# Patient Record
Sex: FEMALE | Race: WHITE | Employment: OTHER | URBAN - METROPOLITAN AREA
[De-identification: names, ages, dates, MRNs, and addresses within clinical notes are randomized per-mention and may not be internally consistent; named-entity substitution may affect disease eponyms.]

---

## 2017-02-23 RX ORDER — CHOLECALCIFEROL (VITAMIN D3) 125 MCG
5000 CAPSULE ORAL DAILY
Qty: 60 CAP | Refills: 1 | Status: SHIPPED | OUTPATIENT
Start: 2017-02-23 | End: 2017-04-13 | Stop reason: ALTCHOICE

## 2017-02-28 ENCOUNTER — OFFICE VISIT (OUTPATIENT)
Dept: INTERNAL MEDICINE CLINIC | Age: 62
End: 2017-02-28

## 2017-02-28 VITALS
OXYGEN SATURATION: 99 % | HEART RATE: 65 BPM | TEMPERATURE: 98 F | DIASTOLIC BLOOD PRESSURE: 69 MMHG | WEIGHT: 160.2 LBS | RESPIRATION RATE: 12 BRPM | BODY MASS INDEX: 25.75 KG/M2 | HEIGHT: 66 IN | SYSTOLIC BLOOD PRESSURE: 147 MMHG

## 2017-02-28 DIAGNOSIS — L30.9 DERMATITIS: Primary | ICD-10-CM

## 2017-02-28 NOTE — MR AVS SNAPSHOT
Visit Information Date & Time Provider Department Dept. Phone Encounter #  
 2/28/2017  8:15 AM Nela Allan NP Internist of [facility] [id number] Your Appointments 3/23/2017  8:05 AM  
LAB with [facility] NURSE VISIT Internist of [facility] ([facility]) Appt Note: labs 6mos. sarris; labs 6mos. sarris [address], [facility] [id number] [place] [address] [address]  
  
   
 [address], [address]  
  
    
 3/30/2017  1:00 PM  
Office Visit with Vince Goldberg, MD  
Internist of [facility] Appt Note: ov 6mos. sarris; 6 mos per bs  
 [address], [address]  
  
   
 [address], [address] Upcoming Health Maintenance Date Due Pneumococcal 19-64 Highest Risk (1 of 3 - PCV13) 4[birth year]8/1974 COLONOSCOPY 12/29/2014 FOOT EXAM Q1 12/15/2016 LIPID PANEL Q1 5/24/2017 PAP AKA CERVICAL CYTOLOGY 6/30/2017 HEMOGLOBIN A1C Q6M 7/18/2017 MICROALBUMIN Q1 9/7/2017 EYE EXAM RETINAL OR DILATED Q1 11/1/2017 BREAST CANCER SCRN MAMMOGRAM 1/16/2018 DTaP/Tdap/Td series (2 - Td) 6/25/2023 Allergies as of 2/28/2017  Review Complete On: 2/28/2017 By: Nela Allan NP Severity Noted Reaction Type Reactions Pcn [Penicillins]    Hives Sulfa (Sulfonamide Antibiotics)  04/13/2015    Nausea Only, Vertigo Current Immunizations  Reviewed on 9/14/2016 Name Date Influenza Vaccine (Quad) PF 9/14/2016 12:16 PM, 12/15/2015  9:44 AM  
 Influenza Vaccine PF 12/22/2014  2:01 PM  
 Influenza Vaccine Split 10/17/2012, 11/4/2011 Influenza Vaccine Whole 11/9/2010 Tdap 6/25/2013 Zoster Vaccine, Live 5/31/2016  2:05 PM  
  
 Not reviewed this visit You Were Diagnosed With   
  
 Codes Comments Dermatitis    -  Primary ICD-10-CM: L30.9 ICD-9-CM: 692.9 Vitals  BP  
  
  
  
  
  
 147/69 (BP 1 Location: Left arm, BP Patient Position: Sitting) BMI and BSA Data Body Mass Index Body Surface Area  
 26.25 kg/m 2 1.83 m 2 Preferred Pharmacy Pharmacy Name Phone [facility] [address] [place] [address] [place] [phone] Your Updated Medication List  
  
   
This list is accurate as of: 2/28/17  8:38 AM.  Always use your most recent med list.  
  
  
  
  
 ALPHAGAN P 0.1 % ophthalmic solution Generic drug:  brimonidine  
  
 aspirin delayed-release 81 mg tablet Take  by mouth daily. cholecalciferol 5,000 unit capsule Commonly known as:  VITAMIN D3 Take 1 Cap by mouth daily. Indications: VITAMIN D DEFICIENCY  
  
 ezetimibe 10 mg tablet Commonly known as:  Kamilla Thurstonda Take 1 Tab by mouth daily. glucose blood VI test strips strip Commonly known as:  ONETOUCH ULTRA TEST  
USE TO TEST BLOOD SUGAR FOUR TIMES A DAY HumaLOG Pen 100 unit/mL kwikpen Generic drug:  insulin lispro  
by SubCUTAneous route. Sliding scale as needed LANTUS SOLOSTAR 100 unit/mL (3 mL) pen Generic drug:  insulin glargine 23 Units by SubCUTAneous route daily. levothyroxine 112 mcg tablet Commonly known as:  SYNTHROID Take 1 Tab by mouth Daily (before breakfast). * quinapril 5 mg tablet Commonly known as:  ACCUPRIL Take 1 Tab by mouth nightly. Takes 5mg with 10mg every evening. * quinapril 10 mg tablet Commonly known as:  ACCUPRIL  
1 tab every evening along with a 5 mg tab VITAMIN B-12 500 mcg tablet Generic drug:  cyanocobalamin Take 500 mcg by mouth daily. * Notice: This list has 2 medication(s) that are the same as other medications prescribed for you. Read the directions carefully, and ask your doctor or other care provider to review them with you. Patient Instructions Health Maintenance Due Topic Date Due  
  Pneumococcal 19-64 Highest Risk (1 of 3 - PCV13) [birth year]08[birth year]1974  COLONOSCOPY  12/29/2014  
 FOOT EXAM Q1  12/15/2016 Dermatitis: Care Instructions Your Care Instructions Dermatitis is the general name used for any rash or inflammation of the skin. Different kinds of dermatitis cause different kinds of rashes. Common causes of a rash include new medicines, plants (such as poison oak or poison ivy), heat, and stress. Certain illnesses can also cause a rash. An allergic reaction to something that touches your skin, such as latex, nickel, or poison ivy, is called contact dermatitis. Contact dermatitis may also be caused by something that irritates the skin, such as bleach, a chemical, or soap. These types of rashes cannot be spread from person to person. How long your rash will last depends on what caused it. Rashes may last a few days or months. Follow-up care is a key part of your treatment and safety. Be sure to make and go to all appointments, and call your doctor if you are having problems. It's also a good idea to know your test results and keep a list of the medicines you take. How can you care for yourself at home? · Do not scratch the rash. Cut your nails short, and file them smooth. Or wear gloves if this helps keep you from scratching. · Wash the area with water only. Pat dry. · Put cold, wet cloths on the rash to reduce itching. · Keep cool, and stay out of the sun. · Leave the rash open to the air as much as possible. · If the rash itches, use hydrocortisone cream. Follow the directions on the label. Calamine lotion may help for plant rashes. · Take an over-the-counter antihistamine, such as diphenhydramine (Benadryl) or loratadine (Claritin), to help calm the itching. Read and follow all instructions on the label. · If your doctor prescribed a cream, use it as directed. If your doctor prescribed medicine, take it exactly as directed. When should you call for help? Call your doctor now or seek immediate medical care if: 
· You have symptoms of infection, such as: 
¨ Increased pain, swelling, warmth, or redness. ¨ Red streaks leading from the area. ¨ Pus draining from the area. ¨ A fever. · You have joint pain along with the rash. Watch closely for changes in your health, and be sure to contact your doctor if: 
· Your rash is changing or getting worse. · You are not getting better as expected. Where can you learn more? Go to http://laura-elsa.info/. Enter ([phone] in the search box to learn more about \"Dermatitis: Care Instructions. \" Current as of: May 10, 2016 Content Version: 11.1 © 5925-8308 [employer]. Care instructions adapted under license by "[employer] (which disclaims liability or warranty for this information). If you have questions about a medical condition or this instruction, always ask your healthcare professional. [place] [address] any warranty or liability for your use of this information. Introducing [facility]! Joselito Collins introduces 5skills patient portal. Now you can access parts of your medical record, email your doctor's office, and request medication refills online. 1. In your internet browser, go to [email or web address] 2. Click on the First Time User? Click Here link in the Sign In box. You will see the New Member Sign Up page. 3. Enter your 5skills Access Code exactly as it appears below. You will not need to use this code after youve completed the sign-up process. If you do not sign up before the expiration date, you must request a new code. · 5skills Access Code: [email or web address] Expires: 5/29/2017  8:38 AM 
 
4. Enter the last four digits of your Social Security Number (xxxx) and Date of Birth (mm/dd/yyyy) as indicated and click Submit. You will be taken to the next sign-up page. 5. Create a Ajungo ID. This will be your Ajungo login ID and cannot be changed, so think of one that is secure and easy to remember. 6. Create a Ajungo password. You can change your password at any time. 7. Enter your Password Reset Question and Answer. This can be used at a later time if you forget your password. 8. Enter your e-mail address. You will receive e-mail notification when new information is available in [address] 9. Click Sign Up. You can now view and download portions of your medical record. 10. Click the Download Summary menu link to download a portable copy of your medical information. If you have questions, please visit the Frequently Asked Questions section of the Ajungo website. Remember, Ajungo is NOT to be used for urgent needs. For medical emergencies, dial 911. Now available from your iPhone and Android! Please provide this summary of care documentation to your next provider. Your primary care clinician is listed as Dandre Pierce. If you have any questions after today's visit, please call [phone].

## 2017-02-28 NOTE — PROGRESS NOTES
HISTORY OF PRESENT ILLNESS  Jens Melara is a 64 y.o. female. Here today for acute visit. She reports about 1 week for congestion, sore throat, ear ache. This has since improved, she took nyquil, she states she is just about 100% recovered but she continues to have swelling under her right eye. No known injury to this area. No vision changes or drainage from eye. No eye redness. She does have chronic retinal problems which she follows with her ophthalmologist closely, but no acute changes related to this skin problem in the last 1 week. She states she has been using much make up on it, trying to cover it up. Has not been using any medications. No fevers, eating and drinking with no problems. No sick contacts. Skin Problem   The history is provided by the patient. This is a new problem. The current episode started more than 2 days ago. The problem occurs constantly. The problem has not changed since onset. Pertinent negatives include no chest pain, no abdominal pain, no headaches and no shortness of breath. She has tried nothing for the symptoms. Past Medical History:   Diagnosis Date    Diabetes mellitus type 1 ([address]     Diabetic peripheral neuropathy ([address]     Diabetic retinopathy ([address]     Essential hypertension with goal blood pressure less than 140/90     Hearing decreased     Hyperlipidemia     Hypothyroidism      Past Surgical History:   Procedure Laterality Date    HX GYN      partial hysterectomy    HX GYN      3 D and C's    HX HEENT      tonsillectomy    HX HEENT      cataract removal bilateral    HX HEENT      multiple surgeries for retinopathy    HX ORTHOPAEDIC      carpral tunnel sugery bilateral     Current Outpatient Prescriptions   Medication Sig    cholecalciferol (VITAMIN D3) 5,000 unit capsule Take 1 Cap by mouth daily. Indications: VITAMIN D DEFICIENCY    ezetimibe (ZETIA) 10 mg tablet Take 1 Tab by mouth daily.     quinapril (ACCUPRIL) 10 mg tablet 1 tab every evening along with a 5 mg tab    levothyroxine (SYNTHROID) 112 mcg tablet Take 1 Tab by mouth Daily (before breakfast).  quinapril (ACCUPRIL) 5 mg tablet Take 1 Tab by mouth nightly. Takes 5mg with 10mg every evening.  ALPHAGAN P 0.1 % ophthalmic solution     glucose blood VI test strips (ONE TOUCH ULTRA TEST) strip USE TO TEST BLOOD SUGAR FOUR TIMES A DAY    cyanocobalamin (VITAMIN B-12) 500 mcg tablet Take 500 mcg by mouth daily.  aspirin delayed-release 81 mg tablet Take  by mouth daily.  insulin glargine (LANTUS SOLOSTAR) 100 unit/mL (3 mL) pen 23 Units by SubCUTAneous route daily.  insulin lispro (HUMALOG PEN) 100 unit/mL kwikpen by SubCUTAneous route. Sliding scale as needed       No current facility-administered medications for this visit. Allergies and Intolerances: Allergies   Allergen Reactions    Pcn [Penicillins] Hives    Sulfa (Sulfonamide Antibiotics) Nausea Only and Vertigo     Family History:   Family History   Problem Relation Age of Onset    Diabetes Brother     Diabetes Mother     Heart Disease Mother         Social History:   She  reports that she quit smoking about 2 years ago. She smoked 1.00 pack per day. She has never used smokeless tobacco.  She  reports that she drinks alcohol. Vitals:   Visit Vitals    /69 (BP 1 Location: Left arm, BP Patient Position: Sitting)    Pulse 65    Temp 98 °F (36.7 °C) (Oral)    Resp 12    Ht 5' 5.5\" (1.664 m)    Wt 160 lb 3.2 oz (72.7 kg)    SpO2 99%    BMI 26.25 kg/m2        Body surface area is 1.83 meters squared. BP Readings from Last 3 Encounters:   02/28/17 147/69   09/14/16 132/60   05/31/16 124/62        Wt Readings from Last 3 Encounters:   02/28/17 160 lb 3.2 oz (72.7 kg)   09/14/16 172 lb (78 kg)   05/31/16 168 lb (76.2 kg)        Case and notes discussed with MA and reviewed with patient for accuracy.      I have personally reviewed and subsequently discussed with the MA any pertinent, preliminary and incomplete elements of the history related to the chief complaint that were recorded by the MA in the patients chart during the initial rooming of the patient. As I have explored the necessary and required elements in detail with the patient during my personal interview with them, I have personally verified, clarified, amended, added to and/or revised said elements based on information acquired during during the interview. Review of Systems   Constitutional: Negative for chills and fever. HENT: Positive for congestion. Negative for ear pain and sore throat. Congestion resolving. Eyes: Positive for blurred vision. Negative for pain, discharge and redness. Chronic vision changes, blurred vision, no change in the last 1 week. Respiratory: Negative for cough, shortness of breath and wheezing. Cardiovascular: Negative for chest pain and palpitations. Gastrointestinal: Negative for abdominal pain, constipation, diarrhea, nausea and vomiting. Genitourinary: Negative for dysuria. Skin: Negative for itching and rash. Neurological: Negative for seizures, loss of consciousness and headaches. Physical Exam   Constitutional: She appears well-developed and well-nourished. No distress. HENT:   Head: Normocephalic and atraumatic. Nose: Nose normal. No mucosal edema or rhinorrhea. Mouth/Throat: Uvula is midline, oropharynx is clear and moist and mucous membranes are normal. No posterior oropharyngeal edema or posterior oropharyngeal erythema. Eyes: Conjunctivae are normal. Pupils are equal, round, and reactive to light. Right eye exhibits no discharge. Left eye exhibits no discharge. Right conjunctiva is not injected. Right conjunctiva has no hemorrhage. Left conjunctiva is not injected. Left conjunctiva has no hemorrhage. No scleral icterus. Right eye exhibits normal extraocular motion. Left eye exhibits normal extraocular motion.        Patient with mascara and eye liner and cover up on today. No eye drainage, redness, crusting. Cardiovascular: Normal rate, regular rhythm and normal heart sounds. Pulmonary/Chest: Effort normal and breath sounds normal. No respiratory distress. She has no wheezes. Lymphadenopathy:     She has no cervical adenopathy. Neurological: She is alert. Skin: Skin is warm and dry. ASSESSMENT and PLAN    ICD-10-CM ICD-9-CM    1. Dermatitis: No sign of infection today. Does appear to be irritated, small healing skin tear or abrasion. Today discuss wound care to this sensitive area. Wash with mild soap, pat dry. White fragrance free lotion. Refrain from make up now. She did take a photo of this on her phone today for comparison. Discussed follow up in 1 week to reassess, she states she will call if needed. Discussed signs symptoms of infection, need for follow up, she agrees to plan. L30.9 692.9      Chiara Brunson was seen today for cold symptoms and eye swelling. Diagnoses and all orders for this visit:    Dermatitis      Follow-up Disposition:  Return in about 1 week (around 3/7/2017), or if symptoms worsen or fail to improve. The plan of care was discussed with the patient, who verbalizes understanding. Discussed all medications, side effects with patient. The patient is to follow up as scheduled and will report to the ED or the office if symptoms change or increase. The patient has voiced understanding and will comply to plan of care.

## 2017-02-28 NOTE — PROGRESS NOTES
Chief Complaint   Patient presents with    Cold Symptoms     x 1 week    Eye Swelling     x 1 week     1. Have you been to the ER, urgent care clinic since your last visit? Hospitalized since your last visit? No    2. Have you seen or consulted any other health care providers outside of the [address] [place] [address] since your last visit? Include any pap smears or colon screening.  No

## 2017-02-28 NOTE — PATIENT INSTRUCTIONS
Health Maintenance Due   Topic Date Due    Pneumococcal 19-64 Highest Risk (1 of 3 - PCV13) 04/08/1974    COLONOSCOPY  12/29/2014    FOOT EXAM Q1  12/15/2016          Dermatitis: Care Instructions  Your Care Instructions  Dermatitis is the general name used for any rash or inflammation of the skin. Different kinds of dermatitis cause different kinds of rashes. Common causes of a rash include new medicines, plants (such as poison oak or poison ivy), heat, and stress. Certain illnesses can also cause a rash. An allergic reaction to something that touches your skin, such as latex, nickel, or poison ivy, is called contact dermatitis. Contact dermatitis may also be caused by something that irritates the skin, such as bleach, a chemical, or soap. These types of rashes cannot be spread from person to person. How long your rash will last depends on what caused it. Rashes may last a few days or months. Follow-up care is a key part of your treatment and safety. Be sure to make and go to all appointments, and call your doctor if you are having problems. It's also a good idea to know your test results and keep a list of the medicines you take. How can you care for yourself at home? · Do not scratch the rash. Cut your nails short, and file them smooth. Or wear gloves if this helps keep you from scratching. · Wash the area with water only. Pat dry. · Put cold, wet cloths on the rash to reduce itching. · Keep cool, and stay out of the sun. · Leave the rash open to the air as much as possible. · If the rash itches, use hydrocortisone cream. Follow the directions on the label. Calamine lotion may help for plant rashes. · Take an over-the-counter antihistamine, such as diphenhydramine (Benadryl) or loratadine (Claritin), to help calm the itching. Read and follow all instructions on the label. · If your doctor prescribed a cream, use it as directed. If your doctor prescribed medicine, take it exactly as directed.   When should you call for help? Call your doctor now or seek immediate medical care if:  · You have symptoms of infection, such as:  ¨ Increased pain, swelling, warmth, or redness. ¨ Red streaks leading from the area. ¨ Pus draining from the area. ¨ A fever. · You have joint pain along with the rash. Watch closely for changes in your health, and be sure to contact your doctor if:  · Your rash is changing or getting worse. · You are not getting better as expected. Where can you learn more? Go to http://laura-elsa.info/. Enter (98) 8811 4307 in the search box to learn more about \"Dermatitis: Care Instructions. \"  Current as of: May 10, 2016  Content Version: 11.1  © 6019-4876 [employer]. Care instructions adapted under license by [employer] (which disclaims liability or warranty for this information). If you have questions about a medical condition or this instruction, always ask your healthcare professional. [place] [address] any warranty or liability for your use of this information.

## 2017-03-23 ENCOUNTER — HOSPITAL ENCOUNTER (OUTPATIENT)
Dept: LAB | Age: 62
Discharge: HOME | End: 2017-03-23
Payer: OTHER GOVERNMENT

## 2017-03-23 DIAGNOSIS — E78.5 HYPERLIPIDEMIA, UNSPECIFIED HYPERLIPIDEMIA TYPE: ICD-10-CM

## 2017-03-23 DIAGNOSIS — E03.4 HYPOTHYROIDISM DUE TO ACQUIRED ATROPHY OF THYROID: ICD-10-CM

## 2017-03-23 DIAGNOSIS — I10 ESSENTIAL HYPERTENSION WITH GOAL BLOOD PRESSURE LESS THAN 140/90: ICD-10-CM

## 2017-03-23 DIAGNOSIS — N18.30 TYPE 2 DIABETES MELLITUS WITH STAGE 3 CHRONIC KIDNEY DISEASE (HCC): ICD-10-CM

## 2017-03-23 DIAGNOSIS — E11.22 TYPE 2 DIABETES MELLITUS WITH STAGE 3 CHRONIC KIDNEY DISEASE (HCC): ICD-10-CM

## 2017-03-23 LAB
ALBUMIN SERPL BCP-MCNC: 3.3 G/DL (ref 3.4–5)
ALBUMIN/GLOB SERPL: 1.2 {RATIO} (ref 0.8–1.7)
ALP SERPL-CCNC: 62 U/L (ref 45–117)
ALT SERPL-CCNC: 16 U/L (ref 13–56)
ANION GAP BLD CALC-SCNC: 8 MMOL/L (ref 3–18)
APPEARANCE UR: CLEAR
AST SERPL W P-5'-P-CCNC: 13 U/L (ref 15–37)
BACTERIA URNS QL MICRO: NEGATIVE /HPF
BASOPHILS # BLD AUTO: 0 K/UL (ref 0–0.06)
BASOPHILS # BLD: 0 % (ref 0–2)
BILIRUB SERPL-MCNC: 0.3 MG/DL (ref 0.2–1)
BILIRUB UR QL: NEGATIVE
BUN SERPL-MCNC: 29 MG/DL (ref 7–18)
BUN/CREAT SERPL: 31 (ref 12–20)
CALCIUM SERPL-MCNC: 8.5 MG/DL (ref 8.5–10.1)
CHLORIDE SERPL-SCNC: 104 MMOL/L (ref 100–108)
CHOLEST SERPL-MCNC: 160 MG/DL
CO2 SERPL-SCNC: 26 MMOL/L (ref 21–32)
COLOR UR: YELLOW
CREAT SERPL-MCNC: 0.95 MG/DL (ref 0.6–1.3)
DIFFERENTIAL METHOD BLD: ABNORMAL
EOSINOPHIL # BLD: 0.1 K/UL (ref 0–0.4)
EOSINOPHIL NFR BLD: 1 % (ref 0–5)
EPITH CASTS URNS QL MICRO: ABNORMAL /LPF (ref 0–5)
ERYTHROCYTE [DISTWIDTH] IN BLOOD BY AUTOMATED COUNT: 12.1 % (ref 11.6–14.5)
GLOBULIN SER CALC-MCNC: 2.7 G/DL (ref 2–4)
GLUCOSE SERPL-MCNC: 138 MG/DL (ref 74–99)
GLUCOSE UR STRIP.AUTO-MCNC: NEGATIVE MG/DL
HCT VFR BLD AUTO: 32.1 % (ref 35–45)
HDLC SERPL-MCNC: 61 MG/DL (ref 40–60)
HDLC SERPL: 2.6 {RATIO} (ref 0–5)
HGB BLD-MCNC: 10.4 G/DL (ref 12–16)
HGB UR QL STRIP: NEGATIVE
KETONES UR QL STRIP.AUTO: NEGATIVE MG/DL
LDLC SERPL CALC-MCNC: 82.2 MG/DL (ref 0–100)
LEUKOCYTE ESTERASE UR QL STRIP.AUTO: ABNORMAL
LIPID PROFILE,FLP: ABNORMAL
LYMPHOCYTES # BLD AUTO: 10 % (ref 21–52)
LYMPHOCYTES # BLD: 0.7 K/UL (ref 0.9–3.6)
MCH RBC QN AUTO: 32.4 PG (ref 24–34)
MCHC RBC AUTO-ENTMCNC: 32.4 G/DL (ref 31–37)
MCV RBC AUTO: 100 FL (ref 74–97)
MONOCYTES # BLD: 0.5 K/UL (ref 0.05–1.2)
MONOCYTES NFR BLD AUTO: 6 % (ref 3–10)
NEUTS SEG # BLD: 6.1 K/UL (ref 1.8–8)
NEUTS SEG NFR BLD AUTO: 83 % (ref 40–73)
NITRITE UR QL STRIP.AUTO: NEGATIVE
PH UR STRIP: 5 [PH] (ref 5–8)
PLATELET # BLD AUTO: 220 K/UL (ref 135–420)
PMV BLD AUTO: 11.8 FL (ref 9.2–11.8)
POTASSIUM SERPL-SCNC: 4.4 MMOL/L (ref 3.5–5.5)
PROT SERPL-MCNC: 6 G/DL (ref 6.4–8.2)
PROT UR STRIP-MCNC: NEGATIVE MG/DL
RBC # BLD AUTO: 3.21 M/UL (ref 4.2–5.3)
RBC #/AREA URNS HPF: 0 /HPF (ref 0–5)
SODIUM SERPL-SCNC: 138 MMOL/L (ref 136–145)
SP GR UR REFRACTOMETRY: 1.02 (ref 1–1.03)
TRIGL SERPL-MCNC: 84 MG/DL (ref ?–150)
TSH SERPL DL<=0.05 MIU/L-ACNC: 0.19 UIU/ML (ref 0.36–3.74)
UROBILINOGEN UR QL STRIP.AUTO: 0.2 EU/DL (ref 0.2–1)
VLDLC SERPL CALC-MCNC: 16.8 MG/DL
WBC # BLD AUTO: 7.4 K/UL (ref 4.6–13.2)
WBC URNS QL MICRO: ABNORMAL /HPF (ref 0–4)

## 2017-03-23 PROCEDURE — 81001 URINALYSIS AUTO W/SCOPE: CPT | Performed by: INTERNAL MEDICINE

## 2017-03-23 PROCEDURE — 82306 VITAMIN D 25 HYDROXY: CPT | Performed by: INTERNAL MEDICINE

## 2017-03-23 PROCEDURE — 84443 ASSAY THYROID STIM HORMONE: CPT | Performed by: INTERNAL MEDICINE

## 2017-03-23 PROCEDURE — 82043 UR ALBUMIN QUANTITATIVE: CPT | Performed by: INTERNAL MEDICINE

## 2017-03-23 PROCEDURE — 36415 COLL VENOUS BLD VENIPUNCTURE: CPT | Performed by: INTERNAL MEDICINE

## 2017-03-23 PROCEDURE — 80061 LIPID PANEL: CPT | Performed by: INTERNAL MEDICINE

## 2017-03-23 PROCEDURE — 85025 COMPLETE CBC W/AUTO DIFF WBC: CPT | Performed by: INTERNAL MEDICINE

## 2017-03-23 PROCEDURE — 80053 COMPREHEN METABOLIC PANEL: CPT | Performed by: INTERNAL MEDICINE

## 2017-03-24 LAB
25(OH)D3 SERPL-MCNC: 53.2 NG/ML (ref 30–100)
CREAT UR-MCNC: 142.36 MG/DL (ref 30–125)
MICROALBUMIN UR-MCNC: 3.2 MG/DL (ref 0–3)
MICROALBUMIN/CREAT UR-RTO: 22 MG/G (ref 0–30)

## 2017-03-26 RX ORDER — QUINAPRIL 10 MG/1
TABLET ORAL
Qty: 90 TAB | Refills: 0 | Status: SHIPPED | OUTPATIENT
Start: 2017-03-26 | End: 2017-06-24 | Stop reason: SDUPTHER

## 2017-04-13 ENCOUNTER — HOSPITAL ENCOUNTER (OUTPATIENT)
Dept: LAB | Age: 62
Discharge: HOME | End: 2017-04-13
Payer: OTHER GOVERNMENT

## 2017-04-13 ENCOUNTER — OFFICE VISIT (OUTPATIENT)
Dept: INTERNAL MEDICINE CLINIC | Age: 62
End: 2017-04-13

## 2017-04-13 VITALS
OXYGEN SATURATION: 100 % | BODY MASS INDEX: 26.82 KG/M2 | TEMPERATURE: 98.4 F | WEIGHT: 161 LBS | HEIGHT: 65 IN | DIASTOLIC BLOOD PRESSURE: 62 MMHG | SYSTOLIC BLOOD PRESSURE: 136 MMHG | HEART RATE: 68 BPM

## 2017-04-13 DIAGNOSIS — E03.9 ACQUIRED HYPOTHYROIDISM: ICD-10-CM

## 2017-04-13 DIAGNOSIS — D64.9 ANEMIA, UNSPECIFIED TYPE: ICD-10-CM

## 2017-04-13 DIAGNOSIS — E78.5 HYPERLIPIDEMIA, UNSPECIFIED HYPERLIPIDEMIA TYPE: ICD-10-CM

## 2017-04-13 DIAGNOSIS — Z23 ENCOUNTER FOR IMMUNIZATION: ICD-10-CM

## 2017-04-13 DIAGNOSIS — N18.30 TYPE 1 DIABETES MELLITUS WITH STAGE 3 CHRONIC KIDNEY DISEASE (HCC): Primary | ICD-10-CM

## 2017-04-13 DIAGNOSIS — Z12.2 ENCOUNTER FOR SCREENING FOR LUNG CANCER: ICD-10-CM

## 2017-04-13 DIAGNOSIS — I10 ESSENTIAL HYPERTENSION: ICD-10-CM

## 2017-04-13 DIAGNOSIS — Z12.31 SCREENING MAMMOGRAM, ENCOUNTER FOR: ICD-10-CM

## 2017-04-13 DIAGNOSIS — N18.30 CHRONIC KIDNEY DISEASE, STAGE III (MODERATE) (HCC): ICD-10-CM

## 2017-04-13 DIAGNOSIS — Z87.891 FORMER SMOKER: ICD-10-CM

## 2017-04-13 DIAGNOSIS — E10.42 TYPE 1 DIABETES MELLITUS WITH DIABETIC POLYNEUROPATHY (HCC): ICD-10-CM

## 2017-04-13 DIAGNOSIS — E10.3553 STABLE PROLIFERATIVE DIABETIC RETINOPATHY OF BOTH EYES ASSOCIATED WITH TYPE 1 DIABETES MELLITUS (HCC): ICD-10-CM

## 2017-04-13 DIAGNOSIS — E10.22 TYPE 1 DIABETES MELLITUS WITH STAGE 3 CHRONIC KIDNEY DISEASE (HCC): Primary | ICD-10-CM

## 2017-04-13 LAB
BASOPHILS # BLD AUTO: 0 K/UL (ref 0–0.06)
BASOPHILS # BLD: 0 % (ref 0–2)
DIFFERENTIAL METHOD BLD: ABNORMAL
EOSINOPHIL # BLD: 0.1 K/UL (ref 0–0.4)
EOSINOPHIL NFR BLD: 0 % (ref 0–5)
ERYTHROCYTE [DISTWIDTH] IN BLOOD BY AUTOMATED COUNT: 12.2 % (ref 11.6–14.5)
FERRITIN SERPL-MCNC: 136 NG/ML (ref 8–388)
FOLATE SERPL-MCNC: 2.9 NG/ML (ref 3.1–17.5)
HCT VFR BLD AUTO: 33.7 % (ref 35–45)
HGB BLD-MCNC: 10.9 G/DL (ref 12–16)
IRON SATN MFR SERPL: 8 %
IRON SERPL-MCNC: 17 UG/DL (ref 50–175)
LYMPHOCYTES # BLD AUTO: 17 % (ref 21–52)
LYMPHOCYTES # BLD: 1.9 K/UL (ref 0.9–3.6)
MCH RBC QN AUTO: 32 PG (ref 24–34)
MCHC RBC AUTO-ENTMCNC: 32.3 G/DL (ref 31–37)
MCV RBC AUTO: 98.8 FL (ref 74–97)
MONOCYTES # BLD: 1.3 K/UL (ref 0.05–1.2)
MONOCYTES NFR BLD AUTO: 11 % (ref 3–10)
NEUTS SEG # BLD: 7.9 K/UL (ref 1.8–8)
NEUTS SEG NFR BLD AUTO: 72 % (ref 40–73)
PLATELET # BLD AUTO: 236 K/UL (ref 135–420)
PMV BLD AUTO: 12.2 FL (ref 9.2–11.8)
RBC # BLD AUTO: 3.41 M/UL (ref 4.2–5.3)
T4 FREE SERPL-MCNC: 1.5 NG/DL (ref 0.7–1.5)
TIBC SERPL-MCNC: 220 UG/DL (ref 250–450)
TSH SERPL DL<=0.05 MIU/L-ACNC: 0.17 UIU/ML (ref 0.36–3.74)
VIT B12 SERPL-MCNC: >2000 PG/ML (ref 211–911)
WBC # BLD AUTO: 11.1 K/UL (ref 4.6–13.2)

## 2017-04-13 PROCEDURE — 85025 COMPLETE CBC W/AUTO DIFF WBC: CPT | Performed by: INTERNAL MEDICINE

## 2017-04-13 PROCEDURE — 84439 ASSAY OF FREE THYROXINE: CPT | Performed by: INTERNAL MEDICINE

## 2017-04-13 PROCEDURE — 83540 ASSAY OF IRON: CPT | Performed by: INTERNAL MEDICINE

## 2017-04-13 PROCEDURE — 82728 ASSAY OF FERRITIN: CPT | Performed by: INTERNAL MEDICINE

## 2017-04-13 PROCEDURE — 84443 ASSAY THYROID STIM HORMONE: CPT | Performed by: INTERNAL MEDICINE

## 2017-04-13 PROCEDURE — 82607 VITAMIN B-12: CPT | Performed by: INTERNAL MEDICINE

## 2017-04-13 RX ORDER — DIPHENHYDRAMINE HCL 25 MG
25 TABLET ORAL
COMMUNITY
End: 2017-10-19 | Stop reason: ALTCHOICE

## 2017-04-13 RX ORDER — DOCUSATE SODIUM 100 MG/1
100 CAPSULE, LIQUID FILLED ORAL 2 TIMES DAILY
COMMUNITY
End: 2017-10-19 | Stop reason: ALTCHOICE

## 2017-04-13 RX ORDER — ACETAMINOPHEN 500 MG
2000 TABLET ORAL DAILY
Qty: 30 CAP | Refills: 5 | Status: SHIPPED | OUTPATIENT
Start: 2017-04-13 | End: 2017-09-26 | Stop reason: SDUPTHER

## 2017-04-13 RX ORDER — ONDANSETRON 4 MG/1
4 TABLET, ORALLY DISINTEGRATING ORAL
COMMUNITY
End: 2020-08-07

## 2017-04-13 RX ORDER — LORATADINE 10 MG
10 TABLET,DISINTEGRATING ORAL DAILY
Qty: 30 TAB | Refills: 5 | Status: SHIPPED | OUTPATIENT
Start: 2017-04-13 | End: 2020-08-07

## 2017-04-13 RX ORDER — MECLIZINE HYDROCHLORIDE 25 MG/1
TABLET ORAL
COMMUNITY
End: 2020-08-07

## 2017-04-13 NOTE — MR AVS SNAPSHOT
Visit Information Date & Time Provider Department Dept. Phone Encounter #  
 4/13/2017 10:00 AM Telma Luke MD Internist of [facility]  Follow-up Instructions Return in about 6 months (around 10/13/2017), or if symptoms worsen or fail to improve. Your Appointments 10/12/2017  8:00 AM  
LAB with C NURSE VISIT Internist of [facility] ([address]) Appt Note: lab  
 [address], [address] An[address] [place]  
  
   
 [address], [address]  
  
    
 10/19/2017  9:00 AM  
Office Visit with Telma Luke MD  
Internist of [address]) Appt Note: 6 months [address], [address] [place] [address] [place]  
  
   
 [address], [address] Upcoming Health Maintenance Date Due Pneumococcal 19-64 Medium Risk (1 of 1 - PPSV23) 4/8/1974 COLONOSCOPY 12/29/2014 FOOT EXAM Q1 12/15/2016 PAP AKA CERVICAL CYTOLOGY 6/30/2017 HEMOGLOBIN A1C Q6M 7/18/2017 EYE EXAM RETINAL OR DILATED Q1 11/1/2017 BREAST CANCER SCRN MAMMOGRAM 1/16/2018 MICROALBUMIN Q1 3/23/2018 LIPID PANEL Q1 3/23/2018 DTaP/Tdap/Td series (2 - Td) 6/25/2023 Allergies as of 4/13/2017  Review Complete On: 4/13/2017 By: Staci Austin Severity Noted Reaction Type Reactions Pcn [Penicillins]    Hives Sulfa (Sulfonamide Antibiotics)  04/13/2015    Nausea Only, Vertigo Current Immunizations  Reviewed on 9/14/2016 Name Date Influenza Vaccine (Quad) PF 9/14/2016 12:16 PM, 12/15/2015  9:44 AM  
 Influenza Vaccine PF 12/22/2014  2:01 PM  
 Influenza Vaccine Split 10/17/2012, 11/4/2011 Influenza Vaccine Whole 11/9/2010 Pneumococcal Polysaccharide (PPSV-23)  Incomplete Tdap 6/25/2013 Zoster Vaccine, Live 5/31/2016  2:05 PM  
  
 Not reviewed this visit You Were Diagnosed With   
  
 Codes Comments Anemia, unspecified type    -  Primary ICD-10-CM: D64.9 ICD-9-CM: 285.9 Acquired hypothyroidism     ICD-10-CM: E03.9 ICD-9-CM: 244.9 Encounter for immunization     ICD-10-CM: A22 ICD-9-CM: V03.89 Vitals BP Pulse Temp Height(growth percentile) Weight(growth percentile) SpO2  
 136/62 68 98.4 °F (36.9 °C) (Oral) 5' 5\" (1.651 m) 161 lb (73 kg) 100% BMI OB Status Smoking Status 26.79 kg/m2 Hysterectomy Former Smoker Vitals History BMI and BSA Data Body Mass Index Body Surface Area  
 26.79 kg/m 2 1.83 m 2 Preferred Pharmacy Pharmacy Name Phone [address] [facility] [phone] Your Updated Medication List  
  
   
This list is accurate as of: 4/13/17 11:40 AM.  Always use your most recent med list.  
  
  
  
  
 ALPHAGAN P 0.1 % ophthalmic solution Generic drug:  brimonidine  
  
 aspirin delayed-release 81 mg tablet Take  by mouth daily. BENADRYL ALLERGY 25 mg tablet Generic drug:  diphenhydrAMINE Take 25 mg by mouth every six (6) hours as needed for Sleep.  
  
 bisacodyl 5 mg Tab Take  by mouth. cholecalciferol 5,000 unit capsule Commonly known as:  VITAMIN D3 Take 1 Cap by mouth daily. Indications: VITAMIN D DEFICIENCY  
  
 docusate sodium 100 mg capsule Commonly known as:  López Heads Take 100 mg by mouth two (2) times a day.  
  
 ezetimibe 10 mg tablet Commonly known as:  Marijean Socks Take 1 Tab by mouth daily. glucose blood VI test strips strip Commonly known as:  ONETOUCH ULTRA TEST  
USE TO TEST BLOOD SUGAR FOUR TIMES A DAY HumaLOG Pen 100 unit/mL kwikpen Generic drug:  insulin lispro  
by SubCUTAneous route. Sliding scale as needed LANTUS SOLOSTAR 100 unit/mL (3 mL) pen Generic drug:  insulin glargine 23 Units by SubCUTAneous route daily. levothyroxine 112 mcg tablet Commonly known as:  SYNTHROID  
 Take 1 Tab by mouth Daily (before breakfast). meclizine 25 mg tablet Commonly known as:  ANTIVERT Take  by mouth three (3) times daily as needed. ondansetron 4 mg disintegrating tablet Commonly known as:  ZOFRAN ODT Take 4 mg by mouth every eight (8) hours as needed for Nausea. * quinapril 5 mg tablet Commonly known as:  ACCUPRIL Take 1 Tab by mouth nightly. Takes 5mg with 10mg every evening. * quinapril 10 mg tablet Commonly known as:  ACCUPRIL  
TAKE 1 TABLET EVERY EVENING ALONG WITH A 5 MG TABLET  
  
 VITAMIN B-12 500 mcg tablet Generic drug:  cyanocobalamin Take 500 mcg by mouth daily. * Notice: This list has 2 medication(s) that are the same as other medications prescribed for you. Read the directions carefully, and ask your doctor or other care provider to review them with you. We Performed the Following PNEUMOCOCCAL POLYSACCHARIDE VACCINE, 23-VALENT, ADULT OR IMMUNOSUPPRESSED PT DOSE, [85001 CPT(R)] Follow-up Instructions Return in about 6 months (around 10/13/2017), or if symptoms worsen or fail to improve. Patient Instructions Advance Directives: Care Instructions Your Care Instructions An advance directive is a legal way to state your wishes at the end of your life. It tells your family and your doctor what to do if you can no longer say what you want. There are two main types of advance directives. You can change them any time that your wishes change. · A living will tells your family and your doctor your wishes about life support and other treatment. · A durable power of  for health care lets you name a person to make treatment decisions for you when you can't speak for yourself. This person is called a health care agent. If you do not have an advance directive, decisions about your medical care may be made by a doctor or a  who doesn't know you. It may help to think of an advance directive as a gift to the people who care for you. If you have one, they won't have to make tough decisions by themselves. Follow-up care is a key part of your treatment and safety. Be sure to make and go to all appointments, and call your doctor if you are having problems. It's also a good idea to know your test results and keep a list of the medicines you take. How can you care for yourself at home? · Discuss your wishes with your loved ones and your doctor. This way, there are no surprises. · Many states have a unique form. Or you might use a universal form that has been approved by many states. This kind of form can sometimes be completed and stored online. Your electronic copy will then be available wherever you have a connection to the Internet. In most cases, doctors will respect your wishes even if you have a form from a different state. · You don't need a  to do an advance directive. But you may want to get legal advice. · Think about these questions when you prepare an advance directive: ¨ Who do you want to make decisions about your medical care if you are not able to? Many people choose a family member or close friend. ¨ Do you know enough about life support methods that might be used? If not, talk to your doctor so you understand. ¨ What are you most afraid of that might happen? You might be afraid of having pain, losing your independence, or being kept alive by machines. ¨ Where would you prefer to die? Choices include your home, a hospital, or a nursing home. ¨ Would you like to have information about hospice care to support you and your family? ¨ Do you want to donate organs when you die? ¨ Do you want certain Jainism practices performed before you die? If so, put your wishes in the advance directive. · Read your advance directive every year, and make changes as needed. When should you call for help? Be sure to contact your doctor if you have any questions. Where can you learn more? Go to http://laura-elsa.info/. Enter R264 in the search box to learn more about \"Advance Directives: Care Instructions. \" Current as of: November 17, 2016 Content Version: 11.2 © 4522-2121 ShareSDK. Care instructions adapted under license by [employer] (which disclaims liability or warranty for this information). If you have questions about a medical condition or this instruction, always ask your healthcare professional. [address] any warranty or liability for your use of this information. Managing Your Allergies: Care Instructions Your Care Instructions Managing your allergies is an important part of staying healthy. Your doctor will help you find out what may be causing the allergies. Common causes of allergy symptoms are house dust and dust mites, animal dander, mold, and pollen. As soon as you know what triggers your symptoms, try to reduce your exposure to your triggers. This can help prevent allergy symptoms, asthma, and other health problems. Ask your doctor about allergy medicine or immunotherapy. These treatments may help reduce or prevent allergy symptoms. Follow-up care is a key part of your treatment and safety. Be sure to make and go to all appointments, and call your doctor if you are having problems. It's also a good idea to know your test results and keep a list of the medicines you take. How can you care for yourself at home? · If you think that dust or dust mites are causing your allergies: 
¨ Wash sheets, pillowcases, and other bedding every week in hot water. ¨ Use airtight, dust-proof covers for pillows, duvets, and mattresses. Avoid plastic covers, because they tend to tear quickly and do not \"breathe. \" Wash according to the instructions. ¨ Remove extra blankets and pillows that you don't need. ¨ Use blankets that are machine-washable. ¨ Don't use home humidifiers. They can help mites live longer. · Use air-conditioning. Change or clean all filters every month. Keep windows closed. Use high-efficiency air filters. Don't use window or attic fans, which draw dust into the air. · If you're allergic to pet dander, keep pets outside or, at the very least, out of your bedroom. Old carpet and cloth-covered furniture can hold a lot of animal dander. You may need to replace them. · Look for signs of cockroaches. Use cockroach baits to get rid of them. Then clean your home well. · If you're allergic to mold, don't keep indoor plants, because molds can grow in soil. Get rid of furniture, rugs, and drapes that smell musty. Check for mold in the bathroom. · If you're allergic to pollen, stay inside when pollen counts are high. · Don't smoke or let anyone else smoke in your house. Don't use fireplaces or wood-burning stoves. Avoid paint fumes, perfumes, and other strong odors. When should you call for help? Give an epinephrine shot if: 
· You think you are having a severe allergic reaction. · You have symptoms in more than one body area, such as mild nausea and an itchy mouth. After giving an epinephrine shot call 911, even if you feel better. Call 911 if: 
· You have symptoms of a severe allergic reaction. These may include: 
¨ Sudden raised, red areas (hives) all over your body. ¨ Swelling of the throat, mouth, lips, or tongue. ¨ Trouble breathing. ¨ Passing out (losing consciousness). Or you may feel very lightheaded or suddenly feel weak, confused, or restless. · You have been given an epinephrine shot, even if you feel better. Call your doctor now or seek immediate medical care if: 
· You have symptoms of an allergic reaction, such as: ¨ A rash or hives (raised, red areas on the skin). ¨ Itching. ¨ Swelling. ¨ Belly pain, nausea, or vomiting. Watch closely for changes in your health, and be sure to contact your doctor if: 
· Your allergies get worse. · You need help controlling your allergies. · You have questions about allergy testing. · You do not get better as expected. Where can you learn more? Go to http://laura-elsa.info/. Enter L249 in the search box to learn more about \"Managing Your Allergies: Care Instructions. \" Current as of: February 12, 2016 Content Version: 11.2 © 9634-8217 iHealth. Care instructions adapted under license by M[employer] Pixel (which disclaims liability or warranty for this information). If you have questions about a medical condition or this instruction, always ask your healthcare professional. [address] any warranty or liability for your use of this information. Introducing [facility]! Pita Castro introduces Crysalin patient portal. Now you can access parts of your medical record, email your doctor's office, and request medication refills online. 1. In your internet browser, go to [email or web address] 2. Click on the First Time User? Click Here link in the Sign In box. You will see the New Member Sign Up page. 3. Enter your Crysalin Access Code exactly as it appears below. You will not need to use this code after youve completed the sign-up process. If you do not sign up before the expiration date, you must request a new code. · Crysalin Access Code: [email or web address] Expires: 5/29/2017  9:38 AM 
 
4. Enter the last four digits of your Social Security Number (xxxx) and Date of Birth (mm/dd/yyyy) as indicated and click Submit. You will be taken to the next sign-up page. 5. Create a Crysalin ID. This will be your Crysalin login ID and cannot be changed, so think of one that is secure and easy to remember. 6. Create a Crysalin password. You can change your password at any time. 7. Enter your Password Reset Question and Answer. This can be used at a later time if you forget your password. 8. Enter your e-mail address. You will receive e-mail notification when new information is available in [address] 9. Click Sign Up. You can now view and download portions of your medical record. 10. Click the Download Summary menu link to download a portable copy of your medical information. If you have questions, please visit the Frequently Asked Questions section of the Common Curriculum website. Remember, Common Curriculum is NOT to be used for urgent needs. For medical emergencies, dial 911. Now available from your iPhone and Android! Please provide this summary of care documentation to your next provider. Your primary care clinician is listed as Mai Joy. If you have any questions after today's visit, please call [phone].

## 2017-04-13 NOTE — PROGRESS NOTES
1. Have you been to the ER, urgent care clinic or hospitalized since your last visit? YES.     2. Have you seen or consulted any other health care providers outside of the [address] [place] [address] since your last visit (Include any pap smears or colon screening)? YES      Do you have an Advanced Directive? NO    Would you like information on Advanced Directives? YES  Pt following up from [facility] in March 2017 for dizziness and vomiting.

## 2017-04-13 NOTE — PATIENT INSTRUCTIONS
Advance Directives: Care Instructions  Your Care Instructions  An advance directive is a legal way to state your wishes at the end of your life. It tells your family and your doctor what to do if you can no longer say what you want. There are two main types of advance directives. You can change them any time that your wishes change. · A living will tells your family and your doctor your wishes about life support and other treatment. · A durable power of  for health care lets you name a person to make treatment decisions for you when you can't speak for yourself. This person is called a health care agent. If you do not have an advance directive, decisions about your medical care may be made by a doctor or a  who doesn't know you. It may help to think of an advance directive as a gift to the people who care for you. If you have one, they won't have to make tough decisions by themselves. Follow-up care is a key part of your treatment and safety. Be sure to make and go to all appointments, and call your doctor if you are having problems. It's also a good idea to know your test results and keep a list of the medicines you take. How can you care for yourself at home? · Discuss your wishes with your loved ones and your doctor. This way, there are no surprises. · Many states have a unique form. Or you might use a universal form that has been approved by many states. This kind of form can sometimes be completed and stored online. Your electronic copy will then be available wherever you have a connection to the Internet. In most cases, doctors will respect your wishes even if you have a form from a different state. · You don't need a  to do an advance directive. But you may want to get legal advice. · Think about these questions when you prepare an advance directive:  ¨ Who do you want to make decisions about your medical care if you are not able to?  Many people choose a family member or close friend. ¨ Do you know enough about life support methods that might be used? If not, talk to your doctor so you understand. ¨ What are you most afraid of that might happen? You might be afraid of having pain, losing your independence, or being kept alive by machines. ¨ Where would you prefer to die? Choices include your home, a hospital, or a nursing home. ¨ Would you like to have information about hospice care to support you and your family? ¨ Do you want to donate organs when you die? ¨ Do you want certain Nondenominational practices performed before you die? If so, put your wishes in the advance directive. · Read your advance directive every year, and make changes as needed. When should you call for help? Be sure to contact your doctor if you have any questions. Where can you learn more? Go to http://lauraMobiveryelsa.info/. Enter R264 in the search box to learn more about \"Advance Directives: Care Instructions. \"  Current as of: November 17, 2016  Content Version: 11.2  © 1856-6978 Anna Lozabai. Care instructions adapted under license by "[employer]Steel[employer]" (which disclaims liability or warranty for this information). If you have questions about a medical condition or this instruction, always ask your healthcare professional. [address] any warranty or liability for your use of this information. Managing Your Allergies: Care Instructions  Your Care Instructions  Managing your allergies is an important part of staying healthy. Your doctor will help you find out what may be causing the allergies. Common causes of allergy symptoms are house dust and dust mites, animal dander, mold, and pollen. As soon as you know what triggers your symptoms, try to reduce your exposure to your triggers. This can help prevent allergy symptoms, asthma, and other health problems. Ask your doctor about allergy medicine or immunotherapy.  These treatments may help reduce or prevent allergy symptoms. Follow-up care is a key part of your treatment and safety. Be sure to make and go to all appointments, and call your doctor if you are having problems. It's also a good idea to know your test results and keep a list of the medicines you take. How can you care for yourself at home? · If you think that dust or dust mites are causing your allergies:  ¨ Wash sheets, pillowcases, and other bedding every week in hot water. ¨ Use airtight, dust-proof covers for pillows, duvets, and mattresses. Avoid plastic covers, because they tend to tear quickly and do not \"breathe. \" Wash according to the instructions. ¨ Remove extra blankets and pillows that you don't need. ¨ Use blankets that are machine-washable. ¨ Don't use home humidifiers. They can help mites live longer. · Use air-conditioning. Change or clean all filters every month. Keep windows closed. Use high-efficiency air filters. Don't use window or attic fans, which draw dust into the air. · If you're allergic to pet dander, keep pets outside or, at the very least, out of your bedroom. Old carpet and cloth-covered furniture can hold a lot of animal dander. You may need to replace them. · Look for signs of cockroaches. Use cockroach baits to get rid of them. Then clean your home well. · If you're allergic to mold, don't keep indoor plants, because molds can grow in soil. Get rid of furniture, rugs, and drapes that smell musty. Check for mold in the bathroom. · If you're allergic to pollen, stay inside when pollen counts are high. · Don't smoke or let anyone else smoke in your house. Don't use fireplaces or wood-burning stoves. Avoid paint fumes, perfumes, and other strong odors. When should you call for help? Give an epinephrine shot if:  · You think you are having a severe allergic reaction. · You have symptoms in more than one body area, such as mild nausea and an itchy mouth.   After giving an epinephrine shot call 911, even if you feel better. Call 911 if:  · You have symptoms of a severe allergic reaction. These may include:  ¨ Sudden raised, red areas (hives) all over your body. ¨ Swelling of the throat, mouth, lips, or tongue. ¨ Trouble breathing. ¨ Passing out (losing consciousness). Or you may feel very lightheaded or suddenly feel weak, confused, or restless. · You have been given an epinephrine shot, even if you feel better. Call your doctor now or seek immediate medical care if:  · You have symptoms of an allergic reaction, such as:  ¨ A rash or hives (raised, red areas on the skin). ¨ Itching. ¨ Swelling. ¨ Belly pain, nausea, or vomiting. Watch closely for changes in your health, and be sure to contact your doctor if:  · Your allergies get worse. · You need help controlling your allergies. · You have questions about allergy testing. · You do not get better as expected. Where can you learn more? Go to http://laura-elsa.info/. Enter L249 in the search box to learn more about \"Managing Your Allergies: Care Instructions. \"  Current as of: February 12, 2016  Content Version: 11.2  © 7118-0859 [employer]. Care instructions adapted under license by [employer] (which disclaims liability or warranty for this information). If you have questions about a medical condition or this instruction, always ask your healthcare professional. [place] [address] any warranty or liability for your use of this information.

## 2017-04-14 ENCOUNTER — TELEPHONE (OUTPATIENT)
Dept: INTERNAL MEDICINE CLINIC | Age: 62
End: 2017-04-14

## 2017-04-14 DIAGNOSIS — E03.9 ACQUIRED HYPOTHYROIDISM: Primary | ICD-10-CM

## 2017-04-14 RX ORDER — LEVOTHYROXINE SODIUM 100 UG/1
100 TABLET ORAL
Qty: 30 TAB | Refills: 2 | Status: SHIPPED | OUTPATIENT
Start: 2017-04-14 | End: 2017-08-04 | Stop reason: SDUPTHER

## 2017-04-14 NOTE — LETTER
4/27/2017 1:45 PM 
 
Ms. Sourav Terry [address] [address] Ms. Cooley, Our office has attempted to contact you by phone and we haven't received a call back. Please contact us to schedule a lab appt in 8 weeks to recheck your thyroid. Also, note that your last labs regarding your thyroid indicate that we need you to decrease your Levothyroxine to 100mcg daily. This was already sent to your pharmacy. Additional labs also indicate that you are anemic, although your blood count is stable so you are not iron deficient. Please continue taking a multivitamin. Sincerely, Mary Kay Aviles MD

## 2017-04-14 NOTE — TELEPHONE ENCOUNTER
Please let the patient know that the lab work drawn at her visit showed that she is getting too high a dose of Synthroid. Please let her know that she should decrease her dose to 100 mcg. New script sent to [facility]. She will need to have her TSH checked again in 8 weeks. Please schedule lab appointment. Lab order placed. Other labs showed that she is anemic, although her blood count is stable and she is not iron deficient. Please make sure she is taking a multivitamin. She was slightly low on folate.

## 2017-04-16 PROBLEM — D64.9 ANEMIA: Status: ACTIVE | Noted: 2017-04-16

## 2017-04-16 NOTE — PROGRESS NOTES
HPI:   Gisell Morales is a 58y.o. year old female who presents today for evaluation of hypertension, hyperlipidemia, diabetes mellitus type 1, chronic renal disease stage 3, and hypothyroidism. She reports that she has had difficulty over the last few weeks with upper respiratory tract infection, prompting a visit to [facility] on 3/24/2017, and she was prescribed doxycycline and prednisone. She subsequently developed severe dizziness and vomiting, prompting a visit to the [facility] [place] [facility] [place]. She was diagnosed with benign positional vertigo and treated with meclizine and Zofran with improvement. She reports today that she continues to have mild postnasal drainage and a non-productive cough. She reports mild facial pain, but denies any fevers, chills, myalgias, ear pain, or sore throat. She has been taking Benadryl with some relief. She is otherwise without complaints. She successfully stopped smoking in 1/2015, and reports that she continues to abstain. She has a history of diabetes mellitus type 1, with onset at age 6. She is currently under the care of Dr. Tayler Hoffman, and is being treated with insulin glargine and lispro. She has not been well-controlled recently, with HbA1c ranging from 7.5-8.0. She does have proliferative diabetic retinopathy and is s/p pan-retinal photocoagulation therapy in both eyes. She is followed closely by Dr. Cain Nolen, and recent exam showed regression bilaterally. She also has peripheral neuropathy, with burning and tingling in her feet at night. Renal function had been normal until 5/2016, with evidence of chronic renal disease, stage 3, since that time with baseline creatinine1.0-1.04/ eGFR 54-56. She also has a history of hypothyroidism, and she is currently on Synthroid. She denies cold intolerance, hair or skin changes. She has a history of hypertension, treated with quinapril.  She had a pharmacologic nuclear stress test in 11/2012, which showed no evidence for ischemia, and normal LV wall motion (EF> 70%). She also had a carotid duplex scan in 7/2013 which showed mild (< 50%) bilateral internal carotid artery stenoses. She denies any chest pain, shortness of breath at rest or with exertion, palpitations, lightheadedness, or edema. She has a history of hyperlipidemia, and was on atorvastatin for many years until 10/2015 when she discontinued it because of myalgias. She was recently tried on rosuvastatin without success due to recurrent myalgias. She was started on ezetimibe in 9/2016 and has been tolerating it without difficulty. She had a screening colonoscopy in 9/2014 by Dr. Reyes Carreno, which found a 6 mm sessile polyp in the descending colon and a 4-5 mm sessile polyp in the rectum/sigmoid (pathology: tubular adenomas). Recommendation was for follow-up colonoscopy in 5 years. She denies any abdominal pain, nausea, vomiting, melena, hematochezia, or change in bowel movements. Past Medical History:   Diagnosis Date    Chronic renal disease, stage III     Diabetes mellitus type 1 ([address]     Diabetic peripheral neuropathy (HCC)     Diabetic retinopathy ([address]     Essential hypertension with goal blood pressure less than 140/90     Hearing decreased     Hyperlipidemia     Hypothyroidism      Past Surgical History:   Procedure Laterality Date    HX GYN      partial hysterectomy    HX GYN      3 D and C's    HX HEENT      tonsillectomy    HX HEENT      cataract removal bilateral    HX HEENT      multiple surgeries for retinopathy    HX ORTHOPAEDIC      carpral tunnel sugery bilateral     Current Outpatient Prescriptions   Medication Sig    bisacodyl 5 mg tab Take  by mouth.  docusate sodium (COLACE) 100 mg capsule Take 100 mg by mouth two (2) times a day.  meclizine (ANTIVERT) 25 mg tablet Take  by mouth three (3) times daily as needed.     ondansetron (ZOFRAN ODT) 4 mg disintegrating tablet Take 4 mg by mouth every eight (8) hours as needed for Nausea.  diphenhydrAMINE (BENADRYL ALLERGY) 25 mg tablet Take 25 mg by mouth every six (6) hours as needed for Sleep.  Cholecalciferol, Vitamin D3, (VITAMIN D3) 2,000 unit cap capsule Take 2,000 Units by mouth daily.  loratadine (CLARITIN REDITABS) 10 mg dissolvable tablet Take 1 Tab by mouth daily.  quinapril (ACCUPRIL) 10 mg tablet TAKE 1 TABLET EVERY EVENING ALONG WITH A 5 MG TABLET    ezetimibe (ZETIA) 10 mg tablet Take 1 Tab by mouth daily.  quinapril (ACCUPRIL) 5 mg tablet Take 1 Tab by mouth nightly. Takes 5mg with 10mg every evening.  ALPHAGAN P 0.1 % ophthalmic solution     glucose blood VI test strips (ONE TOUCH ULTRA TEST) strip USE TO TEST BLOOD SUGAR FOUR TIMES A DAY    cyanocobalamin (VITAMIN B-12) 500 mcg tablet Take 500 mcg by mouth daily.  aspirin delayed-release 81 mg tablet Take  by mouth daily.  insulin glargine (LANTUS SOLOSTAR) 100 unit/mL (3 mL) pen 23 Units by SubCUTAneous route daily.  insulin lispro (HUMALOG PEN) 100 unit/mL kwikpen by SubCUTAneous route. Sliding scale as needed      levothyroxine (SYNTHROID) 100 mcg tablet Take 1 Tab by mouth Daily (before breakfast). No current facility-administered medications for this visit. Allergies and Intolerances: Allergies   Allergen Reactions    Pcn [Penicillins] Hives    Sulfa (Sulfonamide Antibiotics) Nausea Only and Vertigo     Family History: Grandmother  from colon cancer. Mother had CAD and DM. Family History   Problem Relation Age of Onset    Diabetes Brother     Diabetes Mother     Heart Disease Mother      Social History:   She  reports that she quit smoking about 2 years ago. She smoked 1.00 pack per day. She has never used smokeless tobacco. She is  and lives with her . 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   History   Alcohol Use    Yes     Comment: infrequently     Immunization History:  Immunization History Administered Date(s) Administered    Influenza Vaccine (Quad) PF 12/15/2015, 09/14/2016    Influenza Vaccine PF 12/22/2014    Influenza Vaccine Split 11/04/2011, 10/17/2012    Influenza Vaccine Whole 11/09/2010    Pneumococcal Polysaccharide (PPSV-23) 04/13/2017    Tdap 06/25/2013    Zoster Vaccine, Live 05/31/2016       Review of Systems:   As above included in HPI. Otherwise 11 point review of systems negative including constitutional, skin, HENT, eyes, respiratory, cardiovascular, gastrointestinal, genitourinary, musculoskeletal, endo/heme/aller, neurological.    Physical:   Vitals:   BP: 136/62  HR: 68  WT: 161 lb (73 kg)  BMI:  26.79 kg/m2    Exam:   Patient appears in no apparent distress. Affect is appropriate. HEENT --Anicteric sclerae, tympanic membranes normal,  ear canals normal.  PERRL, EOMI, conjunctiva and lids normal.   Sinuses were nontender, turbinates normal, hearing normal.  Oropharynx without  erythema, normal tongue, oral mucosa and tonsils. No cervical lymphadenopathy. No thyromegaly, JVD, or bruits. Carotid pulses 2+ with normal upstroke. Lungs --Clear to auscultation. No wheezing or rales. Heart --Regular rate and rhythm, no murmurs, rubs, gallops, or clicks. Breasts -- no masses, skin dimpling, asymmetry, nipple discharge, nodules, axillary lymphadenopathy. Chest wall --Nontender to palpation. PMI normal.  Abdomen -- Soft and nontender, no hepatosplenomegaly or masses. Extremities -- Without cyanosis, clubbing, edema. 2+ pulses equally and bilaterally.   Normal looking digits, ROM intact  Neuro -- CN 2-12 intact, strength 5/5 with intact soft touch in all extremities, cerebellar  exam finger to nose test normal  Derm  no obvious abnormalities noted, no rash    Review of Data:  Labs:   Hospital Outpatient Visit on 03/23/2017   Component Date Value Ref Range Status    LIPID PROFILE 03/23/2017        Final    Cholesterol, total 03/23/2017 160  <200 MG/DL Final    Triglyceride 03/23/2017 84  <150 MG/DL Final    HDL Cholesterol 03/23/2017 61* 40 - 60 MG/DL Final    LDL, calculated 03/23/2017 82.2  0 - 100 MG/DL Final    VLDL, calculated 03/23/2017 16.8  MG/DL Final    CHOL/HDL Ratio 03/23/2017 2.6  0 - 5.0   Final    TSH 03/23/2017 0.19* 0.36 - 3.74 uIU/mL Final    WBC 03/23/2017 7.4  4.6 - 13.2 K/uL Final    RBC 03/23/2017 3.21* 4.20 - 5.30 M/uL Final    HGB 03/23/2017 10.4* 12.0 - 16.0 g/dL Final    HCT 03/23/2017 32.1* 35.0 - 45.0 % Final    MCV 03/23/2017 100.0* 74.0 - 97.0 FL Final    MCH 03/23/2017 32.4  24.0 - 34.0 PG Final    MCHC 03/23/2017 32.4  31.0 - 37.0 g/dL Final    RDW 03/23/2017 12.1  11.6 - 14.5 % Final    PLATELET 08/44/3077 719  135 - 420 K/uL Final    MPV 03/23/2017 11.8  9.2 - 11.8 FL Final    NEUTROPHILS 03/23/2017 83* 40 - 73 % Final    LYMPHOCYTES 03/23/2017 10* 21 - 52 % Final    MONOCYTES 03/23/2017 6  3 - 10 % Final    EOSINOPHILS 03/23/2017 1  0 - 5 % Final    BASOPHILS 03/23/2017 0  0 - 2 % Final    ABS. NEUTROPHILS 03/23/2017 6.1  1.8 - 8.0 K/UL Final    ABS. LYMPHOCYTES 03/23/2017 0.7* 0.9 - 3.6 K/UL Final    ABS. MONOCYTES 03/23/2017 0.5  0.05 - 1.2 K/UL Final    ABS. EOSINOPHILS 03/23/2017 0.1  0.0 - 0.4 K/UL Final    ABS.  BASOPHILS 03/23/2017 0.0  0.0 - 0.06 K/UL Final    DF 03/23/2017 AUTOMATED    Final    Sodium 03/23/2017 138  136 - 145 mmol/L Final    Potassium 03/23/2017 4.4  3.5 - 5.5 mmol/L Final    Chloride 03/23/2017 104  100 - 108 mmol/L Final    CO2 03/23/2017 26  21 - 32 mmol/L Final    Anion gap 03/23/2017 8  3.0 - 18 mmol/L Final    Glucose 03/23/2017 138* 74 - 99 mg/dL Final    BUN 03/23/2017 29* 7.0 - 18 MG/DL Final    Creatinine 03/23/2017 0.95  0.6 - 1.3 MG/DL Final    BUN/Creatinine ratio 03/23/2017 31* 12 - 20   Final    GFR est AA 03/23/2017 >60  >60 ml/min/1.73m2 Final    GFR est non-AA 03/23/2017 60* >60 ml/min/1.73m2 Final    Calcium 03/23/2017 8.5  8.5 - 10.1 MG/DL Final    Bilirubin, total 03/23/2017 0.3  0.2 - 1.0 MG/DL Final    ALT (SGPT) 03/23/2017 16  13 - 56 U/L Final    AST (SGOT) 03/23/2017 13* 15 - 37 U/L Final    Alk. phosphatase 03/23/2017 62  45 - 117 U/L Final    Protein, total 03/23/2017 6.0* 6.4 - 8.2 g/dL Final    Albumin 03/23/2017 3.3* 3.4 - 5.0 g/dL Final    Globulin 03/23/2017 2.7  2.0 - 4.0 g/dL Final    A-G Ratio 03/23/2017 1.2  0.8 - 1.7   Final    Microalbumin,urine random 03/23/2017 3.20* 0 - 3.0 MG/DL Final    Creatinine, urine 03/23/2017 142.36* 30 - 125 mg/dL Final    Microalbumin/Creat ratio (mg/g cre* 03/23/2017 22  0 - 30 mg/g Final    Color 03/23/2017 YELLOW    Final    Appearance 03/23/2017 CLEAR    Final    Specific gravity 03/23/2017 1.019  1.005 - 1.030   Final    pH (UA) 03/23/2017 5.0  5.0 - 8.0   Final    Protein 03/23/2017 NEGATIVE   NEG mg/dL Final    Glucose 03/23/2017 NEGATIVE   NEG mg/dL Final    Ketone 03/23/2017 NEGATIVE   NEG mg/dL Final    Bilirubin 03/23/2017 NEGATIVE   NEG   Final    Blood 03/23/2017 NEGATIVE   NEG   Final    Urobilinogen 03/23/2017 0.2  0.2 - 1.0 EU/dL Final    Nitrites 03/23/2017 NEGATIVE   NEG   Final    Leukocyte Esterase 03/23/2017 MODERATE* NEG   Final    WBC 03/23/2017 4 to 8  0 - 4 /hpf Final    RBC 03/23/2017 0  0 - 5 /hpf Final    Epithelial cells 03/23/2017 FEW  0 - 5 /lpf Final    Bacteria 03/23/2017 NEGATIVE   NEG /hpf Final    Vitamin D 25-Hydroxy 03/23/2017 53.2  30 - 100 ng/mL Final     Health Maintenance:  Screening:    Mammogram: negative (1/2016)   PAP smear: negative (6/30/2014). S/p YUNG, no further screening needed. Colorectal: colonsocopy (9/2014) tubular adenomas. Dr. Nadine Jefferson. Due 2019. Depression: none   DM (HbA1c/FPG): HbA1c 7.7 (1/2017)   Hepatitis C: negative (12/2015)   Falls: none    DEXA: N/A    Glaucoma: mild primary open angle glaucoma and proliferative diabetic retinopathy. Followed by Dr. Nadine Jefferson (last 11/2016).    Smoking: stopped 1/2015; 30 pack year.   Vitamin D: 53.2 (3/2017)   Medicare Wellness: N/A    Impression:  Patient Active Problem List   Diagnosis Code    Stable proliferative diabetic retinopathy of both eyes associated with type 1 diabetes mellitus ([facility] O63.4297    Hyperlipidemia E78.5    Hearing decreased H91.90    Diabetes mellitus with diabetic polyneuropathy ([facility] E11.42    Vitamin D deficiency E55.9    Ex-cigarette smoker (30 pack year), stopped 1/2015 Z87.891    Colon polyp, colonoscopy due 2019 K63.5    Essential hypertension I10    Hypothyroidism due to acquired atrophy of thyroid E03.4    Chronic kidney disease, stage III (moderate) N18.3    Type 1 diabetes mellitus with stage 3 chronic kidney disease (HCC) E10.22, N18.3    Anemia D64.9       Plan:  1. Hypertension. Well controlled on quinapril. Renal function stable with creatinine 0.95/ eGFR 60. Continue to follow. 2. Type 1 diabetes mellitus. HbA1c with some improvement to 7.7. May be attributable to significant weight loss of 11 pounds since 9/2016. On Lantus and Humalog. Being managed by endocrinology. Proliferative diabetic retinopathy in regression, followed by Dr. Renaldo Morgan. Neuropathy symptoms in feet are mild and have not required treatment with medication. Foot exam performed by endocrine. Renal function with evidence of chronic renal disease, stage 3, but no evidence of microalbuminuria. On Ace-I, but not able to tolerate statin. Follow closely. 3. Hyperlipidemia. On ezetimibe 10 mg qday with significant improvement in LDL to 82. Not able to tolerate statin (atorvastatin/ rosuvastatin) due to myalgias. Stressed importance of lifestyle modifications, especially diet, exercise and weight loss. 4. Chronic renal disease, stage 3. Evidence of mild disease with creatinine 1.00-1.04/ eGFR 54-56 since 5/2016. Most likely due to long standing diabetes mellitus and hypertension. Remaining stable. On Ace-I, but not on statin as discussed above.  Counseled on avoiding NSAIDS and prerenal status. 5. Hypothyroidism. TSH with evidence of over replacement on recent labs on current Synthroid dose. Labs performed during acute illness. Will repeat to confirm and adjust dosage if needed. Continue to follow. 6. H/O cigarette smoking. 30 pack year history and patient stopped in 1/2015. Meets criteria for lung cancer screening with low dose CT scan. Patient agreeable. Will order. 7. Anemia. Noted on recent lab work, but during acute illness. Will repeat, and check iron studies, B12 and folate levels. Colonoscopy 9/2014. S/p YUNG. Further evaluation pending lab results. 8. Allergy symptoms. Patient feels URI symptoms resolved and lingering symptoms due to allergies. Will prescribe trial of Claritin. 9. Health maintenance. Encouraged to continue with abstaining from smoking. Already received influenza vaccine. Will give pneumovax as patient high risk. Other immunizations up to date. Mammogram due and order placed. Colonoscopy due 2018. Vitamin D level now normal. Instructed to decrease to maintenance dose. Patient understands recommendations and agrees with plan. Follow-up appointment in 6 months.

## 2017-04-17 NOTE — TELEPHONE ENCOUNTER
[address] to notify that she will need to decrease Synthroid to 100mcg daily and have thyroid labs rechecked in 8 weeks. Needs a lab appt scheduled. Also verify that she is taking her multivitamin as she is anemic, but not iron deficient.

## 2017-05-06 ENCOUNTER — HOSPITAL ENCOUNTER (OUTPATIENT)
Dept: MAMMOGRAPHY | Age: 62
Discharge: HOME | End: 2017-05-06
Attending: INTERNAL MEDICINE
Payer: OTHER GOVERNMENT

## 2017-05-06 DIAGNOSIS — Z12.31 SCREENING MAMMOGRAM, ENCOUNTER FOR: ICD-10-CM

## 2017-05-06 PROCEDURE — 77067 SCR MAMMO BI INCL CAD: CPT

## 2017-06-24 RX ORDER — QUINAPRIL 10 MG/1
TABLET ORAL
Qty: 90 TAB | Refills: 2 | Status: SHIPPED | OUTPATIENT
Start: 2017-06-24 | End: 2017-08-04 | Stop reason: SDUPTHER

## 2017-07-05 ENCOUNTER — HOSPITAL ENCOUNTER (OUTPATIENT)
Dept: LAB | Age: 62
Discharge: HOME | End: 2017-07-05
Payer: OTHER GOVERNMENT

## 2017-07-05 LAB
ALBUMIN SERPL BCP-MCNC: 3.7 G/DL (ref 3.4–5)
ALBUMIN/GLOB SERPL: 1.4 {RATIO} (ref 0.8–1.7)
ALP SERPL-CCNC: 62 U/L (ref 45–117)
ALT SERPL-CCNC: 18 U/L (ref 13–56)
ANION GAP BLD CALC-SCNC: 8 MMOL/L (ref 3–18)
APPEARANCE UR: CLEAR
AST SERPL W P-5'-P-CCNC: 13 U/L (ref 15–37)
BACTERIA URNS QL MICRO: ABNORMAL /HPF
BASOPHILS # BLD AUTO: 0 K/UL (ref 0–0.06)
BASOPHILS # BLD: 0 % (ref 0–2)
BILIRUB SERPL-MCNC: 0.3 MG/DL (ref 0.2–1)
BILIRUB UR QL: NEGATIVE
BUN SERPL-MCNC: 24 MG/DL (ref 7–18)
BUN/CREAT SERPL: 24 (ref 12–20)
CALCIUM SERPL-MCNC: 8.9 MG/DL (ref 8.5–10.1)
CHLORIDE SERPL-SCNC: 97 MMOL/L (ref 100–108)
CHOLEST SERPL-MCNC: 187 MG/DL
CO2 SERPL-SCNC: 28 MMOL/L (ref 21–32)
COLOR UR: YELLOW
CREAT SERPL-MCNC: 0.99 MG/DL (ref 0.6–1.3)
DIFFERENTIAL METHOD BLD: ABNORMAL
EOSINOPHIL # BLD: 0 K/UL (ref 0–0.4)
EOSINOPHIL NFR BLD: 0 % (ref 0–5)
EPITH CASTS URNS QL MICRO: ABNORMAL /LPF (ref 0–5)
ERYTHROCYTE [DISTWIDTH] IN BLOOD BY AUTOMATED COUNT: 12.6 % (ref 11.6–14.5)
GLOBULIN SER CALC-MCNC: 2.7 G/DL (ref 2–4)
GLUCOSE SERPL-MCNC: 159 MG/DL (ref 74–99)
GLUCOSE UR STRIP.AUTO-MCNC: NEGATIVE MG/DL
HCT VFR BLD AUTO: 34.4 % (ref 35–45)
HDLC SERPL-MCNC: 79 MG/DL (ref 40–60)
HDLC SERPL: 2.4 {RATIO} (ref 0–5)
HGB BLD-MCNC: 11.5 G/DL (ref 12–16)
HGB UR QL STRIP: NEGATIVE
KETONES UR QL STRIP.AUTO: NEGATIVE MG/DL
LDLC SERPL CALC-MCNC: 90.6 MG/DL (ref 0–100)
LEUKOCYTE ESTERASE UR QL STRIP.AUTO: ABNORMAL
LIPID PROFILE,FLP: ABNORMAL
LYMPHOCYTES # BLD AUTO: 23 % (ref 21–52)
LYMPHOCYTES # BLD: 1.5 K/UL (ref 0.9–3.6)
MCH RBC QN AUTO: 32.5 PG (ref 24–34)
MCHC RBC AUTO-ENTMCNC: 33.4 G/DL (ref 31–37)
MCV RBC AUTO: 97.2 FL (ref 74–97)
MONOCYTES # BLD: 0.5 K/UL (ref 0.05–1.2)
MONOCYTES NFR BLD AUTO: 7 % (ref 3–10)
NEUTS SEG # BLD: 4.8 K/UL (ref 1.8–8)
NEUTS SEG NFR BLD AUTO: 70 % (ref 40–73)
NITRITE UR QL STRIP.AUTO: NEGATIVE
PH UR STRIP: 7.5 [PH] (ref 5–8)
PLATELET # BLD AUTO: 259 K/UL (ref 135–420)
PMV BLD AUTO: 11.8 FL (ref 9.2–11.8)
POTASSIUM SERPL-SCNC: 5.5 MMOL/L (ref 3.5–5.5)
PROT SERPL-MCNC: 6.4 G/DL (ref 6.4–8.2)
PROT UR STRIP-MCNC: NEGATIVE MG/DL
RBC # BLD AUTO: 3.54 M/UL (ref 4.2–5.3)
RBC #/AREA URNS HPF: 0 /HPF (ref 0–5)
SODIUM SERPL-SCNC: 133 MMOL/L (ref 136–145)
SP GR UR REFRACTOMETRY: 1.01 (ref 1–1.03)
TRIGL SERPL-MCNC: 87 MG/DL (ref ?–150)
TSH SERPL DL<=0.05 MIU/L-ACNC: 0.58 UIU/ML (ref 0.36–3.74)
UROBILINOGEN UR QL STRIP.AUTO: 0.2 EU/DL (ref 0.2–1)
VLDLC SERPL CALC-MCNC: 17.4 MG/DL
WBC # BLD AUTO: 6.8 K/UL (ref 4.6–13.2)
WBC URNS QL MICRO: ABNORMAL /HPF (ref 0–4)

## 2017-07-05 PROCEDURE — 81001 URINALYSIS AUTO W/SCOPE: CPT | Performed by: INTERNAL MEDICINE

## 2017-07-05 PROCEDURE — 36415 COLL VENOUS BLD VENIPUNCTURE: CPT | Performed by: INTERNAL MEDICINE

## 2017-07-05 PROCEDURE — 84443 ASSAY THYROID STIM HORMONE: CPT | Performed by: INTERNAL MEDICINE

## 2017-07-05 PROCEDURE — 80061 LIPID PANEL: CPT | Performed by: INTERNAL MEDICINE

## 2017-07-05 PROCEDURE — 80053 COMPREHEN METABOLIC PANEL: CPT | Performed by: INTERNAL MEDICINE

## 2017-07-05 PROCEDURE — 85025 COMPLETE CBC W/AUTO DIFF WBC: CPT | Performed by: INTERNAL MEDICINE

## 2017-07-05 PROCEDURE — 82306 VITAMIN D 25 HYDROXY: CPT | Performed by: INTERNAL MEDICINE

## 2017-07-06 LAB — 25(OH)D3 SERPL-MCNC: 54.9 NG/ML (ref 30–100)

## 2017-07-07 ENCOUNTER — TELEPHONE (OUTPATIENT)
Dept: INTERNAL MEDICINE CLINIC | Age: 62
End: 2017-07-07

## 2017-07-07 DIAGNOSIS — E03.4 HYPOTHYROIDISM DUE TO ACQUIRED ATROPHY OF THYROID: ICD-10-CM

## 2017-07-07 DIAGNOSIS — D64.9 ANEMIA, UNSPECIFIED TYPE: ICD-10-CM

## 2017-07-07 DIAGNOSIS — N18.30 TYPE 1 DIABETES MELLITUS WITH STAGE 3 CHRONIC KIDNEY DISEASE (HCC): ICD-10-CM

## 2017-07-07 DIAGNOSIS — E10.22 TYPE 1 DIABETES MELLITUS WITH STAGE 3 CHRONIC KIDNEY DISEASE (HCC): ICD-10-CM

## 2017-07-07 DIAGNOSIS — I10 ESSENTIAL HYPERTENSION: Primary | ICD-10-CM

## 2017-07-07 NOTE — TELEPHONE ENCOUNTER
Please let the patient know that her TSH is improved on her current dose of Synthroid. She should continue on current dose and we will recheck prior to her next visit in October. Other labs drawn showed stable blood count, cholesterol, and liver function. Vitamin D level is normal. Urinalysis showed evidence of possible infection with large leukocyte esterase, 20-30 WBC, and 1+ bacteria. Please inquire if she is having any symptoms, and if so, request that she give sample for urine culture. Thank you.

## 2017-07-11 ENCOUNTER — TELEPHONE (OUTPATIENT)
Dept: INTERNAL MEDICINE CLINIC | Age: 62
End: 2017-07-11

## 2017-07-11 NOTE — TELEPHONE ENCOUNTER
Pt returned call from 7/7/17. Gave her complete message and she was pleased with her results. She stated she has no urinary complaints at this time, no symptoms. I told her if she does start experiencing any symptoms to certainly call us back. She voiced understanding.

## 2017-07-11 NOTE — TELEPHONE ENCOUNTER
Juvencio Rodriguez 20 minutes ago (12:53 PM)                 Pt returned call from 7/7/17. Gave her complete message and she was pleased with her results. She stated she has no urinary complaints at this time, no symptoms. I told her if she does start experiencing any symptoms to certainly call us back. She voiced understanding.

## 2017-07-27 RX ORDER — CHOLECALCIFEROL (VITAMIN D3) 125 MCG
CAPSULE ORAL
Qty: 60 CAP | Refills: 0 | Status: SHIPPED | OUTPATIENT
Start: 2017-07-27 | End: 2017-09-26 | Stop reason: ALTCHOICE

## 2017-08-04 RX ORDER — LEVOTHYROXINE SODIUM 100 UG/1
TABLET ORAL
Qty: 30 TAB | Refills: 5 | Status: SHIPPED | OUTPATIENT
Start: 2017-08-04 | End: 2017-10-19 | Stop reason: SDUPTHER

## 2017-08-04 RX ORDER — QUINAPRIL 5 1/1
TABLET ORAL
Qty: 90 TAB | Refills: 3 | Status: SHIPPED | OUTPATIENT
Start: 2017-08-04 | End: 2018-05-09 | Stop reason: SDUPTHER

## 2017-09-09 RX ORDER — EZETIMIBE 10 MG
TABLET ORAL
Qty: 90 TAB | Refills: 3 | Status: SHIPPED | OUTPATIENT
Start: 2017-09-09 | End: 2018-08-17 | Stop reason: SDUPTHER

## 2017-09-18 RX ORDER — CHOLECALCIFEROL (VITAMIN D3) 125 MCG
CAPSULE ORAL
Qty: 60 CAP | Refills: 0 | OUTPATIENT
Start: 2017-09-18

## 2017-09-25 RX ORDER — CHOLECALCIFEROL (VITAMIN D3) 125 MCG
CAPSULE ORAL
Qty: 60 CAP | Refills: 0 | OUTPATIENT
Start: 2017-09-25

## 2017-09-26 ENCOUNTER — TELEPHONE (OUTPATIENT)
Dept: INTERNAL MEDICINE CLINIC | Age: 62
End: 2017-09-26

## 2017-09-26 RX ORDER — ACETAMINOPHEN 500 MG
2000 TABLET ORAL DAILY
Qty: 30 CAP | Refills: 5 | Status: SHIPPED | OUTPATIENT
Start: 2017-09-26

## 2017-09-26 NOTE — TELEPHONE ENCOUNTER
Patient returning call said Karyle Simone could send in the Vitamin D to [facility] in [address] Phone #[phone]

## 2017-09-26 NOTE — TELEPHONE ENCOUNTER
Please let patient know that she should be taking 2000 U of Vitamin D daily given normal level at last visit. Does she need refill of this and if so, which pharmacy should it be sent? Last time it was sent to [facility]. Thanks.

## 2017-10-12 ENCOUNTER — HOSPITAL ENCOUNTER (OUTPATIENT)
Dept: LAB | Age: 62
Discharge: HOME | End: 2017-10-12
Payer: OTHER GOVERNMENT

## 2017-10-12 DIAGNOSIS — E03.4 HYPOTHYROIDISM DUE TO ACQUIRED ATROPHY OF THYROID: ICD-10-CM

## 2017-10-12 DIAGNOSIS — E10.22 TYPE 1 DIABETES MELLITUS WITH STAGE 3 CHRONIC KIDNEY DISEASE (HCC): ICD-10-CM

## 2017-10-12 DIAGNOSIS — I10 ESSENTIAL HYPERTENSION: ICD-10-CM

## 2017-10-12 DIAGNOSIS — D64.9 ANEMIA, UNSPECIFIED TYPE: ICD-10-CM

## 2017-10-12 DIAGNOSIS — N18.30 TYPE 1 DIABETES MELLITUS WITH STAGE 3 CHRONIC KIDNEY DISEASE (HCC): ICD-10-CM

## 2017-10-12 LAB
ANION GAP SERPL CALC-SCNC: 9 MMOL/L (ref 3–18)
BASOPHILS # BLD: 0 K/UL (ref 0–0.06)
BASOPHILS NFR BLD: 0 % (ref 0–2)
BUN SERPL-MCNC: 16 MG/DL (ref 7–18)
BUN/CREAT SERPL: 19 (ref 12–20)
CALCIUM SERPL-MCNC: 9 MG/DL (ref 8.5–10.1)
CHLORIDE SERPL-SCNC: 103 MMOL/L (ref 100–108)
CO2 SERPL-SCNC: 26 MMOL/L (ref 21–32)
CREAT SERPL-MCNC: 0.84 MG/DL (ref 0.6–1.3)
DIFFERENTIAL METHOD BLD: ABNORMAL
EOSINOPHIL # BLD: 0 K/UL (ref 0–0.4)
EOSINOPHIL NFR BLD: 0 % (ref 0–5)
ERYTHROCYTE [DISTWIDTH] IN BLOOD BY AUTOMATED COUNT: 12.3 % (ref 11.6–14.5)
GLUCOSE SERPL-MCNC: 122 MG/DL (ref 74–99)
HCT VFR BLD AUTO: 34.9 % (ref 35–45)
HGB BLD-MCNC: 11.3 G/DL (ref 12–16)
LYMPHOCYTES # BLD: 1.2 K/UL (ref 0.9–3.6)
LYMPHOCYTES NFR BLD: 26 % (ref 21–52)
MCH RBC QN AUTO: 32.1 PG (ref 24–34)
MCHC RBC AUTO-ENTMCNC: 32.4 G/DL (ref 31–37)
MCV RBC AUTO: 99.1 FL (ref 74–97)
MONOCYTES # BLD: 0.4 K/UL (ref 0.05–1.2)
MONOCYTES NFR BLD: 8 % (ref 3–10)
NEUTS SEG # BLD: 3.2 K/UL (ref 1.8–8)
NEUTS SEG NFR BLD: 66 % (ref 40–73)
PLATELET # BLD AUTO: 243 K/UL (ref 135–420)
PMV BLD AUTO: 12.4 FL (ref 9.2–11.8)
POTASSIUM SERPL-SCNC: 4.1 MMOL/L (ref 3.5–5.5)
RBC # BLD AUTO: 3.52 M/UL (ref 4.2–5.3)
SODIUM SERPL-SCNC: 138 MMOL/L (ref 136–145)
T4 FREE SERPL-MCNC: 1.5 NG/DL (ref 0.7–1.5)
TSH SERPL DL<=0.05 MIU/L-ACNC: 0.25 UIU/ML (ref 0.36–3.74)
WBC # BLD AUTO: 4.8 K/UL (ref 4.6–13.2)

## 2017-10-12 PROCEDURE — 36415 COLL VENOUS BLD VENIPUNCTURE: CPT | Performed by: INTERNAL MEDICINE

## 2017-10-12 PROCEDURE — 85025 COMPLETE CBC W/AUTO DIFF WBC: CPT | Performed by: INTERNAL MEDICINE

## 2017-10-12 PROCEDURE — 84443 ASSAY THYROID STIM HORMONE: CPT | Performed by: INTERNAL MEDICINE

## 2017-10-12 PROCEDURE — 80048 BASIC METABOLIC PNL TOTAL CA: CPT | Performed by: INTERNAL MEDICINE

## 2017-10-12 PROCEDURE — 84439 ASSAY OF FREE THYROXINE: CPT | Performed by: INTERNAL MEDICINE

## 2017-10-19 ENCOUNTER — OFFICE VISIT (OUTPATIENT)
Dept: INTERNAL MEDICINE CLINIC | Age: 62
End: 2017-10-19

## 2017-10-19 VITALS
DIASTOLIC BLOOD PRESSURE: 68 MMHG | HEART RATE: 67 BPM | SYSTOLIC BLOOD PRESSURE: 118 MMHG | BODY MASS INDEX: 26.99 KG/M2 | RESPIRATION RATE: 14 BRPM | TEMPERATURE: 98.5 F | WEIGHT: 162 LBS | HEIGHT: 65 IN | OXYGEN SATURATION: 98 %

## 2017-10-19 DIAGNOSIS — I10 ESSENTIAL HYPERTENSION: Primary | ICD-10-CM

## 2017-10-19 DIAGNOSIS — E03.4 HYPOTHYROIDISM DUE TO ACQUIRED ATROPHY OF THYROID: ICD-10-CM

## 2017-10-19 DIAGNOSIS — E10.22 TYPE 1 DIABETES MELLITUS WITH STAGE 3 CHRONIC KIDNEY DISEASE (HCC): ICD-10-CM

## 2017-10-19 DIAGNOSIS — Z12.2 ENCOUNTER FOR SCREENING FOR LUNG CANCER: ICD-10-CM

## 2017-10-19 DIAGNOSIS — N18.30 CHRONIC KIDNEY DISEASE, STAGE III (MODERATE) (HCC): ICD-10-CM

## 2017-10-19 DIAGNOSIS — E10.3553 STABLE PROLIFERATIVE DIABETIC RETINOPATHY OF BOTH EYES ASSOCIATED WITH TYPE 1 DIABETES MELLITUS (HCC): ICD-10-CM

## 2017-10-19 DIAGNOSIS — E10.42 TYPE 1 DIABETES MELLITUS WITH DIABETIC POLYNEUROPATHY (HCC): ICD-10-CM

## 2017-10-19 DIAGNOSIS — N18.30 TYPE 1 DIABETES MELLITUS WITH STAGE 3 CHRONIC KIDNEY DISEASE (HCC): ICD-10-CM

## 2017-10-19 DIAGNOSIS — Z23 ENCOUNTER FOR IMMUNIZATION: ICD-10-CM

## 2017-10-19 DIAGNOSIS — E78.5 HYPERLIPIDEMIA, UNSPECIFIED HYPERLIPIDEMIA TYPE: ICD-10-CM

## 2017-10-19 DIAGNOSIS — D63.8 ANEMIA IN OTHER CHRONIC DISEASES CLASSIFIED ELSEWHERE: ICD-10-CM

## 2017-10-19 DIAGNOSIS — I45.9 SKIPPED BEATS: ICD-10-CM

## 2017-10-19 RX ORDER — LEVOTHYROXINE SODIUM 100 UG/1
100 TABLET ORAL
Qty: 90 TAB | Refills: 2 | Status: SHIPPED | OUTPATIENT
Start: 2017-10-19 | End: 2018-04-26 | Stop reason: SDUPTHER

## 2017-10-19 NOTE — MR AVS SNAPSHOT
Visit Information Date & Time Provider Department Dept. Phone Encounter #  
 10/19/2017  9:00 AM Reese Tharsher MD [facility] [phone] [id number] Follow-up Instructions Return in about 6 months (around 4/19/2018), or if symptoms worsen or fail to improve. Your Appointments 1/23/2018  8:00 AM  
LAB with [facility] NURSE VISIT [facility] ([facility]) Appt Note: lab only per BS  
 [address] [facility]  
[address] [facility], [address]  
  
    
 4/20/2018  8:00 AM  
LAB with [facility] NURSE VISIT [facility] ([facility]) Appt Note: lab  
 [address], [address] [address]  
[phone]  
  
    
 4/26/2018  8:00 AM  
Office Visit with Reese Thrasher MD  
[facility]) Appt Note: 6 months bs  
 [address], [address] [facility] [address] [address] [place]  
  
   
 [address], [address] Upcoming Health Maintenance Date Due HEMOGLOBIN A1C Q6M 3/6/2018 MICROALBUMIN Q1 3/23/2018 LIPID PANEL Q1 7/5/2018 FOOT EXAM Q1 9/6/2018 EYE EXAM RETINAL OR DILATED Q1 9/19/2018 BREAST CANCER SCRN MAMMOGRAM 5/6/2019 COLONOSCOPY 9/29/2019 PAP AKA CERVICAL CYTOLOGY 4/13/2020 DTaP/Tdap/Td series (2 - Td) 6/25/2023 Allergies as of 10/19/2017  Review Complete On: 10/19/2017 By: Radha Morel Severity Noted Reaction Type Reactions Pcn [Penicillins]    Hives Sulfa (Sulfonamide Antibiotics)  04/13/2015    Nausea Only, Vertigo Current Immunizations  Reviewed on 10/19/2017 Name Date Influenza Vaccine (Quad) PF 10/19/2017  9:27 AM, 9/14/2016 12:16 PM, 12/15/2015  9:44 AM  
 Influenza Vaccine PF 12/22/2014  2:01 PM  
 Influenza Vaccine Split 10/17/2012, 11/4/2011 Influenza Vaccine Whole 11/9/2010 Pneumococcal Polysaccharide (PPSV-23) 4/13/2017 11:34 AM  
 Tdap 6/25/2013 Zoster Vaccine, Live 5/31/2016  2:05 PM  
  
 Reviewed by Radha Morel on 10/19/2017 at  9:30 AM  
 Reviewed by Radha Morel on 10/19/2017 at  9:30 AM  
You Were Diagnosed With   
  
 Codes Comments Encounter for immunization    -  Primary ICD-10-CM: R07 ICD-9-CM: V03.89 Skipped beats     ICD-10-CM: I45.9 ICD-9-CM: 427.9 Encounter for screening for lung cancer     ICD-10-CM: Z12.2 ICD-9-CM: V76.0 Vitals BP Pulse Temp Resp Height(growth percentile) Weight(growth percentile)  
 118/68 (BP 1 Location: Left arm, BP Patient Position: Sitting) 67 98.5 °F (36.9 °C) (Oral) 14 5' 5\" (1.651 m) 162 lb (73.5 kg) SpO2 BMI OB Status Smoking Status 98% 26.96 kg/m2 Hysterectomy Former Smoker Vitals History BMI and BSA Data Body Mass Index Body Surface Area  
 26.96 kg/m 2 1.84 m 2 Preferred Pharmacy Pharmacy Name Phone [address], [facility] [phone] Your Updated Medication List  
  
   
This list is accurate as of: 10/19/17 10:37 AM.  Always use your most recent med list.  
  
  
  
  
 ALPHAGAN P 0.1 % ophthalmic solution Generic drug:  brimonidine  
  
 aspirin delayed-release 81 mg tablet Take  by mouth daily. bisacodyl 5 mg Tab Take  by mouth. Cholecalciferol (Vitamin D3) 2,000 unit Cap capsule Commonly known as:  VITAMIN D3 Take 2,000 Units by mouth daily. glucose blood VI test strips strip Commonly known as:  ONETOUCH ULTRA TEST  
USE TO TEST BLOOD SUGAR FOUR TIMES A DAY HumaLOG Pen 100 unit/mL kwikpen Generic drug:  insulin lispro  
by SubCUTAneous route. Sliding scale as needed LANTUS SOLOSTAR 100 unit/mL (3 mL) Inpn Generic drug:  insulin glargine 23 Units by SubCUTAneous route daily. levothyroxine 100 mcg tablet Commonly known as:  SYNTHROID  
 Take 1 Tab by mouth Daily (before breakfast). loratadine 10 mg dissolvable tablet Commonly known as:  Aj Ora Take 1 Tab by mouth daily. meclizine 25 mg tablet Commonly known as:  ANTIVERT Take  by mouth three (3) times daily as needed. ondansetron 4 mg disintegrating tablet Commonly known as:  ZOFRAN ODT Take 4 mg by mouth every eight (8) hours as needed for Nausea. quinapril 5 mg tablet Commonly known as:  ACCUPRIL  
TAKE 1 TABLET NIGHTLY, WITH 10 MG TABLET EVERY EVENING  
  
 VITAMIN B-12 500 mcg tablet Generic drug:  cyanocobalamin Take 500 mcg by mouth daily. ZETIA 10 mg tablet Generic drug:  ezetimibe TAKE 1 TABLET DAILY Prescriptions Sent to Pharmacy Refills  
 levothyroxine (SYNTHROID) 100 mcg tablet 2 Sig: Take 1 Tab by mouth Daily (before breakfast). Class: Normal  
 Pharmacy: [address], [address] Ph #: [phone] Route: Oral  
  
We Performed the Following AMB POC EKG ROUTINE W/ 12 LEADS, INTER & REP [51799 CPT(R)] INFLUENZA VIRUS VAC QUAD,SPLIT,PRESV FREE SYRINGE IM [id number] CPT(R)] Follow-up Instructions Return in about 6 months (around 4/19/2018), or if symptoms worsen or fail to improve. To-Do List   
 10/19/2017 Imaging:  CT LOW DOSE LUNG CANCER SCREENING Referral Information Referral ID Referred By Referred To  
  
 [id number] Corazon Martin Not Available Visits Status Start Date End Date 1 New Request 10/19/17 10/19/18 If your referral has a status of pending review or denied, additional information will be sent to support the outcome of this decision. Patient Instructions Learning About Diabetes Food Guidelines Your Care Instructions Meal planning is important to manage diabetes. It helps keep your blood sugar at a target level (which you set with your doctor).  You don't have to eat special foods. You can eat what your family eats, including sweets once in a while. But you do have to pay attention to how often you eat and how much you eat of certain foods. You may want to work with a dietitian or a certified diabetes educator (CDE) to help you plan meals and snacks. A dietitian or CDE can also help you lose weight if that is one of your goals. What should you know about eating carbs? Managing the amount of carbohydrate (carbs) you eat is an important part of healthy meals when you have diabetes. Carbohydrate is found in many foods. · Learn which foods have carbs. And learn the amounts of carbs in different foods. ¨ Bread, cereal, pasta, and rice have about 15 grams of carbs in a serving. A serving is 1 slice of bread (1 ounce), ½ cup of cooked cereal, or 1/3 cup of cooked pasta or rice. ¨ Fruits have 15 grams of carbs in a serving. A serving is 1 small fresh fruit, such as an apple or orange; ½ of a banana; ½ cup of cooked or canned fruit; ½ cup of fruit juice; 1 cup of melon or raspberries; or 2 tablespoons of dried fruit. ¨ Milk and no-sugar-added yogurt have 15 grams of carbs in a serving. A serving is 1 cup of milk or 2/3 cup of no-sugar-added yogurt. ¨ Starchy vegetables have 15 grams of carbs in a serving. A serving is ½ cup of mashed potatoes or sweet potato; 1 cup winter squash; ½ of a small baked potato; ½ cup of cooked beans; or ½ cup cooked corn or green peas. · Learn how much carbs to eat each day and at each meal. A dietitian or CDE can teach you how to keep track of the amount of carbs you eat. This is called carbohydrate counting. · If you are not sure how to count carbohydrate grams, use the Plate Method to plan meals. It is a good, quick way to make sure that you have a balanced meal. It also helps you spread carbs throughout the day. ¨ Divide your plate by types of foods.  Put non-starchy vegetables on half the plate, meat or other protein food on one-quarter of the plate, and a grain or starchy vegetable in the final quarter of the plate. To this you can add a small piece of fruit and 1 cup of milk or yogurt, depending on how many carbs you are supposed to eat at a meal. 
· Try to eat about the same amount of carbs at each meal. Do not \"save up\" your daily allowance of carbs to eat at one meal. 
· Proteins have very little or no carbs per serving. Examples of proteins are beef, chicken, turkey, fish, eggs, tofu, cheese, cottage cheese, and peanut butter. A serving size of meat is 3 ounces, which is about the size of a deck of cards. Examples of meat substitute serving sizes (equal to 1 ounce of meat) are 1/4 cup of cottage cheese, 1 egg, 1 tablespoon of peanut butter, and ½ cup of tofu. How can you eat out and still eat healthy? · Learn to estimate the serving sizes of foods that have carbohydrate. If you measure food at home, it will be easier to estimate the amount in a serving of restaurant food. · If the meal you order has too much carbohydrate (such as potatoes, corn, or baked beans), ask to have a low-carbohydrate food instead. Ask for a salad or green vegetables. · If you use insulin, check your blood sugar before and after eating out to help you plan how much to eat in the future. · If you eat more carbohydrate at a meal than you had planned, take a walk or do other exercise. This will help lower your blood sugar. What else should you know? · Limit saturated fat, such as the fat from meat and dairy products. This is a healthy choice because people who have diabetes are at higher risk of heart disease. So choose lean cuts of meat and nonfat or low-fat dairy products. Use olive or canola oil instead of butter or shortening when cooking. · Don't skip meals. Your blood sugar may drop too low if you skip meals and take insulin or certain medicines for diabetes. · Check with your doctor before you drink alcohol. Alcohol can cause your blood sugar to drop too low. Alcohol can also cause a bad reaction if you take certain diabetes medicines. Follow-up care is a key part of your treatment and safety. Be sure to make and go to all appointments, and call your doctor if you are having problems. It's also a good idea to know your test results and keep a list of the medicines you take. Where can you learn more? Go to http://laura-elsa.info/. Enter Q061 in the search box to learn more about \"Learning About Diabetes Food Guidelines. \" Current as of: March 13, 2017 Content Version: 11.3 © 0051-5547 [employer]. Care instructions adapted under license by [employer] (which disclaims liability or warranty for this information). If you have questions about a medical condition or this instruction, always ask your healthcare professional. [place] [address] any warranty or liability for your use of this information. Learning About Meal Planning for Diabetes Why plan your meals? Meal planning can be a key part of managing diabetes. Planning meals and snacks with the right balance of carbohydrate, protein, and fat can help you keep your blood sugar at the target level you set with your doctor. You don't have to eat special foods. You can eat what your family eats, including sweets once in a while. But you do have to pay attention to how often you eat and how much you eat of certain foods. You may want to work with a dietitian or a certified diabetes educator. He or she can give you tips and meal ideas and can answer your questions about meal planning. This health professional can also help you reach a healthy weight if that is one of your goals. What plan is right for you? Your dietitian or diabetes educator may suggest that you start with the plate format or carbohydrate counting. The plate format The plate format is a simple way to help you manage how you eat. You plan meals by learning how much space each food should take on a plate. Using the plate format helps you spread carbohydrate throughout the day. It can make it easier to keep your blood sugar level within your target range. It also helps you see if you're eating healthy portion sizes. To use the plate format, you put non-starchy vegetables on half your plate. Add meat or meat substitutes on one-quarter of the plate. Put a grain or starchy vegetable (such as brown rice or a potato) on the final quarter of the plate. You can add a small piece of fruit and some low-fat or fat-free milk or yogurt, depending on your carbohydrate goal for each meal. 
Here are some tips for using the plate format: · Make sure that you are not using an oversized plate. A 9-inch plate is best. Many restaurants use larger plates. · Get used to using the plate format at home. Then you can use it when you eat out. · Write down your questions about using the plate format. Talk to your doctor, a dietitian, or a diabetes educator about your concerns. Carbohydrate counting With carbohydrate counting, you plan meals based on the amount of carbohydrate in each food. Carbohydrate raises blood sugar higher and more quickly than any other nutrient. It is found in desserts, breads and cereals, and fruit. It's also found in starchy vegetables such as potatoes and corn, grains such as rice and pasta, and milk and yogurt. Spreading carbohydrate throughout the day helps keep your blood sugar levels within your target range. Your daily amount depends on several things, including your weight, how active you are, which diabetes medicines you take, and what your goals are for your blood sugar levels. A registered dietitian or diabetes educator can help you plan how much carbohydrate to include in each meal and snack. A guideline for your daily amount of carbohydrate is: · 45 to 60 grams at each meal. That's about the same as 3 to 4 carbohydrate servings. · 15 to 20 grams at each snack. That's about the same as 1 carbohydrate serving. The Nutrition Facts label on packaged foods tells you how much carbohydrate is in a serving of the food. First, look at the serving size on the food label. Is that the amount you eat in a serving? All of the nutrition information on a food label is based on that serving size. So if you eat more or less than that, you'll need to adjust the other numbers. Total carbohydrate is the next thing you need to look for on the label. If you count carbohydrate servings, one serving of carbohydrate is 15 grams. For foods that don't come with labels, such as fresh fruits and vegetables, you'll need a guide that lists carbohydrate in these foods. Ask your doctor, dietitian, or diabetes educator about books or other nutrition guides you can use. If you take insulin, you need to know how many grams of carbohydrate are in a meal. This lets you know how much rapid-acting insulin to take before you eat. If you use an insulin pump, you get a constant rate of insulin during the day. So the pump must be programmed at meals to give you extra insulin to cover the rise in blood sugar after meals. When you know how much carbohydrate you will eat, you can take the right amount of insulin. Or, if you always use the same amount of insulin, you need to make sure that you eat the same amount of carbohydrate at meals. If you need more help to understand carbohydrate counting and food labels, ask your doctor, dietitian, or diabetes educator. How do you get started with meal planning? Here are some tips to get started: 
· Plan your meals a week at a time. Don't forget to include snacks too. · Use cookbooks or online recipes to plan several main meals. Plan some quick meals for busy nights.  You also can double some recipes that freeze well. Then you can save half for other busy nights when you don't have time to cook. · Make sure you have the ingredients you need for your recipes. If you're running low on basic items, put these items on your shopping list too. · List foods that you use to make breakfasts, lunches, and snacks. List plenty of fruits and vegetables. · Post this list on the refrigerator. Add to it as you think of more things you need. · Take the list to the store to do your weekly shopping. Follow-up care is a key part of your treatment and safety. Be sure to make and go to all appointments, and call your doctor if you are having problems. It's also a good idea to know your test results and keep a list of the medicines you take. Where can you learn more? Go to http://laura-elsa.info/. Kathleen Bowen in the search box to learn more about \"Learning About Meal Planning for Diabetes. \" Current as of: March 13, 2017 Content Version: 11.3 © 2424-9487 [employer]. Care instructions adapted under license by [employer] (which disclaims liability or warranty for this information). If you have questions about a medical condition or this instruction, always ask your healthcare professional. [address] any warranty or liability for your use of this information. Introducing [facility]! Ella Isaac introduces [employer] patient portal. Now you can access parts of your medical record, email your doctor's office, and request medication refills online. 1. In your internet browser, go to [email or web address] 2. Click on the First Time User? Click Here link in the Sign In box. You will see the New Member Sign Up page. 3. Enter your Capt[employer] Access Code exactly as it appears below. You will not need to use this code after youve completed the sign-up process. If you do not sign up before the expiration date, you must request a new code. · [employer] Access Code: [email or web address] Expires: 11/11/2017  3:08 PM 
 
4. Enter the last four digits of your Social Security Number (xxxx) and Date of Birth (mm/dd/yyyy) as indicated and click Submit. You will be taken to the next sign-up page. 5. Create a [employer] ID. This will be your [employer] login ID and cannot be changed, so think of one that is secure and easy to remember. 6. Create a [employer] password. You can change your password at any time. 7. Enter your Password Reset Question and Answer. This can be used at a later time if you forget your password. 8. Enter your e-mail address. You will receive e-mail notification when new information is available in [address] 9. Click Sign Up. You can now view and download portions of your medical record. 10. Click the Download Summary menu link to download a portable copy of your medical information. If you have questions, please visit the Frequently Asked Questions section of the [employer] website. Remember, [employer] is NOT to be used for urgent needs. For medical emergencies, dial 911. Now available from your iPhone and Android! Please provide this summary of care documentation to your next provider. Your primary care clinician is listed as Sangeeta Francis. If you have any questions after today's visit, please call [phone].

## 2017-10-19 NOTE — PROGRESS NOTES
Chief Complaint   Patient presents with    Diabetes     6 month follow up with lab results. Health Maintenance Due   Topic Date Due    INFLUENZA AGE 9 TO ADULT  08/01/2017     Patient given influenza vaccine, Fluarix Quadrivalent, in right deltoid. Instructed patient to sit and wait 10-20 minutes before leaving the premises so that we can watch for any complications or adverse reactions. Patient given vaccine information statement handout before vaccine was given. Patient tolerated well without adverse reactions or complications. 1. Have you been to the ER, urgent care clinic or hospitalized since your last visit? NO.     2. Have you seen or consulted any other health care providers outside of the [facility] since your last visit (Include any pap smears or colon screening)? YES, Patient states she went to see her endocrinologist last month, in [place] with Dr. Rob Tan. Patient states she went to Dr. Esteban Warner last month. Do you have an Advanced Directive? NO     Would you like information on Advanced Directives? NO, patient states she has information on it.

## 2017-10-19 NOTE — PATIENT INSTRUCTIONS
Learning About Diabetes Food Guidelines  Your Care Instructions  Meal planning is important to manage diabetes. It helps keep your blood sugar at a target level (which you set with your doctor). You don't have to eat special foods. You can eat what your family eats, including sweets once in a while. But you do have to pay attention to how often you eat and how much you eat of certain foods. You may want to work with a dietitian or a certified diabetes educator (CDE) to help you plan meals and snacks. A dietitian or CDE can also help you lose weight if that is one of your goals. What should you know about eating carbs? Managing the amount of carbohydrate (carbs) you eat is an important part of healthy meals when you have diabetes. Carbohydrate is found in many foods. · Learn which foods have carbs. And learn the amounts of carbs in different foods. ¨ Bread, cereal, pasta, and rice have about 15 grams of carbs in a serving. A serving is 1 slice of bread (1 ounce), ½ cup of cooked cereal, or 1/3 cup of cooked pasta or rice. ¨ Fruits have 15 grams of carbs in a serving. A serving is 1 small fresh fruit, such as an apple or orange; ½ of a banana; ½ cup of cooked or canned fruit; ½ cup of fruit juice; 1 cup of melon or raspberries; or 2 tablespoons of dried fruit. ¨ Milk and no-sugar-added yogurt have 15 grams of carbs in a serving. A serving is 1 cup of milk or 2/3 cup of no-sugar-added yogurt. ¨ Starchy vegetables have 15 grams of carbs in a serving. A serving is ½ cup of mashed potatoes or sweet potato; 1 cup winter squash; ½ of a small baked potato; ½ cup of cooked beans; or ½ cup cooked corn or green peas. · Learn how much carbs to eat each day and at each meal. A dietitian or CDE can teach you how to keep track of the amount of carbs you eat. This is called carbohydrate counting. · If you are not sure how to count carbohydrate grams, use the Plate Method to plan meals.  It is a good, quick way to make sure that you have a balanced meal. It also helps you spread carbs throughout the day. ¨ Divide your plate by types of foods. Put non-starchy vegetables on half the plate, meat or other protein food on one-quarter of the plate, and a grain or starchy vegetable in the final quarter of the plate. To this you can add a small piece of fruit and 1 cup of milk or yogurt, depending on how many carbs you are supposed to eat at a meal.  · Try to eat about the same amount of carbs at each meal. Do not \"save up\" your daily allowance of carbs to eat at one meal.  · Proteins have very little or no carbs per serving. Examples of proteins are beef, chicken, turkey, fish, eggs, tofu, cheese, cottage cheese, and peanut butter. A serving size of meat is 3 ounces, which is about the size of a deck of cards. Examples of meat substitute serving sizes (equal to 1 ounce of meat) are 1/4 cup of cottage cheese, 1 egg, 1 tablespoon of peanut butter, and ½ cup of tofu. How can you eat out and still eat healthy? · Learn to estimate the serving sizes of foods that have carbohydrate. If you measure food at home, it will be easier to estimate the amount in a serving of restaurant food. · If the meal you order has too much carbohydrate (such as potatoes, corn, or baked beans), ask to have a low-carbohydrate food instead. Ask for a salad or green vegetables. · If you use insulin, check your blood sugar before and after eating out to help you plan how much to eat in the future. · If you eat more carbohydrate at a meal than you had planned, take a walk or do other exercise. This will help lower your blood sugar. What else should you know? · Limit saturated fat, such as the fat from meat and dairy products. This is a healthy choice because people who have diabetes are at higher risk of heart disease. So choose lean cuts of meat and nonfat or low-fat dairy products. Use olive or canola oil instead of butter or shortening when cooking.   · Don't skip meals. Your blood sugar may drop too low if you skip meals and take insulin or certain medicines for diabetes. · Check with your doctor before you drink alcohol. Alcohol can cause your blood sugar to drop too low. Alcohol can also cause a bad reaction if you take certain diabetes medicines. Follow-up care is a key part of your treatment and safety. Be sure to make and go to all appointments, and call your doctor if you are having problems. It's also a good idea to know your test results and keep a list of the medicines you take. Where can you learn more? Go to http://laura-elsa.info/. Enter M813 in the search box to learn more about \"Learning About Diabetes Food Guidelines. \"  Current as of: March 13, 2017  Content Version: 11.3  © 5562-5889 [employer]. Care instructions adapted under license by [employer] (which disclaims liability or warranty for this information). If you have questions about a medical condition or this instruction, always ask your healthcare professional. [address] any warranty or liability for your use of this information. Learning About Meal Planning for Diabetes  Why plan your meals? Meal planning can be a key part of managing diabetes. Planning meals and snacks with the right balance of carbohydrate, protein, and fat can help you keep your blood sugar at the target level you set with your doctor. You don't have to eat special foods. You can eat what your family eats, including sweets once in a while. But you do have to pay attention to how often you eat and how much you eat of certain foods. You may want to work with a dietitian or a certified diabetes educator. He or she can give you tips and meal ideas and can answer your questions about meal planning. This health professional can also help you reach a healthy weight if that is one of your goals. What plan is right for you?   Your dietitian or diabetes educator may suggest that you start with the plate format or carbohydrate counting. The plate format  The plate format is a simple way to help you manage how you eat. You plan meals by learning how much space each food should take on a plate. Using the plate format helps you spread carbohydrate throughout the day. It can make it easier to keep your blood sugar level within your target range. It also helps you see if you're eating healthy portion sizes. To use the plate format, you put non-starchy vegetables on half your plate. Add meat or meat substitutes on one-quarter of the plate. Put a grain or starchy vegetable (such as brown rice or a potato) on the final quarter of the plate. You can add a small piece of fruit and some low-fat or fat-free milk or yogurt, depending on your carbohydrate goal for each meal.  Here are some tips for using the plate format:  · Make sure that you are not using an oversized plate. A 9-inch plate is best. Many restaurants use larger plates. · Get used to using the plate format at home. Then you can use it when you eat out. · Write down your questions about using the plate format. Talk to your doctor, a dietitian, or a diabetes educator about your concerns. Carbohydrate counting  With carbohydrate counting, you plan meals based on the amount of carbohydrate in each food. Carbohydrate raises blood sugar higher and more quickly than any other nutrient. It is found in desserts, breads and cereals, and fruit. It's also found in starchy vegetables such as potatoes and corn, grains such as rice and pasta, and milk and yogurt. Spreading carbohydrate throughout the day helps keep your blood sugar levels within your target range. Your daily amount depends on several things, including your weight, how active you are, which diabetes medicines you take, and what your goals are for your blood sugar levels.  A registered dietitian or diabetes educator can help you plan how much carbohydrate to include in each meal and snack. A guideline for your daily amount of carbohydrate is:  · 45 to 60 grams at each meal. That's about the same as 3 to 4 carbohydrate servings. · 15 to 20 grams at each snack. That's about the same as 1 carbohydrate serving. The Nutrition Facts label on packaged foods tells you how much carbohydrate is in a serving of the food. First, look at the serving size on the food label. Is that the amount you eat in a serving? All of the nutrition information on a food label is based on that serving size. So if you eat more or less than that, you'll need to adjust the other numbers. Total carbohydrate is the next thing you need to look for on the label. If you count carbohydrate servings, one serving of carbohydrate is 15 grams. For foods that don't come with labels, such as fresh fruits and vegetables, you'll need a guide that lists carbohydrate in these foods. Ask your doctor, dietitian, or diabetes educator about books or other nutrition guides you can use. If you take insulin, you need to know how many grams of carbohydrate are in a meal. This lets you know how much rapid-acting insulin to take before you eat. If you use an insulin pump, you get a constant rate of insulin during the day. So the pump must be programmed at meals to give you extra insulin to cover the rise in blood sugar after meals. When you know how much carbohydrate you will eat, you can take the right amount of insulin. Or, if you always use the same amount of insulin, you need to make sure that you eat the same amount of carbohydrate at meals. If you need more help to understand carbohydrate counting and food labels, ask your doctor, dietitian, or diabetes educator. How do you get started with meal planning? Here are some tips to get started:  · Plan your meals a week at a time. Don't forget to include snacks too. · Use cookbooks or online recipes to plan several main meals. Plan some quick meals for busy nights. You also can double some recipes that freeze well. Then you can save half for other busy nights when you don't have time to cook. · Make sure you have the ingredients you need for your recipes. If you're running low on basic items, put these items on your shopping list too. · List foods that you use to make breakfasts, lunches, and snacks. List plenty of fruits and vegetables. · Post this list on the refrigerator. Add to it as you think of more things you need. · Take the list to the store to do your weekly shopping. Follow-up care is a key part of your treatment and safety. Be sure to make and go to all appointments, and call your doctor if you are having problems. It's also a good idea to know your test results and keep a list of the medicines you take. Where can you learn more? Go to http://laura-elsa.info/. Janee Kothari in the search box to learn more about \"Learning About Meal Planning for Diabetes. \"  Current as of: March 13, 2017  Content Version: 11.3  © 1718-4003 [employer]. Care instructions adapted under license by [employer] (which disclaims liability or warranty for this information). If you have questions about a medical condition or this instruction, always ask your healthcare professional. [address] any warranty or liability for your use of this information.

## 2017-10-22 NOTE — PROGRESS NOTES
HPI:   Ivette Strauss is a 58y.o. year old female who presents today for evaluation of hypertension, hyperlipidemia, diabetes mellitus type 1, chronic renal disease stage 3, and hypothyroidism. She reports that she is doing relatively well. She states that she noticed that she was having difficulty with her peripheral vision and was evaluated by Dr. Stacy Irvin. She has regressed proliferative diabetic retinopathy and she reports that she was told that the laser therapy she received many years ago to treat this resulted in the current limitations in her peripheral vision. She does also have mild glaucoma for which she is receiving treatment. She states that her driving has been restricted to only during the day, and she is aware that she must turn her head completely to each side when navigating behind the wheel. She also reports that she occasionally notices some \"skipped beats\" and is wondering if she should be concerned. She denies any prolonged palpitations, chest pain, shortness of breath, lightheadedness, or edema. She is otherwise without complaints and feeling well. She has a history of diabetes mellitus type 1, with onset at age 6. She is currently under the care of Dr. Fany Dodd, and is being treated with insulin glargine and lispro. She has not been well-controlled recently, with HbA1c ranging from 7.5-8.0. She does have proliferative diabetic retinopathy and is s/p pan-retinal photocoagulation therapy in both eyes. She is followed closely by Dr. Stacy Irvin, and recent exam showed regression bilaterally. She also has peripheral neuropathy, with burning and tingling in her feet at night. Renal function had been normal until 5/2016, with evidence of chronic renal disease, stage 3, since that time with baseline creatinine1.0-1.04/ eGFR 54-56. She also has a history of hypothyroidism, and she is currently on Synthroid. She denies cold intolerance, hair or skin changes.       She has a history of hypertension, treated with quinapril. She had a pharmacologic nuclear stress test in 11/2012, which showed no evidence for ischemia, and normal LV wall motion (EF> 70%). She also had a carotid duplex scan in 7/2013 which showed mild (< 50%) bilateral internal carotid artery stenoses. She denies any chest pain, shortness of breath at rest or with exertion, palpitations, lightheadedness, or edema. She has a history of hyperlipidemia, and was on atorvastatin for many years until 10/2015 when she discontinued it because of myalgias. She was recently tried on rosuvastatin without success due to recurrent myalgias. She was started on ezetimibe in 9/2016 and has been tolerating it without difficulty. She successfully stopped smoking in 1/2015, and reports that she continues to abstain. She had a screening colonoscopy in 9/2014 by Dr. Evelyn Avina, which found a 6 mm sessile polyp in the descending colon and a 4-5 mm sessile polyp in the rectum/sigmoid (pathology: tubular adenomas). Recommendation was for follow-up colonoscopy in 5 years. She denies any abdominal pain, nausea, vomiting, melena, hematochezia, or change in bowel movements. In 3/2017, she was diagnosed with an upper respiratory tract infection and was prescribed doxycycline and prednisone by [facility]. She subsequently developed severe dizziness and vomiting, prompting a visit to the [facility] ED where she was diagnosed with benign positional vertigo and treated with meclizine and Zofran with improvement.     Past Medical History:   Diagnosis Date    Chronic renal disease, stage III     Diabetes mellitus type 1 ([address]     Diabetic peripheral neuropathy (HCC)     [facility] retin[facility]     Essential hypertension with goal blood pressure less than 140/90     Hearing decreased     Hyperlipidemia     Hypothyroidism      Past Surgical History:   Procedure Laterality Date    HX GYN      partial hysterectomy    HX GYN      3 D and C's    HX HEENT tonsillectomy    HX HEENT      cataract removal bilateral    HX HEENT      multiple surgeries for retinopathy    HX ORTHOPAEDIC      carpral tunnel sugery bilateral     Current Outpatient Prescriptions   Medication Sig    levothyroxine (SYNTHROID) 100 mcg tablet Take 1 Tab by mouth Daily (before breakfast).  Cholecalciferol, Vitamin D3, (VITAMIN D3) 2,000 unit cap capsule Take 2,000 Units by mouth daily. (Patient taking differently: Take 5,000 Units by mouth daily.)    ZETIA 10 mg tablet TAKE 1 TABLET DAILY    quinapril (ACCUPRIL) 5 mg tablet TAKE 1 TABLET NIGHTLY, WITH 10 MG TABLET EVERY EVENING    ALPHAGAN P 0.1 % ophthalmic solution     glucose blood VI test strips (ONE TOUCH ULTRA TEST) strip USE TO TEST BLOOD SUGAR FOUR TIMES A DAY    cyanocobalamin (VITAMIN B-12) 500 mcg tablet Take 500 mcg by mouth daily.  aspirin delayed-release 81 mg tablet Take  by mouth daily.  insulin glargine (LANTUS SOLOSTAR) 100 unit/mL (3 mL) pen 23 Units by SubCUTAneous route daily.  insulin lispro (HUMALOG PEN) 100 unit/mL kwikpen by SubCUTAneous route. Sliding scale as needed      bisacodyl 5 mg tab Take  by mouth.  meclizine (ANTIVERT) 25 mg tablet Take  by mouth three (3) times daily as needed.  ondansetron (ZOFRAN ODT) 4 mg disintegrating tablet Take 4 mg by mouth every eight (8) hours as needed for Nausea.  loratadine (CLARITIN REDITABS) 10 mg dissolvable tablet Take 1 Tab by mouth daily. No current facility-administered medications for this visit. Allergies and Intolerances: Allergies   Allergen Reactions    Pcn [Penicillins] Hives    Sulfa (Sulfonamide Antibiotics) Nausea Only and Vertigo     Family History: Grandmother  from colon cancer. Mother had CAD and DM. Family History   Problem Relation Age of Onset    Diabetes Brother     Diabetes Mother     Heart Disease Mother      Social History:   She  reports that she quit smoking about 2 years ago.  She smoked 1.00 pack per day. She has never used smokeless tobacco. She is  and lives with her . 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 History   Alcohol Use    Yes     Comment: infrequently     Immunization History:  Immunization History   Administered Date(s) Administered    Influenza Vaccine (Quad) PF 12/15/2015, 09/14/2016, 10/19/2017    Influenza Vaccine PF 12/22/2014    Influenza Vaccine Split 11/04/2011, 10/17/2012    Influenza Vaccine Whole 11/09/2010    Pneumococcal Polysaccharide (PPSV-23) 04/13/2017    Tdap 06/25/2013    Zoster Vaccine, Live 05/31/2016       Review of Systems:   As above included in HPI. Otherwise 11 point review of systems negative including constitutional, skin, HENT, eyes, respiratory, cardiovascular, gastrointestinal, genitourinary, musculoskeletal, endo/heme/aller, neurological.    Physical:   Vitals:   BP: 118/68  HR: 67  WT: 162 lb (73.5 kg)  BMI:  26.96 kg/m2    Exam:   Pt appears well; alert and oriented x 3; appropriate affect. HEENT: PERRLA, anicteric, oropharynx clear, no JVD, adenopathy or thyromegaly. No carotid bruits or radiated murmur. Lungs: clear to auscultation, no wheezes, rhonchi, or rales. Heart: regular rate and rhythm. No murmur, rubs, gallops  Abdomen: soft, nontender, nondistended, normal bowel sounds, no hepatosplenomegaly or masses. Extremities: without edema. Pulses 1-2+ bilaterally.     Review of Data:  Labs:   Hospital Outpatient Visit on 10/12/2017   Component Date Value Ref Range Status    WBC 10/12/2017 4.8  4.6 - 13.2 K/uL Final    RBC 10/12/2017 3.52* 4.20 - 5.30 M/uL Final    HGB 10/12/2017 11.3* 12.0 - 16.0 g/dL Final    HCT 10/12/2017 34.9* 35.0 - 45.0 % Final    MCV 10/12/2017 99.1* 74.0 - 97.0 FL Final    MCH 10/12/2017 32.1  24.0 - 34.0 PG Final    MCHC 10/12/2017 32.4  31.0 - 37.0 g/dL Final    RDW 10/12/2017 12.3  11.6 - 14.5 % Final    PLATELET 53/81/1698 475  135 - 420 K/uL Final    MPV 10/12/2017 12.4* 9.2 - 11.8 FL Final    NEUTROPHILS 10/12/2017 66  40 - 73 % Final    LYMPHOCYTES 10/12/2017 26  21 - 52 % Final    MONOCYTES 10/12/2017 8  3 - 10 % Final    EOSINOPHILS 10/12/2017 0  0 - 5 % Final    BASOPHILS 10/12/2017 0  0 - 2 % Final    ABS. NEUTROPHILS 10/12/2017 3.2  1.8 - 8.0 K/UL Final    ABS. LYMPHOCYTES 10/12/2017 1.2  0.9 - 3.6 K/UL Final    ABS. MONOCYTES 10/12/2017 0.4  0.05 - 1.2 K/UL Final    ABS. EOSINOPHILS 10/12/2017 0.0  0.0 - 0.4 K/UL Final    ABS. BASOPHILS 10/12/2017 0.0  0.0 - 0.06 K/UL Final    DF 10/12/2017 AUTOMATED    Final    Sodium 10/12/2017 138  136 - 145 mmol/L Final    Potassium 10/12/2017 4.1  3.5 - 5.5 mmol/L Final    Chloride 10/12/2017 103  100 - 108 mmol/L Final    CO2 10/12/2017 26  21 - 32 mmol/L Final    Anion gap 10/12/2017 9  3.0 - 18 mmol/L Final    Glucose 10/12/2017 122* 74 - 99 mg/dL Final    BUN 10/12/2017 16  7.0 - 18 MG/DL Final    Creatinine 10/12/2017 0.84  0.6 - 1.3 MG/DL Final    BUN/Creatinine ratio 10/12/2017 19  12 - 20   Final    GFR est AA 10/12/2017 >60  >60 ml/min/1.73m2 Final    GFR est non-AA 10/12/2017 >60  >60 ml/min/1.73m2 Final    Calcium 10/12/2017 9.0  8.5 - 10.1 MG/DL Final    TSH 10/12/2017 0.25* 0.36 - 3.74 uIU/mL Final    T4, Free 10/12/2017 1.5  0.7 - 1.5 NG/DL Final   Abstract on 10/05/2017   Component Date Value Ref Range Status    LDL-C, External 03/23/2017 82   Final    Creatinine, External 03/23/2017 0.95   Final    Hemoglobin A1c, External 09/06/2017 8.2   Final     EKG (10/19/2017) sinus rhythm at 65 bpm, short AL interval (11 msec), but normal QRS and QTc intervals. No ischemic changes. (without change from 12/2015). Health Maintenance:  Screening:    Mammogram: negative (5/2017)   PAP smear: negative (6/30/2014). S/p YUNG, no further screening needed. Colorectal: colonsocopy (9/2014) tubular adenomas. Dr. Sharon Caputo. Due 2019.    Depression: none   DM (HbA1c/FPG): HbA1c 8.2 (9/2017)   Hepatitis C: negative (12/2015)   Falls: none    DEXA: N/A    Glaucoma: mild primary open angle glaucoma and regressed proliferative diabetic retinopathy. Followed by Dr. Mani Doshi (last 9/2017). Smoking: stopped 1/2015; 30 pack year. Vitamin D: 53.2 (3/2017)   Medicare Wellness: N/A    Impression:  Patient Active Problem List   Diagnosis Code    Stable proliferative diabetic retinopathy of both eyes associated with type 1 diabetes mellitus ([facility] H80.0566    Hyperlipidemia E78.5    Hearing decreased H91.90    Diabetes mellitus with diabetic polyneuropathy [facility] E11.42    Vitamin D deficiency E55.9    Ex-cigarette smoker (30 pack year), stopped 1/2015 Z87.891    Colon polyp, colonoscopy due 2019 K63.5    Essential hypertension I10    Hypothyroidism due to acquired atrophy of thyroid E03.4    Chronic kidney disease, stage III (moderate) N18.3    Type 1 diabetes mellitus with stage 3 chronic kidney disease (HCC) E10.22, N18.3    Anemia D64.9       Plan:  1. Palpitations. Description most consistent with ectopic beats, and otherwise asymptomatic. EKG with shortened NJ interval but QRS duration is normal. Will consider further evaluation, including Holter monitor and echocardiogram if worsens. Patient not wishing further evaluation as does not feel they are significant enough currently. 2. Hypertension. Well controlled on quinapril. Renal function stable with creatinine 0.84/ eGFR >60. Continue to follow. 3. Type 1 diabetes mellitus. HbA1c with some worsening to 8.2. On Lantus and Humalog. Being managed by endocrinology. Weight stable, but most likely due to dietary indiscretion. Proliferative diabetic retinopathy in regression, followed by Dr. Mani Doshi. Neuropathy symptoms in feet are mild and have not required treatment with medication. Foot exam performed by endocrine. Renal function with evidence of chronic renal disease, stage 3, but no evidence of microalbuminuria. On Ace-I.  ACE Inhibitor or ARB therapy not prescibed for patient reasons as not able to tolerate due to myalgias. Follow closely. 4. Hyperlipidemia. On ezetimibe with significant improvement in LDL to 82. Not able to tolerate statin (atorvastatin/ rosuvastatin) due to myalgias. Stressed importance of lifestyle modifications, especially diet, exercise and weight loss. 5. Chronic renal disease, stage 3. Evidence of mild disease with creatinine 1.00-1.04/ eGFR 54-56 since 5/2016. Most likely due to long standing diabetes mellitus and hypertension. Remaining stable. On Ace-I, but not on statin as discussed above. Counseled on avoiding NSAIDS and prerenal status. 6. Hypothyroidism. Synthroid dose decreased from 112 to 100 mcg in 4/2017 due to over replacement. TSH was therapeutic in 7/2017, but with evidence of over replacement on most recent labs on current Synthroid dose. Patient not wishing to decrease dose currently as concerned regarding weight gain. Will recheck in 3 months and adjust dose then if needed. Continue to follow. 6. H/O cigarette smoking. 30 pack year history and patient stopped in 1/2015. Meets criteria for lung cancer screening with low dose CT scan. Patient agreeable. Will order. Encouraged to continue with abstaining from smoking. 7. Anemia. Iron studies consistent with anemia of chronic inflammation. B12 normal, and folate level slightly low. Encouraged to take multivitamin with folate. 8. Health maintenance. Will give influenza vaccine today. Other immunizations up to date. Mammogram up to date. Colonoscopy due 2018. Vitamin D level now normal. Continue maintenance dose supplement. Patient understands recommendations and agrees with plan. Follow-up TSH in 3 months. Follow-up appointment in 6 months.

## 2017-10-26 ENCOUNTER — NURSE NAVIGATOR (OUTPATIENT)
Dept: OTHER | Age: 62
End: 2017-10-26

## 2017-10-26 NOTE — NURSE NAVIGATOR
Referring MD: Nicci Lee      Lung Cancer Risk Profile:   Age: 58  Gender: Female  Height: 66\"  Weight: 162#    Smoking History:  Smoking Status: past use  # years smokin  # years quit: 3  Packs/day: 1.5  Pack years: 39    Patient discussed smoking cessation with PCP: Yes    Patient patient participated in shared decision making process with PCP:Yes    Patient is currently experiencing symptoms: No    If yes what symptoms:     Co-Morbidities:     Cancer History: Grandfather with lung cancer      Patient's smoking history discussed via phone. Patient meets LDCT lung cancer screening criteria.  Call transferred to central scheduling to schedule exam.      MARTY McclendonN, RN, [address] [address] [facility] Nurse Navigator

## 2017-11-10 ENCOUNTER — HOSPITAL ENCOUNTER (OUTPATIENT)
Dept: CT IMAGING | Age: 62
Discharge: HOME | End: 2017-11-10
Attending: INTERNAL MEDICINE
Payer: OTHER GOVERNMENT

## 2017-11-10 DIAGNOSIS — Z12.2 ENCOUNTER FOR SCREENING FOR LUNG CANCER: ICD-10-CM

## 2017-11-10 PROCEDURE — G0297 LDCT FOR LUNG CA SCREEN: HCPCS

## 2017-11-13 ENCOUNTER — TELEPHONE (OUTPATIENT)
Dept: INTERNAL MEDICINE CLINIC | Age: 62
End: 2017-11-13

## 2017-11-13 NOTE — TELEPHONE ENCOUNTER
Please let the patient know that her screening low dose chest CT scan showed only a few tiny nodules that were benign in appearance. It also showed evidence of mild emphysema and coronary atherosclerosis. Routine follow-up is recommended with repeat study in one year.

## 2018-01-23 ENCOUNTER — TELEPHONE (OUTPATIENT)
Dept: INTERNAL MEDICINE CLINIC | Age: 63
End: 2018-01-23

## 2018-01-23 ENCOUNTER — HOSPITAL ENCOUNTER (OUTPATIENT)
Dept: LAB | Age: 63
Discharge: HOME | End: 2018-01-23
Payer: OTHER GOVERNMENT

## 2018-01-23 DIAGNOSIS — E03.4 HYPOTHYROIDISM DUE TO ACQUIRED ATROPHY OF THYROID: ICD-10-CM

## 2018-01-23 LAB
T4 FREE SERPL-MCNC: 1.2 NG/DL (ref 0.7–1.5)
TSH SERPL DL<=0.05 MIU/L-ACNC: 3.09 UIU/ML (ref 0.36–3.74)

## 2018-01-23 PROCEDURE — 36415 COLL VENOUS BLD VENIPUNCTURE: CPT | Performed by: INTERNAL MEDICINE

## 2018-01-23 PROCEDURE — 84439 ASSAY OF FREE THYROXINE: CPT | Performed by: INTERNAL MEDICINE

## 2018-01-23 PROCEDURE — 84443 ASSAY THYROID STIM HORMONE: CPT | Performed by: INTERNAL MEDICINE

## 2018-01-24 NOTE — TELEPHONE ENCOUNTER
Please let the patient know that her repeat thyroid labs are within the therapeutic range. She should continue on her current dose of Synthroid. Thanks.

## 2018-01-25 RX ORDER — LEVOTHYROXINE SODIUM 100 UG/1
100 TABLET ORAL
Qty: 90 TAB | Refills: 2 | Status: CANCELLED | OUTPATIENT
Start: 2018-01-25

## 2018-01-25 NOTE — TELEPHONE ENCOUNTER
It appears the patient should have 2 refills remaining on this medication. Please have the patient contact [facility] for additional refills.

## 2018-02-05 ENCOUNTER — OFFICE VISIT (OUTPATIENT)
Dept: ORTHOPEDIC SURGERY | Facility: CLINIC | Age: 63
End: 2018-02-05

## 2018-02-05 VITALS
SYSTOLIC BLOOD PRESSURE: 154 MMHG | OXYGEN SATURATION: 100 % | RESPIRATION RATE: 14 BRPM | WEIGHT: 168 LBS | BODY MASS INDEX: 27.99 KG/M2 | HEART RATE: 70 BPM | DIASTOLIC BLOOD PRESSURE: 64 MMHG | HEIGHT: 65 IN

## 2018-02-05 DIAGNOSIS — M77.11 RIGHT LATERAL EPICONDYLITIS: Primary | ICD-10-CM

## 2018-02-05 RX ORDER — TRIAMCINOLONE ACETONIDE 40 MG/ML
40 INJECTION, SUSPENSION INTRA-ARTICULAR; INTRAMUSCULAR ONCE
Qty: 1 ML | Refills: 0
Start: 2018-02-05 | End: 2018-02-05

## 2018-02-05 NOTE — PROGRESS NOTES
Patient: Idris Maynard                MRN: [id number]       SSN: [id number]  YOB: 1955        AGE: 58 y.o. SEX: female  Body mass index is 27.96 kg/(m^2). PCP: Wally Paredes MD  02/05/18    Chief Complaint: Right elbow pain    HISTORY OF PRESENT ILLNESS: Aleyda Akins is a 68-year-old female who is right-handed who presents today to the office with a complaint of right elbow pain. She states the pain has been present for approximately the last week. Her pain is located on the lateral side of her elbow. She has pain with carrying things. She has pain with wrist extension. She has taken Ibuprofen for the pain, which has helped some. She has not had any physical therapy, stretching exercises or injections. She denies any numbness or tingling. She denies any neck pain. The pain does radiate from the lateral aspect of her elbow through the extensor compartment of her right forearm. Past Medical History:   Diagnosis Date    Chronic renal disease, stage III     Diabetes mellitus type 1 ([address]     Diabetic peripheral neuropathy (HCC)     Diabetic retinopathy ([address]     Essential hypertension with goal blood pressure less than 140/90     Hearing decreased     Hyperlipidemia     Hypothyroidism        Family History   Problem Relation Age of Onset    Diabetes Brother     Diabetes Mother     Heart Disease Mother        Current Outpatient Prescriptions   Medication Sig Dispense Refill    triamcinolone acetonide (KENALOG) 40 mg/mL injection 1 mL by IntraMUSCular route once for 1 dose. 1 mL 0    levothyroxine (SYNTHROID) 100 mcg tablet Take 1 Tab by mouth Daily (before breakfast). 90 Tab 2    Cholecalciferol, Vitamin D3, (VITAMIN D3) 2,000 unit cap capsule Take 2,000 Units by mouth daily.  (Patient taking differently: Take 5,000 Units by mouth daily.) 30 Cap 5    ZETIA 10 mg tablet TAKE 1 TABLET DAILY 90 Tab 3    quinapril (ACCUPRIL) 5 mg tablet TAKE 1 TABLET NIGHTLY, WITH 10 MG TABLET EVERY EVENING 90 Tab 3    meclizine (ANTIVERT) 25 mg tablet Take  by mouth three (3) times daily as needed.  cyanocobalamin (VITAMIN B-12) 500 mcg tablet Take 500 mcg by mouth daily.  aspirin delayed-release 81 mg tablet Take  by mouth daily.  insulin glargine (LANTUS SOLOSTAR) 100 unit/mL (3 mL) pen 23 Units by SubCUTAneous route daily.  insulin lispro (HUMALOG PEN) 100 unit/mL kwikpen by SubCUTAneous route. Sliding scale as needed        bisacodyl 5 mg tab Take  by mouth.  ondansetron (ZOFRAN ODT) 4 mg disintegrating tablet Take 4 mg by mouth every eight (8) hours as needed for Nausea.  loratadine (CLARITIN REDITABS) 10 mg dissolvable tablet Take 1 Tab by mouth daily. 30 Tab 5    ALPHAGAN P 0.1 % ophthalmic solution   3    glucose blood VI test strips (ONE TOUCH ULTRA TEST) strip USE TO TEST BLOOD SUGAR FOUR TIMES A  Strip PRN       Allergies   Allergen Reactions    Pcn [Penicillins] Hives    Sulfa (Sulfonamide Antibiotics) Nausea Only and Vertigo       Past Surgical History:   Procedure Laterality Date    HX GYN      partial hysterectomy    HX GYN      3 D and C's    HX HEENT      tonsillectomy    HX HEENT      cataract removal bilateral    HX HEENT      multiple surgeries for retinopathy    HX ORTHOPAEDIC      carpral tunnel sugery bilateral       Social History     Social History    Marital status:      Spouse name: N/A    Number of children: N/A    Years of education: N/A     Occupational History    Not on file.      Social History Main Topics    Smoking status: Former Smoker     Packs/day: 1.00     Quit date: 1/1/2015    Smokeless tobacco: Never Used    Alcohol use Yes      Comment: infrequently    Drug use: Not on file    Sexual activity: Not on file     Other Topics Concern    Not on file     Social History Narrative       REVIEW OF SYSTEMS:      CON: negative for recent weight loss/gain, fever, or chills  EYE: negative for double or blurry vision  ENT: negative for hoarseness  RS:   negative for cough, URI, SOB  CV:  negative for chest pain, palpitations  GI:    negative for blood in stool, nausea/vomiting  :  negative for blood in urine  MS: As per HPI  Other systems reviewed and noted below. PHYSICAL EXAMINATION:  Visit Vitals    /64 (BP 1 Location: Left arm, BP Patient Position: Sitting)    Pulse 70    Resp 14    Ht 5' 5\" (1.651 m)    Wt 168 lb (76.2 kg)    SpO2 100%    BMI 27.96 kg/m2     Body mass index is 27.96 kg/(m^2). GENERAL: Alert and oriented x3, in no acute distress, well-developed, well-nourished. HEENT: Normocephalic, atraumatic. RESP: Non labored breathing with equal chest rise on inspiration. CV: Well perfused extremities. No cyanosis or clubbing noted. ABDOMEN: Soft, non-tender, non-distended. PHYSICAL EXAMINATION: Musculoskeletal examination of the right elbow reveals no obvious deformity. There is no effusion. There is no warmth or erythema. She has full elbow range of motion with flexion, extension, and pronation and supination. She has 5/5 strength of the biceps and triceps. She has tenderness to palpation over the lateral epicondyle. She has mild pain with passive stretch of the extensors and supinators of the right elbow. No pain with passive stretch of the flexors or pronators. She has pain with resisted wrist extension with the elbow extended and flexed. ASSESSMENT/PLAN: Gael Robledo is a 29-year-old female with right lateral epicondylitis or tennis elbow. I discussed with her the treatment of this. I recommend continuing with anti-inflammatories by mouth over the counter. I also have printed her out and shown her some stretching exercises to do to work on her pain. We also discussed an injection today of corticosteroid into the area. She would like to proceed with this.  After discussing the risks and benefits and obtaining informed consent, the right elbow was sterilely injected with Lidocaine and steroid in the area of her lateral epicondylitis. She tolerated the procedure well. She had no post-procedure pain, numbness, or tingling. I would like to see her back as needed if her symptoms persist. All of her questions were answered.      [facility] - [facility]  OFFICE PROCEDURE PROGRESS NOTE        Chart reviewed for the following:   Cori Ortiz MD, have reviewed the History, Physical and updated the Allergic reactions for [place] performed immediately prior to start of procedure:   Cori Ortiz MD, have performed the following reviews on Octavio Villeda prior to the start of the procedure:            * Patient was identified by name and date of birth   * Agreement on procedure being performed was verified  * Risks and Benefits explained to the patient  * Procedure site verified and marked as necessary  * Patient was positioned for comfort  * Consent was signed and verified     Time: [phone]      Date of procedure: 2/5/2018    Procedure performed by:  Maria Alejandra Castellon MD    Provider assisted by: Kerri RIOS    Patient assisted by: self    How tolerated by patient: tolerated the procedure well with no complications    Post Procedural Pain Scale: 0 - No Hurt    Comments: none                Electronically signed by: Maria Alejandra Castellon MD

## 2018-03-13 LAB
CREATININE, EXTERNAL: 0.99
LDL-C, EXTERNAL: 108
MICROALBUMIN UR TEST STR-MCNC: 21.1 MG/DL

## 2018-04-19 ENCOUNTER — HOSPITAL ENCOUNTER (OUTPATIENT)
Dept: LAB | Age: 63
Discharge: HOME | End: 2018-04-19
Payer: OTHER GOVERNMENT

## 2018-04-19 DIAGNOSIS — E10.22 TYPE 1 DIABETES MELLITUS WITH STAGE 3 CHRONIC KIDNEY DISEASE (HCC): ICD-10-CM

## 2018-04-19 DIAGNOSIS — I10 ESSENTIAL HYPERTENSION: ICD-10-CM

## 2018-04-19 DIAGNOSIS — E03.4 HYPOTHYROIDISM DUE TO ACQUIRED ATROPHY OF THYROID: ICD-10-CM

## 2018-04-19 DIAGNOSIS — E78.5 HYPERLIPIDEMIA, UNSPECIFIED HYPERLIPIDEMIA TYPE: ICD-10-CM

## 2018-04-19 DIAGNOSIS — N18.30 TYPE 1 DIABETES MELLITUS WITH STAGE 3 CHRONIC KIDNEY DISEASE (HCC): ICD-10-CM

## 2018-04-19 DIAGNOSIS — N18.30 CHRONIC KIDNEY DISEASE, STAGE III (MODERATE) (HCC): ICD-10-CM

## 2018-04-19 DIAGNOSIS — E10.42 TYPE 1 DIABETES MELLITUS WITH DIABETIC POLYNEUROPATHY (HCC): ICD-10-CM

## 2018-04-19 LAB
25(OH)D3 SERPL-MCNC: 48.8 NG/ML (ref 30–100)
ALBUMIN SERPL-MCNC: 3.4 G/DL (ref 3.4–5)
ALBUMIN/GLOB SERPL: 1.3 {RATIO} (ref 0.8–1.7)
ALP SERPL-CCNC: 50 U/L (ref 45–117)
ALT SERPL-CCNC: 17 U/L (ref 13–56)
ANION GAP SERPL CALC-SCNC: 5 MMOL/L (ref 3–18)
APPEARANCE UR: CLEAR
AST SERPL-CCNC: 14 U/L (ref 15–37)
BACTERIA URNS QL MICRO: NEGATIVE /HPF
BASOPHILS # BLD: 0 K/UL (ref 0–0.06)
BASOPHILS NFR BLD: 0 % (ref 0–2)
BILIRUB SERPL-MCNC: 0.3 MG/DL (ref 0.2–1)
BILIRUB UR QL: NEGATIVE
BUN SERPL-MCNC: 24 MG/DL (ref 7–18)
BUN/CREAT SERPL: 27 (ref 12–20)
CALCIUM SERPL-MCNC: 8.6 MG/DL (ref 8.5–10.1)
CHLORIDE SERPL-SCNC: 104 MMOL/L (ref 100–108)
CHOLEST SERPL-MCNC: 174 MG/DL
CO2 SERPL-SCNC: 28 MMOL/L (ref 21–32)
COLOR UR: YELLOW
CREAT SERPL-MCNC: 0.9 MG/DL (ref 0.6–1.3)
CREAT UR-MCNC: 50.9 MG/DL (ref 30–125)
DIFFERENTIAL METHOD BLD: ABNORMAL
EOSINOPHIL # BLD: 0.2 K/UL (ref 0–0.4)
EOSINOPHIL NFR BLD: 3 % (ref 0–5)
EPITH CASTS URNS QL MICRO: NORMAL /LPF (ref 0–5)
ERYTHROCYTE [DISTWIDTH] IN BLOOD BY AUTOMATED COUNT: 12.5 % (ref 11.6–14.5)
GLOBULIN SER CALC-MCNC: 2.7 G/DL (ref 2–4)
GLUCOSE SERPL-MCNC: 219 MG/DL (ref 74–99)
GLUCOSE UR STRIP.AUTO-MCNC: 250 MG/DL
HCT VFR BLD AUTO: 30 % (ref 35–45)
HDLC SERPL-MCNC: 81 MG/DL (ref 40–60)
HDLC SERPL: 2.1 {RATIO} (ref 0–5)
HGB BLD-MCNC: 10.4 G/DL (ref 12–16)
HGB UR QL STRIP: NEGATIVE
KETONES UR QL STRIP.AUTO: NEGATIVE MG/DL
LDLC SERPL CALC-MCNC: 79.8 MG/DL (ref 0–100)
LEUKOCYTE ESTERASE UR QL STRIP.AUTO: ABNORMAL
LIPID PROFILE,FLP: ABNORMAL
LYMPHOCYTES # BLD: 1.2 K/UL (ref 0.9–3.6)
LYMPHOCYTES NFR BLD: 20 % (ref 21–52)
MCH RBC QN AUTO: 33.1 PG (ref 24–34)
MCHC RBC AUTO-ENTMCNC: 34.7 G/DL (ref 31–37)
MCV RBC AUTO: 95.5 FL (ref 74–97)
MICROALBUMIN UR-MCNC: 2.63 MG/DL (ref 0–3)
MICROALBUMIN/CREAT UR-RTO: 52 MG/G (ref 0–30)
MONOCYTES # BLD: 0.5 K/UL (ref 0.05–1.2)
MONOCYTES NFR BLD: 8 % (ref 3–10)
NEUTS SEG # BLD: 4.1 K/UL (ref 1.8–8)
NEUTS SEG NFR BLD: 69 % (ref 40–73)
NITRITE UR QL STRIP.AUTO: NEGATIVE
PH UR STRIP: 6.5 [PH] (ref 5–8)
PLATELET # BLD AUTO: 218 K/UL (ref 135–420)
PMV BLD AUTO: 11.5 FL (ref 9.2–11.8)
POTASSIUM SERPL-SCNC: 4.6 MMOL/L (ref 3.5–5.5)
PROT SERPL-MCNC: 6.1 G/DL (ref 6.4–8.2)
PROT UR STRIP-MCNC: NEGATIVE MG/DL
RBC # BLD AUTO: 3.14 M/UL (ref 4.2–5.3)
RBC #/AREA URNS HPF: NORMAL /HPF (ref 0–5)
SODIUM SERPL-SCNC: 137 MMOL/L (ref 136–145)
SP GR UR REFRACTOMETRY: 1.01 (ref 1–1.03)
TRIGL SERPL-MCNC: 66 MG/DL (ref ?–150)
TSH SERPL DL<=0.05 MIU/L-ACNC: 0.15 UIU/ML (ref 0.36–3.74)
UROBILINOGEN UR QL STRIP.AUTO: 1 EU/DL (ref 0.2–1)
VLDLC SERPL CALC-MCNC: 13.2 MG/DL
WBC # BLD AUTO: 5.9 K/UL (ref 4.6–13.2)
WBC URNS QL MICRO: NORMAL /HPF (ref 0–4)

## 2018-04-19 PROCEDURE — 84443 ASSAY THYROID STIM HORMONE: CPT | Performed by: INTERNAL MEDICINE

## 2018-04-19 PROCEDURE — 82043 UR ALBUMIN QUANTITATIVE: CPT | Performed by: INTERNAL MEDICINE

## 2018-04-19 PROCEDURE — 82306 VITAMIN D 25 HYDROXY: CPT | Performed by: INTERNAL MEDICINE

## 2018-04-19 PROCEDURE — 80053 COMPREHEN METABOLIC PANEL: CPT | Performed by: INTERNAL MEDICINE

## 2018-04-19 PROCEDURE — 85025 COMPLETE CBC W/AUTO DIFF WBC: CPT | Performed by: INTERNAL MEDICINE

## 2018-04-19 PROCEDURE — 80061 LIPID PANEL: CPT | Performed by: INTERNAL MEDICINE

## 2018-04-19 PROCEDURE — 81001 URINALYSIS AUTO W/SCOPE: CPT | Performed by: INTERNAL MEDICINE

## 2018-04-19 PROCEDURE — 36415 COLL VENOUS BLD VENIPUNCTURE: CPT | Performed by: INTERNAL MEDICINE

## 2018-04-23 RX ORDER — QUINAPRIL 10 MG/1
TABLET ORAL
Qty: 90 TAB | Refills: 2 | Status: SHIPPED | OUTPATIENT
Start: 2018-04-23 | End: 2019-01-24 | Stop reason: SDUPTHER

## 2018-04-26 ENCOUNTER — TELEPHONE (OUTPATIENT)
Dept: INTERNAL MEDICINE CLINIC | Age: 63
End: 2018-04-26

## 2018-04-26 ENCOUNTER — OFFICE VISIT (OUTPATIENT)
Dept: INTERNAL MEDICINE CLINIC | Age: 63
End: 2018-04-26

## 2018-04-26 VITALS
TEMPERATURE: 98.8 F | DIASTOLIC BLOOD PRESSURE: 62 MMHG | RESPIRATION RATE: 16 BRPM | BODY MASS INDEX: 28.16 KG/M2 | HEIGHT: 65 IN | OXYGEN SATURATION: 97 % | HEART RATE: 64 BPM | WEIGHT: 169 LBS | SYSTOLIC BLOOD PRESSURE: 132 MMHG

## 2018-04-26 DIAGNOSIS — N18.30 CHRONIC KIDNEY DISEASE, STAGE III (MODERATE) (HCC): ICD-10-CM

## 2018-04-26 DIAGNOSIS — Z00.01 ENCOUNTER FOR ROUTINE ADULT PHYSICAL EXAM WITH ABNORMAL FINDINGS: Primary | ICD-10-CM

## 2018-04-26 DIAGNOSIS — E10.3553 STABLE PROLIFERATIVE DIABETIC RETINOPATHY OF BOTH EYES ASSOCIATED WITH TYPE 1 DIABETES MELLITUS (HCC): ICD-10-CM

## 2018-04-26 DIAGNOSIS — N18.30 TYPE 1 DIABETES MELLITUS WITH STAGE 3 CHRONIC KIDNEY DISEASE (HCC): ICD-10-CM

## 2018-04-26 DIAGNOSIS — E78.5 HYPERLIPIDEMIA, UNSPECIFIED HYPERLIPIDEMIA TYPE: ICD-10-CM

## 2018-04-26 DIAGNOSIS — D63.8 ANEMIA IN OTHER CHRONIC DISEASES CLASSIFIED ELSEWHERE: ICD-10-CM

## 2018-04-26 DIAGNOSIS — E03.4 HYPOTHYROIDISM DUE TO ACQUIRED ATROPHY OF THYROID: ICD-10-CM

## 2018-04-26 DIAGNOSIS — E10.42 TYPE 1 DIABETES MELLITUS WITH DIABETIC POLYNEUROPATHY (HCC): ICD-10-CM

## 2018-04-26 DIAGNOSIS — R00.2 PALPITATIONS: ICD-10-CM

## 2018-04-26 DIAGNOSIS — R60.0 BILATERAL LOWER EXTREMITY EDEMA: ICD-10-CM

## 2018-04-26 DIAGNOSIS — R06.09 DYSPNEA ON EXERTION: ICD-10-CM

## 2018-04-26 DIAGNOSIS — I10 ESSENTIAL HYPERTENSION: ICD-10-CM

## 2018-04-26 DIAGNOSIS — R80.9 MICROALBUMINURIA DUE TO TYPE 1 DIABETES MELLITUS (HCC): ICD-10-CM

## 2018-04-26 DIAGNOSIS — E10.22 TYPE 1 DIABETES MELLITUS WITH STAGE 3 CHRONIC KIDNEY DISEASE (HCC): ICD-10-CM

## 2018-04-26 DIAGNOSIS — E10.29 MICROALBUMINURIA DUE TO TYPE 1 DIABETES MELLITUS (HCC): ICD-10-CM

## 2018-04-26 RX ORDER — FUROSEMIDE 20 MG/1
20 TABLET ORAL DAILY
Qty: 30 TAB | Refills: 3 | Status: SHIPPED | OUTPATIENT
Start: 2018-04-26 | End: 2020-08-07

## 2018-04-26 RX ORDER — LEVOTHYROXINE SODIUM 88 UG/1
88 TABLET ORAL
Qty: 90 TAB | Refills: 3 | Status: SHIPPED | OUTPATIENT
Start: 2018-04-26 | End: 2019-05-16

## 2018-04-26 NOTE — PROGRESS NOTES
Chief Complaint   Patient presents with    Hypertension     6 month follow up with labs. 1. Have you been to the ER, urgent care clinic or hospitalized since your last visit? NO.     2. Have you seen or consulted any other health care providers outside of the [address] [place] [address] since your last visit (Include any pap smears or colon screening)? YES, eye exam last month. Endocrinologist, last seen last month.

## 2018-04-26 NOTE — TELEPHONE ENCOUNTER
Patient is to come back to see you in 2 weeks but your schedule is full.  Where would you like to fit her in?

## 2018-04-26 NOTE — MR AVS SNAPSHOT
[address] 
[phone] Patient: Agusto Moreno MRN: [id number] Visit Information Date & Time Provider Department Dept. Phone Encounter #  
 4/26/2018  8:00 AM Jenny Flood MD [facility] [phone] Follow-up Instructions Return in about 2 weeks (around 5/10/2018), or if symptoms worsen or fail to improve. Upcoming Health Maintenance Date Due  
 FOOT EXAM Q1 9/6/2018 HEMOGLOBIN A1C Q6M 9/13/2018 EYE EXAM RETINAL OR DILATED Q1 9/19/2018 MICROALBUMIN Q1 4/19/2019 LIPID PANEL Q1 4/19/2019 BREAST CANCER SCRN MAMMOGRAM 5/6/2019 COLONOSCOPY 9/29/2019 PAP AKA CERVICAL CYTOLOGY 4/13/2020 DTaP/Tdap/Td series (2 - Td) 6/25/2023 Allergies as of 4/26/2018  Review Complete On: 4/26/2018 By: Jinny Marques Severity Noted Reaction Type Reactions Pcn [Penicillins]    Hives Sulfa (Sulfonamide Antibiotics)  04/13/2015    Nausea Only, Vertigo Current Immunizations  Reviewed on 10/19/2017 Name Date Influenza Vaccine (Quad) PF 10/19/2017  9:27 AM, 9/14/2016 12:16 PM, 12/15/2015  9:44 AM  
 Influenza Vaccine PF 12/22/2014  2:01 PM  
 Influenza Vaccine Split 10/17/2012, 11/4/2011 Influenza Vaccine Whole 11/9/2010 Pneumococcal Polysaccharide (PPSV-23) 4/13/2017 11:34 AM  
 Tdap 6/25/2013 Zoster Vaccine, Live 5/31/2016  2:05 PM  
  
 Not reviewed this visit You Were Diagnosed With   
  
 Codes Comments Type 1 diabetes mellitus with stage 3 chronic kidney disease (HCC)    -  Primary ICD-10-CM: E10.22, N18.3 ICD-9-CM: 250.41, 585.3 Dyspnea on exertion     ICD-10-CM: R06.09 
ICD-9-CM: 786.09 Palpitations     ICD-10-CM: R00.2 ICD-9-CM: 785.1 Vitals BP Pulse Temp Resp Height(growth percentile) Weight(growth percentile)  132/62 (BP 1 Location: Left arm, BP Patient Position: Sitting) 64 98.8 °F (37.1 °C) (Oral) 16 5' 5\" (1.651 m) 169 lb (76.7 kg) SpO2 BMI OB Status Smoking Status 97% 28.12 kg/m2 Hysterectomy Former Smoker Vitals History BMI and BSA Data Body Mass Index Body Surface Area  
 28.12 kg/m 2 1.88 m 2 Preferred Pharmacy [facility] Name Phone [facility] [place] [id number] [phone] Your Updated Medication List  
  
   
This list is accurate as of 4/26/18  9:07 AM.  Always use your most recent med list.  
  
  
  
  
 ALPHAGAN P 0.1 % ophthalmic solution Generic drug:  brimonidine  
  
 aspirin delayed-release 81 mg tablet Take  by mouth daily. bisacodyl 5 mg Tab Take  by mouth. Cholecalciferol (Vitamin D3) 2,000 unit Cap capsule Commonly known as:  VITAMIN D3 Take 2,000 Units by mouth daily. glucose blood VI test strips strip Commonly known as:  ONETOUCH ULTRA TEST  
USE TO TEST BLOOD SUGAR FOUR TIMES A DAY HumaLOG Pen 100 unit/mL kwikpen Generic drug:  insulin lispro  
by SubCUTAneous route. Sliding scale as needed LANTUS SOLOSTAR U-100 INSULIN 100 unit/mL (3 mL) Inpn Generic drug:  insulin glargine 23 Units by SubCUTAneous route daily. levothyroxine 88 mcg tablet Commonly known as:  SYNTHROID Take 1 Tab by mouth Daily (before breakfast). loratadine 10 mg dissolvable tablet Commonly known as:  Elyce Waldo Take 1 Tab by mouth daily. meclizine 25 mg tablet Commonly known as:  ANTIVERT Take  by mouth three (3) times daily as needed. ondansetron 4 mg disintegrating tablet Commonly known as:  ZOFRAN ODT Take 4 mg by mouth every eight (8) hours as needed for Nausea. * quinapril 5 mg tablet Commonly known as:  ACCUPRIL  
TAKE 1 TABLET NIGHTLY, WITH 10 MG TABLET EVERY EVENING  
  
 * quinapril 10 mg tablet Commonly known as:  ACCUPRIL  
TAKE 1 TABLET EVERY EVENING ALONG WITH A 5 MG TABLET VITAMIN B-12 500 mcg tablet Generic drug:  cyanocobalamin Take 500 mcg by mouth daily. ZETIA 10 mg tablet Generic drug:  ezetimibe TAKE 1 TABLET DAILY * Notice: This list has 2 medication(s) that are the same as other medications prescribed for you. Read the directions carefully, and ask your doctor or other care provider to review them with you. Prescriptions Sent to Pharmacy Refills  
 levothyroxine (SYNTHROID) 88 mcg tablet 3 Sig: Take 1 Tab by mouth Daily (before breakfast). Class: Normal  
 Pharmacy: [address], [address] Ph #: [phone] Route: Oral  
  
We Performed the Following AMB POC EKG ROUTINE W/ 12 LEADS, INTER & REP [56201 CPT(R)] Follow-up Instructions Return in about 2 weeks (around 5/10/2018), or if symptoms worsen or fail to improve. To-Do List   
 04/26/2018 ECHO:  2D ECHO COMPLETE ADULT (TTE) W OR WO CONTR   
  
 04/26/2018 ECG:  ECG HOLTER MONITOR, UP TO 48 HRS Patient Instructions DASH Diet: Care Instructions Your Care Instructions The DASH diet is an eating plan that can help lower your blood pressure. DASH stands for Dietary Approaches to Stop Hypertension. Hypertension is high blood pressure. The DASH diet focuses on eating foods that are high in calcium, potassium, and magnesium. These nutrients can lower blood pressure. The foods that are highest in these nutrients are fruits, vegetables, low-fat dairy products, nuts, seeds, and legumes. But taking calcium, potassium, and magnesium supplements instead of eating foods that are high in those nutrients does not have the same effect. The DASH diet also includes whole grains, fish, and poultry. The DASH diet is one of several lifestyle changes your doctor may recommend to lower your high blood pressure.  Your doctor may also want you to decrease the amount of sodium in your diet. Lowering sodium while following the DASH diet can lower blood pressure even further than just the DASH diet alone. Follow-up care is a key part of your treatment and safety. Be sure to make and go to all appointments, and call your doctor if you are having problems. It's also a good idea to know your test results and keep a list of the medicines you take. How can you care for yourself at home? Following the DASH diet · Eat 4 to 5 servings of fruit each day. A serving is 1 medium-sized piece of fruit, ½ cup chopped or canned fruit, 1/4 cup dried fruit, or 4 ounces (½ cup) of fruit juice. Choose fruit more often than fruit juice. · Eat 4 to 5 servings of vegetables each day. A serving is 1 cup of lettuce or raw leafy vegetables, ½ cup of chopped or cooked vegetables, or 4 ounces (½ cup) of vegetable juice. Choose vegetables more often than vegetable juice. · Get 2 to 3 servings of low-fat and fat-free dairy each day. A serving is 8 ounces of milk, 1 cup of yogurt, or 1 ½ ounces of cheese. · Eat 6 to 8 servings of grains each day. A serving is 1 slice of bread, 1 ounce of dry cereal, or ½ cup of cooked rice, pasta, or cooked cereal. Try to choose whole-grain products as much as possible. · Limit lean meat, poultry, and fish to 2 servings each day. A serving is 3 ounces, about the size of a deck of cards. · Eat 4 to 5 servings of nuts, seeds, and legumes (cooked dried beans, lentils, and split peas) each week. A serving is 1/3 cup of nuts, 2 tablespoons of seeds, or ½ cup of cooked beans or peas. · Limit fats and oils to 2 to 3 servings each day. A serving is 1 teaspoon of vegetable oil or 2 tablespoons of salad dressing. · Limit sweets and added sugars to 5 servings or less a week. A serving is 1 tablespoon jelly or jam, ½ cup sorbet, or 1 cup of lemonade. · Eat less than 2,300 milligrams (mg) of sodium a day.  If you limit your sodium to 1,500 mg a day, you can lower your blood pressure even more. Tips for success · Start small. Do not try to make dramatic changes to your diet all at once. You might feel that you are missing out on your favorite foods and then be more likely to not follow the plan. Make small changes, and stick with them. Once those changes become habit, add a few more changes. · Try some of the following: ¨ Make it a goal to eat a fruit or vegetable at every meal and at snacks. This will make it easy to get the recommended amount of fruits and vegetables each day. ¨ Try yogurt topped with fruit and nuts for a snack or healthy dessert. ¨ Add lettuce, tomato, cucumber, and onion to sandwiches. ¨ Combine a ready-made pizza crust with low-fat mozzarella cheese and lots of vegetable toppings. Try using tomatoes, squash, spinach, broccoli, carrots, cauliflower, and onions. ¨ Have a variety of cut-up vegetables with a low-fat dip as an appetizer instead of chips and dip. ¨ Sprinkle sunflower seeds or chopped almonds over salads. Or try adding chopped walnuts or almonds to cooked vegetables. ¨ Try some vegetarian meals using beans and peas. Add garbanzo or kidney beans to salads. Make burritos and tacos with mashed weiner beans or black beans. Where can you learn more? Go to http://laura-elsa.info/. Enter I611 in the search box to learn more about \"DASH Diet: Care Instructions. \" Current as of: September 21, 2016 Content Version: 11.4 © 4694-5359 [employer]. Care instructions adapted under license by [employer] (which disclaims liability or warranty for this information). If you have questions about a medical condition or this instruction, always ask your healthcare professional. [address]  any warranty or liability for your use of this information. Introducing [facility]!    
 Dear Katia Banda: 
 Thank you for requesting a [facility] account. Our records indicate that you already have an active [facility] account. You can access your account anytime at [email or web address] Did you know that you can access your hospital and ER discharge instructions at any time in [facility]? You can also review all of your test results from your hospital stay or ER visit. Additional Information If you have questions, please visit the Frequently Asked Questions section of the [facility] website at [email or web address] Remember, [facility] is NOT to be used for urgent needs. For medical emergencies, dial 911. Now available from your iPhone and Android! Please provide this summary of care documentation to your next provider. Your primary care clinician is listed as Torey Torre. If you have any questions after today's visit, please call [phone].

## 2018-04-26 NOTE — PATIENT INSTRUCTIONS

## 2018-04-29 PROBLEM — E10.29 MICROALBUMINURIA DUE TO TYPE 1 DIABETES MELLITUS (HCC): Status: ACTIVE | Noted: 2018-04-29

## 2018-04-29 PROBLEM — R60.0 BILATERAL LOWER EXTREMITY EDEMA: Status: ACTIVE | Noted: 2018-04-29

## 2018-04-29 PROBLEM — R80.9 MICROALBUMINURIA DUE TO TYPE 1 DIABETES MELLITUS (HCC): Status: ACTIVE | Noted: 2018-04-29

## 2018-04-29 PROBLEM — R00.2 PALPITATIONS: Status: ACTIVE | Noted: 2018-04-29

## 2018-04-29 PROBLEM — R06.09 DYSPNEA ON EXERTION: Status: ACTIVE | Noted: 2018-04-29

## 2018-04-29 NOTE — PROGRESS NOTES
HPI:   Frances Hebert is a 61y.o. year old female who presents today for evaluation of hypertension, hyperlipidemia, diabetes mellitus type 1, chronic renal disease stage 3, and hypothyroidism. She reports that she is doing relatively well. She reports that in 2/2018, she began having difficulty with right elbow pain and was evaluated by Dr. Xavier Alejandra who diagnosed her with right lateral epicondylitis. She received a cortisone injection with improvement. She also reports that over the last several months, she has noticed worsening lower extremity edema. She states that previously, her swelling would improve overnight, but she has been noticing recently that it does not resolve. She also reports some mild dyspnea with exertion, such as walking upstairs, which has been present chronically, although may be slightly worse. She does continue to report some \"skipped beats\" although not prolonged. She denies any chest pain, shortness of breath at rest or with normal activities, lightheadedness, PND, or orthopnea. She is otherwise without complaints and feeling well. She has a history of diabetes mellitus type 1, with onset at age 6. She is currently under the care of Dr. Erik Feldman, and is being treated with insulin glargine and lispro. She has not been well-controlled recently, with HbA1c ranging from 7.5-8.0. She does have proliferative diabetic retinopathy and is s/p pan-retinal photocoagulation therapy in both eyes. She is followed closely by Dr. Kelsi Quiñonez, and recent exam showed regression bilaterally. She also has peripheral neuropathy, with burning and tingling in her feet at night. Renal function had been normal until 5/2016, with evidence of chronic renal disease, stage 3, since that time with baseline creatinine1.0-1.04/ eGFR 54-56. She also has a history of hypothyroidism, and she is currently on Synthroid. She denies cold intolerance, hair or skin changes.       She has a history of hypertension, treated with quinapril. She had a pharmacologic nuclear stress test in 11/2012, which showed no evidence for ischemia, and normal LV wall motion (EF> 70%). She also had a carotid duplex scan in 7/2013 which showed mild (< 50%) bilateral internal carotid artery stenoses. She denies any chest pain, shortness of breath at rest or with exertion, palpitations, lightheadedness, or edema. She has a history of hyperlipidemia, and was on atorvastatin for many years until 10/2015 when she discontinued it because of myalgias. She was recently tried on rosuvastatin without success due to recurrent myalgias. She was started on ezetimibe in 9/2016 and has been tolerating it without difficulty. She successfully stopped smoking in 1/2015, and reports that she continues to abstain. She had a screening colonoscopy in 9/2014 by Dr. Sarah Nash, which found a 6 mm sessile polyp in the descending colon and a 4-5 mm sessile polyp in the rectum/sigmoid (pathology: tubular adenomas). Recommendation was for follow-up colonoscopy in 5 years. She denies any abdominal pain, nausea, vomiting, melena, hematochezia, or change in bowel movements. In 3/2017, she was diagnosed with an upper respiratory tract infection and was prescribed doxycycline and prednisone by [facility]. She subsequently developed severe dizziness and vomiting, prompting a visit to the [facility] where she was diagnosed with benign positional vertigo and treated with meclizine and Zofran with improvement. In 10/2017, she noticed difficulty with her peripheral vision and was evaluated by Dr. Kelsi Quiñonez. She has regressed proliferative diabetic retinopathy and she reports that the laser therapy she received many years ago to treat this resulted in the current limitations in her peripheral vision. She does also have mild glaucoma for which she is receiving treatment.  She states that her driving has been restricted to only during the day, and she is aware that she must turn her head completely to each side when navigating behind the wheel. Past Medical History:   Diagnosis Date    Chronic renal disease, stage III     Diabetes mellitus type 1 ([address]     Diabetic peripheral neuropathy (HCC)     Diabetic retinopathy ([address]     Essential hypertension with goal blood pressure less than 140/90     Hearing decreased     Hyperlipidemia     Hypothyroidism      Past Surgical History:   Procedure Laterality Date    HX GYN      partial hysterectomy    HX GYN      3 D and C's    HX HEENT      tonsillectomy    HX HEENT      cataract removal bilateral    HX HEENT      multiple surgeries for retinopathy    HX ORTHOPAEDIC      carpral tunnel sugery bilateral     Current Outpatient Prescriptions   Medication Sig    levothyroxine (SYNTHROID) 88 mcg tablet Take 1 Tab by mouth Daily (before breakfast).  furosemide (LASIX) 20 mg tablet Take 1 Tab by mouth daily.  quinapril (ACCUPRIL) 10 mg tablet TAKE 1 TABLET EVERY EVENING ALONG WITH A 5 MG TABLET    Cholecalciferol, Vitamin D3, (VITAMIN D3) 2,000 unit cap capsule Take 2,000 Units by mouth daily. (Patient taking differently: Take 5,000 Units by mouth daily.)    ZETIA 10 mg tablet TAKE 1 TABLET DAILY    quinapril (ACCUPRIL) 5 mg tablet TAKE 1 TABLET NIGHTLY, WITH 10 MG TABLET EVERY EVENING    ALPHAGAN P 0.1 % ophthalmic solution     glucose blood VI test strips (ONE TOUCH ULTRA TEST) strip USE TO TEST BLOOD SUGAR FOUR TIMES A DAY    cyanocobalamin (VITAMIN B-12) 500 mcg tablet Take 500 mcg by mouth daily.  aspirin delayed-release 81 mg tablet Take  by mouth daily.  insulin glargine (LANTUS SOLOSTAR) 100 unit/mL (3 mL) pen 23 Units by SubCUTAneous route daily.  insulin lispro (HUMALOG PEN) 100 unit/mL kwikpen by SubCUTAneous route. Sliding scale as needed      bisacodyl 5 mg tab Take  by mouth.  meclizine (ANTIVERT) 25 mg tablet Take  by mouth three (3) times daily as needed.     ondansetron (ZOFRAN ODT) 4 mg disintegrating tablet Take 4 mg by mouth every eight (8) hours as needed for Nausea.  loratadine (CLARITIN REDITABS) 10 mg dissolvable tablet Take 1 Tab by mouth daily. No current facility-administered medications for this visit. Allergies and Intolerances: Allergies   Allergen Reactions    Pcn [Penicillins] Hives    Sulfa (Sulfonamide Antibiotics) Nausea Only and Vertigo     Family History: Grandmother  from colon cancer. Mother had CAD and DM. Family History   Problem Relation Age of Onset    Diabetes Brother     Diabetes Mother     Heart Disease Mother      Social History:   She  reports that she quit smoking about 3 years ago. She smoked 1.00 pack per day. She has never used smokeless tobacco. She is  and lives with her . 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 History   Alcohol Use    Yes     Comment: infrequently     Immunization History:  Immunization History   Administered Date(s) Administered    Influenza Vaccine (Quad) PF 12/15/2015, 2016, 10/19/2017    Influenza Vaccine PF 2014    Influenza Vaccine Split 2011, 10/17/2012    Influenza Vaccine Whole 2010    Pneumococcal Polysaccharide (PPSV-23) 2017    Tdap 2013    Zoster Vaccine, Live 2016       Review of Systems:   As above included in HPI. Otherwise 11 point review of systems negative including constitutional, skin, HENT, eyes, respiratory, cardiovascular, gastrointestinal, genitourinary, musculoskeletal, endo/heme/aller, neurological.    Physical:   Vitals:   BP: 132/62  HR: 64  WT: 169 lb (76.7 kg)  BMI:  28.12 kg/m2    Exam:   Patient appears in no apparent distress. Affect is appropriate.   HEENT --Anicteric sclerae, tympanic membranes normal,  ear canals normal.  PERRL, EOMI, conjunctiva and lids normal.   Sinuses were nontender, turbinates normal, hearing normal.  Oropharynx without  erythema, normal tongue, oral mucosa and tonsils. No cervical lymphadenopathy. No thyromegaly, JVD, or bruits. Carotid pulses 2+ with normal upstroke. Lungs --Clear to auscultation. No wheezing or rales. Heart --Regular rate and rhythm, no murmurs, rubs, gallops, or clicks. Breasts -- no masses, skin dimpling, asymmetry, nipple discharge, nodules, axillary lymphadenopathy. Chest wall --Nontender to palpation. PMI normal.  Abdomen -- Soft and nontender, no hepatosplenomegaly or masses. Extremities: 1+ edema bilaterally (R>L). No calf tenderness or discoloration of leg. Negative Jordan's sign. Pulses 1-2+ bilaterally. Neuro -- CN 2-12 intact, strength 5/5 with intact soft touch in all extremities, cerebellar  exam finger to nose test normal, 2+DTRs  Derm  no obvious abnormalities noted, no rash    Review of Data:  Labs:   Abstract on 04/25/2018   Component Date Value Ref Range Status    Hemoglobin A1c, External 03/13/2018 8.7   Final   Hospital Outpatient Visit on 04/19/2018   Component Date Value Ref Range Status    WBC 04/19/2018 5.9  4.6 - 13.2 K/uL Final    RBC 04/19/2018 3.14* 4.20 - 5.30 M/uL Final    HGB 04/19/2018 10.4* 12.0 - 16.0 g/dL Final    HCT 04/19/2018 30.0* 35.0 - 45.0 % Final    MCV 04/19/2018 95.5  74.0 - 97.0 FL Final    MCH 04/19/2018 33.1  24.0 - 34.0 PG Final    MCHC 04/19/2018 34.7  31.0 - 37.0 g/dL Final    RDW 04/19/2018 12.5  11.6 - 14.5 % Final    PLATELET 64/86/1299 104  135 - 420 K/uL Final    MPV 04/19/2018 11.5  9.2 - 11.8 FL Final    NEUTROPHILS 04/19/2018 69  40 - 73 % Final    LYMPHOCYTES 04/19/2018 20* 21 - 52 % Final    MONOCYTES 04/19/2018 8  3 - 10 % Final    EOSINOPHILS 04/19/2018 3  0 - 5 % Final    BASOPHILS 04/19/2018 0  0 - 2 % Final    ABS. NEUTROPHILS 04/19/2018 4.1  1.8 - 8.0 K/UL Final    ABS. LYMPHOCYTES 04/19/2018 1.2  0.9 - 3.6 K/UL Final    ABS. MONOCYTES 04/19/2018 0.5  0.05 - 1.2 K/UL Final    ABS. EOSINOPHILS 04/19/2018 0.2  0.0 - 0.4 K/UL Final    ABS.  BASOPHILS 04/19/2018 0.0  0.0 - 0.06 K/UL Final    DF 04/19/2018 AUTOMATED    Final    LIPID PROFILE 04/19/2018        Final    Cholesterol, total 04/19/2018 174  <200 MG/DL Final    Triglyceride 04/19/2018 66  <150 MG/DL Final    HDL Cholesterol 04/19/2018 81* 40 - 60 MG/DL Final    LDL, calculated 04/19/2018 79.8  0 - 100 MG/DL Final    VLDL, calculated 04/19/2018 13.2  MG/DL Final    CHOL/HDL Ratio 04/19/2018 2.1  0 - 5.0   Final    Sodium 04/19/2018 137  136 - 145 mmol/L Final    Potassium 04/19/2018 4.6  3.5 - 5.5 mmol/L Final    Chloride 04/19/2018 104  100 - 108 mmol/L Final    CO2 04/19/2018 28  21 - 32 mmol/L Final    Anion gap 04/19/2018 5  3.0 - 18 mmol/L Final    Glucose 04/19/2018 219* 74 - 99 mg/dL Final    BUN 04/19/2018 24* 7.0 - 18 MG/DL Final    Creatinine 04/19/2018 0.90  0.6 - 1.3 MG/DL Final    BUN/Creatinine ratio 04/19/2018 27* 12 - 20   Final    GFR est AA 04/19/2018 >60  >60 ml/min/1.73m2 Final    GFR est non-AA 04/19/2018 >60  >60 ml/min/1.73m2 Final    Calcium 04/19/2018 8.6  8.5 - 10.1 MG/DL Final    Bilirubin, total 04/19/2018 0.3  0.2 - 1.0 MG/DL Final    ALT (SGPT) 04/19/2018 17  13 - 56 U/L Final    AST (SGOT) 04/19/2018 14* 15 - 37 U/L Final    Alk.  phosphatase 04/19/2018 50  45 - 117 U/L Final    Protein, total 04/19/2018 6.1* 6.4 - 8.2 g/dL Final    Albumin 04/19/2018 3.4  3.4 - 5.0 g/dL Final    Globulin 04/19/2018 2.7  2.0 - 4.0 g/dL Final    A-G Ratio 04/19/2018 1.3  0.8 - 1.7   Final    TSH 04/19/2018 0.15* 0.36 - 3.74 uIU/mL Final    Microalbumin,urine random 04/19/2018 2.63  0 - 3.0 MG/DL Final    Creatinine, urine 04/19/2018 50.90  30 - 125 mg/dL Final    Microalbumin/Creat ratio (mg/g cre* 04/19/2018 52* 0 - 30 mg/g Final    Vitamin D 25-Hydroxy 04/19/2018 48.8  30 - 100 ng/mL Final    Color 04/19/2018 YELLOW    Final    Appearance 04/19/2018 CLEAR    Final    Specific gravity 04/19/2018 1.014  1.005 - 1.030   Final    pH (UA) 04/19/2018 6.5  5.0 - 8.0   Final    Protein 2018 NEGATIVE   NEG mg/dL Final    Glucose 2018 250* NEG mg/dL Final    Ketone 2018 NEGATIVE   NEG mg/dL Final    Bilirubin 2018 NEGATIVE   NEG   Final    Blood 2018 NEGATIVE   NEG   Final    Urobilinogen 2018 1.0  0.2 - 1.0 EU/dL Final    Nitrites 2018 NEGATIVE   NEG   Final    Leukocyte Esterase 2018 TRACE* NEG   Final    WBC 2018 1 to 3  0 - 4 /hpf Final    RBC 2018 0 to 1  0 - 5 /hpf Final    Epithelial cells 2018 1+  0 - 5 /lpf Final    Bacteria 2018 NEGATIVE   NEG /hpf Final     EKG (2108) sinus rhythm at 62 bpm, short SD interval (11 msec), but normal QRS and QTc intervals. No ischemic changes. (without change from 10/2017). Health Maintenance:  Screening:    Mammogram: negative (2017)   PAP smear: negative (2014). S/p YUNG, no further screening needed. Colorectal: colonsocopy (2014) tubular adenomas. Dr. Sera Masters. Due . Depression: none   DM (HbA1c/FPG): HbA1c 8.7 (3/2018) Dr. Xiomara Velasco   Hepatitis C: negative (2015)   Falls: none    DEXA: N/A    Glaucoma: mild primary open angle glaucoma and regressed proliferative diabetic retinopathy. Followed by Dr. Sera Masters (last 2017).    Smokin pack year (stopped 2015)   Vitamin D: 48.8 (2018)   Medicare Wellness: N/A    Impression:  Patient Active Problem List   Diagnosis Code    Stable proliferative diabetic retinopathy of both eyes associated with type 1 diabetes mellitus ([facility] [address] P16.7831    Hyperlipidemia E78.5    Hearing decreased H91.90    Diabetes mellitus with diabetic polyneuropathy ([facility] [address] E11.42    Vitamin D deficiency E55.9    Ex-cigarette smoker (30 pack year), stopped 2015 Z87.891    Colon polyp, colonoscopy due  K63.5    Essential hypertension I10    Hypothyroidism due to acquired atrophy of thyroid E03.4    Chronic kidney disease, stage III (moderate) N18.3    Type 1 diabetes mellitus with stage 3 chronic kidney disease (HCC) E10.22, N18.3    Anemia D64.9    Dyspnea on exertion R06.09    Palpitations R00.2    Bilateral lower extremity edema R60.0    Microalbuminuria due to type 1 diabetes mellitus (HCC) E10.29, R80.9       Plan:  1. Lower extremity edema/dyspnea. Patient reports worsening lower extremity edema as no longer resolving overnight. Some questionable mild increase in exertional dyspnea. No other associated symptoms except palpitations in the form of \"skipped beats\". EKG without change from baseline. Will obtain lower extremity duplex, although suspicion is low for DVT, and echocardiogram to evaluate heart function and rule out structural abnormalities. Long time smoker so need to evaluate right sided pressures as well as LV function. Will also obtain a 48 hour Holter monitor to evaluate her complaint of palpitations. Consider nuclear stress testing after results of other testing known. Given long standing type I diabetic, there is a concern for possible silent ischemia. Will begin lasix 20 mg daily to help manage edema. Follow closely. 2. Palpitations. Description most consistent with ectopic beats, and otherwise asymptomatic. EKG with shortened NH interval but QRS duration is normal. Given development of lower extremity edema, will proceed with Holter monitor and echocardiogram. Follow. 3. Hypertension. Well controlled on quinapril. Renal function stable with creatinine 0.90/ eGFR >60. Continue to follow. 4. Type 1 diabetes mellitus. HbA1c with some worsening to 8.7. On Lantus and Humalog. Reports has new blood sugar sensor machine that does not require finger sticks for readings. Being managed by endocrinology. Weight increased seven pounds since last visit. Proliferative diabetic retinopathy in regression, followed by Dr. Mich Doherty. Neuropathy symptoms in feet are mild and have not required treatment with medication. Foot exam performed by endocrine.  Renal function with evidence of chronic renal disease, stage 3, and also with evidence of mild microalbuminuria on recent labs. On Ace-I. Lipid lowering therapy not prescibed for patient reasons as not able to tolerate due to myalgias. Follow closely. 5. Hyperlipidemia. On ezetimibe with significant improvement in LDL to 79. Not able to tolerate statin (atorvastatin/ rosuvastatin) due to myalgias. Stressed importance of lifestyle modifications, especially diet, exercise and weight loss. 6. Chronic renal disease, stage 3. Evidence of mild disease with creatinine 1.00-1.04/ eGFR 54-56 since 5/2016. Most likely due to long standing diabetes mellitus and hypertension. Remaining stable. On Ace-I, but not on statin as discussed above. Counseled on avoiding NSAIDS and prerenal status. 7. Hypothyroidism. Synthroid dose decreased from 112 to 100 mcg in 4/2017 due to over replacement. TSH was therapeutic in 7/2017, but with evidence of over replacement on labs in 10/2017 on current Synthroid dose. Patient did not wish to decrease dose last visit, but repeat today again low signifying overreplacement. Agreeable to decrease dose to 88 mcg daily. Will need to recheck TSH in 3 months. Continue to follow. 8. H/O cigarette smoking. 30 pack year history and patient stopped in 1/2015. Meets criteria for lung cancer screening with low dose CT scan. Performed in 11/2017 showing a few tiny solid and one groundglass nodule identified (lung RADS 2). Follow-up recommended for one year (11/2018). Did also note evidence of emphysema and prominence of the pulmonary vascular trunk which can be seen in pulmonary arterial hypertension. To be evaluated with upcoming echocardiogram.  Encouraged to continue with abstaining from smoking. 9. Anemia. Iron studies consistent with anemia of chronic inflammation. B12 normal, and folate level slightly low. Encouraged to take multivitamin with folate. 10. Overweight.  Emphasized importance of lifestyle modifications, including diet, exercise, and weight loss. Weight increased five pounds since last visit with worsening HbA1c. Will readdress next visit. 11. Health maintenance. Will give influenza vaccine today. Will discuss Shingrix at next visit. Other immunizations up to date. Mammogram up to date. Colonoscopy due 2019. Vitamin D level now normal. Continue maintenance dose supplement. Total time: 40 minutes spent with the patient in face-to-face consultation of which greater than 50% was spent on counseling, answering questions and/or coordination of care. Complex medical review and management performed. Patient understands recommendations and agrees with plan. .  Follow-up appointment in 2 weeks.

## 2018-05-01 ENCOUNTER — HOSPITAL ENCOUNTER (OUTPATIENT)
Dept: NON INVASIVE DIAGNOSTICS | Age: 63
Discharge: HOME | End: 2018-05-01
Attending: INTERNAL MEDICINE
Payer: OTHER GOVERNMENT

## 2018-05-01 DIAGNOSIS — E10.22 TYPE 1 DIABETES MELLITUS WITH STAGE 3 CHRONIC KIDNEY DISEASE (HCC): ICD-10-CM

## 2018-05-01 DIAGNOSIS — R06.09 DYSPNEA ON EXERTION: ICD-10-CM

## 2018-05-01 DIAGNOSIS — N18.30 TYPE 1 DIABETES MELLITUS WITH STAGE 3 CHRONIC KIDNEY DISEASE (HCC): ICD-10-CM

## 2018-05-01 DIAGNOSIS — R00.2 PALPITATIONS: ICD-10-CM

## 2018-05-01 PROCEDURE — 93225 XTRNL ECG REC<48 HRS REC: CPT

## 2018-05-02 ENCOUNTER — TELEPHONE (OUTPATIENT)
Dept: INTERNAL MEDICINE CLINIC | Age: 63
End: 2018-05-02

## 2018-05-02 NOTE — TELEPHONE ENCOUNTER
Patient stating she is now having foot pain and red bloody blotches going up her leg. She refused an appt stating she sees Dr. Julia Collins next week. Asking for a call back.

## 2018-05-02 NOTE — TELEPHONE ENCOUNTER
Called and spoke with patient. Woke up this morning with right foot pain, swelling, and red blotches on right ankle. Reviewed photos sent through My Chart. Difficult to interpret, but appears to have evidence of redness on medial foot below great toe, worrisome for infection. Has small ulcer on distal second toe. Discussed with patient need for evaluation in ED to rule out infection. Agreeable and will go to [facility] as closest to her home.

## 2018-05-03 ENCOUNTER — TELEPHONE (OUTPATIENT)
Dept: INTERNAL MEDICINE CLINIC | Age: 63
End: 2018-05-03

## 2018-05-03 DIAGNOSIS — R60.0 BILATERAL LOWER EXTREMITY EDEMA: Primary | ICD-10-CM

## 2018-05-03 NOTE — TELEPHONE ENCOUNTER
Benny stating Dr. Julia Collins was prescribing her compression hose.  She wants rx sent to [facility]    Fax: [phone]

## 2018-05-09 ENCOUNTER — TELEPHONE (OUTPATIENT)
Dept: INTERNAL MEDICINE CLINIC | Age: 63
End: 2018-05-09

## 2018-05-09 ENCOUNTER — HOSPITAL ENCOUNTER (OUTPATIENT)
Dept: LAB | Age: 63
Discharge: HOME | End: 2018-05-09

## 2018-05-09 ENCOUNTER — OFFICE VISIT (OUTPATIENT)
Dept: INTERNAL MEDICINE CLINIC | Age: 63
End: 2018-05-09

## 2018-05-09 VITALS
HEART RATE: 82 BPM | DIASTOLIC BLOOD PRESSURE: 62 MMHG | WEIGHT: 166 LBS | HEIGHT: 65 IN | TEMPERATURE: 99.1 F | SYSTOLIC BLOOD PRESSURE: 142 MMHG | OXYGEN SATURATION: 93 % | RESPIRATION RATE: 14 BRPM | BODY MASS INDEX: 27.66 KG/M2

## 2018-05-09 DIAGNOSIS — N18.30 CHRONIC KIDNEY DISEASE, STAGE III (MODERATE) (HCC): ICD-10-CM

## 2018-05-09 DIAGNOSIS — N18.30 TYPE 1 DIABETES MELLITUS WITH STAGE 3 CHRONIC KIDNEY DISEASE (HCC): ICD-10-CM

## 2018-05-09 DIAGNOSIS — R60.0 BILATERAL LOWER EXTREMITY EDEMA: ICD-10-CM

## 2018-05-09 DIAGNOSIS — E10.29 MICROALBUMINURIA DUE TO TYPE 1 DIABETES MELLITUS (HCC): ICD-10-CM

## 2018-05-09 DIAGNOSIS — E03.4 HYPOTHYROIDISM DUE TO ACQUIRED ATROPHY OF THYROID: ICD-10-CM

## 2018-05-09 DIAGNOSIS — Z87.891 EX-CIGARETTE SMOKER: ICD-10-CM

## 2018-05-09 DIAGNOSIS — I10 ESSENTIAL HYPERTENSION: Primary | ICD-10-CM

## 2018-05-09 DIAGNOSIS — E10.42 TYPE 1 DIABETES MELLITUS WITH DIABETIC POLYNEUROPATHY (HCC): ICD-10-CM

## 2018-05-09 DIAGNOSIS — R06.09 DYSPNEA ON EXERTION: ICD-10-CM

## 2018-05-09 DIAGNOSIS — E10.22 TYPE 1 DIABETES MELLITUS WITH STAGE 3 CHRONIC KIDNEY DISEASE (HCC): ICD-10-CM

## 2018-05-09 DIAGNOSIS — E78.5 HYPERLIPIDEMIA, UNSPECIFIED HYPERLIPIDEMIA TYPE: ICD-10-CM

## 2018-05-09 DIAGNOSIS — R00.2 PALPITATIONS: ICD-10-CM

## 2018-05-09 DIAGNOSIS — E10.3553 STABLE PROLIFERATIVE DIABETIC RETINOPATHY OF BOTH EYES ASSOCIATED WITH TYPE 1 DIABETES MELLITUS (HCC): ICD-10-CM

## 2018-05-09 DIAGNOSIS — R80.9 MICROALBUMINURIA DUE TO TYPE 1 DIABETES MELLITUS (HCC): ICD-10-CM

## 2018-05-09 PROCEDURE — 99001 SPECIMEN HANDLING PT-LAB: CPT | Performed by: INTERNAL MEDICINE

## 2018-05-09 NOTE — PROGRESS NOTES
Chief Complaint   Patient presents with    Swelling     2 week follow up     1. Have you been to the ER, urgent care clinic or hospitalized since your last visit? YES. Last week at [facility] for foot issues. 2. Have you seen or consulted any other health care providers outside of the [address] [place] [address] since your last visit (Include any pap smears or colon screening)? YES, Patient states she went to Dr. Christin Finley from [facility], last seen   Yesterday and was prescribed methylprednisolone titrating dose pack.

## 2018-05-09 NOTE — PATIENT INSTRUCTIONS
Discontinue Medrol dose pack. Begin ibuprofen 400 mg three times per day with food. DASH Diet: Care Instructions  Your Care Instructions    The DASH diet is an eating plan that can help lower your blood pressure. DASH stands for Dietary Approaches to Stop Hypertension. Hypertension is high blood pressure. The DASH diet focuses on eating foods that are high in calcium, potassium, and magnesium. These nutrients can lower blood pressure. The foods that are highest in these nutrients are fruits, vegetables, low-fat dairy products, nuts, seeds, and legumes. But taking calcium, potassium, and magnesium supplements instead of eating foods that are high in those nutrients does not have the same effect. The DASH diet also includes whole grains, fish, and poultry. The DASH diet is one of several lifestyle changes your doctor may recommend to lower your high blood pressure. Your doctor may also want you to decrease the amount of sodium in your diet. Lowering sodium while following the DASH diet can lower blood pressure even further than just the DASH diet alone. Follow-up care is a key part of your treatment and safety. Be sure to make and go to all appointments, and call your doctor if you are having problems. It's also a good idea to know your test results and keep a list of the medicines you take. How can you care for yourself at home? Following the DASH diet  · Eat 4 to 5 servings of fruit each day. A serving is 1 medium-sized piece of fruit, ½ cup chopped or canned fruit, 1/4 cup dried fruit, or 4 ounces (½ cup) of fruit juice. Choose fruit more often than fruit juice. · Eat 4 to 5 servings of vegetables each day. A serving is 1 cup of lettuce or raw leafy vegetables, ½ cup of chopped or cooked vegetables, or 4 ounces (½ cup) of vegetable juice. Choose vegetables more often than vegetable juice. · Get 2 to 3 servings of low-fat and fat-free dairy each day.  A serving is 8 ounces of milk, 1 cup of yogurt, or 1 ½ ounces of cheese. · Eat 6 to 8 servings of grains each day. A serving is 1 slice of bread, 1 ounce of dry cereal, or ½ cup of cooked rice, pasta, or cooked cereal. Try to choose whole-grain products as much as possible. · Limit lean meat, poultry, and fish to 2 servings each day. A serving is 3 ounces, about the size of a deck of cards. · Eat 4 to 5 servings of nuts, seeds, and legumes (cooked dried beans, lentils, and split peas) each week. A serving is 1/3 cup of nuts, 2 tablespoons of seeds, or ½ cup of cooked beans or peas. · Limit fats and oils to 2 to 3 servings each day. A serving is 1 teaspoon of vegetable oil or 2 tablespoons of salad dressing. · Limit sweets and added sugars to 5 servings or less a week. A serving is 1 tablespoon jelly or jam, ½ cup sorbet, or 1 cup of lemonade. · Eat less than 2,300 milligrams (mg) of sodium a day. If you limit your sodium to 1,500 mg a day, you can lower your blood pressure even more. Tips for success  · Start small. Do not try to make dramatic changes to your diet all at once. You might feel that you are missing out on your favorite foods and then be more likely to not follow the plan. Make small changes, and stick with them. Once those changes become habit, add a few more changes. · Try some of the following:  ¨ Make it a goal to eat a fruit or vegetable at every meal and at snacks. This will make it easy to get the recommended amount of fruits and vegetables each day. ¨ Try yogurt topped with fruit and nuts for a snack or healthy dessert. ¨ Add lettuce, tomato, cucumber, and onion to sandwiches. ¨ Combine a ready-made pizza crust with low-fat mozzarella cheese and lots of vegetable toppings. Try using tomatoes, squash, spinach, broccoli, carrots, cauliflower, and onions. ¨ Have a variety of cut-up vegetables with a low-fat dip as an appetizer instead of chips and dip. ¨ Sprinkle sunflower seeds or chopped almonds over salads.  Or try adding chopped walnuts or almonds to cooked vegetables. ¨ Try some vegetarian meals using beans and peas. Add garbanzo or kidney beans to salads. Make burritos and tacos with mashed weiner beans or black beans. Where can you learn more? Go to http://laura-elsa.info/. Enter Z673 in the search box to learn more about \"DASH Diet: Care Instructions. \"  Current as of: September 21, 2016  Content Version: 11.4  © 9120-3278 [employer]. Care instructions adapted under license by [employer] (which disclaims liability or warranty for this information). If you have questions about a medical condition or this instruction, always ask your healthcare professional. [address] any warranty or liability for your use of this information.

## 2018-05-09 NOTE — TELEPHONE ENCOUNTER
Called and requested patient's last progress note be sent to our office. Medical release form will be signed and faxed to [facility] and [address] [place] [address] to release patients medical information.

## 2018-05-09 NOTE — MR AVS SNAPSHOT
[facility] 
 
 
 [address], [address] [place] [address] [facility] 
[phone] Patient: Sergo Ayers MRN: [id number] ZYS:[birth year] Visit Information Date & Time Provider Department Dept. Phone Encounter #  
 5/9/2018  2:30 PM Rafael Tracey MD [facility] [phone] Follow-up Instructions Return in about 3 months (around 8/9/2018), or if symptoms worsen or fail to improve. Your Appointments 8/23/2018 11:30 AM  
Office Visit with Rafael Tracey MD  
[facility]) Appt Note: ov 3mos, sarris [address], [address] [place] [address] [address]  
  
   
 [address], [address] Upcoming Health Maintenance Date Due Influenza Age 5 to Adult 8/1/2018 FOOT EXAM Q1 9/6/2018 HEMOGLOBIN A1C Q6M 9/13/2018 EYE EXAM RETINAL OR DILATED Q1 9/19/2018 MICROALBUMIN Q1 4/19/2019 LIPID PANEL Q1 4/19/2019 BREAST CANCER SCRN MAMMOGRAM 5/6/2019 COLONOSCOPY 9/29/2019 PAP AKA CERVICAL CYTOLOGY 4/13/2020 DTaP/Tdap/Td series (2 - Td) 6/25/2023 Allergies as of 5/9/2018  Review Complete On: 5/9/2018 By: Travis Moore Severity Noted Reaction Type Reactions Pcn [Penicillins]    Hives Sulfa (Sulfonamide Antibiotics)  04/13/2015    Nausea Only, Vertigo Current Immunizations  Reviewed on 10/19/2017 Name Date Influenza Vaccine (Quad) PF 10/19/2017  9:27 AM, 9/14/2016 12:16 PM, 12/15/2015  9:44 AM  
 Influenza Vaccine PF 12/22/2014  2:01 PM  
 Influenza Vaccine Split 10/17/2012, 11/4/2011 Influenza Vaccine Whole 11/9/2010 Pneumococcal Polysaccharide (PPSV-23) 4/13/2017 11:34 AM  
 Tdap 6/25/2013 Zoster Vaccine, Live 5/31/2016  2:05 PM  
  
 Not reviewed this visit You Were Diagnosed With   
  
 Codes Comments Essential hypertension    -  Primary ICD-10-CM: I10 
ICD-9-CM: 401.9 Vitals BP Pulse Temp Resp Height(growth percentile) Weight(growth percentile) 142/62 (BP 1 Location: Right arm, BP Patient Position: Sitting) 82 99.1 °F (37.3 °C) (Oral) 14 5' 5\" (1.651 m) 166 lb (75.3 kg) SpO2 BMI OB Status Smoking Status 93% 27.62 kg/m2 Hysterectomy Former Smoker Vitals History BMI and BSA Data Body Mass Index Body Surface Area  
 27.62 kg/m 2 1.86 m 2 Preferred Pharmacy Pharmacy Name Phone [facility] [phone] Your Updated Medication List  
  
   
This list is accurate as of 5/9/18  3:38 PM.  Always use your most recent med list.  
  
  
  
  
 ALPHAGAN P 0.1 % ophthalmic solution Generic drug:  brimonidine  
  
 aspirin delayed-release 81 mg tablet Take  by mouth daily. bisacodyl 5 mg Tab Take  by mouth. Cholecalciferol (Vitamin D3) 2,000 unit Cap capsule Commonly known as:  VITAMIN D3 Take 2,000 Units by mouth daily. furosemide 20 mg tablet Commonly known as:  LASIX Take 1 Tab by mouth daily. glucose blood VI test strips strip Commonly known as:  ONETOUCH ULTRA TEST  
USE TO TEST BLOOD SUGAR FOUR TIMES A DAY HumaLOG Pen 100 unit/mL kwikpen Generic drug:  insulin lispro  
by SubCUTAneous route. Sliding scale as needed LANTUS SOLOSTAR U-100 INSULIN 100 unit/mL (3 mL) Inpn Generic drug:  insulin glargine 23 Units by SubCUTAneous route daily. levothyroxine 88 mcg tablet Commonly known as:  SYNTHROID Take 1 Tab by mouth Daily (before breakfast). loratadine 10 mg dissolvable tablet Commonly known as:  Carvel Hark Take 1 Tab by mouth daily. meclizine 25 mg tablet Commonly known as:  ANTIVERT Take  by mouth three (3) times daily as needed. ondansetron 4 mg disintegrating tablet Commonly known as:  ZOFRAN ODT Take 4 mg by mouth every eight (8) hours as needed for Nausea. quinapril 10 mg tablet Commonly known as:  ACCUPRIL  
TAKE 1 TABLET EVERY EVENING ALONG WITH A 5 MG TABLET  
  
 VITAMIN B-12 500 mcg tablet Generic drug:  cyanocobalamin Take 500 mcg by mouth daily. ZETIA 10 mg tablet Generic drug:  ezetimibe TAKE 1 TABLET DAILY Follow-up Instructions Return in about 3 months (around 8/9/2018), or if symptoms worsen or fail to improve. To-Do List   
 05/10/2018  9:00 AM  
  Appointment with [id number] MACHINE (WT ) at [facility] NON-INVASIVE CARD ([phone]) Age Limit for ALL Heart procedures @ all [facility] facilities: 18 yrs and older only. Under the age of 25, refer to [facility] ([phone]). Wt Limit: 350lbs. This study requires patient to bring a written physician's order or MD office may fax the order to Central Scheduling at [phone]. Patient needs to bring a current list of all medications. No preparation is required for this study. Patients should report 15 minutes prior to their appointment time to the [facility], [address] [facility] [address].     
  
 05/18/2018 9:00 AM  
  Appointment with [facility] VASCULAR LAB 1 at [facility] VASCULAR LAB ([phone]) Please have patient bring a copy of their order if same day add-on. It can also be faxed to Central Scheduling - [phone]. No preparation is required for this study. Patient can have their meals and take their medications. Please report to the main location @ [place] [address] [address] at least 15 minutes prior to your appointment time. The  is located on the RIGHT side of the street, immediately adjacent to the Emergency Room. Patient Instructions Discontinue Medrol dose pack. Begin ibuprofen 400 mg three times per day with food. DASH Diet: Care Instructions Your Care Instructions The DASH diet is an eating plan that can help lower your blood pressure. DASH stands for Dietary Approaches to Stop Hypertension. Hypertension is high blood pressure. The DASH diet focuses on eating foods that are high in calcium, potassium, and magnesium. These nutrients can lower blood pressure. The foods that are highest in these nutrients are fruits, vegetables, low-fat dairy products, nuts, seeds, and legumes. But taking calcium, potassium, and magnesium supplements instead of eating foods that are high in those nutrients does not have the same effect. The DASH diet also includes whole grains, fish, and poultry. The DASH diet is one of several lifestyle changes your doctor may recommend to lower your high blood pressure. Your doctor may also want you to decrease the amount of sodium in your diet. Lowering sodium while following the DASH diet can lower blood pressure even further than just the DASH diet alone. Follow-up care is a key part of your treatment and safety. Be sure to make and go to all appointments, and call your doctor if you are having problems. It's also a good idea to know your test results and keep a list of the medicines you take. How can you care for yourself at home? Following the DASH diet · Eat 4 to 5 servings of fruit each day. A serving is 1 medium-sized piece of fruit, ½ cup chopped or canned fruit, 1/4 cup dried fruit, or 4 ounces (½ cup) of fruit juice. Choose fruit more often than fruit juice. · Eat 4 to 5 servings of vegetables each day. A serving is 1 cup of lettuce or raw leafy vegetables, ½ cup of chopped or cooked vegetables, or 4 ounces (½ cup) of vegetable juice. Choose vegetables more often than vegetable juice. · Get 2 to 3 servings of low-fat and fat-free dairy each day. A serving is 8 ounces of milk, 1 cup of yogurt, or 1 ½ ounces of cheese. · Eat 6 to 8 servings of grains each day.  A serving is 1 slice of bread, 1 ounce of dry cereal, or ½ cup of cooked rice, pasta, or cooked cereal. Try to choose whole-grain products as much as possible. · Limit lean meat, poultry, and fish to 2 servings each day. A serving is 3 ounces, about the size of a deck of cards. · Eat 4 to 5 servings of nuts, seeds, and legumes (cooked dried beans, lentils, and split peas) each week. A serving is 1/3 cup of nuts, 2 tablespoons of seeds, or ½ cup of cooked beans or peas. · Limit fats and oils to 2 to 3 servings each day. A serving is 1 teaspoon of vegetable oil or 2 tablespoons of salad dressing. · Limit sweets and added sugars to 5 servings or less a week. A serving is 1 tablespoon jelly or jam, ½ cup sorbet, or 1 cup of lemonade. · Eat less than 2,300 milligrams (mg) of sodium a day. If you limit your sodium to 1,500 mg a day, you can lower your blood pressure even more. Tips for success · Start small. Do not try to make dramatic changes to your diet all at once. You might feel that you are missing out on your favorite foods and then be more likely to not follow the plan. Make small changes, and stick with them. Once those changes become habit, add a few more changes. · Try some of the following: ¨ Make it a goal to eat a fruit or vegetable at every meal and at snacks. This will make it easy to get the recommended amount of fruits and vegetables each day. ¨ Try yogurt topped with fruit and nuts for a snack or healthy dessert. ¨ Add lettuce, tomato, cucumber, and onion to sandwiches. ¨ Combine a ready-made pizza crust with low-fat mozzarella cheese and lots of vegetable toppings. Try using tomatoes, squash, spinach, broccoli, carrots, cauliflower, and onions. ¨ Have a variety of cut-up vegetables with a low-fat dip as an appetizer instead of chips and dip. ¨ Sprinkle sunflower seeds or chopped almonds over salads. Or try adding chopped walnuts or almonds to cooked vegetables. ¨ Try some vegetarian meals using beans and peas.  Add garbanzo or kidney beans to salads. Make burritos and tacos with mashed weiner beans or black beans. Where can you learn more? Go to http://laura-elsa.info/. Enter Y925 in the search box to learn more about \"DASH Diet: Care Instructions. \" Current as of: September 21, 2016 Content Version: 11.4 © 3269-6386 [employer]. Care instructions adapted under license by [employer] (which disclaims liability or warranty for this information). If you have questions about a medical condition or this instruction, always ask your healthcare professional. [address] any warranty or liability for your use of this information. Introducing [facility]! Dear Candace Doyle: 
Thank you for requesting a [employer] account. Our records indicate that you already have an active Bundlr account. You can access your account anytime at [email or web address] Did you know that you can access your hospital and ER discharge instructions at any time in Bundlr? You can also review all of your test results from your hospital stay or ER visit. Additional Information If you have questions, please visit the Frequently Asked Questions section of the Bundlr website at [email or web address] Remember, Bundlr is NOT to be used for urgent needs. For medical emergencies, dial 911. Now available from your iPhone and Android! Please provide this summary of care documentation to your next provider. Your primary care clinician is listed as Lashell Ding. If you have any questions after today's visit, please call [phone].

## 2018-05-10 ENCOUNTER — TELEPHONE (OUTPATIENT)
Dept: INTERNAL MEDICINE CLINIC | Age: 63
End: 2018-05-10

## 2018-05-10 ENCOUNTER — APPOINTMENT (OUTPATIENT)
Dept: VASCULAR SURGERY | Age: 63
End: 2018-05-10
Attending: INTERNAL MEDICINE
Payer: OTHER GOVERNMENT

## 2018-05-10 ENCOUNTER — HOSPITAL ENCOUNTER (OUTPATIENT)
Dept: NON INVASIVE DIAGNOSTICS | Age: 63
Discharge: HOME | End: 2018-05-10
Attending: INTERNAL MEDICINE
Payer: OTHER GOVERNMENT

## 2018-05-10 DIAGNOSIS — N18.30 TYPE 1 DIABETES MELLITUS WITH STAGE 3 CHRONIC KIDNEY DISEASE (HCC): ICD-10-CM

## 2018-05-10 DIAGNOSIS — R06.09 DYSPNEA ON EXERTION: ICD-10-CM

## 2018-05-10 DIAGNOSIS — R00.2 PALPITATIONS: ICD-10-CM

## 2018-05-10 DIAGNOSIS — E10.22 TYPE 1 DIABETES MELLITUS WITH STAGE 3 CHRONIC KIDNEY DISEASE (HCC): ICD-10-CM

## 2018-05-10 LAB
BUN SERPL-MCNC: 24 MG/DL (ref 8–27)
BUN/CREAT SERPL: 27 (ref 12–28)
CALCIUM SERPL-MCNC: 9.5 MG/DL (ref 8.7–10.3)
CHLORIDE SERPL-SCNC: 99 MMOL/L (ref 96–106)
CO2 SERPL-SCNC: 27 MMOL/L (ref 18–29)
CREAT SERPL-MCNC: 0.89 MG/DL (ref 0.57–1)
GFR SERPLBLD CREATININE-BSD FMLA CKD-EPI: 69 ML/MIN/1.73
GFR SERPLBLD CREATININE-BSD FMLA CKD-EPI: 80 ML/MIN/1.73
GLUCOSE SERPL-MCNC: 254 MG/DL (ref 65–99)
MAGNESIUM SERPL-MCNC: 1.9 MG/DL (ref 1.6–2.3)
POTASSIUM SERPL-SCNC: 4.5 MMOL/L (ref 3.5–5.2)
SODIUM SERPL-SCNC: 137 MMOL/L (ref 134–144)

## 2018-05-10 PROCEDURE — 93306 TTE W/DOPPLER COMPLETE: CPT

## 2018-05-10 NOTE — TELEPHONE ENCOUNTER
Spoke with patient and told her that we were able to schedule her Duplex Ultrasound with Yash Olivera at [facility] for Friday May 11th at 1pm with check in at 12:30pm. Patient verbalized understanding. Order  faxed directly to Imaging department at [facility] fax # [phone].

## 2018-05-10 NOTE — TELEPHONE ENCOUNTER
Spoke with patient and explained that [facility] did not have an available appointment until Friday May 18th and that Dr. Vimal Cheatham would like her to be seen sooner. Explained that if she is willing then we can try [address] [place] [address] and see if they will be able to see her sooner. Patient happily agreed and is aware to be expecting a phone call from Chuck Catalan.

## 2018-05-11 ENCOUNTER — TELEPHONE (OUTPATIENT)
Dept: INTERNAL MEDICINE CLINIC | Age: 63
End: 2018-05-11

## 2018-05-11 NOTE — TELEPHONE ENCOUNTER
Called patient and informed of echocardiogram and holter monitor results. Reports that sinus tachycardia episode on Holter occurred when she was undergoing stress test at [facility] ED. Discussed that nothing concerning that would require intervention. Informed of normal BMP and magnesium. Will continue lasix 20 mg daily. Reports that right ankle swelling and pain improved with ibuprofen. Blood sugars much better since stopping Medrol dose pack. Duplex exam scheduled for today.

## 2018-05-12 NOTE — TELEPHONE ENCOUNTER
Please let the patient know that her lower extremity duplex scan was negative for DVT and venous reflux. The swelling in her ankle is most likely secondary to an injury which she may have been unaware of due to her neuropathy.

## 2018-05-14 NOTE — TELEPHONE ENCOUNTER
Pt aware of message below and verbalized understanding. Patient wanted to let Dr. Flavia Jamil know that she is going to follow up with her Podiatrist. No further questions or concerns from pt at this time.

## 2018-05-14 NOTE — PROGRESS NOTES
HPI:   Yogesh Tim is a 61y.o. year old female who presents today for follow-up evaluation of lower extremity edema. She has a history of hypertension, hyperlipidemia, diabetes mellitus type 1, chronic renal disease stage 3, and hypothyroidism. She was last seen on 4/26/2018, and reported worsening lower extremity edema. She stated that previously, her swelling would improve overnight, but she had been noticing recently that it did not resolve. She also reported some mild dyspnea with exertion, such as walking upstairs, which had been present chronically, although may have been slightly worse. She also reported some \"skipped beats\" although not prolonged. She denied any chest pain, shortness of breath at rest or with normal activities, lightheadedness, PND, or orthopnea. She was started on low dose lasix, and referred for an echocardiogram, Holter monitor, and lower extremity duplex scan, but prior to having these performed, presented to [facility] ED for evaluation of pain and swelling of her right ankle. While in ED, CBC, CMP, and D-dimer were unremarkable. Cardiac enzymes x 2 were negative, and EKG was unchanged. She was observed overnight and underwent a pharmacologic nuclear stress test (5/3/2018) which was a normal low risk study, without evidence of ischemia or prior infarction, and EF 86%. She reports that yesterday, she was seen by a podiatrist, Dr. Dwight Ha, for her right foot pain and swelling and was started on a Medrol dose pack. She states that since taking the steroids, her blood sugars have increased to >400. She reports today that the swelling in her left foot completely resolved since starting lasix. She has her echocardiogram and duplex scan scheduled for tomorrow, and she completed her Holter monitor. She is otherwise without complaints. She has a history of diabetes mellitus type 1, with onset at age 6.  She is currently under the care of Dr. Courtney Shabazz, and is being treated with insulin glargine and lispro. She has not been well-controlled recently, with HbA1c ranging from 7.5-8.0. She does have proliferative diabetic retinopathy and is s/p pan-retinal photocoagulation therapy in both eyes. She is followed closely by Dr. Darin Sanchez, and recent exam showed regression bilaterally. She also has peripheral neuropathy, with burning and tingling in her feet at night. Renal function had been normal until 5/2016, with evidence of chronic renal disease, stage 3, since that time with baseline creatinine1.0-1.04/ eGFR 54-56. She also has a history of hypothyroidism, and she is currently on Synthroid. She denies cold intolerance, hair or skin changes. She has a history of hypertension, treated with quinapril. She had a pharmacologic nuclear stress test in 11/2012, which showed no evidence for ischemia, and normal LV wall motion (EF> 70%). She also had a carotid duplex scan in 7/2013 which showed mild (< 50%) bilateral internal carotid artery stenoses. She denies any chest pain, shortness of breath at rest or with exertion, palpitations, lightheadedness, or edema. She has a history of hyperlipidemia, and was on atorvastatin for many years until 10/2015 when she discontinued it because of myalgias. She was recently tried on rosuvastatin without success due to recurrent myalgias. She was started on ezetimibe in 9/2016 and has been tolerating it without difficulty. She successfully stopped smoking in 1/2015, and reports that she continues to abstain. She had a screening colonoscopy in 9/2014 by Dr. Gayathri Sosa, which found a 6 mm sessile polyp in the descending colon and a 4-5 mm sessile polyp in the rectum/sigmoid (pathology: tubular adenomas). Recommendation was for follow-up colonoscopy in 5 years. She denies any abdominal pain, nausea, vomiting, melena, hematochezia, or change in bowel movements.     In 3/2017, she was diagnosed with an upper respiratory tract infection and was prescribed doxycycline and prednisone by [facility]. She subsequently developed severe dizziness and vomiting, prompting a visit to the [facility] ED where she was diagnosed with benign positional vertigo and treated with meclizine and Zofran with improvement. In 10/2017, she noticed difficulty with her peripheral vision and was evaluated by Dr. Malissa Ureña. She has regressed proliferative diabetic retinopathy and she reports that the laser therapy she received many years ago to treat this resulted in the current limitations in her peripheral vision. She does also have mild glaucoma for which she is receiving treatment. She states that her driving has been restricted to only during the day, and she is aware that she must turn her head completely to each side when navigating behind the wheel. In 2/2018, she began having difficulty with right elbow pain and was evaluated by Dr. Micaela Paredes who diagnosed her with right lateral epicondylitis. She received a cortisone injection with improvement. Past Medical History:   Diagnosis Date    Chronic renal disease, stage III     Diabetes mellitus type 1 ([address]     Diabetic peripheral neuropathy (HCC)     Diabetic retinopathy ([address]     Essential hypertension with goal blood pressure less than 140/90     Hearing decreased     Hyperlipidemia     Hypothyroidism      Past Surgical History:   Procedure Laterality Date    HX GYN      partial hysterectomy    HX GYN      3 D and C's    HX HEENT      tonsillectomy    HX HEENT      cataract removal bilateral    HX HEENT      multiple surgeries for retinopathy    HX ORTHOPAEDIC      carpral tunnel sugery bilateral     Current Outpatient Prescriptions   Medication Sig    levothyroxine (SYNTHROID) 88 mcg tablet Take 1 Tab by mouth Daily (before breakfast).  furosemide (LASIX) 20 mg tablet Take 1 Tab by mouth daily.     quinapril (ACCUPRIL) 10 mg tablet TAKE 1 TABLET EVERY EVENING ALONG WITH A 5 MG TABLET  Cholecalciferol, Vitamin D3, (VITAMIN D3) 2,000 unit cap capsule Take 2,000 Units by mouth daily.  ZETIA 10 mg tablet TAKE 1 TABLET DAILY    ALPHAGAN P 0.1 % ophthalmic solution     glucose blood VI test strips (ONE TOUCH ULTRA TEST) strip USE TO TEST BLOOD SUGAR FOUR TIMES A DAY    cyanocobalamin (VITAMIN B-12) 500 mcg tablet Take 500 mcg by mouth daily.  aspirin delayed-release 81 mg tablet Take  by mouth daily.  insulin glargine (LANTUS SOLOSTAR) 100 unit/mL (3 mL) pen 23 Units by SubCUTAneous route daily.  insulin lispro (HUMALOG PEN) 100 unit/mL kwikpen by SubCUTAneous route. Sliding scale as needed      bisacodyl 5 mg tab Take  by mouth.  meclizine (ANTIVERT) 25 mg tablet Take  by mouth three (3) times daily as needed.  ondansetron (ZOFRAN ODT) 4 mg disintegrating tablet Take 4 mg by mouth every eight (8) hours as needed for Nausea.  loratadine (CLARITIN REDITABS) 10 mg dissolvable tablet Take 1 Tab by mouth daily. No current facility-administered medications for this visit. Allergies and Intolerances: Allergies   Allergen Reactions    Pcn [Penicillins] Hives    Sulfa (Sulfonamide Antibiotics) Nausea Only and Vertigo     Family History: Grandmother  from colon cancer. Mother had CAD and DM. Family History   Problem Relation Age of Onset    Diabetes Brother     Diabetes Mother     Heart Disease Mother      Social History:   She  reports that she quit smoking about 3 years ago. She smoked 1.00 pack per day. She has never used smokeless tobacco. She is  and lives with her . 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   History   Alcohol Use    Yes     Comment: infrequently     Immunization History:  Immunization History   Administered Date(s) Administered    Influenza Vaccine (Quad) PF 12/15/2015, 2016, 10/19/2017    Influenza Vaccine PF 2014    Influenza Vaccine Split 2011, 10/17/2012    Influenza Vaccine Whole 11/09/2010    Pneumococcal Polysaccharide (PPSV-23) 04/13/2017    Tdap 06/25/2013    Zoster Vaccine, Live 05/31/2016       Review of Systems:   As above included in HPI. Otherwise 11 point review of systems negative including constitutional, skin, HENT, eyes, respiratory, cardiovascular, gastrointestinal, genitourinary, musculoskeletal, endo/heme/aller, neurological.    Physical:   Vitals:   BP: 142/62  HR: 82  WT: 166 lb (75.3 kg)  BMI:  28.12 kg/m2    Exam:   Pt appears well; alert and oriented x 3; appropriate affect. HEENT: PERRLA, anicteric, oropharynx clear, no JVD, adenopathy or thyromegaly. No carotid bruits or radiated murmur. Lungs: clear to auscultation, no wheezes, rhonchi, or rales. Heart: regular rate and rhythm. No murmur, rubs, gallops  Abdomen: soft, nontender, nondistended, normal bowel sounds, no hepatosplenomegaly or masses. Extremities: without edema on left. Right foot with swelling around ankle. No tenderness to palpation, but does have evidence of neuropathy.      Review of Data:  Labs:   Abstract on 04/25/2018   Component Date Value Ref Range Status    Hemoglobin A1c, External 03/13/2018 8.7   Final   Hospital Outpatient Visit on 04/19/2018   Component Date Value Ref Range Status    WBC 04/19/2018 5.9  4.6 - 13.2 K/uL Final    RBC 04/19/2018 3.14* 4.20 - 5.30 M/uL Final    HGB 04/19/2018 10.4* 12.0 - 16.0 g/dL Final    HCT 04/19/2018 30.0* 35.0 - 45.0 % Final    MCV 04/19/2018 95.5  74.0 - 97.0 FL Final    MCH 04/19/2018 33.1  24.0 - 34.0 PG Final    MCHC 04/19/2018 34.7  31.0 - 37.0 g/dL Final    RDW 04/19/2018 12.5  11.6 - 14.5 % Final    PLATELET 68/80/4417 745  135 - 420 K/uL Final    MPV 04/19/2018 11.5  9.2 - 11.8 FL Final    NEUTROPHILS 04/19/2018 69  40 - 73 % Final    LYMPHOCYTES 04/19/2018 20* 21 - 52 % Final    MONOCYTES 04/19/2018 8  3 - 10 % Final    EOSINOPHILS 04/19/2018 3  0 - 5 % Final    BASOPHILS 04/19/2018 0  0 - 2 % Final    ABS. NEUTROPHILS 04/19/2018 4.1  1.8 - 8.0 K/UL Final    ABS. LYMPHOCYTES 04/19/2018 1.2  0.9 - 3.6 K/UL Final    ABS. MONOCYTES 04/19/2018 0.5  0.05 - 1.2 K/UL Final    ABS. EOSINOPHILS 04/19/2018 0.2  0.0 - 0.4 K/UL Final    ABS. BASOPHILS 04/19/2018 0.0  0.0 - 0.06 K/UL Final    DF 04/19/2018 AUTOMATED    Final    LIPID PROFILE 04/19/2018        Final    Cholesterol, total 04/19/2018 174  <200 MG/DL Final    Triglyceride 04/19/2018 66  <150 MG/DL Final    HDL Cholesterol 04/19/2018 81* 40 - 60 MG/DL Final    LDL, calculated 04/19/2018 79.8  0 - 100 MG/DL Final    VLDL, calculated 04/19/2018 13.2  MG/DL Final    CHOL/HDL Ratio 04/19/2018 2.1  0 - 5.0   Final    Sodium 04/19/2018 137  136 - 145 mmol/L Final    Potassium 04/19/2018 4.6  3.5 - 5.5 mmol/L Final    Chloride 04/19/2018 104  100 - 108 mmol/L Final    CO2 04/19/2018 28  21 - 32 mmol/L Final    Anion gap 04/19/2018 5  3.0 - 18 mmol/L Final    Glucose 04/19/2018 219* 74 - 99 mg/dL Final    BUN 04/19/2018 24* 7.0 - 18 MG/DL Final    Creatinine 04/19/2018 0.90  0.6 - 1.3 MG/DL Final    BUN/Creatinine ratio 04/19/2018 27* 12 - 20   Final    GFR est AA 04/19/2018 >60  >60 ml/min/1.73m2 Final    GFR est non-AA 04/19/2018 >60  >60 ml/min/1.73m2 Final    Calcium 04/19/2018 8.6  8.5 - 10.1 MG/DL Final    Bilirubin, total 04/19/2018 0.3  0.2 - 1.0 MG/DL Final    ALT (SGPT) 04/19/2018 17  13 - 56 U/L Final    AST (SGOT) 04/19/2018 14* 15 - 37 U/L Final    Alk.  phosphatase 04/19/2018 50  45 - 117 U/L Final    Protein, total 04/19/2018 6.1* 6.4 - 8.2 g/dL Final    Albumin 04/19/2018 3.4  3.4 - 5.0 g/dL Final    Globulin 04/19/2018 2.7  2.0 - 4.0 g/dL Final    A-G Ratio 04/19/2018 1.3  0.8 - 1.7   Final    TSH 04/19/2018 0.15* 0.36 - 3.74 uIU/mL Final    Microalbumin,urine random 04/19/2018 2.63  0 - 3.0 MG/DL Final    Creatinine, urine 04/19/2018 50.90  30 - 125 mg/dL Final    Microalbumin/Creat ratio (mg/g cre* 04/19/2018 52* 0 - 30 mg/g Final    Vitamin D 25-Hydroxy 2018 48.8  30 - 100 ng/mL Final    Color 2018 YELLOW    Final    Appearance 2018 CLEAR    Final    Specific gravity 2018 1.014  1.005 - 1.030   Final    pH (UA) 2018 6.5  5.0 - 8.0   Final    Protein 2018 NEGATIVE   NEG mg/dL Final    Glucose 2018 250* NEG mg/dL Final    Ketone 2018 NEGATIVE   NEG mg/dL Final    Bilirubin 2018 NEGATIVE   NEG   Final    Blood 2018 NEGATIVE   NEG   Final    Urobilinogen 2018 1.0  0.2 - 1.0 EU/dL Final    Nitrites 2018 NEGATIVE   NEG   Final    Leukocyte Esterase 2018 TRACE* NEG   Final    WBC 2018 1 to 3  0 - 4 /hpf Final    RBC 2018 0 to 1  0 - 5 /hpf Final    Epithelial cells 2018 1+  0 - 5 /lpf Final    Bacteria 2018 NEGATIVE   NEG /hpf Final     EKG (2108) sinus rhythm at 62 bpm, short WA interval (11 msec), but normal QRS and QTc intervals. No ischemic changes. (without change from 10/2017). Health Maintenance:  Screening:    Mammogram: negative (2017). Breast exam 2018. PAP smear: negative (2014). S/p YUNG, no further screening needed. Colorectal: colonsocopy (2014) tubular adenomas. Dr. Corbin Mcdaniels. Due . Depression: none   DM (HbA1c/FPG): HbA1c 8.7 (3/2018) Dr. Peter Schmidt   Hepatitis C: negative (2015)   Falls: none    DEXA: N/A    Glaucoma: mild primary open angle glaucoma and regressed proliferative diabetic retinopathy. Followed by Dr. Corbin Mcdaniels (last 2017).    Smokin pack year (stopped 2015)   Vitamin D: 48.8 (2018)   Medicare Wellness: N/A    Impression:  Patient Active Problem List   Diagnosis Code    Stable proliferative diabetic retinopathy of both eyes associated with type 1 diabetes mellitus ([facility] C27.0227    Hyperlipidemia E78.5    Hearing decreased H91.90    Diabetes mellitus with diabetic polyneuropathy ([address] E11.42    Vitamin D deficiency E55.9    Ex-cigarette smoker (30 pack year), stopped 1/2015 Z87.891    Colon polyp, colonoscopy due 2019 K63.5    Essential hypertension I10    Hypothyroidism due to acquired atrophy of thyroid E03.4    Chronic kidney disease, stage III (moderate) N18.3    Type 1 diabetes mellitus with stage 3 chronic kidney disease (HCC) E10.22, N18.3    Anemia D64.9    Dyspnea on exertion R06.09    Palpitations R00.2    Bilateral lower extremity edema R60.0    Microalbuminuria due to type 1 diabetes mellitus ([address] E10.29, R80.9       Plan:  1. Lower extremity edema/dyspnea. Patient reported worsening lower extremity edema not resolving overnight. Also reported some questionable mild increase in exertional dyspnea. No other associated symptoms except palpitations in the form of \"skipped beats\". EKG without change from baseline. Ordered lower extremity duplex, although suspicion is low for DVT, and echocardiogram to evaluate heart function and rule out structural abnormalities. Long time smoker so need to evaluate right sided pressures as well as LV function. Scheduled for tomorrow. Completed 48 hour Holter monitor to evaluate her complaint of palpitations. Results still pending. Pharmacologic nuclear stress test peformed at ED and was negative. Started on lasix 20 mg daily with improvement in left leg edema. Persists on right, but appears secondary to an injury. Follow closely. 2. Right ankle swelling. Contacted podiatrist, Dr. Tran Fink, to discuss his findings and prescribing of Medrol dose pack. Related that felt that swelling most likely secondary to sprain injury as x-rays negative and presentation not consistent with gout or other form of arthritis. Discussed problems with elevated blood sugars and agreeable to discontinue Medrol and begin treatment with ibuprofen 400 mg tid for pain and swelling. Instructions given to patient. 3. Hypertension. Well controlled on quinapril. Renal function stable with creatinine 0.90/ eGFR >60.  Will recheck today since started lasix. Continue to follow. 4. Type 1 diabetes mellitus. HbA1c with some worsening to 8.7. On Lantus and Humalog. Reports has new blood sugar sensor machine that does not require finger sticks for readings. Being managed by endocrinology. Weight increased seven pounds since last visit. Proliferative diabetic retinopathy in regression, followed by Dr. Keturah Kuo. Neuropathy symptoms in feet are mild and have not required treatment with medication. Foot exam performed by endocrine. Renal function with evidence of chronic renal disease, stage 3, and also with evidence of mild microalbuminuria on recent labs. On Ace-I. Lipid lowering therapy not prescibed for patient reasons as not able to tolerate due to myalgias. Follow closely. 5. Hyperlipidemia. On ezetimibe with significant improvement in LDL to 79. Not able to tolerate statin (atorvastatin/ rosuvastatin) due to myalgias. Stressed importance of lifestyle modifications, especially diet, exercise and weight loss. 6. Chronic renal disease, stage 3. Evidence of mild disease with creatinine 1.00-1.04/ eGFR 54-56 since 5/2016. Most likely due to long standing diabetes mellitus and hypertension. Remaining stable. On Ace-I, but not on statin as discussed above. Counseled on avoiding NSAIDS and prerenal status. 7. Hypothyroidism. Synthroid dose decreased from 112 to 100 mcg in 4/2017 due to over replacement. TSH was therapeutic in 7/2017, but with evidence of over replacement on labs in 10/2017 on current Synthroid dose. Patient did not wish to decrease dose last visit, but repeat today again low signifying overreplacement. Agreeable to decrease dose to 88 mcg daily. Will need to recheck TSH in 3 months. Continue to follow. 8. H/O cigarette smoking. 30 pack year history and patient stopped in 1/2015. Meets criteria for lung cancer screening with low dose CT scan.  Performed in 11/2017 showing a few tiny solid and one groundglass nodule identified (lung RADS 2). Follow-up recommended for one year (11/2018). Did also note evidence of emphysema and prominence of the pulmonary vascular trunk which can be seen in pulmonary arterial hypertension. To be evaluated with upcoming echocardiogram.  Encouraged to continue with abstaining from smoking. 9. Anemia. Iron studies consistent with anemia of chronic inflammation. B12 normal, and folate level slightly low. Encouraged to take multivitamin with folate. 10. Overweight. Emphasized importance of lifestyle modifications, including diet, exercise, and weight loss. Weight increased five pounds since last visit with worsening HbA1c. Will readdress next visit. 11. Health maintenance. Will give influenza vaccine today. Will discuss Shingrix at next visit. Other immunizations up to date. Mammogram up to date. Colonoscopy due 2019. Vitamin D level now normal. Continue maintenance dose supplement. Total time: 40 minutes spent with the patient in face-to-face consultation of which greater than 50% was spent on counseling, answering questions and/or coordination of care. Complex medical review and management performed. Patient understands recommendations and agrees with plan. .  Follow-up appointment in 3 months. Addendum    Echocardiogram (5/10/2018) showed normal LV function (EF 55-60%) no RWMA, mild LAE, mild-moderate TR, RVSP normal (22 mmHg.)  Holter monitor (5/1/2018) Sinus rhythm with sinus arrhythmia at times; rare PVC's; one short run of non-sustained of SVT for 6 beats. Episode of sinus tachycardia recorded in diary occurred while she was undergoing pharmacologic nuclear stress test. No other symptoms reported. Lower extremity duplex (5/11/2018) negative for DVT and venous reflux.

## 2018-06-18 DIAGNOSIS — R60.0 BILATERAL LOWER EXTREMITY EDEMA: ICD-10-CM

## 2018-06-18 DIAGNOSIS — R06.09 DYSPNEA ON EXERTION: ICD-10-CM

## 2018-06-19 ENCOUNTER — OFFICE VISIT (OUTPATIENT)
Dept: INTERNAL MEDICINE CLINIC | Age: 63
End: 2018-06-19

## 2018-06-19 VITALS
HEART RATE: 73 BPM | BODY MASS INDEX: 28.19 KG/M2 | TEMPERATURE: 98.9 F | OXYGEN SATURATION: 98 % | RESPIRATION RATE: 14 BRPM | SYSTOLIC BLOOD PRESSURE: 122 MMHG | WEIGHT: 169.2 LBS | HEIGHT: 65 IN | DIASTOLIC BLOOD PRESSURE: 58 MMHG

## 2018-06-19 DIAGNOSIS — R58 ECCHYMOSIS: Primary | ICD-10-CM

## 2018-06-19 NOTE — PROGRESS NOTES
1. Have you been to the ER, urgent care clinic or hospitalized since your last visit? NO.     2. Have you seen or consulted any other health care providers outside of the [facility] since your last visit (Include any pap smears or colon screening)? NO      Do you have an Advanced Directive? NO    Would you like information on Advanced Directives?  NO
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
## 2018-06-19 NOTE — MR AVS SNAPSHOT
[address] [facility] [place] 
 
 
 [address], [address] [facility] 
[phone] Patient: Anita Roberts MRN: [id number] KHX:[birth year] Visit Information Date & Time Provider Department Dept. Phone Encounter #  
 6/19/2018  1:30 PM Cheo Mackenzie NP [facility] [address] [place] [address] [phone] [id number] Your Appointments 8/16/2018  9:25 AM  
LAB with [facility] NURSE VISIT [facility] of [address] [place] [address] ([facility]) Appt Note: labs [address], [address]  
  
   
 [address], [facility]  
  
    
 8/23/2018 11:30 AM  
Office Visit with Demarco Lozano MD  
[facility] of [facility] Appt Note: ov 3mos, sarris [address], [address]  
  
   
 [address], [facility] Upcoming Health Maintenance Date Due Influenza Age 5 to Adult 8/1/2018 FOOT EXAM Q1 9/6/2018 HEMOGLOBIN A1C Q6M 9/13/2018 EYE EXAM RETINAL OR DILATED Q1 9/19/2018 MICROALBUMIN Q1 4/19/2019 LIPID PANEL Q1 4/19/2019 BREAST CANCER SCRN MAMMOGRAM 5/6/2019 COLONOSCOPY 9/29/2019 PAP AKA CERVICAL CYTOLOGY 4/13/2020 DTaP/Tdap/Td series (2 - Td) 6/25/2023 Allergies as of 6/19/2018  Review Complete On: 6/19/2018 By: Francois Cavazos Severity Noted Reaction Type Reactions Pcn [Penicillins]    Hives Sulfa (Sulfonamide Antibiotics)  04/13/2015    Nausea Only, Vertigo Current Immunizations  Reviewed on 10/19/2017 Name Date Influenza Vaccine (Quad) PF 10/19/2017  9:27 AM, 9/14/2016 12:16 PM, 12/15/2015  9:44 AM  
 Influenza Vaccine PF 12/22/2014  2:01 PM  
 Influenza Vaccine Split 10/17/2012, 11/4/2011 Influenza Vaccine Whole 11/9/2010 Pneumococcal Polysaccharide (PPSV-23) 4/13/2017 11:34 AM  
 Tdap 6/25/2013  Zoster Vaccine, Live 5/31/2016  2:05 PM  
  
 Not reviewed this visit Vitals BP Pulse Temp Resp Height(growth percentile) Weight(growth percentile) 122/58 (BP 1 Location: Left arm, BP Patient Position: Sitting) 73 98.9 °F (37.2 °C) (Oral) 14 5' 5\" (1.651 m) 169 lb 3.2 oz (76.7 kg) SpO2 BMI OB Status Smoking Status 98% 28.16 kg/m2 Hysterectomy Former Smoker Vitals History BMI and BSA Data Body Mass Index Body Surface Area  
 28.16 kg/m 2 1.88 m 2 Preferred Pharmacy Pharmacy Name Phone [facility] [address] [address] [phone] Your Updated Medication List  
  
   
This list is accurate as of 6/19/18  1:59 PM.  Always use your most recent med list.  
  
  
  
  
 ALPHAGAN P 0.1 % ophthalmic solution Generic drug:  brimonidine  
  
 aspirin delayed-release 81 mg tablet Take  by mouth daily. bisacodyl 5 mg Tab Take  by mouth. Cholecalciferol (Vitamin D3) 2,000 unit Cap capsule Commonly known as:  VITAMIN D3 Take 2,000 Units by mouth daily. furosemide 20 mg tablet Commonly known as:  LASIX Take 1 Tab by mouth daily. glucose blood VI test strips strip Commonly known as:  ONETOUCH ULTRA TEST  
USE TO TEST BLOOD SUGAR FOUR TIMES A DAY HumaLOG Pen 100 unit/mL kwikpen Generic drug:  insulin lispro  
by SubCUTAneous route. Sliding scale as needed LANTUS SOLOSTAR U-100 INSULIN 100 unit/mL (3 mL) Inpn Generic drug:  insulin glargine 23 Units by SubCUTAneous route daily. levothyroxine 88 mcg tablet Commonly known as:  SYNTHROID Take 1 Tab by mouth Daily (before breakfast). loratadine 10 mg dissolvable tablet Commonly known as:  Marlyne Hamm Take 1 Tab by mouth daily. meclizine 25 mg tablet Commonly known as:  ANTIVERT Take  by mouth three (3) times daily as needed. ondansetron 4 mg disintegrating tablet Commonly known as:  ZOFRAN ODT  
 Take 4 mg by mouth every eight (8) hours as needed for Nausea. quinapril 10 mg tablet Commonly known as:  ACCUPRIL  
TAKE 1 TABLET EVERY EVENING ALONG WITH A 5 MG TABLET  
  
 VITAMIN B-12 500 mcg tablet Generic drug:  cyanocobalamin Take 500 mcg by mouth daily. ZETIA 10 mg tablet Generic drug:  ezetimibe TAKE 1 TABLET DAILY Introducing [facility]! Dear Abdiaziz Busby: 
Thank you for requesting a [employer] account. Our records indicate that you already have an active [employer] account. You can access your account anytime at [email or web address] Did you know that you can access your hospital and ER discharge instructions at any time in [employer]? You can also review all of your test results from your hospital stay or ER visit. Additional Information If you have questions, please visit the Frequently Asked Questions section of the [employer] website at [email or web address] Remember, [employer] is NOT to be used for urgent needs. For medical emergencies, dial 911. Now available from your iPhone and Android! Please provide this summary of care documentation to your next provider. Your primary care clinician is listed as Rosario Murillo. If you have any questions after today's visit, please call [phone].

## 2018-07-11 ENCOUNTER — OFFICE VISIT (OUTPATIENT)
Dept: VASCULAR SURGERY | Age: 63
End: 2018-07-11

## 2018-07-11 VITALS
RESPIRATION RATE: 17 BRPM | BODY MASS INDEX: 28.16 KG/M2 | SYSTOLIC BLOOD PRESSURE: 126 MMHG | DIASTOLIC BLOOD PRESSURE: 64 MMHG | HEIGHT: 65 IN | HEART RATE: 80 BPM | WEIGHT: 169 LBS

## 2018-07-11 DIAGNOSIS — R23.3 PETECHIAL RASH: Primary | ICD-10-CM

## 2018-07-11 DIAGNOSIS — E10.22 TYPE 1 DIABETES MELLITUS WITH STAGE 3 CHRONIC KIDNEY DISEASE (HCC): ICD-10-CM

## 2018-07-11 DIAGNOSIS — M79.89 RIGHT LEG SWELLING: ICD-10-CM

## 2018-07-11 DIAGNOSIS — R00.2 PALPITATIONS: ICD-10-CM

## 2018-07-11 DIAGNOSIS — E03.4 HYPOTHYROIDISM DUE TO ACQUIRED ATROPHY OF THYROID: ICD-10-CM

## 2018-07-11 DIAGNOSIS — R06.09 DYSPNEA ON EXERTION: ICD-10-CM

## 2018-07-11 DIAGNOSIS — N18.30 TYPE 1 DIABETES MELLITUS WITH STAGE 3 CHRONIC KIDNEY DISEASE (HCC): ICD-10-CM

## 2018-07-11 NOTE — PROGRESS NOTES
1. Have you been to an emergency room or urgent care clinic since your last visit? Yes    Hospitalized since your last visit? If yes, where, when, and reason for visit? Yes [address] [place], [place] [address]rubio Ramos  2. Have you seen or consulted any other health care providers outside of the [facility] since your last visit including any procedures, health maintenance items.  If yes, where, when and reason for visit? no

## 2018-07-11 NOTE — PROGRESS NOTES
Casper Padilla    Chief Complaint   Patient presents with    New Patient     Ecchymosis    Swelling    Leg Pain       HPI    Casper Padilla is a 61 y.o. female who presents to the office today at the request of her primary care physician for a petechial rash on her anterior calves. She states that about 1 month ago she developed some swelling in her right lower leg as well as a dark red to purplish rash on the front of her shins. She states that the rash is not painful. It is not itchy. She denies any fevers or chills. She states that the rash will seem to improve and almost go away and then will come back and flare up again. She states that she was seen by a podiatrist who felt that her right ankle and foot swelling was secondary to an old injury to her ankle. She does not have any history of injury that she is aware of to the ankle. She states that she was seen in the emergency room and admitted overnight for testing. She states that she was wearing a Holter monitor at the time and she had an extensive cardiac workup which all came back fine. He did have a 2D echo which showed EF was within normal limits and no significant valvular disease. No evidence of endocarditis reported. She states she was wearing the Holter monitor at the time as she was having some shortness of breath and palpitations and her mother had history of heart failure. She did have an ultrasound during her admission which was negative for DVT and did not show any venous reflux in the right leg. She does have history of arterial studies done several years ago which showed normal perfusion. She does take a daily aspirin of 81 mg but states she has been taking this for a long time. She is on no other antiplatelets or blood thinners. She does have a history of diabetes and peripheral neuropathy. She does also have history of hypothyroidism. She had extensive lab work done recently which was all within normal limits.   Her platelet count was also within normal limits. She did not have the rash anywhere but on her anterior lower legs. Otherwise she states that she is in her usual state of health and is without complaint. She does also have a history of hypertension and hyperlipidemia which she states is well controlled. Past Medical History:   Diagnosis Date    Chronic renal disease, stage III     Diabetes mellitus type 1 ([address]     Diabetic peripheral neuropathy (HCC)     Diabetic retinopathy ([address]     Essential hypertension with goal blood pressure less than 140/90     Hearing decreased     Hyperlipidemia     Hypothyroidism      Patient Active Problem List   Diagnosis Code    Stable proliferative diabetic retinopathy of both eyes associated with type 1 diabetes mellitus ([address] P27.9776    Hyperlipidemia E78.5    Hearing decreased H91.90    Diabetes mellitus with diabetic polyneuropathy ([address] E11.42    Vitamin D deficiency E55.9    Ex-cigarette smoker (30 pack year), stopped 1/2015 Z87.891    Colon polyp, colonoscopy due 2019 K63.5    Essential hypertension I10    Hypothyroidism due to acquired atrophy of thyroid E03.4    Chronic kidney disease, stage III (moderate) N18.3    Type 1 diabetes mellitus with stage 3 chronic kidney disease (HCC) E10.22, N18.3    Anemia D64.9    Dyspnea on exertion R06.09    Palpitations R00.2    Bilateral lower extremity edema R60.0    Microalbuminuria due to type 1 diabetes mellitus ([address] E10.29, R80.9     Past Surgical History:   Procedure Laterality Date    HX GYN      partial hysterectomy    HX GYN      3 D and C's    HX HEENT      tonsillectomy    HX HEENT      cataract removal bilateral    HX HEENT      multiple surgeries for retinopathy    HX ORTHOPAEDIC      carpral tunnel sugery bilateral     Current Outpatient Prescriptions   Medication Sig Dispense Refill    levothyroxine (SYNTHROID) 88 mcg tablet Take 1 Tab by mouth Daily (before breakfast).  90 Tab 3    furosemide (LASIX) 20 mg tablet Take 1 Tab by mouth daily. 30 Tab 3    quinapril (ACCUPRIL) 10 mg tablet TAKE 1 TABLET EVERY EVENING ALONG WITH A 5 MG TABLET 90 Tab 2    Cholecalciferol, Vitamin D3, (VITAMIN D3) 2,000 unit cap capsule Take 2,000 Units by mouth daily. 30 Cap 5    ZETIA 10 mg tablet TAKE 1 TABLET DAILY 90 Tab 3    bisacodyl 5 mg tab Take  by mouth.  meclizine (ANTIVERT) 25 mg tablet Take  by mouth three (3) times daily as needed.  ondansetron (ZOFRAN ODT) 4 mg disintegrating tablet Take 4 mg by mouth every eight (8) hours as needed for Nausea.  loratadine (CLARITIN REDITABS) 10 mg dissolvable tablet Take 1 Tab by mouth daily. 30 Tab 5    ALPHAGAN P 0.1 % ophthalmic solution   3    glucose blood VI test strips (ONE TOUCH ULTRA TEST) strip USE TO TEST BLOOD SUGAR FOUR TIMES A  Strip PRN    cyanocobalamin (VITAMIN B-12) 500 mcg tablet Take 500 mcg by mouth daily.  aspirin delayed-release 81 mg tablet Take  by mouth daily.  insulin glargine (LANTUS SOLOSTAR) 100 unit/mL (3 mL) pen 23 Units by SubCUTAneous route daily.  insulin lispro (HUMALOG PEN) 100 unit/mL kwikpen by SubCUTAneous route. Sliding scale as needed         Allergies   Allergen Reactions    Pcn [Penicillins] Hives    Sulfa (Sulfonamide Antibiotics) Nausea Only and Vertigo     Social History     Social History    Marital status:      Spouse name: N/A    Number of children: N/A    Years of education: N/A     Occupational History    Not on file.      Social History Main Topics    Smoking status: Former Smoker     Packs/day: 1.00     Quit date: 1/1/2015    Smokeless tobacco: Never Used    Alcohol use Yes      Comment: infrequently    Drug use: No    Sexual activity: Not Currently     Other Topics Concern    Not on file     Social History Narrative      Family History   Problem Relation Age of Onset    Diabetes Brother     Diabetes Mother     Heart Disease Mother        Review of Systems    Constitutional: negative   HEENT: negative   Respiratory: negative   Cardiovascular: negative   Gastrointestinal: negative   Genitourinary:negative   Hematologic/lymphatic: negative   Musculoskeletal: Positive for right leg swelling, petechial rash to her anterior calves bilaterally  Neurological: negative   Behavioral/Psych: negative   Endocrine: negative   Allergic/Immunologic: negative      Physical Exam:    Visit Vitals    /64 (BP 1 Location: Left arm, BP Patient Position: Sitting)    Pulse 80    Resp 17    Ht 5' 5\" (1.651 m)    Wt 169 lb (76.7 kg)    BMI 28.12 kg/m2      General: Well-appearing female in no acute distress   HEENT: EOMI, no scleral icterus is noted. Cardiovascular: RRR   Pulmonary: No increased work or breathing is noted. Abdomen: Obese, nondistended. Extremities: Warm and well perfused bilaterally. Mild edema to the right lower extremity. There is a non-blanchable petechial rash to the anterior calves bilaterally. Rash is not raised and is nonpainful to palpation. No underlying induration or fluctuance appreciated. She does have palpable pedal pulses and multiphasic Doppler signals by handheld Doppler. Neuro: Cranial nerves II through XII are grossly intact   Integument: No ulcerations are identified visibly      Impression and Plan:  Mario Galvez is a 61 y.o. female with 1 month history of petechial rash to her bilateral lower extremities and right leg edema. Recent duplex study was negative for DVT and did not show any evidence of venous reflux in the right leg. All of her lab work from her recent hospital admission was reviewed and within normal limits. She otherwise seems to be in her usual state of health with no fevers or chills. Discussed that I am unsure of the etiology of her rash or swelling. I did encourage her to follow-up with her endocrinologist next week to make sure that this is not related to her hypothyroidism or diabetes. Discussed that if everything checks out with endocrinology I would recommend referral to possibly dermatology or rheumatology. Could also consider hematology evaluation if necessary. She was given Tubigrip for gentle compression for her right leg swelling. I did also encourage her to elevate the leg as able. Discussed that I will touch base with her next week after her endocrinology appointment and we will make further recommendations accordingly. Plan was discussed. Patient expresses understanding and agrees. [address] [place] [address]        PLEASE NOTE:  This document has been produced using voice recognition software. Unrecognized errors in transcription may be present.

## 2018-07-17 LAB — HBA1C MFR BLD HPLC: 7.2 %

## 2018-07-24 ENCOUNTER — TELEPHONE (OUTPATIENT)
Dept: VASCULAR SURGERY | Age: 63
End: 2018-07-24

## 2018-07-24 NOTE — TELEPHONE ENCOUNTER
----- [facility] at 7/11/2018  3:51 PM EDT -----  Look at images via email with doc and call pt next week after the 17th to check in and further recs accordingly.

## 2018-07-24 NOTE — TELEPHONE ENCOUNTER
Patient doing well. States that she did see her primary care physician and endocrinologist and is referred to dermatology for further evaluation. I did have Dr. Dede Sandoval review the pictures of her skin rash and he agreed that this did not appear vascular and agreed with dermatology referral.  Patient will call the office as needed. Otherwise she states that she is doing well.

## 2018-08-16 ENCOUNTER — APPOINTMENT (OUTPATIENT)
Dept: INTERNAL MEDICINE CLINIC | Age: 63
End: 2018-08-16

## 2018-08-16 ENCOUNTER — HOSPITAL ENCOUNTER (OUTPATIENT)
Dept: LAB | Age: 63
Discharge: HOME | End: 2018-08-16

## 2018-08-16 PROCEDURE — 99001 SPECIMEN HANDLING PT-LAB: CPT | Performed by: INTERNAL MEDICINE

## 2018-08-17 LAB
ALBUMIN/CREAT UR: 26.6 MG/G CREAT (ref 0–30)
BASOPHILS # BLD AUTO: 0 X10E3/UL (ref 0–0.2)
BASOPHILS NFR BLD AUTO: 0 %
BUN SERPL-MCNC: 22 MG/DL (ref 8–27)
BUN/CREAT SERPL: 27 (ref 12–28)
CALCIUM SERPL-MCNC: 9.6 MG/DL (ref 8.7–10.3)
CHLORIDE SERPL-SCNC: 101 MMOL/L (ref 96–106)
CO2 SERPL-SCNC: 24 MMOL/L (ref 20–29)
CREAT SERPL-MCNC: 0.83 MG/DL (ref 0.57–1)
CREAT UR-MCNC: 56.8 MG/DL
EOSINOPHIL # BLD AUTO: 0 X10E3/UL (ref 0–0.4)
EOSINOPHIL NFR BLD AUTO: 0 %
ERYTHROCYTE [DISTWIDTH] IN BLOOD BY AUTOMATED COUNT: 12.9 % (ref 12.3–15.4)
GLUCOSE SERPL-MCNC: 215 MG/DL (ref 65–99)
HCT VFR BLD AUTO: 35 % (ref 34–46.6)
HGB BLD-MCNC: 11.5 G/DL (ref 11.1–15.9)
IMM GRANULOCYTES # BLD: 0 X10E3/UL (ref 0–0.1)
IMM GRANULOCYTES NFR BLD: 0 %
INTERPRETATION: NORMAL
LYMPHOCYTES # BLD AUTO: 1.1 X10E3/UL (ref 0.7–3.1)
LYMPHOCYTES NFR BLD AUTO: 20 %
MCH RBC QN AUTO: 32.8 PG (ref 26.6–33)
MCHC RBC AUTO-ENTMCNC: 32.9 G/DL (ref 31.5–35.7)
MCV RBC AUTO: 100 FL (ref 79–97)
MICROALBUMIN UR-MCNC: 15.1 UG/ML
MONOCYTES # BLD AUTO: 0.4 X10E3/UL (ref 0.1–0.9)
MONOCYTES NFR BLD AUTO: 6 %
NEUTROPHILS # BLD AUTO: 4.2 X10E3/UL (ref 1.4–7)
NEUTROPHILS NFR BLD AUTO: 74 %
PLATELET # BLD AUTO: 215 X10E3/UL (ref 150–379)
POTASSIUM SERPL-SCNC: 4.9 MMOL/L (ref 3.5–5.2)
RBC # BLD AUTO: 3.51 X10E6/UL (ref 3.77–5.28)
SODIUM SERPL-SCNC: 140 MMOL/L (ref 134–144)
TSH SERPL DL<=0.005 MIU/L-ACNC: 0.71 UIU/ML (ref 0.45–4.5)
WBC # BLD AUTO: 5.8 X10E3/UL (ref 3.4–10.8)

## 2018-08-17 RX ORDER — EZETIMIBE 10 MG/1
TABLET ORAL
Qty: 90 TAB | Refills: 3 | Status: SHIPPED | OUTPATIENT
Start: 2018-08-17 | End: 2019-08-12 | Stop reason: SDUPTHER

## 2018-09-04 RX ORDER — QUINAPRIL 5 1/1
TABLET ORAL
Qty: 90 TAB | Refills: 3 | Status: SHIPPED | OUTPATIENT
Start: 2018-09-04 | End: 2019-08-14 | Stop reason: SDUPTHER

## 2018-10-16 ENCOUNTER — TELEPHONE (OUTPATIENT)
Dept: INTERNAL MEDICINE CLINIC | Age: 63
End: 2018-10-16

## 2018-10-16 NOTE — TELEPHONE ENCOUNTER
José Miguel left shoulder on vacation in 2000 [facility] sports medicine will fax over notes- they found a nodule in her lungs- she will bring over disc of xray

## 2018-10-17 NOTE — TELEPHONE ENCOUNTER
Please find out more details. Was it a plain x-ray or CT scan? She is due for her low dose screening chest CT scan in early 11/2018. Also, she is overdue for an appointment. Please schedule her on 11/7/2018 at 12 noon if she is available. Thanks.

## 2018-11-07 ENCOUNTER — HOSPITAL ENCOUNTER (OUTPATIENT)
Dept: LAB | Age: 63
Discharge: HOME | End: 2018-11-07

## 2018-11-07 ENCOUNTER — TELEPHONE (OUTPATIENT)
Dept: INTERNAL MEDICINE CLINIC | Age: 63
End: 2018-11-07

## 2018-11-07 ENCOUNTER — OFFICE VISIT (OUTPATIENT)
Dept: INTERNAL MEDICINE CLINIC | Age: 63
End: 2018-11-07

## 2018-11-07 VITALS
SYSTOLIC BLOOD PRESSURE: 117 MMHG | BODY MASS INDEX: 28.12 KG/M2 | WEIGHT: 168.8 LBS | HEART RATE: 97 BPM | HEIGHT: 65 IN | OXYGEN SATURATION: 97 % | DIASTOLIC BLOOD PRESSURE: 72 MMHG | TEMPERATURE: 98.9 F | RESPIRATION RATE: 14 BRPM

## 2018-11-07 DIAGNOSIS — M79.89 SWELLING OF RIGHT FOOT: ICD-10-CM

## 2018-11-07 DIAGNOSIS — E10.42 TYPE 1 DIABETES MELLITUS WITH DIABETIC POLYNEUROPATHY (HCC): Primary | ICD-10-CM

## 2018-11-07 DIAGNOSIS — E10.22 TYPE 1 DIABETES MELLITUS WITH STAGE 3 CHRONIC KIDNEY DISEASE (HCC): ICD-10-CM

## 2018-11-07 DIAGNOSIS — Z12.2 ENCOUNTER FOR SCREENING FOR LUNG CANCER: ICD-10-CM

## 2018-11-07 DIAGNOSIS — E03.4 HYPOTHYROIDISM DUE TO ACQUIRED ATROPHY OF THYROID: ICD-10-CM

## 2018-11-07 DIAGNOSIS — R80.9 MICROALBUMINURIA DUE TO TYPE 1 DIABETES MELLITUS (HCC): ICD-10-CM

## 2018-11-07 DIAGNOSIS — Z87.891 FORMER SMOKER: ICD-10-CM

## 2018-11-07 DIAGNOSIS — E10.3553 STABLE PROLIFERATIVE DIABETIC RETINOPATHY OF BOTH EYES ASSOCIATED WITH TYPE 1 DIABETES MELLITUS (HCC): ICD-10-CM

## 2018-11-07 DIAGNOSIS — D75.89 MACROCYTOSIS: ICD-10-CM

## 2018-11-07 DIAGNOSIS — I10 ESSENTIAL HYPERTENSION: ICD-10-CM

## 2018-11-07 DIAGNOSIS — E10.29 MICROALBUMINURIA DUE TO TYPE 1 DIABETES MELLITUS (HCC): ICD-10-CM

## 2018-11-07 DIAGNOSIS — Z23 ENCOUNTER FOR IMMUNIZATION: ICD-10-CM

## 2018-11-07 DIAGNOSIS — N18.30 CHRONIC KIDNEY DISEASE, STAGE III (MODERATE) (HCC): ICD-10-CM

## 2018-11-07 DIAGNOSIS — E78.5 HYPERLIPIDEMIA, UNSPECIFIED HYPERLIPIDEMIA TYPE: ICD-10-CM

## 2018-11-07 DIAGNOSIS — R93.89 ABNORMAL MRI: ICD-10-CM

## 2018-11-07 DIAGNOSIS — N18.30 TYPE 1 DIABETES MELLITUS WITH STAGE 3 CHRONIC KIDNEY DISEASE (HCC): ICD-10-CM

## 2018-11-07 DIAGNOSIS — E53.8 VITAMIN B12 DEFICIENCY: ICD-10-CM

## 2018-11-07 DIAGNOSIS — E03.9 ACQUIRED HYPOTHYROIDISM: ICD-10-CM

## 2018-11-07 PROCEDURE — 99001 SPECIMEN HANDLING PT-LAB: CPT

## 2018-11-07 NOTE — PATIENT INSTRUCTIONS
Vaccine Information Statement Influenza (Flu) Vaccine (Inactivated or Recombinant): What you need to know Many Vaccine Information Statements are available in French and other languages. See www.immunize.org/vis Hojas de Información Sobre Vacunas están disponibles en Español y en muchos otros idiomas. Visite www.immunize.org/vis 1. Why get vaccinated? Influenza (flu) is a contagious disease that spreads around the [place] every year, usually between October and May. Flu is caused by influenza viruses, and is spread mainly by coughing, sneezing, and close contact. Anyone can get flu. Flu strikes suddenly and can last several days. Symptoms vary by age, but can include: 
 fever/chills  sore throat  muscle aches  fatigue  cough  headache  runny or stuffy nose Flu can also lead to pneumonia and blood infections, and cause diarrhea and seizures in children. If you have a medical condition, such as heart or lung disease, flu can make it worse. Flu is more dangerous for some people. Infants and young children, people 72years of age and older, pregnant women, and people with certain health conditions or a weakened immune system are at greatest risk. Each year thousands of people in the [facility] die from flu, and many more are hospitalized. Flu vaccine can: 
 keep you from getting flu, 
 make flu less severe if you do get it, and 
 keep you from spreading flu to your family and other people. 2. Inactivated and recombinant flu vaccines A dose of flu vaccine is recommended every flu season. Children 6 months through 6years of age may need two doses during the same flu season. Everyone else needs only one dose each flu season.   
 
 
Some inactivated flu vaccines contain a very small amount of a mercury-based preservative called thimerosal. Studies have not shown thimerosal in vaccines to be harmful, but flu vaccines that do not contain thimerosal are available. There is no live flu virus in flu shots. They cannot cause the flu. There are many flu viruses, and they are always changing. Each year a new flu vaccine is made to protect against three or four viruses that are likely to cause disease in the upcoming flu season. But even when the vaccine doesnt exactly match these viruses, it may still provide some protection Flu vaccine cannot prevent: 
 flu that is caused by a virus not covered by the vaccine, or 
 illnesses that look like flu but are not. It takes about 2 weeks for protection to develop after vaccination, and protection lasts through the flu season. 3. Some people should not get this vaccine Tell the person who is giving you the vaccine:  If you have any severe, life-threatening allergies. If you ever had a life-threatening allergic reaction after a dose of flu vaccine, or have a severe allergy to any part of this vaccine, you may be advised not to get vaccinated. Most, but not all, types of flu vaccine contain a small amount of egg protein.  If you ever had Guillain-Barré Syndrome (also called GBS). Some people with a history of GBS should not get this vaccine. This should be discussed with your doctor.  If you are not feeling well. It is usually okay to get flu vaccine when you have a mild illness, but you might be asked to come back when you feel better. 4. Risks of a vaccine reaction With any medicine, including vaccines, there is a chance of reactions. These are usually mild and go away on their own, but serious reactions are also possible. Most people who get a flu shot do not have any problems with it. Minor problems following a flu shot include:  
 soreness, redness, or swelling where the shot was given  hoarseness  sore, red or itchy eyes  cough  fever  aches  headache  itching  fatigue If these problems occur, they usually begin soon after the shot and last 1 or 2 days. More serious problems following a flu shot can include the following:  There may be a small increased risk of Guillain-Barré Syndrome (GBS) after inactivated flu vaccine. This risk has been estimated at 1 or 2 additional cases per million people vaccinated. This is much lower than the risk of severe complications from flu, which can be prevented by flu vaccine.  Young children who get the flu shot along with pneumococcal vaccine (PCV13) and/or DTaP vaccine at the same time might be slightly more likely to have a seizure caused by fever. Ask your doctor for more information. Tell your doctor if a child who is getting flu vaccine has ever had a seizure. Problems that could happen after any injected vaccine:  People sometimes faint after a medical procedure, including vaccination. Sitting or lying down for about 15 minutes can help prevent fainting, and injuries caused by a fall. Tell your doctor if you feel dizzy, or have vision changes or ringing in the ears.  Some people get severe pain in the shoulder and have difficulty moving the arm where a shot was given. This happens very rarely.  Any medication can cause a severe allergic reaction. Such reactions from a vaccine are very rare, estimated at about 1 in a million doses, and would happen within a few minutes to a few hours after the vaccination. As with any medicine, there is a very remote chance of a vaccine causing a serious injury or death. The safety of vaccines is always being monitored. For more information, visit: www.cdc.gov/vaccinesafety/ 
 
5. What if there is a serious reaction? What should I look for?  Look for anything that concerns you, such as signs of a severe allergic reaction, very high fever, or unusual behavior.  
 
Signs of a severe allergic reaction can include hives, swelling of the face and throat, difficulty breathing, a fast heartbeat, dizziness, and weakness  usually within a few minutes to a few hours after the vaccination. What should I do?  If you think it is a severe allergic reaction or other emergency that cant wait, call 9-1-1 and get the person to the nearest hospital. Otherwise, call your doctor.  Reactions should be reported to the Vaccine Adverse Event Reporting System (VAERS). Your doctor should file this report, or you can do it yourself through  the VAERS web site at www.vaers. [facility].gov, or by calling 3-669.936.5993. VAERS does not give medical advice. 6. The National Vaccine Injury Compensation Program 
 
The [facility] Vaccine Injury Compensation Program (VICP) is a federal program that was created to compensate people who may have been injured by certain vaccines. Persons who believe they may have been injured by a vaccine can learn about the program and about filing a claim by calling 6-803.691.4966 or visiting the [facility] [employer] website at www.[facility].gov/vaccinecompensation. There is a time limit to file a claim for compensation. 7. How can I learn more?  Ask your healthcare provider. He or she can give you the vaccine package insert or suggest other sources of information.  Call your local or state health department.  Contact the Centers for Disease Control and Prevention (CDC): 
- Call 4-992.845.5523 (1-800-CDC-INFO) or 
- Visit CDCs website at www.cdc.gov/flu Vaccine Information Statement Inactivated Influenza Vaccine 8/7/2015 
[address] BEL[address] Duanesushiljay DavilaChambers 334AJ-99 Department of Health and [employer] Centers for Disease Control and Prevention Office Use Only Learning About Diabetes Food Guidelines Your Care Instructions Meal planning is important to manage diabetes. It helps keep your blood sugar at a target level (which you set with your doctor). You don't have to eat special foods.  You can eat what your family eats, including sweets once in a while. But you do have to pay attention to how often you eat and how much you eat of certain foods. You may want to work with a dietitian or a certified diabetes educator (CDE) to help you plan meals and snacks. A dietitian or CDE can also help you lose weight if that is one of your goals. What should you know about eating carbs? Managing the amount of carbohydrate (carbs) you eat is an important part of healthy meals when you have diabetes. Carbohydrate is found in many foods. · Learn which foods have carbs. And learn the amounts of carbs in different foods. ? Bread, cereal, pasta, and rice have about 15 grams of carbs in a serving. A serving is 1 slice of bread (1 ounce), ½ cup of cooked cereal, or 1/3 cup of cooked pasta or rice. ? Fruits have 15 grams of carbs in a serving. A serving is 1 small fresh fruit, such as an apple or orange; ½ of a banana; ½ cup of cooked or canned fruit; ½ cup of fruit juice; 1 cup of melon or raspberries; or 2 tablespoons of dried fruit. ? Milk and no-sugar-added yogurt have 15 grams of carbs in a serving. A serving is 1 cup of milk or 2/3 cup of no-sugar-added yogurt. ? Starchy vegetables have 15 grams of carbs in a serving. A serving is ½ cup of mashed potatoes or sweet potato; 1 cup winter squash; ½ of a small baked potato; ½ cup of cooked beans; or ½ cup cooked corn or green peas. · Learn how much carbs to eat each day and at each meal. A dietitian or CDE can teach you how to keep track of the amount of carbs you eat. This is called carbohydrate counting. · If you are not sure how to count carbohydrate grams, use the Plate Method to plan meals. It is a good, quick way to make sure that you have a balanced meal. It also helps you spread carbs throughout the day. ? Divide your plate by types of foods.  Put non-starchy vegetables on half the plate, meat or other protein food on one-quarter of the plate, and a grain or starchy vegetable in the final quarter of the plate. To this you can add a small piece of fruit and 1 cup of milk or yogurt, depending on how many carbs you are supposed to eat at a meal. 
· Try to eat about the same amount of carbs at each meal. Do not \"save up\" your daily allowance of carbs to eat at one meal. 
· Proteins have very little or no carbs per serving. Examples of proteins are beef, chicken, turkey, fish, eggs, tofu, cheese, cottage cheese, and peanut butter. A serving size of meat is 3 ounces, which is about the size of a deck of cards. Examples of meat substitute serving sizes (equal to 1 ounce of meat) are 1/4 cup of cottage cheese, 1 egg, 1 tablespoon of peanut butter, and ½ cup of tofu. How can you eat out and still eat healthy? · Learn to estimate the serving sizes of foods that have carbohydrate. If you measure food at home, it will be easier to estimate the amount in a serving of restaurant food. · If the meal you order has too much carbohydrate (such as potatoes, corn, or baked beans), ask to have a low-carbohydrate food instead. Ask for a salad or green vegetables. · If you use insulin, check your blood sugar before and after eating out to help you plan how much to eat in the future. · If you eat more carbohydrate at a meal than you had planned, take a walk or do other exercise. This will help lower your blood sugar. What else should you know? · Limit saturated fat, such as the fat from meat and dairy products. This is a healthy choice because people who have diabetes are at higher risk of heart disease. So choose lean cuts of meat and nonfat or low-fat dairy products. Use olive or canola oil instead of butter or shortening when cooking. · Don't skip meals. Your blood sugar may drop too low if you skip meals and take insulin or certain medicines for diabetes. · Check with your doctor before you drink alcohol.  Alcohol can cause your blood sugar to drop too low. Alcohol can also cause a bad reaction if you take certain diabetes medicines. Follow-up care is a key part of your treatment and safety. Be sure to make and go to all appointments, and call your doctor if you are having problems. It's also a good idea to know your test results and keep a list of the medicines you take. Where can you learn more? Go to http://laura-elsa.info/. Enter L004 in the search box to learn more about \"Learning About Diabetes Food Guidelines. \" Current as of: December 7, 2017 Content Version: 11.8 © 7918-5152 [employer]. Care instructions adapted under license by [employer] (which disclaims liability or warranty for this information). If you have questions about a medical condition or this instruction, always ask your healthcare professional. [address] any warranty or liability for your use of this information.

## 2018-11-07 NOTE — PROGRESS NOTES
Nallely Jackson presents today for Chief Complaint Patient presents with  ED Follow-up Seen at ER in [place] due to a fall, DX of broken left arm. Patient states that she recieved a call after leaving the hospital stating that they a found a spot on her lung from the CXR. Depression Screening: PHQ over the last two weeks 4/26/2018 Little interest or pleasure in doing things Not at all Feeling down, depressed, irritable, or hopeless Not at all Total Score PHQ 2 0 Nallely Jackson 1955 female who presents for routine immunizations. Patient denies any symptoms , reactions or allergies that would exclude them from being immunized today. Risks and adverse reactions were discussed and the VIS was given to them. All questions were addressed. Order placed for FLU,  per Verbal Order from DR. SCHREIBER with read back. Patient was observed for 15 min post injection. There were no reactions observed. Dominique Brock LPN Learning Assessment: 
Learning Assessment 11/7/2018 PRIMARY LEARNER Patient HIGHEST LEVEL OF EDUCATION - PRIMARY LEARNER  > 4 YEARS OF COLLEGE  
BARRIERS PRIMARY LEARNER NONE  
CO-LEARNER CAREGIVER No  
CO-LEARNER NAME -  
PRIMARY LANGUAGE ENGLISH  
LEARNER PREFERENCE PRIMARY DEMONSTRATION  
  -  
ANSWERED BY PATIENT  
RELATIONSHIP SELF Abuse Screening: 
Abuse Screening Questionnaire 11/7/2018 Do you ever feel afraid of your partner? Allie Webster Are you in a relationship with someone who physically or mentally threatens you? Allie Webster Is it safe for you to go home? Felisha Sehrwood Coordination of Care: 1. Have you been to the ER, urgent care clinic since your last visit? Hospitalized since your last visit? no 
 
2. Have you seen or consulted any other health care providers outside of the [facility] since your last visit? Include any pap smears or colon screening. no 
 
 
Advance Directive: 1. Do you have an advance directive in place? Patient Reply:no 2. If not, would you like material regarding how to put one in place?  Patient Reply: no

## 2018-11-08 ENCOUNTER — TELEPHONE (OUTPATIENT)
Dept: INTERNAL MEDICINE CLINIC | Age: 63
End: 2018-11-08

## 2018-11-08 LAB
ALBUMIN SERPL-MCNC: 4.5 G/DL (ref 3.6–4.8)
ALBUMIN/GLOB SERPL: 2 {RATIO} (ref 1.2–2.2)
ALP SERPL-CCNC: 71 IU/L (ref 39–117)
ALT SERPL-CCNC: 14 IU/L (ref 0–32)
AST SERPL-CCNC: 19 IU/L (ref 0–40)
BASOPHILS # BLD AUTO: 0 X10E3/UL (ref 0–0.2)
BASOPHILS NFR BLD AUTO: 0 %
BILIRUB SERPL-MCNC: 0.4 MG/DL (ref 0–1.2)
BUN SERPL-MCNC: 18 MG/DL (ref 8–27)
BUN/CREAT SERPL: 19 (ref 12–28)
CALCIUM SERPL-MCNC: 9.9 MG/DL (ref 8.7–10.3)
CHLORIDE SERPL-SCNC: 100 MMOL/L (ref 96–106)
CO2 SERPL-SCNC: 22 MMOL/L (ref 20–29)
CREAT SERPL-MCNC: 0.94 MG/DL (ref 0.57–1)
EOSINOPHIL # BLD AUTO: 0 X10E3/UL (ref 0–0.4)
EOSINOPHIL NFR BLD AUTO: 1 %
ERYTHROCYTE [DISTWIDTH] IN BLOOD BY AUTOMATED COUNT: 12.7 % (ref 12.3–15.4)
EST. AVERAGE GLUCOSE BLD GHB EST-MCNC: 163 MG/DL
FOLATE SERPL-MCNC: 10.2 NG/ML
GLOBULIN SER CALC-MCNC: 2.3 G/DL (ref 1.5–4.5)
GLUCOSE SERPL-MCNC: 182 MG/DL (ref 65–99)
HBA1C MFR BLD: 7.3 % (ref 4.8–5.6)
HCT VFR BLD AUTO: 34.4 % (ref 34–46.6)
HGB BLD-MCNC: 11.6 G/DL (ref 11.1–15.9)
IMM GRANULOCYTES # BLD: 0 X10E3/UL (ref 0–0.1)
IMM GRANULOCYTES NFR BLD: 0 %
LYMPHOCYTES # BLD AUTO: 1.1 X10E3/UL (ref 0.7–3.1)
LYMPHOCYTES NFR BLD AUTO: 19 %
MCH RBC QN AUTO: 33.1 PG (ref 26.6–33)
MCHC RBC AUTO-ENTMCNC: 33.7 G/DL (ref 31.5–35.7)
MCV RBC AUTO: 98 FL (ref 79–97)
MONOCYTES # BLD AUTO: 0.3 X10E3/UL (ref 0.1–0.9)
MONOCYTES NFR BLD AUTO: 5 %
NEUTROPHILS # BLD AUTO: 4.4 X10E3/UL (ref 1.4–7)
NEUTROPHILS NFR BLD AUTO: 75 %
PLATELET # BLD AUTO: 263 X10E3/UL (ref 150–379)
POTASSIUM SERPL-SCNC: 4.5 MMOL/L (ref 3.5–5.2)
PROT SERPL-MCNC: 6.8 G/DL (ref 6–8.5)
RBC # BLD AUTO: 3.5 X10E6/UL (ref 3.77–5.28)
SODIUM SERPL-SCNC: 137 MMOL/L (ref 134–144)
TSH SERPL DL<=0.005 MIU/L-ACNC: 1.04 UIU/ML (ref 0.45–4.5)
VIT B12 SERPL-MCNC: >2000 PG/ML (ref 232–1245)
WBC # BLD AUTO: 5.8 X10E3/UL (ref 3.4–10.8)

## 2018-11-08 NOTE — TELEPHONE ENCOUNTER
Imaging at [facility] calling, says they need a corrected order for Imaging order. DX on the order is not covered by insurance. Needs it to say F17.210 if pt is a current smoker and Z87.891 is she is a former smoker. Pt is there now.     FAx corrected order to [phone] and [phone]

## 2018-11-08 NOTE — TELEPHONE ENCOUNTER
[facility] calling, needs copy of order for ct of lung faxed to [phone]. Pt is scheduled there today. Order faxed.

## 2018-11-11 ENCOUNTER — TELEPHONE (OUTPATIENT)
Dept: INTERNAL MEDICINE CLINIC | Age: 63
End: 2018-11-11

## 2018-11-11 PROBLEM — R60.0 BILATERAL LOWER EXTREMITY EDEMA: Status: RESOLVED | Noted: 2018-04-29 | Resolved: 2018-11-11

## 2018-11-11 PROBLEM — R00.2 PALPITATIONS: Status: RESOLVED | Noted: 2018-04-29 | Resolved: 2018-11-11

## 2018-11-11 PROBLEM — R06.09 DYSPNEA ON EXERTION: Status: RESOLVED | Noted: 2018-04-29 | Resolved: 2018-11-11

## 2018-11-11 NOTE — PROGRESS NOTES
HPI:  
Rhonda Rowell is a 61y.o. year old female who presents today for follow-up evaluation of of hypertension, hyperlipidemia, diabetes mellitus type 1, chronic renal disease stage 3, and hypothyroidism. She reports that she retired on 8/31/2018. She states that in early 10/2018, she went on vacation hiking in [place]. She states that on 10/12/2018, while hiking on a trail in the woods, she encountered some wooden steps and slipped, landing sideways and backwards on her left shoulder. She was transported to [facility] in [place], [place], and found to have a closed nondisplaced fracture of the left proximal humerus. She was placed in a sling and told to follow-up with orthopedics. A chest x-ray was performed in the ED which showed a small ambiguous opacity in the periphery of the right lung. Recommended chest CT scan follow-up. She is now being followed by Dr. Parker Phoenix of [address] [place] [address]. She states that she continues to wear the sling when outside, although can take it off at home. She states that she continues to experience significant pain when moving it a certain way, but she is no longer requiring pain medication. She states that she was instructed not to drive until completely healed. She is otherwise without complaints. On 4/26/2018, she presented with worsening lower extremity edema. She stated that previously, her swelling would improve overnight, but she had been noticing recently that it did not resolve. She also reported some mild dyspnea with exertion, such as walking upstairs, which had been present chronically, although may have been slightly worse. She also reported some \"skipped beats\" although not prolonged. She denied any chest pain, shortness of breath at rest or with normal activities, lightheadedness, PND, or orthopnea.  She was started on low dose lasix, and referred for an echocardiogram, Holter monitor, and lower extremity duplex scan, but prior to having these performed, presented to [facility] ED for evaluation of pain and swelling of her right ankle. While in ED, CBC, CMP, and D-dimer were unremarkable. Cardiac enzymes x 2 were negative, and EKG was unchanged. She was observed overnight and underwent a pharmacologic nuclear stress test (5/3/2018) which was a normal low risk study, without evidence of ischemia or prior infarction, and EF 86%. She was subsequently discharged, and underwent an echocardiogram (5/10/2018) showed normal LV function (EF 55-60%) no RWMA, mild LAE, mild-moderate TR, RVSP normal (22 mmHg); holter monitor (5/1/2018) showing sinus rhythm with sinus arrhythmia at times; rare PVC's; one short run of non-sustained of SVT for 6 beats. Episode of sinus tachycardia recorded in diary occurred while she was undergoing pharmacologic nuclear stress test. No other symptoms reported; and lower extremity duplex (5/11/2018) which was negative for DVT and venous reflux. On 5/8/2018, she was evaluated by a podiatrist, Dr. Urvashi Bryant, for her right foot swelling. Felt that it was most likely secondary to sprain injury as x-rays negative and presentation not consistent with gout or other form of arthritis. She was started on a Medrol dose pack. However, her blood sugars increased to >400 and she was changed to ibuprofen 400 mg tid.  She did have a right foot MRI (5/26/2018) which showed considerable edema involving lower leg, hindfoot and midfoot, nonlocalizing a nonspecific; evidence of old injury to the ATF ligament.  Other ankle ligaments are intact; mild peroneus brevis tendinopathy without measurable tear; small partial thickness longitudinal split in the peroneus longus tendon within the retromalleolar groove; mild distal Achilles tendinosis; and small subchondral cyst in the anterior facet of the talus with otherwise no degenerative change seen in any of the hindfoot or midfoot articulations. She states that she was told that the right foot swelling was due to an old injury, but she states that it persists although she does not feel much pain due to her neuropathy. She has a history of diabetes mellitus type 1, with onset at age 6. She is currently under the care of Dr. Salima Steven, and is being treated with insulin glargine and lispro. She has not been well-controlled recently, with HbA1c ranging from 7.5-8.0. She does have proliferative diabetic retinopathy and is s/p pan-retinal photocoagulation therapy in both eyes. She is followed closely by Dr. Keena Bruce, and recent exam showed regression bilaterally. She also has peripheral neuropathy, with burning and tingling in her feet at night. Renal function had been normal until 5/2016, with evidence of chronic renal disease, stage 3, since that time with baseline creatinine1.0-1.04/ eGFR 54-56. She also has a history of hypothyroidism, and she is currently on Synthroid. She denies cold intolerance, hair or skin changes. She has a history of hypertension, treated with quinapril. She had a pharmacologic nuclear stress test in 11/2012, which showed no evidence for ischemia, and normal LV wall motion (EF> 70%). She also had a carotid duplex scan in 7/2013 which showed mild (< 50%) bilateral internal carotid artery stenoses. She denies any chest pain, shortness of breath at rest or with exertion, palpitations, lightheadedness, or edema. She has a history of hyperlipidemia, and was on atorvastatin for many years until 10/2015 when she discontinued it because of myalgias. She was recently tried on rosuvastatin without success due to recurrent myalgias. She was started on ezetimibe in 9/2016 and has been tolerating it without difficulty. She successfully stopped smoking in 1/2015, and reports that she continues to abstain.  
 
She had a screening colonoscopy in 9/2014 by Dr. Ryan Bronson, which found a 6 mm sessile polyp in the descending colon and a 4-5 mm sessile polyp in the rectum/sigmoid (pathology: tubular adenomas). Recommendation was for follow-up colonoscopy in 5 years. She denies any abdominal pain, nausea, vomiting, melena, hematochezia, or change in bowel movements. In 3/2017, she was diagnosed with an upper respiratory tract infection and was prescribed doxycycline and prednisone by [facility]. She subsequently developed severe dizziness and vomiting, prompting a visit to the [facility] ED where she was diagnosed with benign positional vertigo and treated with meclizine and Zofran with improvement. In 10/2017, she noticed difficulty with her peripheral vision and was evaluated by Dr. Manuel Salomon. She has regressed proliferative diabetic retinopathy and she reports that the laser therapy she received many years ago to treat this resulted in the current limitations in her peripheral vision. She does also have mild glaucoma for which she is receiving treatment. She states that her driving has been restricted to only during the day, and she is aware that she must turn her head completely to each side when navigating behind the wheel. In 2/2018, she began having difficulty with right elbow pain and was evaluated by Dr. Jordy Valencia who diagnosed her with right lateral epicondylitis. She received a cortisone injection with improvement. Past Medical History:  
Diagnosis Date  Chronic renal disease, stage III  Diabetes mellitus type 1 ([address]  Diabetic peripheral neuropathy ([address]  Diabetic retinopathy ([address]  Essential hypertension with goal blood pressure less than 140/90   
 Hearing decreased  Hyperlipidemia  Hypothyroidism Past Surgical History:  
Procedure Laterality Date  HX GYN    
 partial hysterectomy  HX GYN    
 3 D and C's  
 HX HEENT    
 tonsillectomy  HX HEENT    
 cataract removal bilateral  
 HX HEENT    
 multiple surgeries for retinopathy  HX ORTHOPAEDIC    
 carpral tunnel sugery bilateral  
 
Current Outpatient Medications Medication Sig  
 quinapril (ACCUPRIL) 5 mg tablet TAKE 1 TABLET NIGHTLY WITH 10 MG TABLET EVERY EVENING  
 ezetimibe (ZETIA) 10 mg tablet TAKE 1 TABLET DAILY  levothyroxine (SYNTHROID) 88 mcg tablet Take 1 Tab by mouth Daily (before breakfast).  quinapril (ACCUPRIL) 10 mg tablet TAKE 1 TABLET EVERY EVENING ALONG WITH A 5 MG TABLET  Cholecalciferol, Vitamin D3, (VITAMIN D3) 2,000 unit cap capsule Take 2,000 Units by mouth daily.  loratadine (CLARITIN REDITABS) 10 mg dissolvable tablet Take 1 Tab by mouth daily.  ALPHAGAN P 0.1 % ophthalmic solution  glucose blood VI test strips (ONE TOUCH ULTRA TEST) strip USE TO TEST BLOOD SUGAR FOUR TIMES A DAY  cyanocobalamin (VITAMIN B-12) 500 mcg tablet Take 500 mcg by mouth daily.  aspirin delayed-release 81 mg tablet Take  by mouth daily.  insulin glargine (LANTUS SOLOSTAR) 100 unit/mL (3 mL) pen 23 Units by SubCUTAneous route daily.  insulin lispro (HUMALOG PEN) 100 unit/mL kwikpen by SubCUTAneous route. Sliding scale as needed  furosemide (LASIX) 20 mg tablet Take 1 Tab by mouth daily.  bisacodyl 5 mg tab Take  by mouth.  meclizine (ANTIVERT) 25 mg tablet Take  by mouth three (3) times daily as needed.  ondansetron (ZOFRAN ODT) 4 mg disintegrating tablet Take 4 mg by mouth every eight (8) hours as needed for Nausea. No current facility-administered medications for this visit. Allergies and Intolerances: Allergies Allergen Reactions  Pcn [Penicillins] Hives  Sulfa (Sulfonamide Antibiotics) Nausea Only and Vertigo Family History: Grandmother  from colon cancer. Mother had CAD and DM. Family History Problem Relation Age of Onset  Diabetes Brother  Diabetes Mother  Heart Disease Mother Social History: She  reports that she quit smoking about 3 years ago. She smoked 1.00 pack per day. she has never used smokeless tobacco. She is  and lives with her . 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 Social History Substance and Sexual Activity Alcohol Use Yes Comment: infrequently Immunization History: 
Immunization History Administered Date(s) Administered  Influenza Vaccine (Quad) PF 12/15/2015, 09/14/2016, 10/19/2017, 11/07/2018  Influenza Vaccine PF 12/22/2014  Influenza Vaccine Split 11/04/2011, 10/17/2012  Influenza Vaccine Whole 11/09/2010  Pneumococcal Polysaccharide (PPSV-23) 04/13/2017  Tdap 06/25/2013  Zoster Vaccine, Live 05/31/2016 Review of Systems: As above included in HPI. Otherwise 11 point review of systems negative including constitutional, skin, HENT, eyes, respiratory, cardiovascular, gastrointestinal, genitourinary, musculoskeletal, endo/heme/aller, neurological. 
 
Physical:  
Vitals:  
BP: 117/72 HR: 97 
WT: 168 lb 12.8 oz (76.6 kg) BMI:  28.12 kg/m2 Exam:  
Pt appears well; alert and oriented x 3; appropriate affect. HEENT: PERRLA, anicteric, oropharynx clear, no JVD, adenopathy or thyromegaly. No carotid bruits or radiated murmur. Lungs: clear to auscultation, no wheezes, rhonchi, or rales. Heart: regular rate and rhythm. No murmur, rubs, gallops Abdomen: soft, nontender, nondistended, normal bowel sounds, no hepatosplenomegaly or masses. Extremities: without edema on left. Right foot with noticeable swelling around ankle. No tenderness to palpation, but does have evidence of neuropathy. No erythema or warmth. Left shoulder in sling. Diabetic foot exam:  
 
Left Foot: 
 Visual Exam: normal  
 Pulse DP: 1+ (weak) Filament test: reduced sensation Vibratory sensation: diminished Right Foot: 
 Visual Exam: normal  
 Pulse DP: 1+ (weak) Filament test: reduced sensation Vibratory sensation: diminished Review of Data: 
Labs: Abstract on 07/27/2018 Component Date Value Ref Range Status  LDL-C, External 03/13/2018 108   Final  
 Creatinine, External 03/13/2018 0.99   Final  
 Hemoglobin A1c, External 07/17/2018 7.2   Final  
 Urine Microalbumin, External 03/13/2018 21.1   Final  
Office Visit on 05/09/2018 Component Date Value Ref Range Status  Glucose 05/09/2018 254* 65 - 99 mg/dL Final  
 BUN 05/09/2018 24  8 - 27 mg/dL Final  
 Creatinine 05/09/2018 0.89  0.57 - 1.00 mg/dL Final  
 GFR est non-AA 05/09/2018 69  >59 mL/min/1.73 Final  
 GFR est AA 05/09/2018 80  >59 mL/min/1.73 Final  
 BUN/Creatinine ratio 05/09/2018 27  12 - 28 Final  
 Sodium 05/09/2018 137  134 - 144 mmol/L Final  
 Potassium 05/09/2018 4.5  3.5 - 5.2 mmol/L Final  
 Chloride 05/09/2018 99  96 - 106 mmol/L Final  
 CO2 05/09/2018 27  18 - 29 mmol/L Final  
 Calcium 05/09/2018 9.5  8.7 - 10.3 mg/dL Final  
 Magnesium 05/09/2018 1.9  1.6 - 2.3 mg/dL Final  
 WBC 08/16/2018 5.8  3.4 - 10.8 x10E3/uL Final  
 RBC 08/16/2018 3.51* 3.77 - 5.28 x10E6/uL Final  
 HGB 08/16/2018 11.5  11.1 - 15.9 g/dL Final  
 HCT 08/16/2018 35.0  34.0 - 46.6 % Final  
 MCV 08/16/2018 100* 79 - 97 fL Final  
 MCH 08/16/2018 32.8  26.6 - 33.0 pg Final  
 MCHC 08/16/2018 32.9  31.5 - 35.7 g/dL Final  
 RDW 08/16/2018 12.9  12.3 - 15.4 % Final  
 PLATELET 87/93/1426 204  150 - 379 x10E3/uL Final  
 NEUTROPHILS 08/16/2018 74  Not Estab. % Final  
 Lymphocytes 08/16/2018 20  Not Estab. % Final  
 MONOCYTES 08/16/2018 6  Not Estab. % Final  
 EOSINOPHILS 08/16/2018 0  Not Estab. % Final  
 BASOPHILS 08/16/2018 0  Not Estab. % Final  
 ABS. NEUTROPHILS 08/16/2018 4.2  1.4 - 7.0 x10E3/uL Final  
 Abs Lymphocytes 08/16/2018 1.1  0.7 - 3.1 x10E3/uL Final  
 ABS.  MONOCYTES 08/16/2018 0.4  0.1 - 0.9 x10E3/uL Final  
  ABS. EOSINOPHILS 2018 0.0  0.0 - 0.4 x10E3/uL Final  
 ABS. BASOPHILS 2018 0.0  0.0 - 0.2 x10E3/uL Final  
 IMMATURE GRANULOCYTES 2018 0  Not Estab. % Final  
 ABS. IMM. GRANS. 2018 0.0  0.0 - 0.1 x10E3/uL Final  
 Glucose 2018 215* 65 - 99 mg/dL Final  
 BUN 2018 22  8 - 27 mg/dL Final  
 Creatinine 2018 0.83  0.57 - 1.00 mg/dL Final  
 GFR est non-AA 2018 75  >59 mL/min/1.73 Final  
 GFR est AA 2018 87  >59 mL/min/1.73 Final  
 BUN/Creatinine ratio 2018 27  12 - 28 Final  
 Sodium 2018 140  134 - 144 mmol/L Final  
 Potassium 2018 4.9  3.5 - 5.2 mmol/L Final  
 Chloride 2018 101  96 - 106 mmol/L Final  
 CO2 2018 24  20 - 29 mmol/L Final  
 Calcium 2018 9.6  8.7 - 10.3 mg/dL Final  
 Creatinine, urine 2018 56.8  Not Estab. mg/dL Final  
 Microalbumin, urine 2018 15.1  Not Estab. ug/mL Final  
 Microalb/Creat ratio (ug/mg creat.) 2018 26.6  0.0 - 30.0 mg/g creat Final  
 Interpretation 2018 Note   Final  
 TSH 2018 0.706  0.450 - 4.500 uIU/mL Final  
 
 
 
EKG (2108) sinus rhythm at 62 bpm, short OK interval (11 msec), but normal QRS and QTc intervals. No ischemic changes. (without change from 10/2017). Health Maintenance: 
Screening:  
 Mammogram: negative (2017). Breast exam 2018. Overdue for mammogram. 
 PAP smear: negative (2014). S/p YUNG, no further screening needed. Colorectal: colonsocopy (2014) tubular adenomas. Dr. Zachariah Dailey. Due . Depression: none DM (HbA1c/FPG): HbA1c 7.2 (2018) Dr. Ankur Araiza Hepatitis C: negative (2015) Falls: none DEXA: N/A Glaucoma: mild primary open angle glaucoma and regressed proliferative diabetic retinopathy. Followed by Dr. Zachariah Dailey (last 2017). Overdue. Smokin pack year (stopped 2015) Vitamin D: 48.8 (2018) Medicare Wellness: N/A Impression: 
Patient Active Problem List  
 Diagnosis Code  Stable proliferative diabetic retinopathy of both eyes associated with type 1 diabetes mellitus ([facility] G80.6678  Hyperlipidemia E78.5  Hearing decreased H91.90  
 Diabetes mellitus with diabetic polyneuropathy (HCC) E11.42  Vitamin D deficiency E55.9  Ex-cigarette smoker (30 pack year), stopped 1/2015 Z87.891  Colon polyp, colonoscopy due 2019 K63.5  Essential hypertension I10  
 Hypothyroidism due to acquired atrophy of thyroid E03.4  Chronic kidney disease, stage III (moderate) (HCC) N18.3  Type 1 diabetes mellitus with stage 3 chronic kidney disease (HCC) E10.22, N18.3  Anemia D64.9  Dyspnea on exertion R06.09  
 Palpitations R00.2  Bilateral lower extremity edema R60.0  Microalbuminuria due to type 1 diabetes mellitus (HCC) E10.29, R80.9 Plan: 1. Hypertension. Well controlled on quinapril 15 mg daily and lasix 20 mg daily. Renal function has been stable with creatinine 0.83/ eGFR 75. Will recheck today. Continue to follow. 2. Lower extremity edema/dyspnea. Patient reported worsened lower extremity edema not resolving overnight and some questionable mild increase in exertional dyspnea. No other associated symptoms except palpitations in the form of \"skipped beats\". EKG without change from baseline. Lower extremity duplex negative for DVT or venous reflux. Echocardiogram with normal LV function and normal right sided pressures. 48 hour Holter monitor unrevealing. Pharmacologic nuclear stress test peformed at ED was negative. Started on lasix 20 mg daily with improvement in left leg edema. Persists on right, but appears secondary to an injury. Follow closely. 3. Right ankle swelling. Evaluated by podiatrist, Dr. Weston Colunga, and felt that swelling most likely secondary to sprain injury as x-rays negative and presentation not consistent with gout or other form of arthritis.  Started on Medrol dose pack, but changed to ibuprofen 400 mg tid given elevated blood sugars. Swelling persists, and ankle MRI (5/2018) with swelling evident, old ATF ligament injury, and multiple tendon injuries. No significant pain, although has significant neuropathy, and swelling persists. Will refer to Dr. Yesica Villanueva for evaluation. 4. Type 1 diabetes mellitus. HbA1c improved to 7.2 in 7/2018 since using new blood sugar sensor machine that does not require finger sticks for readings. On Lantus and Humalog. Being managed by Dr. Nani Nielsen, although missed appointment due to shoulder injury. Will check HbA1c today. Proliferative diabetic retinopathy in regression, followed by Dr. Dominique De La Paz. Neuropathy symptoms in feet are mild and have not required treatment with medication. Foot exam performed today. Renal function with evidence of chronic renal disease, stage 3, and also with evidence of mild microalbuminuria on recent labs. On Ace-I. Lipid lowering therapy not prescibed for patient reasons as not able to tolerate due to myalgias. Follow closely. 5. Hyperlipidemia. On ezetimibe with significant improvement in LDL to 79. Not able to tolerate statin (atorvastatin/ rosuvastatin) due to myalgias. Stressed importance of lifestyle modifications, especially diet, exercise and weight loss. 6. Chronic renal disease, stage 3. Evidence of mild disease with creatinine 1.00-1.04/ eGFR 54-56 since 5/2016. However, recent readings normalized. Most likely due to long standing diabetes mellitus and hypertension. Remaining stable. On Ace-I, but not on statin as discussed above. Counseled on avoiding NSAIDS and prerenal status. 7. Hypothyroidism. Synthroid dose decreased from 112 to 100 mcg in 4/2017 due to over replacement. TSH was therapeutic in 7/2017, but with evidence of over replacement on labs in 10/2017 on current Synthroid dose. Patient did not wish to decrease dose last visit, but repeat today again low signifying overreplacement. Agreeable to decrease dose to 88 mcg daily. Will need to recheck TSH today. Continue to follow. 8. H/O cigarette smoking. 30 pack year history and patient stopped in 1/2015. Meets criteria for lung cancer screening with low dose CT scan. Performed in 11/2017 showing a few tiny solid and one groundglass nodule identified (lung RADS 2). Follow-up recommended for one year (11/2018). Will order. Did also note evidence of emphysema and prominence of the pulmonary vascular trunk which can be seen in pulmonary arterial hypertension. However, echocardiogram with normal PA pressures. Encouraged to continue with abstaining from smoking. 9. Anemia. Iron studies consistent with anemia of chronic inflammation. B12 normal, and folate level slightly low. Encouraged to take multivitamin with folate. 10. Overweight. Emphasized importance of lifestyle modifications, including diet, exercise, and weight loss. Weight increased five pounds since last visit with worsening HbA1c. Will readdress next visit. 11. Health maintenance. Will give influenza vaccine today. Will discuss Shingrix at next visit. Other immunizations up to date. Mammogram overdue, but having difficulty since not driving. Will address again next visit. Colonoscopy due 2019. Vitamin D level now normal. Continue maintenance dose supplement. Total time: 40 minutes spent with the patient in face-to-face consultation of which greater than 50% was spent on counseling, answering questions and/or coordination of care. Complex medical review and management performed. Patient understands recommendations and agrees with plan. Vincent Samano Follow-up appointment in 3 months. Addendum Office Visit on 11/07/2018 Component Date Value Ref Range Status  WBC 11/07/2018 5.8  3.4 - 10.8 x10E3/uL Final  
 RBC 11/07/2018 3.50* 3.77 - 5.28 x10E6/uL Final  
 HGB 11/07/2018 11.6  11.1 - 15.9 g/dL Final  
 HCT 11/07/2018 34.4  34.0 - 46.6 % Final  
 MCV 11/07/2018 98* 79 - 97 fL Final  
  MCH 11/07/2018 33.1* 26.6 - 33.0 pg Final  
 MCHC 11/07/2018 33.7  31.5 - 35.7 g/dL Final  
 RDW 11/07/2018 12.7  12.3 - 15.4 % Final  
 PLATELET 54/47/7133 658  150 - 379 x10E3/uL Final  
 NEUTROPHILS 11/07/2018 75  Not Estab. % Final  
 Lymphocytes 11/07/2018 19  Not Estab. % Final  
 MONOCYTES 11/07/2018 5  Not Estab. % Final  
 EOSINOPHILS 11/07/2018 1  Not Estab. % Final  
 BASOPHILS 11/07/2018 0  Not Estab. % Final  
 ABS. NEUTROPHILS 11/07/2018 4.4  1.4 - 7.0 x10E3/uL Final  
 Abs Lymphocytes 11/07/2018 1.1  0.7 - 3.1 x10E3/uL Final  
 ABS. MONOCYTES 11/07/2018 0.3  0.1 - 0.9 x10E3/uL Final  
 ABS. EOSINOPHILS 11/07/2018 0.0  0.0 - 0.4 x10E3/uL Final  
 ABS. BASOPHILS 11/07/2018 0.0  0.0 - 0.2 x10E3/uL Final  
 IMMATURE GRANULOCYTES 11/07/2018 0  Not Estab. % Final  
 ABS. IMM. GRANS. 11/07/2018 0.0  0.0 - 0.1 x10E3/uL Final  
 Glucose 11/07/2018 182* 65 - 99 mg/dL Final  
 BUN 11/07/2018 18  8 - 27 mg/dL Final  
 Creatinine 11/07/2018 0.94  0.57 - 1.00 mg/dL Final  
 GFR est non-AA 11/07/2018 65  >59 mL/min/1.73 Final  
 GFR est AA 11/07/2018 75  >59 mL/min/1.73 Final  
 BUN/Creatinine ratio 11/07/2018 19  12 - 28 Final  
 Sodium 11/07/2018 137  134 - 144 mmol/L Final  
 Potassium 11/07/2018 4.5  3.5 - 5.2 mmol/L Final  
 Chloride 11/07/2018 100  96 - 106 mmol/L Final  
 CO2 11/07/2018 22  20 - 29 mmol/L Final  
 Calcium 11/07/2018 9.9  8.7 - 10.3 mg/dL Final  
 Protein, total 11/07/2018 6.8  6.0 - 8.5 g/dL Final  
 Albumin 11/07/2018 4.5  3.6 - 4.8 g/dL Final  
 GLOBULIN, TOTAL 11/07/2018 2.3  1.5 - 4.5 g/dL Final  
 A-G Ratio 11/07/2018 2.0  1.2 - 2.2 Final  
 Bilirubin, total 11/07/2018 0.4  0.0 - 1.2 mg/dL Final  
 Alk.  phosphatase 11/07/2018 71  39 - 117 IU/L Final  
 AST (SGOT) 11/07/2018 19  0 - 40 IU/L Final  
 ALT (SGPT) 11/07/2018 14  0 - 32 IU/L Final  
 Vitamin B12 11/07/2018 >2000* 232 - 1245 pg/mL Final  
 Folate 11/07/2018 10.2  >3.0 ng/mL Final  
  Hemoglobin A1c 11/07/2018 7.3* 4.8 - 5.6 % Final  
 Estimated average glucose 11/07/2018 163  mg/dL Final  
 TSH 11/07/2018 1.040  0.450 - 4.500 uIU/mL Final  
Reviewed labs from visit. CBC normal with Hb 11.6/ Hct 34.4. B12 and folate levels normal.  
Renal function remains normal with creatinine 0.94/ eGFR 65. Liver function tests normal. 
TSH therapeutic with evidence of adequate replacement on current Synthroid dose. HbA1c excellent at 7.3.

## 2018-11-11 NOTE — TELEPHONE ENCOUNTER
Reviewed labs from visit. Please let the patient know the following:    CBC normal with Hb 11.6/ Hct 34.4. B12 and folate levels normal.     Renal function remains normal with creatinine 0.94/ eGFR 65. Liver function tests normal.    TSH therapeutic with evidence of adequate replacement on current Synthroid dose. HbA1c excellent at 7.3. Also please let the patient know that she is overdue for a mammogram. Please ask her to schedule once her left shoulder is improved.

## 2018-11-13 ENCOUNTER — TELEPHONE (OUTPATIENT)
Dept: INTERNAL MEDICINE CLINIC | Age: 63
End: 2018-11-13

## 2018-11-13 NOTE — TELEPHONE ENCOUNTER
I think she had it done at a [facility] facility, I copied the results for you      1. Small right upper lobe and left lower lobe pulmonary nodules are likely benign. No suspicious nodules or masses. 2. Moderate to severe aortic and coronary artery calcifications. 3. Questionable asymmetric soft tissue in the right breast which could just represent normal fibroglandular tissue as it is only partially visualized. Recommend correlation with patient's routine mammography. Lung-RADS 2 - Benign appearance or behavior. Management: Continue annual screening with low dose CT 12 months. Modifier S - Clinically significant or potentially clinically significant incidental findings (non-lung cancer). See above. CLINICAL INDICATION/HISTORY: Lung cancer screening, cigarette/nicotine dependence. COMPARISON: None    TECHNIQUE: Chest CT from thoracic inlet through the diaphragm without contrast. A low-dose lung cancer screening protocol was performed. Note that visualization of non-pulmonary soft tissue structures is limited with this protocol. Coronal and sagittal reformats were generated and reviewed. One or more dose reduction techniques were used on this CT: automated exposure control, adjustment of the mAs and/or kVp according to patient size, and iterative reconstruction techniques.  The specific techniques used on this CT exam have been documented in the patient's electronic medical record. DLP: 31    _______________    FINDINGS:    LUNGS:    Nodules:  -2 mm solid nodule right upper lobe image 36.  -2 mm left lower lobe nodule image 71. Other pulmonary findings:  Linear atelectasis and/or scarring noted. OTHER:     Moderate to severe aortic and coronary artery calcifications. Questionable asymmetric fibroglandular tissue in the right breast but only partially visualized. This could just be normal fibroglandular tissue. Small hiatal hernia noted.  Marked degenerative changes in the lower thoracic spine.

## 2018-11-13 NOTE — TELEPHONE ENCOUNTER
Called and discussed results with patient. Discussed that no worrisome nodules. WIll repeat CT scan in one year. Also informed of moderate to severe aortic and coronary calcifications. Unable to tolerate statin, but on ezetimibe with last LDL 79. Also informed of questionable asymmetry in right breast. Discussed that she is overdue for mammogram, but aware that with arm in sling, unable to perform. Will find out next week if she can remove sling and will then schedule mammogram.    Also informed of lab results in previous encounter.

## 2018-11-26 ENCOUNTER — OFFICE VISIT (OUTPATIENT)
Dept: ORTHOPEDIC SURGERY | Age: 63
End: 2018-11-26

## 2018-11-26 VITALS
BODY MASS INDEX: 27.86 KG/M2 | HEART RATE: 70 BPM | TEMPERATURE: 98.1 F | HEIGHT: 65 IN | WEIGHT: 167.2 LBS | SYSTOLIC BLOOD PRESSURE: 141 MMHG | DIASTOLIC BLOOD PRESSURE: 53 MMHG | RESPIRATION RATE: 16 BRPM | OXYGEN SATURATION: 100 %

## 2018-11-26 DIAGNOSIS — M79.671 RIGHT FOOT PAIN: Primary | ICD-10-CM

## 2018-11-26 DIAGNOSIS — M25.471 RIGHT ANKLE SWELLING: ICD-10-CM

## 2018-11-26 NOTE — PATIENT INSTRUCTIONS
Please follow up as needed. You are advised to contact us if your condition worsens. Obtain Low setting knee high KENYETTA hose stockings Leg and Ankle Edema: Care Instructions Your Care Instructions Swelling in the legs, ankles, and feet is called edema. It is common after you sit or stand for a while. Long plane flights or car rides often cause swelling in the legs and feet. You may also have swelling if you have to stand for long periods of time at your job. Problems with the veins in the legs (varicose veins) and changes in hormones can also cause swelling. Sometimes the swelling in the ankles and feet is caused by a more serious problem, such as heart failure, infection, blood clots, or liver or kidney disease. Follow-up care is a key part of your treatment and safety. Be sure to make and go to all appointments, and call your doctor if you are having problems. It's also a good idea to know your test results and keep a list of the medicines you take. How can you care for yourself at home? · If your doctor gave you medicine, take it as prescribed. Call your doctor if you think you are having a problem with your medicine. · Whenever you are resting, raise your legs up. Try to keep the swollen area higher than the level of your heart. · Take breaks from standing or sitting in one position. ? Walk around to increase the blood flow in your lower legs. ? Move your feet and ankles often while you stand, or tighten and relax your leg muscles. · Wear support stockings. Put them on in the morning, before swelling gets worse. · Eat a balanced diet. Lose weight if you need to. · Limit the amount of salt (sodium) in your diet. Salt holds fluid in the body and may increase swelling. When should you call for help? Call 911 anytime you think you may need emergency care. For example, call if: 
  · You have symptoms of a blood clot in your lung (called a pulmonary embolism). These may include: ? Sudden chest pain. ? Trouble breathing. ? Coughing up blood.  
 Call your doctor now or seek immediate medical care if: 
  · You have signs of a blood clot, such as: 
? Pain in your calf, back of the knee, thigh, or groin. ? Redness and swelling in your leg or groin.  
  · You have symptoms of infection, such as: 
? Increased pain, swelling, warmth, or redness. ? Red streaks or pus. ? A fever.  
 Watch closely for changes in your health, and be sure to contact your doctor if: 
  · Your swelling is getting worse.  
  · You have new or worsening pain in your legs.  
  · You do not get better as expected. Where can you learn more? Go to http://laura-elsa.info/. Enter W739 in the search box to learn more about \"Leg and Ankle Edema: Care Instructions. \" Current as of: November 20, 2017 Content Version: 11.8 © 5804-9123 [employer]. Care instructions adapted under license by [employer] (which disclaims liability or warranty for this information). If you have questions about a medical condition or this instruction, always ask your healthcare professional. [place] [address] any warranty or liability for your use of this information.

## 2018-11-26 NOTE — PROGRESS NOTES
AMBULATORY PROGRESS NOTE Patient: Cisco Yarbrough             MRN: [id number]     SSN: [id number] Body mass index is 27.82 kg/m². YOB: 1955     AGE: 61 y.o. EX: female PCP: Elizabeth Danielle MD 
 
 IMPRESSION/DIAGNOSIS AND TREATMENT PLAN  
 
DIAGNOSES 1. Right foot pain 2. Right ankle swelling Orders Placed This Encounter  [41838] Foot Min 3V Cisco Yarbrough understands her diagnoses and the proposed plan. In this individual who has had swelling in her ankle for the last eight months or so without any history of fevers, shakes, chills, and night sweats and no history of inflammatory arthropathy and no history of gout, rheumatoid arthritis, lupus, psoriasis, and/or Sjogrens syndrome, recommendation is for conservative care, KENYETTA hose stockings. She had x-rays of her foot today, three views, AP, lateral, and oblique, these films in the office today, nonweightbearing, show normal alignment to the midfoot and forefoot. No osteolytic or osteoblastic lesions are seen. There is no fracture, no subluxation, and no dislocation. There is some slight narrowing to the number two and three TMT joint articulation. All these are nonweightbearing films. There are no significant bone spurs seen to the inferior and/or superior surface of the calcaneus, and there are no calcifications seen to the soft tissues of the right foot in the anterior tibial or posterior tibial artery distributions. The patient has had additional studies in the [facility] system for which she describes having an MRI of the right ankle. She has had PVL studies of the right leg, and she has had a cardiac stress test as well. The cardio stress test revealed a myocardial profusion scan was normal, low-risk. The results are listed in the diagnostic section of the report. The PVL studies reveal no thrombus and no venous reflux, no DVT. The MRI results showed some edema in the lower leg, hindfoot, and midfoot, nonlocalizing and nonspecific. It showed an old ATFL ligament sprain. It showed some peroneal tendinopathy without measurable tear, small-thickness, longitudinal tear is seen to the peroneal longus and retromalleolar groove. (The patient is asymptomatic and nontender in this region.)  It showed some mild Achilles tendinosis and a small subchondral cyst within the talus, but no other degenerative changes are seen to the hindfoot or midfoot. So, she has had an extensive work up by her physicians, but from an orthopedic foot and ankle standpoint, I see no additional workup that needs to be conducted at this point. I do recommend a KENYETTA hose stocking, knee-high, rather than the compression-type stockings that she does have. On her objective exam today, she had mild, pre-tibial edema on both sides. Plan: 
 
1) Review PVL studies performed at [place]. 2) Continue activity modification as directed. 3) DME Order: Low setting knee high KENYETTA hose stockings (LMS). Patient declined, as she wants to obtain them from [employer]. RTO - PRN 
 HPI AND EXAMINATION Sheryle Isles IS A 61 y.o. female who presents to my outpatient office complaining of right foot pain. Ms. Jenaette Elizabeth states that she has been experiencing chronic swelling in her right ankle that has been occurring for over 6 months. She notes that it feels cold at times, as well. She states that she has compression stockings, but reports that she has not been wearing them as directed. The patient denies any recent falls, twists, or trauma. She denies any pain associated with the swelling, and she denies any h/o surgery. She does not recall her foot ever turning bright red or shiny. The patient had an MRI of her right foot at [place] in May 2018, the results of which have been reviewed with the patient. Of note, the patient broke her left arm in October while hiking. The patient has h/o diabetic neuropathy. Visit Vitals /53 Pulse 70 Temp 98.1 °F (36.7 °C) (Oral) Resp 16 Ht 5' 5\" (1.651 m) Wt 167 lb 3.2 oz (75.8 kg) SpO2 100% BMI 27.82 kg/m² ANKLE/FOOT right Psychiatry: Alert, Oriented x 3; Speech normal in context and clarity,  
         Memory intact grossly, no involuntary movements - tremors, no dementia Gait: Normal 
Tenderness: No tenderness at this time. Cutaneous: Foot is redder in hue relative to the left foot, not hot. Joint Motion: Near full ankle motion, normal hindfoot motion. Joint / Tendon Stability: No Ankle or Subtalar instability or joint laxity. No peroneal sublux ability or dislocation Alignment: Forefoot, Midfoot, Hindfoot WNL. Neuro Motor/Sensory: NL/NL. Vascular: 1+ Pitting Edema, bilaterally Lymphatics: No extremity lymphedema, No calf swelling, no tenderness to calf muscles. CHART REVIEW Past Medical History:  
Diagnosis Date  Chronic renal disease, stage III  Diabetes mellitus type 1 ([address]  Diabetic peripheral neuropathy [address]  Diabetic retinopathy ([facility]  Essential hypertension with goal blood pressure less than 140/90   
 Hearing decreased  Hyperlipidemia  Hypothyroidism Current Outpatient Medications Medication Sig  
 quinapril (ACCUPRIL) 5 mg tablet TAKE 1 TABLET NIGHTLY WITH 10 MG TABLET EVERY EVENING  
 ezetimibe (ZETIA) 10 mg tablet TAKE 1 TABLET DAILY  levothyroxine (SYNTHROID) 88 mcg tablet Take 1 Tab by mouth Daily (before breakfast).  furosemide (LASIX) 20 mg tablet Take 1 Tab by mouth daily.  quinapril (ACCUPRIL) 10 mg tablet TAKE 1 TABLET EVERY EVENING ALONG WITH A 5 MG TABLET  Cholecalciferol, Vitamin D3, (VITAMIN D3) 2,000 unit cap capsule Take 2,000 Units by mouth daily.  bisacodyl 5 mg tab Take  by mouth.  meclizine (ANTIVERT) 25 mg tablet Take  by mouth three (3) times daily as needed.  ondansetron (ZOFRAN ODT) 4 mg disintegrating tablet Take 4 mg by mouth every eight (8) hours as needed for Nausea.  loratadine (CLARITIN REDITABS) 10 mg dissolvable tablet Take 1 Tab by mouth daily.  ALPHAGAN P 0.1 % ophthalmic solution  glucose blood VI test strips (ONE TOUCH ULTRA TEST) strip USE TO TEST BLOOD SUGAR FOUR TIMES A DAY  cyanocobalamin (VITAMIN B-12) 500 mcg tablet Take 500 mcg by mouth daily.  aspirin delayed-release 81 mg tablet Take  by mouth daily.  insulin glargine (LANTUS SOLOSTAR) 100 unit/mL (3 mL) pen 23 Units by SubCUTAneous route daily.  insulin lispro (HUMALOG PEN) 100 unit/mL kwikpen by SubCUTAneous route. Sliding scale as needed No current facility-administered medications for this visit. Allergies Allergen Reactions  Pcn [Penicillins] Hives  Sulfa (Sulfonamide Antibiotics) Nausea Only and Vertigo Past Surgical History:  
Procedure Laterality Date  HX GYN    
 partial hysterectomy  HX GYN    
 3 D and C's  
 HX HEENT    
 tonsillectomy  HX HEENT    
 cataract removal bilateral  
 HX HEENT    
 multiple surgeries for retinopathy  HX ORTHOPAEDIC    
 carpral tunnel sugery bilateral  
 
Social History Occupational History  Not on file Tobacco Use  Smoking status: Former Smoker Packs/day: 1.00 Last attempt to quit: 1/1/2015 Years since quitting: 3.9  Smokeless tobacco: Never Used Substance and Sexual Activity  Alcohol use: Yes Comment: infrequently  Drug use: No  
 Sexual activity: Not Currently Family History Problem Relation Age of Onset  Diabetes Brother  Diabetes Mother  Heart Disease Mother REVIEW OF SYSTEMS : 11/26/2018  ALL BELOW ARE Negative except : SEE HPI Constitutional: Negative for fever, chills and weight loss. Neg Weight Loss Cardiovascular: Negative for chest pain, claudication and leg swelling.  SOB, EUGENE  
 Gastrointestinal/Urological: Negative for  pain, N/V/D/C, Blood in stool or urine,dysuria                         Hematuria, Incontinence, pelvic pain Musculoskeletal: see HPI. Neurological: Negative for dizziness and weakness, headaches,Visual Changes             Confusion,  Or Seizures, Psychiatric/Behavioral: Negative for depression, memory loss and substance abuse. Extremities:  Negative for hair changes, rash or skin lesion changes. Hematologic: Negative for Bleeding problems, bruising, pallor or swollen lymph nodes. Peripheral Vascular: No calf pain, vascular vein tenderness to calf pain No calf throbbing, posterior knee throbbing pain DIAGNOSTIC IMAGING   [facility] INTERPRETATION BY RADIOLOGIST May MR LOWER EXT W/ JOINT RT W/O CONTRAST5/26/2018 [facility] Result Impression IMPRESSION: 1.  Considerable edema involving lower leg, hindfoot and midfoot, nonlocalizing a nonspecific.  Although this may be due to local factors such as trauma or cellulitis, this is frequently due to regional or systemic factors. 2.  Evidence of old injury to the ATF ligament.  Other ankle ligaments are intact. 3.  Mild peroneus brevis tendinopathy without measurable tear.  Small partial thickness longitudinal split in the peroneus longus tendon within the retromalleolar groove. 4.  Mild distal Achilles tendinosis. 5.  Small subchondral cyst in the anterior facet of the talus with otherwise no degenerative change seen in any of the hindfoot or midfoot articulations. Result Narrative  
============================ 
[facility] 
============================ 
 
EXAM: MRI right Ankle HISTORY:  
-From Provider: Ankle effusion, right; M25.471 
-Additional: Right ankle pain and swelling for 1 month, no known injury.  
 
TECHNIQUE: T1 weighted sagittal, STIR sagittal, proton FSE axial, fat suppressed T2 FSE axial, fat suppressed T2 FSE coronal, proton FSE oblique axial. 
 
FINDINGS: 
 
Comparison examination: None Reference Exams: None TALOCRURAL JOINT: Joint surfaces are intact.  No joint effusion.  The ATF ligament has a lamellated appearance and is somewhat indistinct suggesting an old partial tear.  However, there is no short axis disruption or michelle loss of ligament continuity.  Other ligaments medially and laterally are intact and appear unremarkable. TALUS, SINUS TARSI, SUBTALAR JOINT: Unremarkable except for small subchondral cyst in the anterior talar facet. CALCANEUS, ACHILLES TENDON, PLANTAR FASCIA: Very mild distal Achilles tendinosis.  Otherwise unremarkable. TARSAL TUNNEL: Unremarkable. REMAINING ANKLE TENDONS:  
  > Mild flattening and increased signal in the peroneus brevis tendon as it passes over the tip of the fibula.  
  > Small partial thickness longitudinal split in the dorsal peroneus longus tendon within the retromalleolar groove, measuring around 1 cm in length and 50% of the tendon thickness.   
  > Remaining tendons are normal in caliber and signal intensity. VISUALIZED MID FOOT, INCLUDING CHOPART, MIDTARSAL AND LISFRANC ARTICULATIONS: Midfoot ossicles and articulations are unremarkable.  Metatarsal bases are unremarkable.  There is moderate atrophy of the intrinsic foot musculature. MISCELLANEOUS: Moderate circumferential edema is seen around the lower leg, hindfoot and midfoot, nonlocalizing and nonspecific. 
 
---- Status Results Details  
  Encounter Summary SCANNED ORDER5/26/2018 [facility] SCANNED ORDER5/26/2018 [facility] Result Narrative Ordered by an unspecified provider. Status Results Details  
  Encounter Summary PVL VENOUS EXAM LE RIGHT5/11/2018 [facility] Result Narrative Right: The deep venous system of the right lower extremity was examined using duplex ultrasound.  B-mode imaging demonstrates normal compressibility of the common femoral, femoral, deep femoral, popliteal, posterior tibial, and peroneal veins. There is no thrombus identified in the lower extremity.  Doppler flow signals are spontaneous and phasic at all levels. There is no evidence of venous reflux in the deep veins or in the great saphenous vein at the saphenofemoral junction. The contralateral common femoral vein is patent with normal hemodynamics. Conclusions: No evidence of deep venous thrombosis in the right lower extremity. No evidence of venous reflux. Status Results Details  
  Encounter Summary BANDAR REST/STRESS MULTIPLE SPECT5/3/2018 [facility] Component Name Value Ref Range EF Nuc 86    
Result Impression THIS MYOCARDIAL PERFUSION STUDY IS NORMAL 
 THIS IS A LOW RISK STUDY MYOCARDIAL PERFUSION IMAGING IS NORMAL. NO ABNORMALITIES OR EVIDENCE OF ISCHEMIA OR PRIOR INFARCTION. THE CALCULATED LV EJECTION FRACTION WAS 86%. OVERALL LEFT VENTRICULAR SYSTOLIC FUNCTION WAS NORMAL WITHOUT REGIONAL WALL MOTION 
 ABNORMALITIES (AS NOTED ABOVE). Jostin Garrett MD 
 (Electronically Signed) Final Date: 03 May 2018 11:42 Result Narrative Nuclear Stress Test Report Name: Talon Norris Gender: F Referring Physician: Pino Dodd Exam Date: 2018 09:12 : 1955 Stress Personnel: Andrew Garcia 
 CPI: [id number] Location: Testing  Supervisor: Junito Cruz RN Ht (in): 65 Wt (lb): 168 BSA: 1.87 Room Number: Patient Class: ED Type of Exam: BANDAR REST/STRESS MULTIPLE SPECT Clinical Indications: OTHER (SEE COMMENTS); CP/ angina equivalent: low prob CAD and abnorm ekg or unable to exercise ICD Codes: 
 
 Cardiac Risk Factors: HTN, DM, CURRENT SMOKER ( 1PPD) Past Medical History: HYPOTHYROIDISM Medications: ALBUTEROL, LANTUS, SYNTHROID 
 
 
 PROCEDURE Pharmacologic Administered By: Protocol: Regadenoson Dose: 0.4mg Duration (m:s): 0:10 Resting BP (mmHg): 154 / 55 Peak BP (mmHg): 163 / 49 BP Pressure Response: Normal 
 Resting HR (bpm): 78 Peak HR (bpm): 107 MPHR: 157 % MPHR: 68 Target HR (bpm): 133 Double Product: 34882 Symptoms During Test: NAUSEA, GENERALIZED DISCOMFORT Reasons For Terminating Test: COMPLETED IMAGE PROTOCOL Rest/Stress 1 Day Radiopharmaceutical Dose (mCi) Imaging  Date Inj to Img Time (min) Rest IV: Tc-99m Sestamibi 10.3 03-May-2018 45 Stress IV: Tc-99m Sestamibi 33 03-May-2018 45 Rest IV: Administration Site: Right Forearm Administered by: Crystal Stoner Stress IV: Administration Site: Right Forearm Administered by: GABRIEL 
 
 
 
 
 ECG ANALYSIS Resting ECG: SINUS RHYTHM, NORMAL ST SEGMENTS Stress ECG: NO ST SEGMENT CHANGES Arrhythmias: NONE 
 EKG Interpretation: Negative EKG response. SPECT RESULTS Technical Quality: Excellent Raw Data Analysis: No clinically relevant artifact PERFUSION: Stress and rest images show a normal perfusion. FUNCTIONAL RESULTS (calculated via Gated SPECT) Post Stress LV EF: 86 % TID: 1.28 
 
 EDV: 51 ml ESV: 7 ml EDVI: 0 ml/m² ESVI: 0 ml/m² LV Ejection Fraction: The calculated LVEF was 86%. Wall Motion 0 - Normal                         3 - Akinetic 1 - Equivocal                    4 - Dyskinetic 
 2 - Hypokinetic                X - Not Interpretable LV Function & Wall Thickening: No wall motion or thickening abnormalities. LV Size: Left ventricle size appears normal on both stress and rest. 
 
 Right Ventriclar Perfusion & Function: Right ventricle size appears unable to assess. Status Results Details  
  Encounter Summary Please see above section of this report. I have personally reviewed the results of the above study. The interpretation of this study is my professional opinion. Written by Telma Quiñonez, as dictated by Dr. Suzzanne Bernheim. I, Dr. Suzzanne Bernheim, confirm that all documentation is accurate.

## 2018-12-18 DIAGNOSIS — Z12.2 ENCOUNTER FOR SCREENING FOR LUNG CANCER: ICD-10-CM

## 2018-12-18 DIAGNOSIS — Z87.891 FORMER SMOKER: ICD-10-CM

## 2019-01-10 LAB — HBA1C MFR BLD HPLC: 6.7 %

## 2019-01-24 RX ORDER — QUINAPRIL 10 MG/1
TABLET ORAL
Qty: 90 TAB | Refills: 2 | Status: SHIPPED | OUTPATIENT
Start: 2019-01-24 | End: 2019-11-30 | Stop reason: SDUPTHER

## 2019-01-31 ENCOUNTER — NURSE NAVIGATOR (OUTPATIENT)
Dept: OTHER | Age: 64
End: 2019-01-31

## 2019-01-31 NOTE — NURSE NAVIGATOR
Ms. Carey Lovett had her annual screening done at [facility], [place], [place], [address] [address] [facility] Nurse Navigator

## 2019-02-04 ENCOUNTER — TELEPHONE (OUTPATIENT)
Dept: INTERNAL MEDICINE CLINIC | Age: 64
End: 2019-02-04

## 2019-02-04 NOTE — TELEPHONE ENCOUNTER
Pt calling, says she started a new job. Can only have appt on Monday or Friday or after 3:15pm Tue Wed or Thur. Wants to know if she can transfer car to Dr. Yohan Cintron or if Dr. Arnoldo Wilson can recommend someone in [facility]?

## 2019-02-04 NOTE — TELEPHONE ENCOUNTER
May add her on 2/13/2019. Same patient is scheduled at 9am and 1pm that day. Please schedule her in one of these slots if possible.

## 2019-02-04 NOTE — TELEPHONE ENCOUNTER
Nba Stevens patient is asking if she can be seen at 3:15 in the afternoon second week of February on Tuesday, Wednesday, Thursday.

## 2019-02-05 NOTE — TELEPHONE ENCOUNTER
Called pt. She says she has decided she doesn't want to leave Dr. Liana Amaya and had scheduled an appt to see her in May.

## 2019-02-05 NOTE — TELEPHONE ENCOUNTER
Dr. Azra Jones, would you see this patient of Dr. Rupa Lemus as a new patient? She is only able to come on Monday and Friday and Dr. Rupa Lemus is not in the office those days. Dr. Rupa Lemus has agreed to the change.

## 2019-04-12 LAB — HBA1C MFR BLD HPLC: 6.9 %

## 2019-05-01 ENCOUNTER — HOSPITAL ENCOUNTER (OUTPATIENT)
Dept: MAMMOGRAPHY | Age: 64
Discharge: HOME | End: 2019-05-01
Attending: INTERNAL MEDICINE
Payer: OTHER GOVERNMENT

## 2019-05-01 DIAGNOSIS — Z12.31 VISIT FOR SCREENING MAMMOGRAM: ICD-10-CM

## 2019-05-01 PROCEDURE — 77067 SCR MAMMO BI INCL CAD: CPT

## 2019-05-02 ENCOUNTER — TELEPHONE (OUTPATIENT)
Dept: INTERNAL MEDICINE CLINIC | Age: 64
End: 2019-05-02

## 2019-05-02 DIAGNOSIS — N63.10 MASS OF RIGHT BREAST: Primary | ICD-10-CM

## 2019-05-02 DIAGNOSIS — R92.8 ABNORMAL MAMMOGRAM OF RIGHT BREAST: ICD-10-CM

## 2019-05-02 NOTE — TELEPHONE ENCOUNTER
Reviewed mammogram results (4/25/2019) which showed a small mass within the deep central right breast. Recommending additional views with ultrasound. Please let patient know above and ask her to schedule. Orders placed.

## 2019-05-02 NOTE — TELEPHONE ENCOUNTER
Pt returned call. Please call her at [phone]. She said she is getting ready to get her hair done so might not be able to answer.

## 2019-05-14 ENCOUNTER — HOSPITAL ENCOUNTER (OUTPATIENT)
Dept: MAMMOGRAPHY | Age: 64
Discharge: HOME | End: 2019-05-14
Attending: INTERNAL MEDICINE
Payer: OTHER GOVERNMENT

## 2019-05-14 ENCOUNTER — HOSPITAL ENCOUNTER (OUTPATIENT)
Dept: ULTRASOUND IMAGING | Age: 64
Discharge: HOME | End: 2019-05-14
Attending: INTERNAL MEDICINE
Payer: OTHER GOVERNMENT

## 2019-05-14 DIAGNOSIS — R92.8 ABNORMAL MAMMOGRAM OF RIGHT BREAST: ICD-10-CM

## 2019-05-14 DIAGNOSIS — N63.10 MASS OF RIGHT BREAST: ICD-10-CM

## 2019-05-14 PROCEDURE — 77061 BREAST TOMOSYNTHESIS UNI: CPT

## 2019-05-15 ENCOUNTER — TELEPHONE (OUTPATIENT)
Dept: INTERNAL MEDICINE CLINIC | Age: 64
End: 2019-05-15

## 2019-05-15 ENCOUNTER — LAB ONLY (OUTPATIENT)
Dept: INTERNAL MEDICINE CLINIC | Age: 64
End: 2019-05-15

## 2019-05-15 DIAGNOSIS — E78.5 HYPERLIPIDEMIA, UNSPECIFIED HYPERLIPIDEMIA TYPE: ICD-10-CM

## 2019-05-15 DIAGNOSIS — E03.4 HYPOTHYROIDISM DUE TO ACQUIRED ATROPHY OF THYROID: ICD-10-CM

## 2019-05-15 DIAGNOSIS — I10 ESSENTIAL HYPERTENSION: ICD-10-CM

## 2019-05-15 DIAGNOSIS — E10.42 TYPE 1 DIABETES MELLITUS WITH DIABETIC POLYNEUROPATHY (HCC): Primary | ICD-10-CM

## 2019-05-15 DIAGNOSIS — E10.22 TYPE 1 DIABETES MELLITUS WITH STAGE 3 CHRONIC KIDNEY DISEASE (HCC): ICD-10-CM

## 2019-05-15 DIAGNOSIS — D63.8 ANEMIA IN OTHER CHRONIC DISEASES CLASSIFIED ELSEWHERE: ICD-10-CM

## 2019-05-15 DIAGNOSIS — N18.30 TYPE 1 DIABETES MELLITUS WITH STAGE 3 CHRONIC KIDNEY DISEASE (HCC): ICD-10-CM

## 2019-05-15 LAB
CREATININE, EXTERNAL: 0.84
LDL-C, EXTERNAL: 75

## 2019-05-16 ENCOUNTER — TELEPHONE (OUTPATIENT)
Dept: INTERNAL MEDICINE CLINIC | Age: 64
End: 2019-05-16

## 2019-05-16 LAB
ALBUMIN SERPL-MCNC: 4.2 G/DL (ref 3.6–4.8)
ALBUMIN/GLOB SERPL: 1.9 {RATIO} (ref 1.2–2.2)
ALP SERPL-CCNC: 51 IU/L (ref 39–117)
ALT SERPL-CCNC: 11 IU/L (ref 0–32)
AST SERPL-CCNC: 18 IU/L (ref 0–40)
BASOPHILS # BLD AUTO: 0 X10E3/UL (ref 0–0.2)
BASOPHILS NFR BLD AUTO: 0 %
BILIRUB SERPL-MCNC: 0.2 MG/DL (ref 0–1.2)
BUN SERPL-MCNC: 20 MG/DL (ref 8–27)
BUN/CREAT SERPL: 24 (ref 12–28)
CALCIUM SERPL-MCNC: 9.3 MG/DL (ref 8.7–10.3)
CHLORIDE SERPL-SCNC: 103 MMOL/L (ref 96–106)
CHOLEST SERPL-MCNC: 197 MG/DL (ref 100–199)
CO2 SERPL-SCNC: 23 MMOL/L (ref 20–29)
CREAT SERPL-MCNC: 0.84 MG/DL (ref 0.57–1)
EOSINOPHIL # BLD AUTO: 0 X10E3/UL (ref 0–0.4)
EOSINOPHIL NFR BLD AUTO: 1 %
ERYTHROCYTE [DISTWIDTH] IN BLOOD BY AUTOMATED COUNT: 12.9 % (ref 12.3–15.4)
EST. AVERAGE GLUCOSE BLD GHB EST-MCNC: 146 MG/DL
FOLATE SERPL-MCNC: 6.5 NG/ML
GLOBULIN SER CALC-MCNC: 2.2 G/DL (ref 1.5–4.5)
GLUCOSE SERPL-MCNC: 82 MG/DL (ref 65–99)
HBA1C MFR BLD: 6.7 % (ref 4.8–5.6)
HCT VFR BLD AUTO: 35.6 % (ref 34–46.6)
HDLC SERPL-MCNC: 75 MG/DL
HGB BLD-MCNC: 11.7 G/DL (ref 11.1–15.9)
IMM GRANULOCYTES # BLD AUTO: 0 X10E3/UL (ref 0–0.1)
IMM GRANULOCYTES NFR BLD AUTO: 0 %
INTERPRETATION, 910389: NORMAL
INTERPRETATION: NORMAL
LDLC SERPL CALC-MCNC: 109 MG/DL (ref 0–99)
LYMPHOCYTES # BLD AUTO: 1.4 X10E3/UL (ref 0.7–3.1)
LYMPHOCYTES NFR BLD AUTO: 23 %
Lab: NORMAL
MCH RBC QN AUTO: 33.1 PG (ref 26.6–33)
MCHC RBC AUTO-ENTMCNC: 32.9 G/DL (ref 31.5–35.7)
MCV RBC AUTO: 101 FL (ref 79–97)
MONOCYTES # BLD AUTO: 0.4 X10E3/UL (ref 0.1–0.9)
MONOCYTES NFR BLD AUTO: 6 %
NEUTROPHILS # BLD AUTO: 4.4 X10E3/UL (ref 1.4–7)
NEUTROPHILS NFR BLD AUTO: 70 %
PDF IMAGE, 910387: NORMAL
PLATELET # BLD AUTO: 235 X10E3/UL (ref 150–379)
POTASSIUM SERPL-SCNC: 4.7 MMOL/L (ref 3.5–5.2)
PROT SERPL-MCNC: 6.4 G/DL (ref 6–8.5)
RBC # BLD AUTO: 3.53 X10E6/UL (ref 3.77–5.28)
SODIUM SERPL-SCNC: 141 MMOL/L (ref 134–144)
TRIGL SERPL-MCNC: 63 MG/DL (ref 0–149)
TSH SERPL DL<=0.005 MIU/L-ACNC: 12.17 UIU/ML (ref 0.45–4.5)
VIT B12 SERPL-MCNC: 1260 PG/ML (ref 232–1245)
VLDLC SERPL CALC-MCNC: 13 MG/DL (ref 5–40)
WBC # BLD AUTO: 6.3 X10E3/UL (ref 3.4–10.8)

## 2019-05-16 RX ORDER — LEVOTHYROXINE SODIUM 100 UG/1
100 TABLET ORAL
Qty: 90 TAB | Refills: 2 | Status: SHIPPED | OUTPATIENT
Start: 2019-05-16 | End: 2020-01-19

## 2019-05-16 NOTE — TELEPHONE ENCOUNTER
Called patient regarding pre-visit labs showing TSH 12.1. Reports compliant with Synthroid 88 mcg. Will increase dose to 100 mcg. Script sent to [facility].

## 2019-05-16 NOTE — TELEPHONE ENCOUNTER
Please let her know that her right diagnostic mammogram did NOT show any evidence of malignancy. The area that was in question on her screening mammogram was shown to be only overlapping fibroglandular tissue. It was recommended that she continue with her usual yearly screening, and her next one would be due in 5/2020.

## 2019-05-29 ENCOUNTER — OFFICE VISIT (OUTPATIENT)
Dept: INTERNAL MEDICINE CLINIC | Age: 64
End: 2019-05-29

## 2019-05-29 VITALS
RESPIRATION RATE: 14 BRPM | WEIGHT: 170 LBS | BODY MASS INDEX: 28.32 KG/M2 | HEART RATE: 64 BPM | DIASTOLIC BLOOD PRESSURE: 62 MMHG | OXYGEN SATURATION: 96 % | TEMPERATURE: 98 F | SYSTOLIC BLOOD PRESSURE: 118 MMHG | HEIGHT: 65 IN

## 2019-05-29 DIAGNOSIS — I10 ESSENTIAL HYPERTENSION: ICD-10-CM

## 2019-05-29 DIAGNOSIS — Z87.891 EX-CIGARETTE SMOKER: ICD-10-CM

## 2019-05-29 DIAGNOSIS — D75.89 MACROCYTOSIS WITHOUT ANEMIA: ICD-10-CM

## 2019-05-29 DIAGNOSIS — E03.4 HYPOTHYROIDISM DUE TO ACQUIRED ATROPHY OF THYROID: ICD-10-CM

## 2019-05-29 DIAGNOSIS — E10.29 MICROALBUMINURIA DUE TO TYPE 1 DIABETES MELLITUS (HCC): ICD-10-CM

## 2019-05-29 DIAGNOSIS — Z12.11 COLON CANCER SCREENING: ICD-10-CM

## 2019-05-29 DIAGNOSIS — N18.30 TYPE 1 DIABETES MELLITUS WITH STAGE 3 CHRONIC KIDNEY DISEASE (HCC): ICD-10-CM

## 2019-05-29 DIAGNOSIS — E78.5 HYPERLIPIDEMIA, UNSPECIFIED HYPERLIPIDEMIA TYPE: ICD-10-CM

## 2019-05-29 DIAGNOSIS — N18.30 CHRONIC KIDNEY DISEASE, STAGE III (MODERATE) (HCC): ICD-10-CM

## 2019-05-29 DIAGNOSIS — E10.42 TYPE 1 DIABETES MELLITUS WITH DIABETIC POLYNEUROPATHY (HCC): ICD-10-CM

## 2019-05-29 DIAGNOSIS — E10.22 TYPE 1 DIABETES MELLITUS WITH STAGE 3 CHRONIC KIDNEY DISEASE (HCC): ICD-10-CM

## 2019-05-29 DIAGNOSIS — R80.9 MICROALBUMINURIA DUE TO TYPE 1 DIABETES MELLITUS (HCC): ICD-10-CM

## 2019-05-29 DIAGNOSIS — E10.3553 STABLE PROLIFERATIVE DIABETIC RETINOPATHY OF BOTH EYES ASSOCIATED WITH TYPE 1 DIABETES MELLITUS (HCC): ICD-10-CM

## 2019-05-29 DIAGNOSIS — Z00.01 ENCOUNTER FOR ROUTINE ADULT PHYSICAL EXAM WITH ABNORMAL FINDINGS: Primary | ICD-10-CM

## 2019-05-29 NOTE — PATIENT INSTRUCTIONS
DASH Diet: Care Instructions Your Care Instructions The DASH diet is an eating plan that can help lower your blood pressure. DASH stands for Dietary Approaches to Stop Hypertension. Hypertension is high blood pressure. The DASH diet focuses on eating foods that are high in calcium, potassium, and magnesium. These nutrients can lower blood pressure. The foods that are highest in these nutrients are fruits, vegetables, low-fat dairy products, nuts, seeds, and legumes. But taking calcium, potassium, and magnesium supplements instead of eating foods that are high in those nutrients does not have the same effect. The DASH diet also includes whole grains, fish, and poultry. The DASH diet is one of several lifestyle changes your doctor may recommend to lower your high blood pressure. Your doctor may also want you to decrease the amount of sodium in your diet. Lowering sodium while following the DASH diet can lower blood pressure even further than just the DASH diet alone. Follow-up care is a key part of your treatment and safety. Be sure to make and go to all appointments, and call your doctor if you are having problems. It's also a good idea to know your test results and keep a list of the medicines you take. How can you care for yourself at home? Following the DASH diet · Eat 4 to 5 servings of fruit each day. A serving is 1 medium-sized piece of fruit, ½ cup chopped or canned fruit, 1/4 cup dried fruit, or 4 ounces (½ cup) of fruit juice. Choose fruit more often than fruit juice. · Eat 4 to 5 servings of vegetables each day. A serving is 1 cup of lettuce or raw leafy vegetables, ½ cup of chopped or cooked vegetables, or 4 ounces (½ cup) of vegetable juice. Choose vegetables more often than vegetable juice. · Get 2 to 3 servings of low-fat and fat-free dairy each day. A serving is 8 ounces of milk, 1 cup of yogurt, or 1 ½ ounces of cheese. · Eat 6 to 8 servings of grains each day. A serving is 1 slice of bread, 1 ounce of dry cereal, or ½ cup of cooked rice, pasta, or cooked cereal. Try to choose whole-grain products as much as possible. · Limit lean meat, poultry, and fish to 2 servings each day. A serving is 3 ounces, about the size of a deck of cards. · Eat 4 to 5 servings of nuts, seeds, and legumes (cooked dried beans, lentils, and split peas) each week. A serving is 1/3 cup of nuts, 2 tablespoons of seeds, or ½ cup of cooked beans or peas. · Limit fats and oils to 2 to 3 servings each day. A serving is 1 teaspoon of vegetable oil or 2 tablespoons of salad dressing. · Limit sweets and added sugars to 5 servings or less a week. A serving is 1 tablespoon jelly or jam, ½ cup sorbet, or 1 cup of lemonade. · Eat less than 2,300 milligrams (mg) of sodium a day. If you limit your sodium to 1,500 mg a day, you can lower your blood pressure even more. Tips for success · Start small. Do not try to make dramatic changes to your diet all at once. You might feel that you are missing out on your favorite foods and then be more likely to not follow the plan. Make small changes, and stick with them. Once those changes become habit, add a few more changes. · Try some of the following: ? Make it a goal to eat a fruit or vegetable at every meal and at snacks. This will make it easy to get the recommended amount of fruits and vegetables each day. ? Try yogurt topped with fruit and nuts for a snack or healthy dessert. ? Add lettuce, tomato, cucumber, and onion to sandwiches. ? Combine a ready-made pizza crust with low-fat mozzarella cheese and lots of vegetable toppings. Try using tomatoes, squash, spinach, broccoli, carrots, cauliflower, and onions. ? Have a variety of cut-up vegetables with a low-fat dip as an appetizer instead of chips and dip. ? Sprinkle sunflower seeds or chopped almonds over salads.  Or try adding chopped walnuts or almonds to cooked vegetables. ? Try some vegetarian meals using beans and peas. Add garbanzo or kidney beans to salads. Make burritos and tacos with mashed weiner beans or black beans. Where can you learn more? Go to http://laura-elsa.info/. Enter S525 in the search box to learn more about \"DASH Diet: Care Instructions. \" Current as of: July 22, 2018 Content Version: 11.9 © 2481-0111 [employer]. Care instructions adapted under license by [employer] (which disclaims liability or warranty for this information). If you have questions about a medical condition or this instruction, always ask your healthcare professional. [address] any warranty or liability for your use of this information. Learning About Diabetes Food Guidelines Your Care Instructions Meal planning is important to manage diabetes. It helps keep your blood sugar at a target level (which you set with your doctor). You don't have to eat special foods. You can eat what your family eats, including sweets once in a while. But you do have to pay attention to how often you eat and how much you eat of certain foods. You may want to work with a dietitian or a certified diabetes educator (CDE) to help you plan meals and snacks. A dietitian or CDE can also help you lose weight if that is one of your goals. What should you know about eating carbs? Managing the amount of carbohydrate (carbs) you eat is an important part of healthy meals when you have diabetes. Carbohydrate is found in many foods. · Learn which foods have carbs. And learn the amounts of carbs in different foods. ? Bread, cereal, pasta, and rice have about 15 grams of carbs in a serving. A serving is 1 slice of bread (1 ounce), ½ cup of cooked cereal, or 1/3 cup of cooked pasta or rice. ? Fruits have 15 grams of carbs in a serving.  A serving is 1 small fresh fruit, such as an apple or orange; ½ of a banana; ½ cup of cooked or canned fruit; ½ cup of fruit juice; 1 cup of melon or raspberries; or 2 tablespoons of dried fruit. ? Milk and no-sugar-added yogurt have 15 grams of carbs in a serving. A serving is 1 cup of milk or 2/3 cup of no-sugar-added yogurt. ? Starchy vegetables have 15 grams of carbs in a serving. A serving is ½ cup of mashed potatoes or sweet potato; 1 cup winter squash; ½ of a small baked potato; ½ cup of cooked beans; or ½ cup cooked corn or green peas. · Learn how much carbs to eat each day and at each meal. A dietitian or CDE can teach you how to keep track of the amount of carbs you eat. This is called carbohydrate counting. · If you are not sure how to count carbohydrate grams, use the Plate Method to plan meals. It is a good, quick way to make sure that you have a balanced meal. It also helps you spread carbs throughout the day. ? Divide your plate by types of foods. Put non-starchy vegetables on half the plate, meat or other protein food on one-quarter of the plate, and a grain or starchy vegetable in the final quarter of the plate. To this you can add a small piece of fruit and 1 cup of milk or yogurt, depending on how many carbs you are supposed to eat at a meal. 
· Try to eat about the same amount of carbs at each meal. Do not \"save up\" your daily allowance of carbs to eat at one meal. 
· Proteins have very little or no carbs per serving. Examples of proteins are beef, chicken, turkey, fish, eggs, tofu, cheese, cottage cheese, and peanut butter. A serving size of meat is 3 ounces, which is about the size of a deck of cards. Examples of meat substitute serving sizes (equal to 1 ounce of meat) are 1/4 cup of cottage cheese, 1 egg, 1 tablespoon of peanut butter, and ½ cup of tofu. How can you eat out and still eat healthy? · Learn to estimate the serving sizes of foods that have carbohydrate.  If you measure food at home, it will be easier to estimate the amount in a serving of restaurant food. · If the meal you order has too much carbohydrate (such as potatoes, corn, or baked beans), ask to have a low-carbohydrate food instead. Ask for a salad or green vegetables. · If you use insulin, check your blood sugar before and after eating out to help you plan how much to eat in the future. · If you eat more carbohydrate at a meal than you had planned, take a walk or do other exercise. This will help lower your blood sugar. What else should you know? · Limit saturated fat, such as the fat from meat and dairy products. This is a healthy choice because people who have diabetes are at higher risk of heart disease. So choose lean cuts of meat and nonfat or low-fat dairy products. Use olive or canola oil instead of butter or shortening when cooking. · Don't skip meals. Your blood sugar may drop too low if you skip meals and take insulin or certain medicines for diabetes. · Check with your doctor before you drink alcohol. Alcohol can cause your blood sugar to drop too low. Alcohol can also cause a bad reaction if you take certain diabetes medicines. Follow-up care is a key part of your treatment and safety. Be sure to make and go to all appointments, and call your doctor if you are having problems. It's also a good idea to know your test results and keep a list of the medicines you take. Where can you learn more? Go to http://laura-elsa.info/. Enter W074 in the search box to learn more about \"Learning About Diabetes Food Guidelines. \" Current as of: July 25, 2018 Content Version: 11.9 © 2769-3788 [employer]. Care instructions adapted under license by [employer] (which disclaims liability or warranty for this information).  If you have questions about a medical condition or this instruction, always ask your healthcare professional. [employer] disclaims any warranty or liability for your use of this information.

## 2019-05-29 NOTE — PROGRESS NOTES
Chief Complaint   Patient presents with    Diabetes     6 month follow up with lab review. Health Maintenance Due   Topic Date Due    COLONOSCOPY  09/29/2019     1. Have you been to the ER, urgent care clinic or hospitalized since your last visit? NO.     2. Have you seen or consulted any other health care providers outside of the [address] [place] [address] since your last visit (Include any pap smears or colon screening)? YES, Endocrinologist, last seen last month. Do you have an Advanced Directive? NO    Would you like information on Advanced Directives?  NO    Learning Assessment 5/29/2019   PRIMARY LEARNER Patient   HIGHEST LEVEL OF EDUCATION - PRIMARY LEARNER  > 4 YEARS OF COLLEGE   BARRIERS PRIMARY LEARNER NONE   CO-LEARNER CAREGIVER No   CO-LEARNER NAME -   PRIMARY LANGUAGE ENGLISH    NEED No   LEARNER PREFERENCE PRIMARY DEMONSTRATION     READING     LISTENING   ANSWERED BY patient   RELATIONSHIP SELF

## 2019-06-02 PROBLEM — D75.89 MACROCYTOSIS WITHOUT ANEMIA: Status: ACTIVE | Noted: 2017-04-16

## 2019-06-02 NOTE — PROGRESS NOTES
HPI:   kSy Dudley is a 59y.o. year old female who presents today for a physical exam and for evaluation of of hypertension, hyperlipidemia, diabetes mellitus type 1, chronic renal disease stage 3, and hypothyroidism. 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 She reports that she is doing well and is currently without complaints. Her TSH was noted to be elevated to 12.17 on pre-visit labs, and she was contacted to increase her dose of Synthroid to 100 mcg daily. On 4/26/2018, she presented with worsening lower extremity edema. She stated that previously, her swelling would improve overnight, but she had been noticing recently that it did not resolve. She also reported some mild dyspnea with exertion, such as walking upstairs, which had been present chronically, although may have been slightly worse. She also reported some \"skipped beats\" although not prolonged. She denied any chest pain, shortness of breath at rest or with normal activities, lightheadedness, PND, or orthopnea. She was started on low dose lasix, and referred for an echocardiogram, Holter monitor, and lower extremity duplex scan, but prior to having these performed, presented to [facility] ED for evaluation of pain and swelling of her right ankle. While in ED, CBC, CMP, and D-dimer were unremarkable. Cardiac enzymes x 2 were negative, and EKG was unchanged. She was observed overnight and underwent a pharmacologic nuclear stress test (5/3/2018) which was a normal low risk study, without evidence of ischemia or prior infarction, and EF 86%. She was subsequently discharged, and underwent an echocardiogram (5/10/2018) showed normal LV function (EF 55-60%) no RWMA, mild LAE, mild-moderate TR, RVSP normal (22 mmHg); holter monitor (5/1/2018) showing sinus rhythm with sinus arrhythmia at times; rare PVC's; one short run of non-sustained of SVT for 6 beats.  Episode of sinus tachycardia recorded in diary occurred while she was undergoing pharmacologic nuclear stress test. No other symptoms reported; and lower extremity duplex (5/11/2018) which was negative for DVT and venous reflux. On 5/8/2018, she was evaluated by a podiatrist, Dr. Yair Plunkett, for her right foot swelling. Felt that it was most likely secondary to sprain injury as x-rays negative and presentation not consistent with gout or other form of arthritis. She was started on a Medrol dose pack. However, her blood sugars increased to >400 and she was changed to ibuprofen 400 mg tid. She did have a right foot MRI (5/26/2018) which showed considerable edema involving lower leg, hindfoot and midfoot, nonlocalizing a nonspecific; evidence of old injury to the ATF ligament.  Other ankle ligaments are intact; mild peroneus brevis tendinopathy without measurable tear; small partial thickness longitudinal split in the peroneus longus tendon within the retromalleolar groove; mild distal Achilles tendinosis; and small subchondral cyst in the anterior facet of the talus with otherwise no degenerative change seen in any of the hindfoot or midfoot articulations. She states that she was told that the right foot swelling was due to an old injury, but she states that it persists although she does not feel much pain due to her neuropathy. She has a history of diabetes mellitus type 1, with onset at age 6. She is currently under the care of Dr. Eli Cortes, and is being treated with insulin glargine and lispro. She has not been well-controlled recently, with HbA1c ranging from 7.5-8.0. She does have proliferative diabetic retinopathy and is s/p pan-retinal photocoagulation therapy in both eyes. She is followed closely by Dr. Giancarlo Jones, and recent exam showed regression bilaterally. She also has peripheral neuropathy, with burning and tingling in her feet at night.  Renal function had been normal until 5/2016, with evidence of chronic renal disease, stage 3, since that time with baseline creatinine1.0-1.04/ eGFR 54-56. She also has a history of hypothyroidism, and she is currently on Synthroid. She denies cold intolerance, hair or skin changes. She has a history of hypertension, treated with quinapril. She had a pharmacologic nuclear stress test in 11/2012, which showed no evidence for ischemia, and normal LV wall motion (EF> 70%). She also had a carotid duplex scan in 7/2013 which showed mild (< 50%) bilateral internal carotid artery stenoses. She denies any chest pain, shortness of breath at rest or with exertion, palpitations, lightheadedness, or edema. She has a history of hyperlipidemia, and was on atorvastatin for many years until 10/2015 when she discontinued it because of myalgias. She was recently tried on rosuvastatin without success due to recurrent myalgias. She was started on ezetimibe in 9/2016 and has been tolerating it without difficulty. She successfully stopped smoking in 1/2015, and reports that she continues to abstain. She had a screening colonoscopy in 9/2014 by Dr. Tia Yan, which found a 6 mm sessile polyp in the descending colon and a 4-5 mm sessile polyp in the rectum/sigmoid (pathology: tubular adenomas). Recommendation was for follow-up colonoscopy in 5 years. She denies any abdominal pain, nausea, vomiting, melena, hematochezia, or change in bowel movements. In 3/2017, she was diagnosed with an upper respiratory tract infection and was prescribed doxycycline and prednisone by [facility]. She subsequently developed severe dizziness and vomiting, prompting a visit to the [facility] ED where she was diagnosed with benign positional vertigo and treated with meclizine and Zofran with improvement. In 10/2017, she noticed difficulty with her peripheral vision and was evaluated by Dr. Elida Orta.  She has regressed proliferative diabetic retinopathy and she reports that the laser therapy she received many years ago to treat this resulted in the current limitations in her peripheral vision. She does also have mild glaucoma for which she is receiving treatment. She states that her driving has been restricted to only during the day, and she is aware that she must turn her head completely to each side when navigating behind the wheel. In 2/2018, she began having difficulty with right elbow pain and was evaluated by Dr. Niles Reis who diagnosed her with right lateral epicondylitis. She received a cortisone injection with improvement. In 10/2018, while hiking on vacation hiking in [place], she slipped on some wooden steps and landed on her left shoulder. She was transported to [facility] in [place], [place], and found to have a closed nondisplaced fracture of the left proximal humerus. She was placed in a sling and told to follow-up with orthopedics. She was being followed by Dr. Zuhair Vega of [address] [place] [address], and completed physical therapy with improvement. She is no longer requiring pain medication. Past Medical History:   Diagnosis Date    Chronic renal disease, stage III ([address]     Diabetes mellitus type 1 ([address]     Diabetic peripheral neuropathy (HCC)     Diabetic retinopathy ([address]     Essential hypertension with goal blood pressure less than 140/90     Hearing decreased     Hyperlipidemia     Hypothyroidism      Past Surgical History:   Procedure Laterality Date    HX GYN      partial hysterectomy    HX GYN      3 D and C's    HX HEENT      tonsillectomy    HX HEENT      cataract removal bilateral    HX HEENT      multiple surgeries for retinopathy    HX ORTHOPAEDIC      carpral tunnel sugery bilateral     Current Outpatient Medications   Medication Sig    levothyroxine (SYNTHROID) 100 mcg tablet Take 1 Tab by mouth Daily (before breakfast).     quinapril (ACCUPRIL) 10 mg tablet TAKE 1 TABLET EVERY EVENING ALONG WITH A 5 MG TABLET    quinapril (ACCUPRIL) 5 mg tablet TAKE 1 TABLET NIGHTLY WITH 10 MG TABLET EVERY EVENING    ezetimibe (ZETIA) 10 mg tablet TAKE 1 TABLET DAILY    Cholecalciferol, Vitamin D3, (VITAMIN D3) 2,000 unit cap capsule Take 2,000 Units by mouth daily.  bisacodyl 5 mg tab Take  by mouth.  ALPHAGAN P 0.1 % ophthalmic solution     glucose blood VI test strips (ONE TOUCH ULTRA TEST) strip USE TO TEST BLOOD SUGAR FOUR TIMES A DAY    cyanocobalamin (VITAMIN B-12) 500 mcg tablet Take 500 mcg by mouth daily.  aspirin delayed-release 81 mg tablet Take  by mouth daily.  insulin glargine (LANTUS SOLOSTAR) 100 unit/mL (3 mL) pen 23 Units by SubCUTAneous route daily.  insulin lispro (HUMALOG PEN) 100 unit/mL kwikpen by SubCUTAneous route. Sliding scale as needed      furosemide (LASIX) 20 mg tablet Take 1 Tab by mouth daily.  meclizine (ANTIVERT) 25 mg tablet Take  by mouth three (3) times daily as needed.  ondansetron (ZOFRAN ODT) 4 mg disintegrating tablet Take 4 mg by mouth every eight (8) hours as needed for Nausea.  loratadine (CLARITIN REDITABS) 10 mg dissolvable tablet Take 1 Tab by mouth daily. No current facility-administered medications for this visit. Allergies and Intolerances: Allergies   Allergen Reactions    Pcn [Penicillins] Hives    Sulfa (Sulfonamide Antibiotics) Nausea Only and Vertigo     Family History: Grandmother  from colon cancer. Mother had CAD and DM. Family History   Problem Relation Age of Onset    Diabetes Brother     Diabetes Mother     Heart Disease Mother      Social History:   She  reports that she quit smoking about 4 years ago. She smoked 1.00 pack per day. She has never used smokeless tobacco. She is  and lives with her . 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   Social History     Substance and Sexual Activity   Alcohol Use Yes    Comment: infrequently     Immunization History:  Immunization History   Administered Date(s) Administered    Influenza Vaccine (Quad) PF 12/15/2015, 09/14/2016, 10/19/2017, 11/07/2018    Influenza Vaccine PF 12/22/2014    Influenza Vaccine Split 11/04/2011, 10/17/2012    Influenza Vaccine Whole 11/09/2010    Pneumococcal Polysaccharide (PPSV-23) 04/13/2017    Tdap 06/25/2013    Zoster Vaccine, Live 05/31/2016       Review of Systems:   As above included in HPI. Otherwise 11 point review of systems negative including constitutional, skin, HENT, eyes, respiratory, cardiovascular, gastrointestinal, genitourinary, musculoskeletal, endo/heme/aller, neurological.    Physical:   Vitals:   BP: 118/62  HR: 64  WT: 170 lb (77.1 kg)  BMI:  28.29 kg/m2    Exam:   Patient appears in no apparent distress. Affect is appropriate. HEENT --Anicteric sclerae, tympanic membranes normal,  ear canals normal.  PERRL, EOMI, conjunctiva and lids normal.   Sinuses were nontender, turbinates normal, hearing normal.  Oropharynx without  erythema, normal tongue, oral mucosa and tonsils. No cervical lymphadenopathy. No thyromegaly, JVD, or bruits. Carotid pulses 2+ with normal upstroke. Lungs --Clear to auscultation. No wheezing or rales. Heart --Regular rate and rhythm, no murmurs, rubs, gallops, or clicks. Breasts -- patient declined   Chest wall --Nontender to palpation. PMI normal.  Abdomen -- Soft and nontender, no hepatosplenomegaly or masses. Extremities -- Trace edema bilaterally;  2+ pulses equally and bilaterally.   Normal looking digits, ROM intact  Neuro -- CN 2-12 intact, strength 5/5 with intact soft touch in all extremities  Derm  no obvious abnormalities noted, no rash      Review of Data:  Labs:   Lab Only on 05/15/2019   Component Date Value Ref Range Status    WBC 05/15/2019 6.3  3.4 - 10.8 x10E3/uL Final    RBC 05/15/2019 3.53* 3.77 - 5.28 x10E6/uL Final    HGB 05/15/2019 11.7  11.1 - 15.9 g/dL Final    HCT 05/15/2019 35.6  34.0 - 46.6 % Final    MCV 05/15/2019 101* 79 - 97 fL Final   [facility] 05/15/2019 33.1* 26.6 - 33.0 pg Final    MCHC 05/15/2019 32.9  31.5 - 35.7 g/dL Final    RDW 05/15/2019 12.9  12.3 - 15.4 % Final    PLATELET 34/33/1961 168  150 - 379 x10E3/uL Final    NEUTROPHILS 05/15/2019 70  Not Estab. % Final    Lymphocytes 05/15/2019 23  Not Estab. % Final    MONOCYTES 05/15/2019 6  Not Estab. % Final    EOSINOPHILS 05/15/2019 1  Not Estab. % Final    BASOPHILS 05/15/2019 0  Not Estab. % Final    ABS. NEUTROPHILS 05/15/2019 4.4  1.4 - 7.0 x10E3/uL Final    Abs Lymphocytes 05/15/2019 1.4  0.7 - 3.1 x10E3/uL Final    ABS. MONOCYTES 05/15/2019 0.4  0.1 - 0.9 x10E3/uL Final    ABS. EOSINOPHILS 05/15/2019 0.0  0.0 - 0.4 x10E3/uL Final    ABS. BASOPHILS 05/15/2019 0.0  0.0 - 0.2 x10E3/uL Final    IMMATURE GRANULOCYTES 05/15/2019 0  Not Estab. % Final    ABS. IMM.  GRANS. 05/15/2019 0.0  0.0 - 0.1 x10E3/uL Final    Hemoglobin A1c 05/15/2019 6.7* 4.8 - 5.6 % Final    Estimated average glucose 05/15/2019 146  mg/dL Final    Cholesterol, total 05/15/2019 197  100 - 199 mg/dL Final    Triglyceride 05/15/2019 63  0 - 149 mg/dL Final    HDL Cholesterol 05/15/2019 75  >39 mg/dL Final    VLDL, calculated 05/15/2019 13  5 - 40 mg/dL Final    LDL, calculated 05/15/2019 109* 0 - 99 mg/dL Final    Glucose 05/15/2019 82  65 - 99 mg/dL Final    BUN 05/15/2019 20  8 - 27 mg/dL Final    Creatinine 05/15/2019 0.84  0.57 - 1.00 mg/dL Final    GFR est non-AA 05/15/2019 74  >59 mL/min/1.73 Final    GFR est AA 05/15/2019 85  >59 mL/min/1.73 Final    BUN/Creatinine ratio 05/15/2019 24  12 - 28 Final    Sodium 05/15/2019 141  134 - 144 mmol/L Final    Potassium 05/15/2019 4.7  3.5 - 5.2 mmol/L Final    Chloride 05/15/2019 103  96 - 106 mmol/L Final    CO2 05/15/2019 23  20 - 29 mmol/L Final    Calcium 05/15/2019 9.3  8.7 - 10.3 mg/dL Final    Protein, total 05/15/2019 6.4  6.0 - 8.5 g/dL Final    Albumin 05/15/2019 4.2  3.6 - 4.8 g/dL Final    GLOBULIN, TOTAL 05/15/2019 2.2  1.5 - 4.5 g/dL Final    A-G Ratio 05/15/2019 1.9  1.2 - 2.2 Final    Bilirubin, total 05/15/2019 0.2  0.0 - 1.2 mg/dL Final    Alk. phosphatase 05/15/2019 51  39 - 117 IU/L Final    AST (SGOT) 05/15/2019 18  0 - 40 IU/L Final    ALT (SGPT) 05/15/2019 11  0 - 32 IU/L Final    TSH 05/15/2019 12.170* 0.450 - 4.500 uIU/mL Final    Vitamin B12 05/15/2019 1,260* 232 - 1,245 pg/mL Final    Folate 05/15/2019 6.5  >3.0 ng/mL Final    Interpretation 05/15/2019 Note   Final    INTERPRETATION 05/15/2019 Note   Final    PDF IMAGE 12/51/4454 Not applicable   Final    PDF Image 32/58/8144 Not applicable   Final   Abstract on 2019   Component Date Value Ref Range Status    Hemoglobin A1c, External 2019 6.9   Final   Abstract on 2019   Component Date Value Ref Range Status    Hemoglobin A1c, External 01/10/2019 6.7   Final       Health Maintenance:  Screening:    Mammogram: negative (2019). PAP smear: negative (2014). S/p YUNG, no further screening needed. Colorectal: colonsocopy (2014) tubular adenomas. Dr. Jennifer Taylor. Due 2019. Depression: none   DM (HbA1c/FPG): HbA1c 6.7 (2019) Dr. Humza Carter   Hepatitis C: negative (2015)   Falls: none    DEXA: N/A    Glaucoma: mild primary open angle glaucoma and regressed proliferative diabetic retinopathy.  Followed by Dr. Godfrey Lynne (last 10/2018)   Smokin pack year (stopped 2015)   Vitamin D: 48.8 (2018)   Medicare Wellness: N/A    Impression:  Patient Active Problem List   Diagnosis Code    Stable proliferative diabetic retinopathy of both eyes associated with type 1 diabetes mellitus ([facility] G02.7727    Hyperlipidemia E78.5    Hearing decreased H91.90    Diabetes mellitus with diabetic polyneuropathy ([facility] E11.42    Vitamin D deficiency E55.9    Ex-cigarette smoker (30 pack year), stopped 2015 Z87.891    Colon polyp, colonoscopy due 2019 K63.5    Essential hypertension I10    Hypothyroidism due to acquired atrophy of thyroid E03.4    Chronic kidney disease, stage III (moderate) ([facility]) N18.3    Type 1 diabetes mellitus with stage 3 chronic kidney disease ([facility]) E10.22, N18.3    Macrocytosis without anemia D75.89    Microalbuminuria due to type 1 diabetes mellitus ([facility]) E10.29, R80.9       Plan:  1. Hypertension. Well controlled on quinapril 15 mg daily and lasix 20 mg daily. Renal function has been stable with creatinine 0.84/ eGFR 74. Continue to follow. 2. Lower extremity edema/dyspnea. Patient had reported worsened lower extremity edema not resolving overnight and some questionable mild increase in exertional dyspnea. No other associated symptoms except palpitations in the form of \"skipped beats\". EKG without change from baseline. Lower extremity duplex negative for DVT or venous reflux. Echocardiogram with normal LV function and normal right sided pressures. 48 hour Holter monitor unrevealing. Pharmacologic nuclear stress test peformed at ED was negative. Started on lasix 20 mg daily with improvement. Reports that now generally present during the day and resolving overnight. Not using compression hose regularly. 3. Right ankle swelling. Evaluated by podiatrist, Dr. Christel Coronel, and felt that swelling most likely secondary to sprain injury as x-rays negative and presentation not consistent with gout or other form of arthritis. Started on Medrol dose pack, but changed to ibuprofen 400 mg tid given elevated blood sugars. Swelling persists, and ankle MRI (5/2018) with swelling evident, old ATF ligament injury, and multiple tendon injuries. No significant pain, although has significant neuropathy. Evaluated by Dr. Madina Escobedo and no further work-up needed. Recommended compression hose for the swelling. 4. Type 1 diabetes mellitus. HbA1c improved to 6.9 in 4/2019 since using new Freestyle sensor machine for blood sugar readings. On Lantus and Humalog.  Being managed by Dr. Leonard Lopez, Proliferative diabetic retinopathy in regression, followed by  [facility]. Neuropathy symptoms in feet are mild and have not required treatment with medication. Foot exam performed (1/2019). Renal function with evidence of chronic renal disease, stage 3, and also with evidence of mild microalbuminuria ((8/2018). On Ace-I. Lipid lowering therapy not prescibed for patient reasons as not able to tolerate statins due to myalgias. Follow closely. 5. Hyperlipidemia. On ezetimibe with significant improvement in LDL to 79 at last visit. However, increased to 109 today. Not able to tolerate statin (atorvastatin/ rosuvastatin) due to myalgias. Moderate to severe aortic and coronary atherosclerosis and calcifications seen on chest CT scan (11/2018). Stressed importance of lifestyle modifications, especially diet, exercise and weight loss. 6. Chronic renal disease, stage 3. Evidence of mild disease with creatinine 1.00-1.04/ eGFR 54-56 since 5/2016. However, recent readings normalized. Most likely due to long standing diabetes mellitus and hypertension. Remaining stable. On Ace-I, but not on statin as discussed above. Counseled on avoiding NSAIDS and prerenal status. 7. Hypothyroidism. Synthroid dose decreased from 112 to 100 mcg in 4/2017 due to over replacement. TSH was therapeutic in 7/2017, but with evidence of over replacement on labs in 10/2017 and 11/2018. Dose decreased to 88 mcg daily, but now TSH significantly elevated on most recent labs at 12.17, and dose of Synthroid increased back to 100 mcg daily. Will repeat in 3 months. 6. Former smoking. 30 pack year history and patient stopped in 1/2015. Meets criteria for lung cancer screening with low dose CT scan. Performed in 11/2017 showing a few tiny solid and one groundglass nodule identified (lung RADS 2). Did also note evidence of emphysema and prominence of the pulmonary vascular trunk which can be seen in pulmonary arterial hypertension. However, echocardiogram with normal PA pressures.   Repeat in 11/2018 without change. Next study due in 11/2019. Encouraged to continue with abstaining from smoking. 9. Anemia. Resolved today. Iron studies were consistent with anemia of chronic inflammation; B12 normal; and folate level slightly low last visit so advised to begin multivitamin with folate. Anemia now resolved and folate level normal. Macrocytosis persists. Will continue to follow. 10. Overweight. Emphasized importance of lifestyle modifications, including diet, exercise, and weight loss. Will readdress next visit. 11. Humeral fracture, left. Healed without sequelae. Doing well. 12. Health maintenance. Given script for Shingrix vaccine today. Other immunizations up to date. Mammogram up to date. Colonoscopy due 9/2019. Will place referral to Dr. Marlyn Ly. WIll discuss obtaining bone density study at next visit. Vitamin D level now normal. Continue maintenance dose supplement. Total time: 40 minutes spent with the patient in face-to-face consultation of which greater than 50% was spent on counseling, answering questions and/or coordination of care. Complex medical review and management performed. Patient understands recommendations and agrees with plan. .  Follow-up appointment in 3 months to reassess thyroid replacement and lipid panel.

## 2019-07-12 LAB
HBA1C MFR BLD HPLC: 6.8 %
MICROALBUMIN UR TEST STR-MCNC: 8.2 MG/DL

## 2019-08-12 RX ORDER — EZETIMIBE 10 MG/1
TABLET ORAL
Qty: 90 TAB | Refills: 3 | Status: SHIPPED | OUTPATIENT
Start: 2019-08-12 | End: 2020-08-08

## 2019-08-14 RX ORDER — QUINAPRIL 5 1/1
TABLET ORAL
Qty: 90 TAB | Refills: 3 | Status: SHIPPED | OUTPATIENT
Start: 2019-08-14 | End: 2020-08-08

## 2019-08-23 ENCOUNTER — APPOINTMENT (OUTPATIENT)
Dept: INTERNAL MEDICINE CLINIC | Age: 64
End: 2019-08-23

## 2019-08-24 LAB
25(OH)D3+25(OH)D2 SERPL-MCNC: 56.8 NG/ML (ref 30–100)
ALBUMIN/CREAT UR: 15.8 MG/G CREAT (ref 0–30)
BASOPHILS # BLD AUTO: 0 X10E3/UL (ref 0–0.2)
BASOPHILS NFR BLD AUTO: 0 %
BUN SERPL-MCNC: 20 MG/DL (ref 8–27)
BUN/CREAT SERPL: 21 (ref 12–28)
CALCIUM SERPL-MCNC: 9.4 MG/DL (ref 8.7–10.3)
CHLORIDE SERPL-SCNC: 101 MMOL/L (ref 96–106)
CHOLEST SERPL-MCNC: 167 MG/DL (ref 100–199)
CO2 SERPL-SCNC: 24 MMOL/L (ref 20–29)
CREAT SERPL-MCNC: 0.95 MG/DL (ref 0.57–1)
CREAT UR-MCNC: 46.1 MG/DL
EOSINOPHIL # BLD AUTO: 0 X10E3/UL (ref 0–0.4)
EOSINOPHIL NFR BLD AUTO: 0 %
ERYTHROCYTE [DISTWIDTH] IN BLOOD BY AUTOMATED COUNT: 11.6 % (ref 12.3–15.4)
GLUCOSE SERPL-MCNC: 197 MG/DL (ref 65–99)
HCT VFR BLD AUTO: 33.3 % (ref 34–46.6)
HDLC SERPL-MCNC: 69 MG/DL
HGB BLD-MCNC: 11.2 G/DL (ref 11.1–15.9)
IMM GRANULOCYTES # BLD AUTO: 0 X10E3/UL (ref 0–0.1)
IMM GRANULOCYTES NFR BLD AUTO: 0 %
INTERPRETATION, 910389: NORMAL
LDLC SERPL CALC-MCNC: 83 MG/DL (ref 0–99)
LYMPHOCYTES # BLD AUTO: 1.1 X10E3/UL (ref 0.7–3.1)
LYMPHOCYTES NFR BLD AUTO: 24 %
MCH RBC QN AUTO: 33.5 PG (ref 26.6–33)
MCHC RBC AUTO-ENTMCNC: 33.6 G/DL (ref 31.5–35.7)
MCV RBC AUTO: 100 FL (ref 79–97)
MICROALBUMIN UR-MCNC: 7.3 UG/ML
MONOCYTES # BLD AUTO: 0.3 X10E3/UL (ref 0.1–0.9)
MONOCYTES NFR BLD AUTO: 7 %
NEUTROPHILS # BLD AUTO: 3.1 X10E3/UL (ref 1.4–7)
NEUTROPHILS NFR BLD AUTO: 69 %
PLATELET # BLD AUTO: 192 X10E3/UL (ref 150–450)
POTASSIUM SERPL-SCNC: 4.6 MMOL/L (ref 3.5–5.2)
RBC # BLD AUTO: 3.34 X10E6/UL (ref 3.77–5.28)
SODIUM SERPL-SCNC: 139 MMOL/L (ref 134–144)
T4 FREE SERPL-MCNC: 1.86 NG/DL (ref 0.82–1.77)
TRIGL SERPL-MCNC: 74 MG/DL (ref 0–149)
TSH SERPL DL<=0.005 MIU/L-ACNC: 0.56 UIU/ML (ref 0.45–4.5)
VLDLC SERPL CALC-MCNC: 15 MG/DL (ref 5–40)
WBC # BLD AUTO: 4.5 X10E3/UL (ref 3.4–10.8)

## 2019-08-28 ENCOUNTER — OFFICE VISIT (OUTPATIENT)
Dept: INTERNAL MEDICINE CLINIC | Age: 64
End: 2019-08-28

## 2019-08-28 VITALS
SYSTOLIC BLOOD PRESSURE: 138 MMHG | RESPIRATION RATE: 14 BRPM | TEMPERATURE: 98.5 F | HEIGHT: 65 IN | HEART RATE: 62 BPM | WEIGHT: 173 LBS | OXYGEN SATURATION: 98 % | BODY MASS INDEX: 28.82 KG/M2 | DIASTOLIC BLOOD PRESSURE: 64 MMHG

## 2019-08-28 DIAGNOSIS — E10.22 TYPE 1 DIABETES MELLITUS WITH STAGE 3 CHRONIC KIDNEY DISEASE (HCC): ICD-10-CM

## 2019-08-28 DIAGNOSIS — R80.9 MICROALBUMINURIA DUE TO TYPE 1 DIABETES MELLITUS (HCC): ICD-10-CM

## 2019-08-28 DIAGNOSIS — I10 ESSENTIAL HYPERTENSION: Primary | ICD-10-CM

## 2019-08-28 DIAGNOSIS — N18.30 TYPE 1 DIABETES MELLITUS WITH STAGE 3 CHRONIC KIDNEY DISEASE (HCC): ICD-10-CM

## 2019-08-28 DIAGNOSIS — E55.9 VITAMIN D DEFICIENCY: ICD-10-CM

## 2019-08-28 DIAGNOSIS — E10.29 MICROALBUMINURIA DUE TO TYPE 1 DIABETES MELLITUS (HCC): ICD-10-CM

## 2019-08-28 DIAGNOSIS — E03.4 HYPOTHYROIDISM DUE TO ACQUIRED ATROPHY OF THYROID: ICD-10-CM

## 2019-08-28 DIAGNOSIS — E10.42 TYPE 1 DIABETES MELLITUS WITH DIABETIC POLYNEUROPATHY (HCC): ICD-10-CM

## 2019-08-28 DIAGNOSIS — N18.30 CHRONIC KIDNEY DISEASE, STAGE III (MODERATE) (HCC): ICD-10-CM

## 2019-08-28 DIAGNOSIS — E10.3553 STABLE PROLIFERATIVE DIABETIC RETINOPATHY OF BOTH EYES ASSOCIATED WITH TYPE 1 DIABETES MELLITUS (HCC): ICD-10-CM

## 2019-08-28 DIAGNOSIS — E78.5 HYPERLIPIDEMIA, UNSPECIFIED HYPERLIPIDEMIA TYPE: ICD-10-CM

## 2019-08-28 DIAGNOSIS — Z87.891 EX-CIGARETTE SMOKER: ICD-10-CM

## 2019-08-28 DIAGNOSIS — D75.89 MACROCYTOSIS WITHOUT ANEMIA: ICD-10-CM

## 2019-08-28 NOTE — PROGRESS NOTES
Chief Complaint   Patient presents with    Thyroid Problem     3 month follow up with lab results.  Cholesterol Problem     Health Maintenance Due   Topic Date Due    Pneumococcal 0-64 years (2 of 3 - PCV13) 04/13/2018    Influenza Age 9 to Adult  08/01/2019    COLONOSCOPY  09/29/2019     1. Have you been to the ER, urgent care clinic or hospitalized since your last visit? NO.     2. Have you seen or consulted any other health care providers outside of the [address] [place] [address] since your last visit (Include any pap smears or colon screening)? YES, Dr. Asif Valdes endocrinology office last seen 7/12/2019. Dr. Rhea Soto, ophthalmologist, last seen last week. Do you have an Advanced Directive? NO    Would you like information on Advanced Directives? NO    Learning Assessment 5/29/2019   PRIMARY LEARNER Patient   HIGHEST LEVEL OF EDUCATION - PRIMARY LEARNER  > 4 YEARS OF COLLEGE   BARRIERS PRIMARY LEARNER NONE   CO-LEARNER CAREGIVER No   CO-LEARNER NAME -   PRIMARY LANGUAGE ENGLISH    NEED No   LEARNER PREFERENCE PRIMARY DEMONSTRATION     READING     LISTENING   ANSWERED BY patient   RELATIONSHIP SELF     Abuse Screening Questionnaire 5/29/2019   Do you ever feel afraid of your partner? N   Are you in a relationship with someone who physically or mentally threatens you? N   Is it safe for you to go home? Y     3 most recent PHQ Screens 5/29/2019   Little interest or pleasure in doing things Not at all   Feeling down, depressed, irritable, or hopeless Not at all   Total Score PHQ 2 0     Fall Risk Assessment, last 12 mths 5/29/2019   Able to walk? Yes   Fall in past 12 months?  No

## 2019-08-28 NOTE — PATIENT INSTRUCTIONS
DASH Diet: Care Instructions  Your Care Instructions    The DASH diet is an eating plan that can help lower your blood pressure. DASH stands for Dietary Approaches to Stop Hypertension. Hypertension is high blood pressure. The DASH diet focuses on eating foods that are high in calcium, potassium, and magnesium. These nutrients can lower blood pressure. The foods that are highest in these nutrients are fruits, vegetables, low-fat dairy products, nuts, seeds, and legumes. But taking calcium, potassium, and magnesium supplements instead of eating foods that are high in those nutrients does not have the same effect. The DASH diet also includes whole grains, fish, and poultry. The DASH diet is one of several lifestyle changes your doctor may recommend to lower your high blood pressure. Your doctor may also want you to decrease the amount of sodium in your diet. Lowering sodium while following the DASH diet can lower blood pressure even further than just the DASH diet alone. Follow-up care is a key part of your treatment and safety. Be sure to make and go to all appointments, and call your doctor if you are having problems. It's also a good idea to know your test results and keep a list of the medicines you take. How can you care for yourself at home? Following the DASH diet  · Eat 4 to 5 servings of fruit each day. A serving is 1 medium-sized piece of fruit, ½ cup chopped or canned fruit, 1/4 cup dried fruit, or 4 ounces (½ cup) of fruit juice. Choose fruit more often than fruit juice. · Eat 4 to 5 servings of vegetables each day. A serving is 1 cup of lettuce or raw leafy vegetables, ½ cup of chopped or cooked vegetables, or 4 ounces (½ cup) of vegetable juice. Choose vegetables more often than vegetable juice. · Get 2 to 3 servings of low-fat and fat-free dairy each day. A serving is 8 ounces of milk, 1 cup of yogurt, or 1 ½ ounces of cheese. · Eat 6 to 8 servings of grains each day.  A serving is 1 slice of bread, 1 ounce of dry cereal, or ½ cup of cooked rice, pasta, or cooked cereal. Try to choose whole-grain products as much as possible. · Limit lean meat, poultry, and fish to 2 servings each day. A serving is 3 ounces, about the size of a deck of cards. · Eat 4 to 5 servings of nuts, seeds, and legumes (cooked dried beans, lentils, and split peas) each week. A serving is 1/3 cup of nuts, 2 tablespoons of seeds, or ½ cup of cooked beans or peas. · Limit fats and oils to 2 to 3 servings each day. A serving is 1 teaspoon of vegetable oil or 2 tablespoons of salad dressing. · Limit sweets and added sugars to 5 servings or less a week. A serving is 1 tablespoon jelly or jam, ½ cup sorbet, or 1 cup of lemonade. · Eat less than 2,300 milligrams (mg) of sodium a day. If you limit your sodium to 1,500 mg a day, you can lower your blood pressure even more. Tips for success  · Start small. Do not try to make dramatic changes to your diet all at once. You might feel that you are missing out on your favorite foods and then be more likely to not follow the plan. Make small changes, and stick with them. Once those changes become habit, add a few more changes. · Try some of the following:  ? Make it a goal to eat a fruit or vegetable at every meal and at snacks. This will make it easy to get the recommended amount of fruits and vegetables each day. ? Try yogurt topped with fruit and nuts for a snack or healthy dessert. ? Add lettuce, tomato, cucumber, and onion to sandwiches. ? Combine a ready-made pizza crust with low-fat mozzarella cheese and lots of vegetable toppings. Try using tomatoes, squash, spinach, broccoli, carrots, cauliflower, and onions. ? Have a variety of cut-up vegetables with a low-fat dip as an appetizer instead of chips and dip. ? Sprinkle sunflower seeds or chopped almonds over salads. Or try adding chopped walnuts or almonds to cooked vegetables.   ? Try some vegetarian meals using beans and peas. Add garbanzo or kidney beans to salads. Make burritos and tacos with mashed weiner beans or black beans. Where can you learn more? Go to http://laura-elsa.info/. Enter V378 in the search box to learn more about \"DASH Diet: Care Instructions. \"  Current as of: July 22, 2018  Content Version: 12.1  © 6026-8030 [employer], [employer]. Care instructions adapted under license by [employer] (which disclaims liability or warranty for this information). If you have questions about a medical condition or this instruction, always ask your healthcare professional. [address] any warranty or liability for your use of this information.

## 2019-09-01 ENCOUNTER — TELEPHONE (OUTPATIENT)
Dept: INTERNAL MEDICINE CLINIC | Age: 64
End: 2019-09-01

## 2019-09-01 NOTE — TELEPHONE ENCOUNTER
Patient did not schedule 3 month follow-up appointment with labs prior. Please ask her to schedule. Thanks.

## 2019-09-01 NOTE — PROGRESS NOTES
HPI:   Leonard Love is a 59y.o. year old female who presents today for a routine visit and for evaluation of of hypertension, hyperlipidemia, diabetes mellitus type 1, chronic renal disease stage 3, and hypothyroidism. 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 She reports that she is doing well. She reports that her diabetes control has been improved considerably due to use of the Freestyle meter. She states that she can adjust her blood sugar throughout the day as needed, and she has noted a significant improvement in her HbA1c to 6.8 at her last visit with Dr. Ciara Perez. She is otherwise without new complaints and feeling well. On 4/26/2018, she presented with worsening lower extremity edema. She stated that previously, her swelling would improve overnight, but she had been noticing recently that it did not resolve. She also reported some mild dyspnea with exertion, such as walking upstairs, which had been present chronically, although may have been slightly worse. She also reported some \"skipped beats\" although not prolonged. She denied any chest pain, shortness of breath at rest or with normal activities, lightheadedness, PND, or orthopnea. She was started on low dose lasix, and referred for an echocardiogram, Holter monitor, and lower extremity duplex scan, but prior to having these performed, presented to [facility] ED for evaluation of pain and swelling of her right ankle. While in ED, CBC, CMP, and D-dimer were unremarkable. Cardiac enzymes x 2 were negative, and EKG was unchanged. She was observed overnight and underwent a pharmacologic nuclear stress test (5/3/2018) which was a normal low risk study, without evidence of ischemia or prior infarction, and EF 86%.  She was subsequently discharged, and underwent an echocardiogram (5/10/2018) showed normal LV function (EF 55-60%) no RWMA, mild LAE, mild-moderate TR, RVSP normal (22 mmHg); holter monitor (5/1/2018) showing sinus rhythm with sinus arrhythmia at times; rare PVC's; one short run of non-sustained of SVT for 6 beats. Episode of sinus tachycardia recorded in diary occurred while she was undergoing pharmacologic nuclear stress test. No other symptoms reported; and lower extremity duplex (5/11/2018) which was negative for DVT and venous reflux. On 5/8/2018, she was evaluated by a podiatrist, Dr. Josue Siddiqi, for her right foot swelling. Felt that it was most likely secondary to sprain injury as x-rays negative and presentation not consistent with gout or other form of arthritis. She was started on a Medrol dose pack. However, her blood sugars increased to >400 and she was changed to ibuprofen 400 mg tid. She did have a right foot MRI (5/26/2018) which showed considerable edema involving lower leg, hindfoot and midfoot, nonlocalizing a nonspecific; evidence of old injury to the ATF ligament.  Other ankle ligaments are intact; mild peroneus brevis tendinopathy without measurable tear; small partial thickness longitudinal split in the peroneus longus tendon within the retromalleolar groove; mild distal Achilles tendinosis; and small subchondral cyst in the anterior facet of the talus with otherwise no degenerative change seen in any of the hindfoot or midfoot articulations. She states that she was told that the right foot swelling was due to an old injury, but she states that it persists although she does not feel much pain due to her neuropathy. She has a history of diabetes mellitus type 1, with onset at age 6. She is currently under the care of Dr. Jonathan Feliciano, and is being treated with insulin glargine and lispro. She had not been well-controlled, with HbA1c ranging from 7.5-8.0, although has ow improved since using the Freestyle meter as discussed. She does have proliferative diabetic retinopathy and is s/p pan-retinal photocoagulation therapy in both eyes.  She is followed closely by Dr. Nayeli Graf, and recent exam showed regression bilaterally. She also has peripheral neuropathy, with burning and tingling in her feet at night. Renal function had been normal until 5/2016, with evidence of chronic renal disease, stage 3, since that time with baseline creatinine1.0-1.04/ eGFR 54-56. She also has a history of hypothyroidism, and she is currently on Synthroid. She denies cold intolerance, hair or skin changes. She has a history of hypertension, treated with quinapril. She had a pharmacologic nuclear stress test in 11/2012, which showed no evidence for ischemia, and normal LV wall motion (EF> 70%). She also had a carotid duplex scan in 7/2013 which showed mild (< 50%) bilateral internal carotid artery stenoses. She denies any chest pain, shortness of breath at rest or with exertion, palpitations, lightheadedness, or edema. She has a history of hyperlipidemia, and was on atorvastatin for many years until 10/2015 when she discontinued it because of myalgias. She was recently tried on rosuvastatin without success due to recurrent myalgias. She was started on ezetimibe in 9/2016 and has been tolerating it without difficulty. She successfully stopped smoking in 1/2015, and reports that she continues to abstain. She had a screening colonoscopy in 9/2014 by Dr. Tatum Hoskins, which found a 6 mm sessile polyp in the descending colon and a 4-5 mm sessile polyp in the rectum/sigmoid (pathology: tubular adenomas). Recommendation was for follow-up colonoscopy in 5 years. She denies any abdominal pain, nausea, vomiting, melena, hematochezia, or change in bowel movements. In 3/2017, she was diagnosed with an upper respiratory tract infection and was prescribed doxycycline and prednisone by [facility]. She subsequently developed severe dizziness and vomiting, prompting a visit to the [facility] [place] [facility] ED where she was diagnosed with benign positional vertigo and treated with meclizine and Zofran with improvement.     In 10/2017, she noticed difficulty with her peripheral vision and was evaluated by Dr. Alessandro Oliver. She has regressed proliferative diabetic retinopathy and she reports that the laser therapy she received many years ago to treat this resulted in the current limitations in her peripheral vision. She does also have mild glaucoma for which she is receiving treatment. She states that her driving has been restricted to only during the day, and she is aware that she must turn her head completely to each side when navigating behind the wheel. In 2/2018, she began having difficulty with right elbow pain and was evaluated by Dr. Kathe Keller who diagnosed her with right lateral epicondylitis. She received a cortisone injection with improvement. In 10/2018, while hiking on vacation hiking in [place], she slipped on some wooden steps and landed on her left shoulder. She was transported to [facility] in [place], [place], and found to have a closed nondisplaced fracture of the left proximal humerus. She was placed in a sling and told to follow-up with orthopedics. She was being followed by Dr. Barney Owen [address] [place] [address], and completed physical therapy with improvement. She is no longer requiring pain medication.        Past Medical History:   Diagnosis Date    Chronic renal disease, stage III ([address]     Diabetes mellitus type 1 ([address]     Diabetic peripheral neuropathy (HCC)     Diabetic retinopathy ([address]     Essential hypertension with goal blood pressure less than 140/90     Hearing decreased     Hyperlipidemia     Hypothyroidism      Past Surgical History:   Procedure Laterality Date    HX GYN      partial hysterectomy    HX GYN      3 D and C's    HX HEENT      tonsillectomy    HX HEENT      cataract removal bilateral    HX HEENT      multiple surgeries for retinopathy    HX ORTHOPAEDIC      carpral tunnel sugery bilateral     Current Outpatient Medications   Medication Sig    quinapril (ACCUPRIL) 5 mg tablet TAKE 1 TABLET NIGHTLY WITH 10 MG TABLET EVERY EVENING    ezetimibe (ZETIA) 10 mg tablet TAKE 1 TABLET DAILY    levothyroxine (SYNTHROID) 100 mcg tablet Take 1 Tab by mouth Daily (before breakfast).  quinapril (ACCUPRIL) 10 mg tablet TAKE 1 TABLET EVERY EVENING ALONG WITH A 5 MG TABLET    Cholecalciferol, Vitamin D3, (VITAMIN D3) 2,000 unit cap capsule Take 2,000 Units by mouth daily.  ALPHAGAN P 0.1 % ophthalmic solution     glucose blood VI test strips (ONE TOUCH ULTRA TEST) strip USE TO TEST BLOOD SUGAR FOUR TIMES A DAY    cyanocobalamin (VITAMIN B-12) 500 mcg tablet Take 500 mcg by mouth daily.  aspirin delayed-release 81 mg tablet Take  by mouth daily.  insulin glargine (LANTUS SOLOSTAR) 100 unit/mL (3 mL) pen 23 Units by SubCUTAneous route daily.  insulin lispro (HUMALOG PEN) 100 unit/mL kwikpen by SubCUTAneous route. Sliding scale as needed      furosemide (LASIX) 20 mg tablet Take 1 Tab by mouth daily.  bisacodyl 5 mg tab Take  by mouth.  meclizine (ANTIVERT) 25 mg tablet Take  by mouth three (3) times daily as needed.  ondansetron (ZOFRAN ODT) 4 mg disintegrating tablet Take 4 mg by mouth every eight (8) hours as needed for Nausea.  loratadine (CLARITIN REDITABS) 10 mg dissolvable tablet Take 1 Tab by mouth daily. No current facility-administered medications for this visit. Allergies and Intolerances: Allergies   Allergen Reactions    Pcn [Penicillins] Hives    Sulfa (Sulfonamide Antibiotics) Nausea Only and Vertigo     Family History: Grandmother  from colon cancer. Mother had CAD and DM. Family History   Problem Relation Age of Onset    Diabetes Brother     Diabetes Mother     Heart Disease Mother      Social History:   She  reports that she quit smoking about 4 years ago. She smoked 1.00 pack per day. She has never used smokeless tobacco. She is  and lives with her .  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 Social History     Substance and Sexual Activity   Alcohol Use Yes    Comment: infrequently     Immunization History:  Immunization History   Administered Date(s) Administered    Influenza Vaccine (Quad) PF 12/15/2015, 09/14/2016, 10/19/2017, 11/07/2018    Influenza Vaccine PF 12/22/2014    Influenza Vaccine Split 11/04/2011, 10/17/2012    Influenza Vaccine Whole 11/09/2010    Pneumococcal Polysaccharide (PPSV-23) 04/13/2017, 05/31/2019    Tdap 06/25/2013    Zoster Vaccine, Live 05/31/2016       Review of Systems:   As above included in HPI. Otherwise 11 point review of systems negative including constitutional, skin, HENT, eyes, respiratory, cardiovascular, gastrointestinal, genitourinary, musculoskeletal, endo/heme/aller, neurological.    Physical:   Vitals:   BP: 138/64  HR: 62  WT: 173 lb (78.5 kg)  BMI:  28.29 kg/m2    Exam:   Patient appears in no apparent distress. Affect is appropriate. HEENT: PERRLA, anicteric, oropharynx clear, no JVD, adenopathy or thyromegaly. No carotid bruits or radiated murmur. Lungs: clear to auscultation, no wheezes, rhonchi, or rales. Heart: regular rate and rhythm. No murmur, rubs, gallops  Abdomen: soft, nontender, nondistended, normal bowel sounds, no hepatosplenomegaly or masses. Extremities: without edema. Pulses 1-2+ bilaterally.     Review of Data:  Labs:   Abstract on 07/25/2019   Component Date Value Ref Range Status    LDL-C, External 05/15/2019 75   Final    Creatinine, External 05/15/2019 0.84   Final    Hemoglobin A1c, External 07/12/2019 6.8   Final   Office Visit on 05/29/2019   Component Date Value Ref Range Status    WBC 08/23/2019 4.5  3.4 - 10.8 x10E3/uL Final    RBC 08/23/2019 3.34* 3.77 - 5.28 x10E6/uL Final    HGB 08/23/2019 11.2  11.1 - 15.9 g/dL Final    HCT 08/23/2019 33.3* 34.0 - 46.6 % Final    MCV 08/23/2019 100* 79 - 97 fL Final    MCH 08/23/2019 33.5* 26.6 - 33.0 pg Final    MCHC 08/23/2019 33.6  31.5 - 35.7 g/dL Final    RDW 08/23/2019 11.6* 12.3 - 15.4 % Final    PLATELET 44/34/5905 528  150 - 450 x10E3/uL Final    NEUTROPHILS 08/23/2019 69  Not Estab. % Final    Lymphocytes 08/23/2019 24  Not Estab. % Final    MONOCYTES 08/23/2019 7  Not Estab. % Final    EOSINOPHILS 08/23/2019 0  Not Estab. % Final    BASOPHILS 08/23/2019 0  Not Estab. % Final    ABS. NEUTROPHILS 08/23/2019 3.1  1.4 - 7.0 x10E3/uL Final    Abs Lymphocytes 08/23/2019 1.1  0.7 - 3.1 x10E3/uL Final    ABS. MONOCYTES 08/23/2019 0.3  0.1 - 0.9 x10E3/uL Final    ABS. EOSINOPHILS 08/23/2019 0.0  0.0 - 0.4 x10E3/uL Final    ABS. BASOPHILS 08/23/2019 0.0  0.0 - 0.2 x10E3/uL Final    IMMATURE GRANULOCYTES 08/23/2019 0  Not Estab. % Final    ABS. IMM.  GRANS. 08/23/2019 0.0  0.0 - 0.1 x10E3/uL Final    Glucose 08/23/2019 197* 65 - 99 mg/dL Final    BUN 08/23/2019 20  8 - 27 mg/dL Final    Creatinine 08/23/2019 0.95  0.57 - 1.00 mg/dL Final    GFR est non-AA 08/23/2019 63  >59 mL/min/1.73 Final    GFR est AA 08/23/2019 73  >59 mL/min/1.73 Final    BUN/Creatinine ratio 08/23/2019 21  12 - 28 Final    Sodium 08/23/2019 139  134 - 144 mmol/L Final    Potassium 08/23/2019 4.6  3.5 - 5.2 mmol/L Final    Chloride 08/23/2019 101  96 - 106 mmol/L Final    CO2 08/23/2019 24  20 - 29 mmol/L Final    Calcium 08/23/2019 9.4  8.7 - 10.3 mg/dL Final    Cholesterol, total 08/23/2019 167  100 - 199 mg/dL Final    Triglyceride 08/23/2019 74  0 - 149 mg/dL Final    HDL Cholesterol 08/23/2019 69  >39 mg/dL Final    VLDL, calculated 08/23/2019 15  5 - 40 mg/dL Final    LDL, calculated 08/23/2019 83  0 - 99 mg/dL Final    Creatinine, urine 08/23/2019 46.1  Not Estab. mg/dL Final    Microalbumin, urine 08/23/2019 7.3  Not Estab. ug/mL Final    Microalb/Creat ratio (ug/mg creat.) 08/23/2019 15.8  0.0 - 30.0 mg/g creat Final    TSH 08/23/2019 0.564  0.450 - 4.500 uIU/mL Final    VITAMIN D, 25-HYDROXY 08/23/2019 56.8  30.0 - 100.0 ng/mL Final    T4, Free 08/23/2019 1.86* 0.82 - 1.77 ng/dL Final    INTERPRETATION 08/23/2019 Note   Final   Lab Only on 05/15/2019   Component Date Value Ref Range Status    WBC 05/15/2019 6.3  3.4 - 10.8 x10E3/uL Final    RBC 05/15/2019 3.53* 3.77 - 5.28 x10E6/uL Final    HGB 05/15/2019 11.7  11.1 - 15.9 g/dL Final    HCT 05/15/2019 35.6  34.0 - 46.6 % Final    MCV 05/15/2019 101* 79 - 97 fL Final    MCH 05/15/2019 33.1* 26.6 - 33.0 pg Final    MCHC 05/15/2019 32.9  31.5 - 35.7 g/dL Final    RDW 05/15/2019 12.9  12.3 - 15.4 % Final    PLATELET 36/15/8961 643  150 - 379 x10E3/uL Final    NEUTROPHILS 05/15/2019 70  Not Estab. % Final    Lymphocytes 05/15/2019 23  Not Estab. % Final    MONOCYTES 05/15/2019 6  Not Estab. % Final    EOSINOPHILS 05/15/2019 1  Not Estab. % Final    BASOPHILS 05/15/2019 0  Not Estab. % Final    ABS. NEUTROPHILS 05/15/2019 4.4  1.4 - 7.0 x10E3/uL Final    Abs Lymphocytes 05/15/2019 1.4  0.7 - 3.1 x10E3/uL Final    ABS. MONOCYTES 05/15/2019 0.4  0.1 - 0.9 x10E3/uL Final    ABS. EOSINOPHILS 05/15/2019 0.0  0.0 - 0.4 x10E3/uL Final    ABS. BASOPHILS 05/15/2019 0.0  0.0 - 0.2 x10E3/uL Final    IMMATURE GRANULOCYTES 05/15/2019 0  Not Estab. % Final    ABS. IMM.  GRANS. 05/15/2019 0.0  0.0 - 0.1 x10E3/uL Final    Hemoglobin A1c 05/15/2019 6.7* 4.8 - 5.6 % Final    Estimated average glucose 05/15/2019 146  mg/dL Final    Cholesterol, total 05/15/2019 197  100 - 199 mg/dL Final    Triglyceride 05/15/2019 63  0 - 149 mg/dL Final    HDL Cholesterol 05/15/2019 75  >39 mg/dL Final    VLDL, calculated 05/15/2019 13  5 - 40 mg/dL Final    LDL, calculated 05/15/2019 109* 0 - 99 mg/dL Final    Glucose 05/15/2019 82  65 - 99 mg/dL Final    BUN 05/15/2019 20  8 - 27 mg/dL Final    Creatinine 05/15/2019 0.84  0.57 - 1.00 mg/dL Final    GFR est non-AA 05/15/2019 74  >59 mL/min/1.73 Final    GFR est AA 05/15/2019 85  >59 mL/min/1.73 Final   [facility] BUN/Creatinine ratio 05/15/2019 24  12 - 28 Final    Sodium 05/15/2019 141  134 - 144 mmol/L Final    Potassium 05/15/2019 4.7  3.5 - 5.2 mmol/L Final    Chloride 05/15/2019 103  96 - 106 mmol/L Final    CO2 05/15/2019 23  20 - 29 mmol/L Final    Calcium 05/15/2019 9.3  8.7 - 10.3 mg/dL Final    Protein, total 05/15/2019 6.4  6.0 - 8.5 g/dL Final    Albumin 05/15/2019 4.2  3.6 - 4.8 g/dL Final    GLOBULIN, TOTAL 05/15/2019 2.2  1.5 - 4.5 g/dL Final    A-G Ratio 05/15/2019 1.9  1.2 - 2.2 Final    Bilirubin, total 05/15/2019 0.2  0.0 - 1.2 mg/dL Final    Alk. phosphatase 05/15/2019 51  39 - 117 IU/L Final    AST (SGOT) 05/15/2019 18  0 - 40 IU/L Final    ALT (SGPT) 05/15/2019 11  0 - 32 IU/L Final    TSH 05/15/2019 12.170* 0.450 - 4.500 uIU/mL Final    Vitamin B12 05/15/2019 1,260* 232 - 1,245 pg/mL Final    Folate 05/15/2019 6.5  >3.0 ng/mL Final    Interpretation 05/15/2019 Note   Final    INTERPRETATION 05/15/2019 Note   Final    PDF IMAGE  Not applicable   Final    PDF Image  Not applicable   Final   Abstract on 2019   Component Date Value Ref Range Status    Hemoglobin A1c, External 2019 6.9   Final       Health Maintenance:  Screening:    Mammogram: negative (2019). PAP smear: negative (2014). S/p YUNG, no further screening needed. Colorectal: colonsocopy (2014) tubular adenomas. Dr. Aishwarya Bishop. Due 2019 and already scheduled next month. Depression: none   DM (HbA1c/FPG): HbA1c 6.8 (2019) Dr. Gracia Nam   Hepatitis C: negative (2015)   Falls: none    DEXA: N/A    Glaucoma: mild primary open angle glaucoma and regressed proliferative diabetic retinopathy.  Followed by Dr. Alessandro Oliver (last 10/2018)   Smokin pack year (stopped 2015)   Vitamin D: 56.8 (2019)   Medicare Wellness: N/A    Impression:  Patient Active Problem List   Diagnosis Code    Stable proliferative diabetic retinopathy of both eyes associated with type 1 diabetes mellitus [facility]) M43.6174    Hyperlipidemia E78.5    Hearing decreased H91.90    Diabetes mellitus with diabetic polyneuropathy ([facility] E11.42    Vitamin D deficiency E55.9    Ex-cigarette smoker (30 pack year), stopped 1/2015 Z87.891    Colon polyp, colonoscopy due 2019 K63.5    Essential hypertension I10    Hypothyroidism due to acquired atrophy of thyroid E03.4    Chronic kidney disease, stage III (moderate) ([facility]) N18.3    Type 1 diabetes mellitus with stage 3 chronic kidney disease (HCC) E10.22, N18.3    Macrocytosis without anemia D75.89    Microalbuminuria due to type 1 diabetes mellitus ([facility]) E10.29, R80.9       Plan:  1. Hypertension. Well controlled on quinapril 15 mg daily and lasix 20 mg daily. Renal function has been stable with creatinine 0.95/ eGFR 63. Continue to follow. 2. Lower extremity edema/dyspnea. Patient had reported worsened lower extremity edema not resolving overnight and some questionable mild increase in exertional dyspnea. No other associated symptoms except palpitations in the form of \"skipped beats\". EKG without change from baseline. Lower extremity duplex negative for DVT or venous reflux. Echocardiogram with normal LV function and normal right sided pressures. 48 hour Holter monitor unrevealing. Pharmacologic nuclear stress test peformed at ED was negative. Started on lasix 20 mg daily with improvement. Reports that now generally present during the day and resolving overnight. Using compression hose regularly. 3. Right ankle swelling. Evaluated by podiatrist, Dr. Kateryna Mercer, and felt that swelling most likely secondary to sprain injury as x-rays negative and presentation not consistent with gout or other form of arthritis. Started on Medrol dose pack, but changed to ibuprofen 400 mg tid given elevated blood sugars. Swelling persists, and ankle MRI (5/2018) with swelling evident, old ATF ligament injury, and multiple tendon injuries.  No significant pain, although has significant neuropathy. Evaluated by Dr. Vignesh Park and no further work-up needed. Recommended compression hose for the swelling and continuing to use with improvement. 4. Type 1 diabetes mellitus. HbA1c improved to 6.9 in 4/2019 since using new Freestyle sensor machine for blood sugar readings. On Lantus and Humalog. Being managed by Dr. Melanie Freeman, Proliferative diabetic retinopathy in regression, followed by Dr. Dev Gambino. Neuropathy symptoms in feet are mild and have not required treatment with medication. Foot exam performed (1/2019). Renal function with evidence of chronic renal disease, stage 3, and also with evidence of mild microalbuminuria ((8/2018). On Ace-I. Unable to tolerate statins due to myalgias, but LDL effectively decreased with ezetimibe. Follow closely. 5. Hyperlipidemia. On ezetimibe with significant improvement in LDL to 83 and HDL 69. Not able to tolerate statin (atorvastatin/ rosuvastatin) due to myalgias. Moderate to severe aortic and coronary atherosclerosis and calcifications seen on chest CT scan (11/2018). Stressed importance of lifestyle modifications, especially diet, exercise and weight loss. 6. Chronic renal disease, stage 3. Evidence of mild disease with creatinine 1.00-1.04/ eGFR 54-56 since 5/2016. However, recent readings normalized. Most likely due to long standing diabetes mellitus and hypertension. Remaining stable. On Ace-I, but not on statin as discussed above. Counseled on avoiding NSAIDS and prerenal status. 7. Hypothyroidism. Synthroid dose decreased from 112 to 100 mcg in 4/2017 due to over replacement. TSH was therapeutic in 7/2017, but with evidence of over replacement on labs in 10/2017 and 11/2018. Dose decreased to 88 mcg daily, but TSH significantly elevated to 12.17 in 5/2019, and dose of Synthroid increased back to 100 mcg daily. TSH in range today, although free T4 elevated. Will repeat in 3 months, and if needed may need to alternate dosing. 6. Former smoking.  30 pack year history and patient stopped in 1/2015. Meets criteria for lung cancer screening with low dose CT scan. Performed in 11/2017 showing a few tiny solid and one groundglass nodule identified (lung RADS 2). Did also note evidence of emphysema and prominence of the pulmonary vascular trunk which can be seen in pulmonary arterial hypertension. However, echocardiogram with normal PA pressures. Repeat in 11/2018 without change. Next study due in 11/2019. Will order next visit. Encouraged to continue with abstaining from smoking. 9. Anemia. Iron studies were consistent with anemia of chronic inflammation; B12 normal; and folate level slightly low last visit so advised to begin multivitamin with folate. Anemia improved and folate level normal. Macrocytosis persists. Will continue to follow. 10. Overweight. Emphasized importance of lifestyle modifications, including diet, exercise, and weight loss. Will readdress next visit. 11. Humeral fracture, left. Healed without sequelae. Doing well. 12. Health maintenance. Given script for Shingrix vaccine but not yet obtained. Other immunizations up to date. Mammogram up to date. Colonoscopy due 9/2019 and scheduled with Dr. Alvarez Hill. WIll discuss obtaining bone density study at next visit. Vitamin D level now normal. Continue maintenance dose supplement. Patient understands recommendations and agrees with plan. .  Follow-up appointment in 3 months.

## 2019-11-01 LAB — HBA1C MFR BLD HPLC: 6.9 %

## 2019-11-30 RX ORDER — QUINAPRIL 10 MG/1
TABLET ORAL
Qty: 90 TAB | Refills: 4 | Status: SHIPPED | OUTPATIENT
Start: 2019-11-30 | End: 2021-02-04

## 2019-12-03 ENCOUNTER — APPOINTMENT (OUTPATIENT)
Dept: INTERNAL MEDICINE CLINIC | Age: 64
End: 2019-12-03

## 2019-12-04 LAB
ALBUMIN SERPL-MCNC: 4.2 G/DL (ref 3.6–4.8)
ALBUMIN/GLOB SERPL: 2.3 {RATIO} (ref 1.2–2.2)
ALP SERPL-CCNC: 62 IU/L (ref 39–117)
ALT SERPL-CCNC: 12 IU/L (ref 0–32)
AST SERPL-CCNC: 12 IU/L (ref 0–40)
BASOPHILS # BLD AUTO: 0 X10E3/UL (ref 0–0.2)
BASOPHILS NFR BLD AUTO: 0 %
BILIRUB SERPL-MCNC: 0.2 MG/DL (ref 0–1.2)
BUN SERPL-MCNC: 17 MG/DL (ref 8–27)
BUN/CREAT SERPL: 19 (ref 12–28)
CALCIUM SERPL-MCNC: 9.2 MG/DL (ref 8.7–10.3)
CHLORIDE SERPL-SCNC: 101 MMOL/L (ref 96–106)
CHOLEST SERPL-MCNC: 189 MG/DL (ref 100–199)
CO2 SERPL-SCNC: 23 MMOL/L (ref 20–29)
CREAT SERPL-MCNC: 0.88 MG/DL (ref 0.57–1)
EOSINOPHIL # BLD AUTO: 0 X10E3/UL (ref 0–0.4)
EOSINOPHIL NFR BLD AUTO: 1 %
ERYTHROCYTE [DISTWIDTH] IN BLOOD BY AUTOMATED COUNT: 11.7 % (ref 12.3–15.4)
GLOBULIN SER CALC-MCNC: 1.8 G/DL (ref 1.5–4.5)
GLUCOSE SERPL-MCNC: 89 MG/DL (ref 65–99)
HCT VFR BLD AUTO: 33.6 % (ref 34–46.6)
HDLC SERPL-MCNC: 79 MG/DL
HGB BLD-MCNC: 11.1 G/DL (ref 11.1–15.9)
IMM GRANULOCYTES # BLD AUTO: 0 X10E3/UL (ref 0–0.1)
IMM GRANULOCYTES NFR BLD AUTO: 0 %
INTERPRETATION, 910389: NORMAL
LDLC SERPL CALC-MCNC: 96 MG/DL (ref 0–99)
LYMPHOCYTES # BLD AUTO: 1.2 X10E3/UL (ref 0.7–3.1)
LYMPHOCYTES NFR BLD AUTO: 20 %
MCH RBC QN AUTO: 32.7 PG (ref 26.6–33)
MCHC RBC AUTO-ENTMCNC: 33 G/DL (ref 31.5–35.7)
MCV RBC AUTO: 99 FL (ref 79–97)
MONOCYTES # BLD AUTO: 0.5 X10E3/UL (ref 0.1–0.9)
MONOCYTES NFR BLD AUTO: 8 %
NEUTROPHILS # BLD AUTO: 4.5 X10E3/UL (ref 1.4–7)
NEUTROPHILS NFR BLD AUTO: 71 %
PLATELET # BLD AUTO: 254 X10E3/UL (ref 150–450)
POTASSIUM SERPL-SCNC: 4.3 MMOL/L (ref 3.5–5.2)
PROT SERPL-MCNC: 6 G/DL (ref 6–8.5)
RBC # BLD AUTO: 3.39 X10E6/UL (ref 3.77–5.28)
SODIUM SERPL-SCNC: 138 MMOL/L (ref 134–144)
T4 FREE SERPL-MCNC: 1.37 NG/DL (ref 0.82–1.77)
TRIGL SERPL-MCNC: 71 MG/DL (ref 0–149)
TSH SERPL DL<=0.005 MIU/L-ACNC: 0.88 UIU/ML (ref 0.45–4.5)
VLDLC SERPL CALC-MCNC: 14 MG/DL (ref 5–40)
WBC # BLD AUTO: 6.3 X10E3/UL (ref 3.4–10.8)

## 2019-12-10 ENCOUNTER — OFFICE VISIT (OUTPATIENT)
Dept: INTERNAL MEDICINE CLINIC | Age: 64
End: 2019-12-10

## 2019-12-10 VITALS
SYSTOLIC BLOOD PRESSURE: 116 MMHG | BODY MASS INDEX: 28.66 KG/M2 | HEART RATE: 78 BPM | DIASTOLIC BLOOD PRESSURE: 60 MMHG | RESPIRATION RATE: 16 BRPM | WEIGHT: 172 LBS | TEMPERATURE: 98.5 F | HEIGHT: 65 IN

## 2019-12-10 DIAGNOSIS — D75.89 MACROCYTOSIS: ICD-10-CM

## 2019-12-10 DIAGNOSIS — Z87.891 EX-CIGARETTE SMOKER: ICD-10-CM

## 2019-12-10 DIAGNOSIS — E10.22 TYPE 1 DIABETES MELLITUS WITH STAGE 3 CHRONIC KIDNEY DISEASE (HCC): ICD-10-CM

## 2019-12-10 DIAGNOSIS — E10.3553 STABLE PROLIFERATIVE DIABETIC RETINOPATHY OF BOTH EYES ASSOCIATED WITH TYPE 1 DIABETES MELLITUS (HCC): ICD-10-CM

## 2019-12-10 DIAGNOSIS — R80.9 MICROALBUMINURIA DUE TO TYPE 1 DIABETES MELLITUS (HCC): ICD-10-CM

## 2019-12-10 DIAGNOSIS — Z23 ENCOUNTER FOR IMMUNIZATION: ICD-10-CM

## 2019-12-10 DIAGNOSIS — N18.30 CHRONIC KIDNEY DISEASE, STAGE III (MODERATE) (HCC): ICD-10-CM

## 2019-12-10 DIAGNOSIS — E10.42 TYPE 1 DIABETES MELLITUS WITH DIABETIC POLYNEUROPATHY (HCC): ICD-10-CM

## 2019-12-10 DIAGNOSIS — N18.30 TYPE 1 DIABETES MELLITUS WITH STAGE 3 CHRONIC KIDNEY DISEASE (HCC): ICD-10-CM

## 2019-12-10 DIAGNOSIS — Z12.2 ENCOUNTER FOR SCREENING FOR LUNG CANCER: ICD-10-CM

## 2019-12-10 DIAGNOSIS — I25.10 CORONARY ARTERY CALCIFICATION SEEN ON CT SCAN: ICD-10-CM

## 2019-12-10 DIAGNOSIS — E78.5 HYPERLIPIDEMIA, UNSPECIFIED HYPERLIPIDEMIA TYPE: ICD-10-CM

## 2019-12-10 DIAGNOSIS — I10 ESSENTIAL HYPERTENSION: Primary | ICD-10-CM

## 2019-12-10 DIAGNOSIS — E10.29 MICROALBUMINURIA DUE TO TYPE 1 DIABETES MELLITUS (HCC): ICD-10-CM

## 2019-12-10 DIAGNOSIS — E03.4 HYPOTHYROIDISM DUE TO ACQUIRED ATROPHY OF THYROID: ICD-10-CM

## 2019-12-10 DIAGNOSIS — Z00.00 LABORATORY TESTS ORDERED AS PART OF A COMPLETE PHYSICAL EXAM (CPE): ICD-10-CM

## 2019-12-10 NOTE — PATIENT INSTRUCTIONS
Vaccine Information Statement Influenza (Flu) Vaccine (Inactivated or Recombinant): What You Need to Know Many Vaccine Information Statements are available in Korean and other languages. See www.immunize.org/vis Hojas de información sobre vacunas están disponibles en español y en muchos otros idiomas. Visite www.immunize.org/vis 1. Why get vaccinated? Influenza vaccine can prevent influenza (flu). Flu is a contagious disease that spreads around the [facility] every year, usually between October and May. Anyone can get the flu, but it is more dangerous for some people. Infants and young children, people 72years of age and older, pregnant women, and people with certain health conditions or a weakened immune system are at greatest risk of flu complications. Pneumonia, bronchitis, sinus infections and ear infections are examples of flu-related complications. If you have a medical condition, such as heart disease, cancer or diabetes, flu can make it worse. Flu can cause fever and chills, sore throat, muscle aches, fatigue, cough, headache, and runny or stuffy nose. Some people may have vomiting and diarrhea, though this is more common in children than adults. Each year thousands of people in the [facility] die from flu, and many more are hospitalized. Flu vaccine prevents millions of illnesses and flu-related visits to the doctor each year. 2. Influenza vaccines CDC recommends everyone 10months of age and older get vaccinated every flu season. Children 6 months through 6years of age may need 2 doses during a single flu season. Everyone else needs only 1 dose each flu season. It takes about 2 weeks for protection to develop after vaccination. There are many flu viruses, and they are always changing. Each year a new flu vaccine is made to protect against three or four viruses that are likely to cause disease in the upcoming flu season.  Even when the vaccine doesnt exactly match these viruses, it may still provide some protection. Influenza vaccine does not cause flu. Influenza vaccine may be given at the same time as other vaccines. 3. Talk with your health care provider Tell your vaccine provider if the person getting the vaccine: 
 Has had an allergic reaction after a previous dose of influenza vaccine, or has any severe, life-threatening allergies.  Has ever had Guillain-Barré Syndrome (also called GBS). In some cases, your health care provider may decide to postpone influenza vaccination to a future visit. People with minor illnesses, such as a cold, may be vaccinated. People who are moderately or severely ill should usually wait until they recover before getting influenza vaccine. Your health care provider can give you more information. 4. Risks of a reaction  Soreness, redness, and swelling where shot is given, fever, muscle aches, and headache can happen after influenza vaccine.  There may be a very small increased risk of Guillain-Barré Syndrome (GBS) after inactivated influenza vaccine (the flu shot). Yolis Forbeson children who get the flu shot along with pneumococcal vaccine (PCV13), and/or DTaP vaccine at the same time might be slightly more likely to have a seizure caused by fever. Tell your health care provider if a child who is getting flu vaccine has ever had a seizure. People sometimes faint after medical procedures, including vaccination. Tell your provider if you feel dizzy or have vision changes or ringing in the ears. As with any medicine, there is a very remote chance of a vaccine causing a severe allergic reaction, other serious injury, or death. 5. What if there is a serious problem? An allergic reaction could occur after the vaccinated person leaves the clinic.  If you see signs of a severe allergic reaction (hives, swelling of the face and throat, difficulty breathing, a fast heartbeat, dizziness, or weakness), call 9-1-1 and get the person to the nearest hospital. 
 
For other signs that concern you, call your health care provider. Adverse reactions should be reported to the Vaccine Adverse Event Reporting System (VAERS). Your health care provider will usually file this report, or you can do it yourself. Visit the VAERS website at www.vaers. hhs.gov or call 2-381.330.6208. VAERS is only for reporting reactions, and VAERS staff do not give medical advice. 6. The National Vaccine Injury Compensation Program 
 
The [facility] Vaccine Injury Compensation Program (VICP) is a federal program that was created to compensate people who may have been injured by certain vaccines. Visit the VICP website at www.[facility].gov/vaccinecompensation or call 6-138.576.1376 to learn about the program and about filing a claim. There is a time limit to file a claim for compensation. 7. How can I learn more?  Ask your health care provider.  Call your local or state health department.  Contact the Centers for Disease Control and Prevention (CDC): 
- Call 8-279.578.9656 (7-440-HNJ-INFO) or 
- Visit CDCs influenza website at www.cdc.gov/flu Vaccine Information Statement (Interim) Inactivated Influenza Vaccine 8/15/2019 
42 BELLE Sunny Villagomez [id number] Department of Health and [employer] Centers for Disease Control and Prevention Office Use Only Learning About Meal Planning for Diabetes Why plan your meals? Meal planning can be a key part of managing diabetes. Planning meals and snacks with the right balance of carbohydrate, protein, and fat can help you keep your blood sugar at the target level you set with your doctor. You don't have to eat special foods. You can eat what your family eats, including sweets once in a while. But you do have to pay attention to how often you eat and how much you eat of certain foods. You may want to work with a dietitian or a certified diabetes educator.  He or she can give you tips and meal ideas and can answer your questions about meal planning. This health professional can also help you reach a healthy weight if that is one of your goals. What plan is right for you? Your dietitian or diabetes educator may suggest that you start with the plate format or carbohydrate counting. The plate format The plate format is a simple way to help you manage how you eat. You plan meals by learning how much space each food should take on a plate. Using the plate format helps you spread carbohydrate throughout the day. It can make it easier to keep your blood sugar level within your target range. It also helps you see if you're eating healthy portion sizes. To use the plate format, you put non-starchy vegetables on half your plate. Add meat or meat substitutes on one-quarter of the plate. Put a grain or starchy vegetable (such as brown rice or a potato) on the final quarter of the plate. You can add a small piece of fruit and some low-fat or fat-free milk or yogurt, depending on your carbohydrate goal for each meal. 
Here are some tips for using the plate format: · Make sure that you are not using an oversized plate. A 9-inch plate is best. Many restaurants use larger plates. · Get used to using the plate format at home. Then you can use it when you eat out. · Write down your questions about using the plate format. Talk to your doctor, a dietitian, or a diabetes educator about your concerns. Carbohydrate counting With carbohydrate counting, you plan meals based on the amount of carbohydrate in each food. Carbohydrate raises blood sugar higher and more quickly than any other nutrient. It is found in desserts, breads and cereals, and fruit. It's also found in starchy vegetables such as potatoes and corn, grains such as rice and pasta, and milk and yogurt. Spreading carbohydrate throughout the day helps keep your blood sugar levels within your target range. Your daily amount depends on several things, including your weight, how active you are, which diabetes medicines you take, and what your goals are for your blood sugar levels. A registered dietitian or diabetes educator can help you plan how much carbohydrate to include in each meal and snack. A guideline for your daily amount of carbohydrate is: · 45 to 60 grams at each meal. That's about the same as 3 to 4 carbohydrate servings. · 15 to 20 grams at each snack. That's about the same as 1 carbohydrate serving. The Nutrition Facts label on packaged foods tells you how much carbohydrate is in a serving of the food. First, look at the serving size on the food label. Is that the amount you eat in a serving? All of the nutrition information on a food label is based on that serving size. So if you eat more or less than that, you'll need to adjust the other numbers. Total carbohydrate is the next thing you need to look for on the label. If you count carbohydrate servings, one serving of carbohydrate is 15 grams. For foods that don't come with labels, such as fresh fruits and vegetables, you'll need a guide that lists carbohydrate in these foods. Ask your doctor, dietitian, or diabetes educator about books or other nutrition guides you can use. If you take insulin, you need to know how many grams of carbohydrate are in a meal. This lets you know how much rapid-acting insulin to take before you eat. If you use an insulin pump, you get a constant rate of insulin during the day. So the pump must be programmed at meals to give you extra insulin to cover the rise in blood sugar after meals. When you know how much carbohydrate you will eat, you can take the right amount of insulin. Or, if you always use the same amount of insulin, you need to make sure that you eat the same amount of carbohydrate at meals.  
If you need more help to understand carbohydrate counting and food labels, ask your doctor, dietitian, or diabetes educator. How do you get started with meal planning? Here are some tips to get started: 
· Plan your meals a week at a time. Don't forget to include snacks too. · Use cookbooks or online recipes to plan several main meals. Plan some quick meals for busy nights. You also can double some recipes that freeze well. Then you can save half for other busy nights when you don't have time to cook. · Make sure you have the ingredients you need for your recipes. If you're running low on basic items, put these items on your shopping list too. · List foods that you use to make breakfasts, lunches, and snacks. List plenty of fruits and vegetables. · Post this list on the refrigerator. Add to it as you think of more things you need. · Take the list to the store to do your weekly shopping. Follow-up care is a key part of your treatment and safety. Be sure to make and go to all appointments, and call your doctor if you are having problems. It's also a good idea to know your test results and keep a list of the medicines you take. Where can you learn more? Go to http://laura-elsa.info/. Vignesh Kumra in the search box to learn more about \"Learning About Meal Planning for Diabetes. \" Current as of: April 16, 2019 Content Version: 12.2 © 4489-8299 [employer]. Care instructions adapted under license by [employer] (which disclaims liability or warranty for this information). If you have questions about a medical condition or this instruction, always ask your healthcare professional. [address] any warranty or liability for your use of this information.

## 2019-12-10 NOTE — PROGRESS NOTES
Chief Complaint   Patient presents with    Hypertension     3 month follow up with labs. Health Maintenance Due   Topic Date Due    Shingrix Vaccine Age 49> (1 of 2) 04/08/2005    Influenza Age 5 to Adult  08/01/2019     Patient given influenza vaccine, FLUARIX, in left deltoid, per verbal order from Dr. Dada Perkins with read back. Instructed patient to sit and wait 10-20 minutes before leaving the premises so that we can watch for any complications or adverse reactions. Patient given vaccine information statement handout before vaccine was given. Patient tolerated well without adverse reactions or complications. 1. Have you been to the ER, urgent care clinic or hospitalized since your last visit? NO.     2. Have you seen or consulted any other health care providers outside of the [address] [place] [address] since your last visit (Include any pap smears or colon screening)? YES, Endocrinologist Nov. 2019. Do you have an Advanced Directive? NO    Would you like information on Advanced Directives? NO    Learning Assessment 5/29/2019   PRIMARY LEARNER Patient   HIGHEST LEVEL OF EDUCATION - PRIMARY LEARNER  > 4 YEARS OF COLLEGE   BARRIERS PRIMARY LEARNER NONE   CO-LEARNER CAREGIVER No   CO-LEARNER NAME -   PRIMARY LANGUAGE ENGLISH    NEED No   LEARNER PREFERENCE PRIMARY DEMONSTRATION     READING     LISTENING   ANSWERED BY patient   RELATIONSHIP SELF     Abuse Screening Questionnaire 12/10/2019   Do you ever feel afraid of your partner? N   Are you in a relationship with someone who physically or mentally threatens you? N   Is it safe for you to go home? Y     3 most recent PHQ Screens 12/10/2019   Little interest or pleasure in doing things Not at all   Feeling down, depressed, irritable, or hopeless Not at all   Total Score PHQ 2 0     Fall Risk Assessment, last 12 mths 12/10/2019   Able to walk? Yes   Fall in past 12 months?  No

## 2019-12-14 PROBLEM — I25.10 CORONARY ARTERY CALCIFICATION SEEN ON CT SCAN: Status: ACTIVE | Noted: 2019-12-14

## 2019-12-14 NOTE — PROGRESS NOTES
HPI:   James Singh is a 59y.o. year old female who presents today for a routine visit and for evaluation of of hypertension, hyperlipidemia, diabetes mellitus type 1, chronic renal disease stage 3, and hypothyroidism. 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 She states that she is now suffering from dry eyes, and it has been causing periods of blurriness which is interfering with her ability to drive. She is using Refresh and [address] with only modest improvement. She reports that her diabetes control continues to be stable since using the [employer] She continues to adjust her blood sugars throughout the day as needed, and her HbA1c was 6.9 at her last visit with Dr. Ltanya Apley in 11/2019. She reports that she went hiking in [place] last month and is considering purchasing a house to rehab in her father's childhood town. She is otherwise without new complaints and feeling generally well. On 4/26/2018, she presented with worsening lower extremity edema. She stated that previously, her swelling would improve overnight, but she had been noticing recently that it did not resolve. She also reported some mild dyspnea with exertion, such as walking upstairs, which had been present chronically, although may have been slightly worse. She also reported some \"skipped beats\" although not prolonged. She denied any chest pain, shortness of breath at rest or with normal activities, lightheadedness, PND, or orthopnea. She was started on low dose lasix, and referred for an echocardiogram, Holter monitor, and lower extremity duplex scan, but prior to having these performed, presented to [facility] ED for evaluation of pain and swelling of her right ankle. While in ED, CBC, CMP, and D-dimer were unremarkable. Cardiac enzymes x 2 were negative, and EKG was unchanged.  She was observed overnight and underwent a pharmacologic nuclear stress test (5/3/2018) which was a normal low risk study, without evidence of ischemia or prior infarction, and EF 86%. She was subsequently discharged, and underwent an echocardiogram (5/10/2018) showed normal LV function (EF 55-60%) no RWMA, mild LAE, mild-moderate TR, RVSP normal (22 mmHg); holter monitor (5/1/2018) showing sinus rhythm with sinus arrhythmia at times; rare PVC's; one short run of non-sustained of SVT for 6 beats. Episode of sinus tachycardia recorded in diary occurred while she was undergoing pharmacologic nuclear stress test. No other symptoms reported; and lower extremity duplex (5/11/2018) which was negative for DVT and venous reflux. On 5/8/2018, she was evaluated by a podiatrist, Dr. Roberto Carlos Waters, for her right foot swelling. Felt that it was most likely secondary to sprain injury as x-rays negative and presentation not consistent with gout or other form of arthritis. She was started on a Medrol dose pack. However, her blood sugars increased to >400 and she was changed to ibuprofen 400 mg tid. She did have a right foot MRI (5/26/2018) which showed considerable edema involving lower leg, hindfoot and midfoot, nonlocalizing a nonspecific; evidence of old injury to the ATF ligament.  Other ankle ligaments are intact; mild peroneus brevis tendinopathy without measurable tear; small partial thickness longitudinal split in the peroneus longus tendon within the retromalleolar groove; mild distal Achilles tendinosis; and small subchondral cyst in the anterior facet of the talus with otherwise no degenerative change seen in any of the hindfoot or midfoot articulations. She states that she was told that the right foot swelling was due to an old injury, but she states that it persists although she does not feel much pain due to her neuropathy. She has a history of diabetes mellitus type 1, with onset at age 6.  She is currently under the care of Dr. Claudio Israel, and is being treated with insulin glargine and lispro. She had not been well-controlled, with HbA1c ranging from 7.5-8.0, although has ow improved since using the Freestyle meter as discussed. She does have proliferative diabetic retinopathy and is s/p pan-retinal photocoagulation therapy in both eyes. She is followed closely by Dr. Fany Navarro, and recent exam showed regression bilaterally. She also has peripheral neuropathy, with burning and tingling in her feet at night. Renal function had been normal until 5/2016, with evidence of chronic renal disease, stage 3, since that time with baseline creatinine1.0-1.04/ eGFR 54-56. She also has a history of hypothyroidism, and she is currently on Synthroid. She denies cold intolerance, hair or skin changes. She has a history of hypertension, treated with quinapril. She had a pharmacologic nuclear stress test in 11/2012, which showed no evidence for ischemia, and normal LV wall motion (EF> 70%). She also had a carotid duplex scan in 7/2013 which showed mild (< 50%) bilateral internal carotid artery stenoses. She denies any chest pain, shortness of breath at rest or with exertion, palpitations, lightheadedness, or edema. She has a history of hyperlipidemia, and was on atorvastatin for many years until 10/2015 when she discontinued it because of myalgias. She was recently tried on rosuvastatin without success due to recurrent myalgias. She was started on ezetimibe in 9/2016 and has been tolerating it without difficulty. She successfully stopped smoking in 1/2015, and reports that she continues to abstain. She had a screening colonoscopy in 9/2014 by Dr. Shahida Selby, which found a 6 mm sessile polyp in the descending colon and a 4-5 mm sessile polyp in the rectum/sigmoid (pathology: tubular adenomas). She had a repeat colonoscopy in 9/2019 showing a 6 mm sessile polyp in the ileocecal valve (pathology unavailable). Recommendation was for follow-up colonoscopy in 5 years.  She denies any abdominal pain, nausea, vomiting, melena, hematochezia, or change in bowel movements. In 3/2017, she was diagnosed with an upper respiratory tract infection and was prescribed doxycycline and prednisone by [facility]. She subsequently developed severe dizziness and vomiting, prompting a visit to the [facility] [place] [facility] ED where she was diagnosed with benign positional vertigo and treated with meclizine and Zofran with improvement. In 10/2017, she noticed difficulty with her peripheral vision and was evaluated by Dr. Patricia Sibley. She has regressed proliferative diabetic retinopathy and she reports that the laser therapy she received many years ago to treat this resulted in the current limitations in her peripheral vision. She does also have mild glaucoma for which she is receiving treatment. She states that her driving has been restricted to only during the day, and she is aware that she must turn her head completely to each side when navigating behind the wheel. In 2/2018, she began having difficulty with right elbow pain and was evaluated by Dr. Miguel Ángel Lagunas who diagnosed her with right lateral epicondylitis. She received a cortisone injection with improvement. In 10/2018, while hiking on vacation hiking in [place], she slipped on some wooden steps and landed on her left shoulder. She was transported to [facility] in [place], [place], and found to have a closed nondisplaced fracture of the left proximal humerus. She was placed in a sling and told to follow-up with orthopedics. She was being followed by Dr. Jovani Mallory of [facility], and completed physical therapy with improvement. She is no longer requiring pain medication.        Past Medical History:   Diagnosis Date    Chronic renal disease, stage III ([address]     Diabetes mellitus type 1 ([address]     Diabetic peripheral neuropathy (HCC)     Diabetic retinopathy ([address]     Essential hypertension with goal blood pressure less than 140/90     Hearing decreased     Hyperlipidemia     Hypothyroidism      Past Surgical History:   Procedure Laterality Date    HX GYN      partial hysterectomy    HX GYN      3 D and C's    HX HEENT      tonsillectomy    HX HEENT      cataract removal bilateral    HX HEENT      multiple surgeries for retinopathy    HX ORTHOPAEDIC      carpral tunnel sugery bilateral     Current Outpatient Medications   Medication Sig    quinapril (ACCUPRIL) 10 mg tablet TAKE 1 TABLET EVERY EVENING ALONG WITH A 5 MG TABLET    quinapril (ACCUPRIL) 5 mg tablet TAKE 1 TABLET NIGHTLY WITH 10 MG TABLET EVERY EVENING    ezetimibe (ZETIA) 10 mg tablet TAKE 1 TABLET DAILY    levothyroxine (SYNTHROID) 100 mcg tablet Take 1 Tab by mouth Daily (before breakfast).  Cholecalciferol, Vitamin D3, (VITAMIN D3) 2,000 unit cap capsule Take 2,000 Units by mouth daily.  ALPHAGAN P 0.1 % ophthalmic solution     glucose blood VI test strips (ONE TOUCH ULTRA TEST) strip USE TO TEST BLOOD SUGAR FOUR TIMES A DAY    cyanocobalamin (VITAMIN B-12) 500 mcg tablet Take 500 mcg by mouth daily.  aspirin delayed-release 81 mg tablet Take  by mouth daily.  insulin glargine (LANTUS SOLOSTAR) 100 unit/mL (3 mL) pen 23 Units by SubCUTAneous route daily.  insulin lispro (HUMALOG PEN) 100 unit/mL kwikpen by SubCUTAneous route. Sliding scale as needed      furosemide (LASIX) 20 mg tablet Take 1 Tab by mouth daily.  bisacodyl 5 mg tab Take  by mouth.  meclizine (ANTIVERT) 25 mg tablet Take  by mouth three (3) times daily as needed.  ondansetron (ZOFRAN ODT) 4 mg disintegrating tablet Take 4 mg by mouth every eight (8) hours as needed for Nausea.  loratadine (CLARITIN REDITABS) 10 mg dissolvable tablet Take 1 Tab by mouth daily. No current facility-administered medications for this visit. Allergies and Intolerances:    Allergies   Allergen Reactions    Pcn [Penicillins] Hives    Sulfa (Sulfonamide Antibiotics) Nausea Only and Vertigo     Family History: Grandmother  from colon cancer. Mother had CAD and DM. Family History   Problem Relation Age of Onset    Diabetes Brother     Diabetes Mother     Heart Disease Mother      Social History:   She  reports that she quit smoking about 4 years ago. She has a 10.00 pack-year smoking history. She has never used smokeless tobacco. She is  and lives with her . 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 Social History     Substance and Sexual Activity   Alcohol Use Yes    Comment: infrequently     Immunization History:  Immunization History   Administered Date(s) Administered    Influenza Vaccine (Quad) PF 12/15/2015, 2016, 10/19/2017, 2018, 12/10/2019    Influenza Vaccine PF 2014    Influenza Vaccine Split 2011, 10/17/2012    Influenza Vaccine Whole 2010    Pneumococcal Polysaccharide (PPSV-23) 2017, 2019    Tdap 2013    Zoster Vaccine, Live 2016       Review of Systems:   As above included in HPI. Otherwise 11 point review of systems negative including constitutional, skin, HENT, eyes, respiratory, cardiovascular, gastrointestinal, genitourinary, musculoskeletal, endo/heme/aller, neurological.    Physical:   Vitals:   BP: 116/60  HR: 78  WT: 172 lb (78 kg)  BMI:  28.62 kg/m2    Exam:   Patient appears in no apparent distress. Affect is appropriate. HEENT: PERRLA, anicteric, oropharynx clear, no JVD, adenopathy or thyromegaly. No carotid bruits or radiated murmur. Lungs: clear to auscultation, no wheezes, rhonchi, or rales. Heart: regular rate and rhythm. No murmur, rubs, gallops  Abdomen: soft, nontender, nondistended, normal bowel sounds, no hepatosplenomegaly or masses. Extremities: without edema. Pulses 1-2+ bilaterally.     Review of Data:  Labs:   Abstract on 2019   Component Date Value Ref Range Status    Hemoglobin A1c, External 11/01/2019 6.9   Final    Urine Microalbumin, External 07/12/2019 8.2   Final   Office Visit on 08/28/2019   Component Date Value Ref Range Status    WBC 12/03/2019 6.3  3.4 - 10.8 x10E3/uL Final    RBC 12/03/2019 3.39* 3.77 - 5.28 x10E6/uL Final    HGB 12/03/2019 11.1  11.1 - 15.9 g/dL Final    HCT 12/03/2019 33.6* 34.0 - 46.6 % Final    MCV 12/03/2019 99* 79 - 97 fL Final    MCH 12/03/2019 32.7  26.6 - 33.0 pg Final    MCHC 12/03/2019 33.0  31.5 - 35.7 g/dL Final    RDW 12/03/2019 11.7* 12.3 - 15.4 % Final    PLATELET 93/25/2804 861  150 - 450 x10E3/uL Final    NEUTROPHILS 12/03/2019 71  Not Estab. % Final    Lymphocytes 12/03/2019 20  Not Estab. % Final    MONOCYTES 12/03/2019 8  Not Estab. % Final    EOSINOPHILS 12/03/2019 1  Not Estab. % Final    BASOPHILS 12/03/2019 0  Not Estab. % Final    ABS. NEUTROPHILS 12/03/2019 4.5  1.4 - 7.0 x10E3/uL Final    Abs Lymphocytes 12/03/2019 1.2  0.7 - 3.1 x10E3/uL Final    ABS. MONOCYTES 12/03/2019 0.5  0.1 - 0.9 x10E3/uL Final    ABS. EOSINOPHILS 12/03/2019 0.0  0.0 - 0.4 x10E3/uL Final    ABS. BASOPHILS 12/03/2019 0.0  0.0 - 0.2 x10E3/uL Final    IMMATURE GRANULOCYTES 12/03/2019 0  Not Estab. % Final    ABS. IMM. GRANS.  12/03/2019 0.0  0.0 - 0.1 x10E3/uL Final    Glucose 12/03/2019 89  65 - 99 mg/dL Final    BUN 12/03/2019 17  8 - 27 mg/dL Final    Creatinine 12/03/2019 0.88  0.57 - 1.00 mg/dL Final    GFR est non-AA 12/03/2019 70  >59 mL/min/1.73 Final    GFR est AA 12/03/2019 80  >59 mL/min/1.73 Final    BUN/Creatinine ratio 12/03/2019 19  12 - 28 Final    Sodium 12/03/2019 138  134 - 144 mmol/L Final    Potassium 12/03/2019 4.3  3.5 - 5.2 mmol/L Final    Chloride 12/03/2019 101  96 - 106 mmol/L Final    CO2 12/03/2019 23  20 - 29 mmol/L Final    Calcium 12/03/2019 9.2  8.7 - 10.3 mg/dL Final    Protein, total 12/03/2019 6.0  6.0 - 8.5 g/dL Final    Albumin 12/03/2019 4.2  3.6 - 4.8 g/dL Final    GLOBULIN, TOTAL 2019 1.8  1.5 - 4.5 g/dL Final    A-G Ratio 2019 2.3* 1.2 - 2.2 Final    Bilirubin, total 2019 0.2  0.0 - 1.2 mg/dL Final    Alk. phosphatase 2019 62  39 - 117 IU/L Final    AST (SGOT) 2019 12  0 - 40 IU/L Final    ALT (SGPT) 2019 12  0 - 32 IU/L Final    Cholesterol, total 2019 189  100 - 199 mg/dL Final    Triglyceride 2019 71  0 - 149 mg/dL Final    HDL Cholesterol 2019 79  >39 mg/dL Final    VLDL, calculated 2019 14  5 - 40 mg/dL Final    LDL, calculated 2019 96  0 - 99 mg/dL Final    TSH 2019 0.877  0.450 - 4.500 uIU/mL Final    T4, Free 2019 1.37  0.82 - 1.77 ng/dL Final    INTERPRETATION 2019 Note   Final   Abstract on 2019   Component Date Value Ref Range Status    LDL-C, External 05/15/2019 75   Final    Creatinine, External 05/15/2019 0.84   Final    Hemoglobin A1c, External 2019 6.8   Final       Health Maintenance:  Screening:    Mammogram: negative (2019). PAP smear: negative (2014). S/p YUNG, no further screening needed. Colorectal: colonsocopy (2019) ileocecal valve polyp. Dr. Jordan Churchill. Due . Depression: none   DM (HbA1c/FPG): HbA1c 6.9 (2019) Dr. Wesley Head   Hepatitis C: negative (2015)   Falls: none    DEXA: N/A    Glaucoma: mild primary open angle glaucoma and regressed proliferative diabetic retinopathy.  Followed by Dr. Patricia Sibley (last 10/2019)   Smokin pack year (stopped 2015)   Vitamin D: 56.8 (2019)   Medicare Wellness: N/A    Impression:  Patient Active Problem List   Diagnosis Code    Stable proliferative diabetic retinopathy of both eyes associated with type 1 diabetes mellitus ([facility] L42.0918    Hyperlipidemia E78.5    Hearing decreased H91.90    Diabetes mellitus with diabetic polyneuropathy ([facility] E11.42    Vitamin D deficiency E55.9    Ex-cigarette smoker (30 pack year), stopped 2015 Z87.891    Adenomatous polyp of colon D12.6    Essential hypertension I10    Hypothyroidism due to acquired atrophy of thyroid E03.4    Chronic kidney disease, stage III (moderate) ([facility]) N18.3    Type 1 diabetes mellitus with stage 3 chronic kidney disease (HCC) E10.22, N18.3    Macrocytosis D75.89    Microalbuminuria due to type 1 diabetes mellitus ([facility]) E10.29, R80.9    Coronary artery calcification seen on CT scan I25.10       Plan:  1. Hypertension. Well controlled on quinapril 15 mg daily and lasix 20 mg daily. Renal function has been stable with creatinine 0.88/ eGFR 70. Continue to follow. 2. Lower extremity edema/dyspnea. Patient had reported worsened lower extremity edema not resolving overnight and some questionable mild increase in exertional dyspnea. No other associated symptoms except palpitations in the form of \"skipped beats\". EKG without change from baseline. Lower extremity duplex negative for DVT or venous reflux. Echocardiogram with normal LV function and normal right sided pressures. 48 hour Holter monitor unrevealing. Pharmacologic nuclear stress test peformed at ED was negative. Started on lasix 20 mg daily with improvement. Reports that now generally present during the day and resolving overnight. Using compression hose regularly. 3. Right ankle swelling. Evaluated by podiatrist, Dr. Jack Ferguson, and felt that swelling most likely secondary to sprain injury as x-rays negative and presentation not consistent with gout or other form of arthritis. Started on Medrol dose pack, but changed to ibuprofen 400 mg tid given elevated blood sugars. Swelling persists, and ankle MRI (5/2018) with swelling evident, old ATF ligament injury, and multiple tendon injuries. No significant pain, although has significant neuropathy. Evaluated by Dr. Baylee Kidd and no further work-up needed. Recommended compression hose for the swelling and continuing to use with improvement. 4. Type 1 diabetes mellitus.  HbA1c improved to 6.9 in 4/2019 since using new Freestyle sensor machine for blood sugar readings. On Lantus and Humalog. Being managed by Dr. Derek Lee, Proliferative diabetic retinopathy in regression, followed by Dr. Esteban Conner. Neuropathy symptoms in feet are mild and have not required treatment with medication. Foot exam performed (1/2019). Renal function with evidence of chronic renal disease, stage 3, and also with evidence of mild microalbuminuria ((8/2018). On Ace-I. Unable to tolerate statins due to myalgias, but LDL effectively decreased with ezetimibe. Follow closely. 5. Hyperlipidemia. On ezetimibe with significant improvement in LDL to 96 and HDL 79. Not able to tolerate statin (atorvastatin/ rosuvastatin) due to myalgias. Moderate to severe aortic and coronary atherosclerosis and calcifications seen on chest CT scan (11/2018). Stressed importance of lifestyle modifications, especially diet, exercise and weight loss. 6. Chronic renal disease, stage 3. Evidence of mild disease with creatinine 1.00-1.04/ eGFR 54-56 since 5/2016. However, recent readings normalized and normal today. Most likely due to long standing diabetes mellitus and hypertension. Remaining stable. On Ace-I, but not on statin as discussed above. Counseled on avoiding NSAIDS and prerenal status. 7. Hypothyroidism. Synthroid dose decreased from 112 to 100 mcg in 4/2017 due to over replacement. TSH was therapeutic in 7/2017, but with evidence of over replacement on labs in 10/2017 and 11/2018. Dose decreased to 88 mcg daily, but TSH significantly elevated to 12.17 in 5/2019, and dose of Synthroid increased back to 100 mcg daily. TSH in range today. Will continue to monitor. 6. Former smoking. 30 pack year history and patient stopped in 1/2015. Meets criteria for lung cancer screening with low dose CT scan. Performed in 11/2017 showing a few tiny solid and one groundglass nodule identified (lung RADS 2).  Did also note evidence of emphysema and prominence of the pulmonary vascular trunk which can be seen in pulmonary arterial hypertension. However, echocardiogram with normal PA pressures. Repeat in 11/2018 without change. Next study due in 11/2019, and agreeable to proceed. Will place order. Encouraged to continue with abstaining from smoking. 9. Anemia. Iron studies were consistent with anemia of chronic inflammation; B12 normal; and folate level slightly low last visit so advised to begin multivitamin with folate. Anemia improved and folate level normal. Macrocytosis persists. Will continue to follow. 10. Overweight. Emphasized importance of lifestyle modifications, including diet, exercise, and weight loss. Will readdress next visit. 11. Humeral fracture, left. Healed without sequelae. Doing well. 12. Health maintenance. Will give influenza vaccine today. Given script for Shingrix vaccine but not yet obtained. Other immunizations up to date. Mammogram up to date. Colonoscopy completed with Dr. Jordan Churchill. WIll discuss obtaining bone density study at next visit. Vitamin D level now normal. Continue maintenance dose supplement. Patient understands recommendations and agrees with plan. .  Follow-up appointment in 4 months for physical exam.

## 2019-12-27 ENCOUNTER — NURSE NAVIGATOR (OUTPATIENT)
Dept: OTHER | Age: 64
End: 2019-12-27

## 2019-12-27 NOTE — NURSE NAVIGATOR
Referring Provider: Nikolai Rivera MD       Lung Cancer Risk Profile:   Age: 59  Gender: Female  Height: 65\"  Weight: 172#    Smoking History:  Smoking Status: past use  # years smokin  # years quit: 6  Packs/day: 2  Pack years: 64    Patient discussed smoking cessation with PCP: Unknown    Patient participated in shared decision making process with PCP: Unknown    Patient is currently experiencing symptoms: No, per patient report    If yes what symptoms:     Co-Morbidities:      Cancer History:      Additional Risk Factors:    Exposure to second hand smoke      Patient's smoking history discussed via phone. Patient meets LDCT lung cancer screening criteria.  Call transferred to central scheduling to schedule exam.      [place] RN, [id number] [address] [facility] Nurse Navigator

## 2020-01-08 ENCOUNTER — HOSPITAL ENCOUNTER (OUTPATIENT)
Dept: CT IMAGING | Age: 65
Discharge: HOME | End: 2020-01-08
Attending: INTERNAL MEDICINE
Payer: OTHER GOVERNMENT

## 2020-01-08 VITALS — BODY MASS INDEX: 28.66 KG/M2 | HEIGHT: 65 IN | WEIGHT: 172 LBS

## 2020-01-08 DIAGNOSIS — Z87.891 EX-CIGARETTE SMOKER: ICD-10-CM

## 2020-01-08 DIAGNOSIS — Z12.2 ENCOUNTER FOR SCREENING FOR LUNG CANCER: ICD-10-CM

## 2020-01-08 PROCEDURE — G0297 LDCT FOR LUNG CA SCREEN: HCPCS

## 2020-01-10 ENCOUNTER — TELEPHONE (OUTPATIENT)
Dept: INTERNAL MEDICINE CLINIC | Age: 65
End: 2020-01-10

## 2020-01-10 NOTE — TELEPHONE ENCOUNTER
CT Results (most recent):  Results from Hospital Encounter encounter on 01/08/20   CT LOW DOSE LUNG CANCER SCREENING    Narrative CT CHEST LUNG SCREENING    INDICATION: Ex-cigarette smoker (30 pack year), stopped 1/2015 [L58.884  (ICD-10-CM)]; Encounter for screening for lung cancer [Z12.2 (ICD-10-CM)]    TECHNIQUE: Low-dose computed tomography acquisition performed according to the  national comprehensive cancer network guidelines space on body mass index. Axial  raw images obtained. Reconstruction on lung windows and soft tissue windows with  additional coronal and sagittally reformatted series. No intravenous contrast  administered for this exam. All CT scans at this facility are performed using  dose optimization techniques as appropriate to a performed exam, to include  automated exposure control, adjustment of the mA and/or kV according to  patient's size (including appropriate matching for site specific examinations),  or use of iterative reconstruction technique. COMPARISON: CT chest 11/10/2017    CTDI study: 4.22 mGy    FINDINGS:  Lungs:  -Stable 2 mm round solid nodule at the right lower lobe (series 3, image 104). -2 mm round groundglass nodule at the medial right lower lobe (series 3, image  88), probably present on prior exam but slightly more conspicuous on current  exam.  -Stable 2 mm round solid nodule at the posterior medial left lower lobe (series  3, image 100). -Stable punctate calcified granuloma at the superior segment left lower lobe  (image 70). Mild emphysema and mild diffuse bronchial wall thickening. Mild reticular scarring at the lung bases and lingula. Pleura: No pleural effusion or pneumothorax. Mediastinum: No thoracic aortic aneurysm. Moderate atherosclerosis at the aortic  knob, as well as coronary artery atherosclerosis. No pericardial effusion. Questionable mild mural thickening at the distal esophagus. Lymph nodes: No mediastinal or axillary adenopathy.     Upper abdomen: No acute finding. Bones: No acute osseous finding. Moderate to advanced degenerative spondylosis  and disc disease, relatively most prominent at T8-T10 with sclerotic endplate  changes similar to prior exam. Stable minimal wedging at those levels. Impression IMPRESSION:    1. Grossly stable small pulmonary nodules as described. Lung-RADS 2 - Benign appearance or behavior. Management: Continue annual screening with low dose CT in 12 months. 2. Mild emphysema and bronchial wall thickening. 3. Questionable mild mural thickening at the distal esophagus, possibly artifact  from under distention. Recommend correlation for possibility of esophagitis. Please let the patient know that her low-dose screening CT scan showed stable small pulmonary nodules of benign appearance. No other concerning nodules present. There was some question of mild thickening in her distal esophagus, although it was felt it could be artifact. Please find out if she has been having any symptoms suggestive of possible reflux, including burning or pain in her epigastrium, nausea, or vomiting.

## 2020-01-13 NOTE — TELEPHONE ENCOUNTER
Called and verified patient's full name and date of birth. Informed patient of Dr. Tila Bang' message about CT scan. Patient verbalized understanding and states she has been having some burning and nausea that comes on once a day.

## 2020-01-17 RX ORDER — OMEPRAZOLE 20 MG/1
20 CAPSULE, DELAYED RELEASE ORAL DAILY
Qty: 90 CAP | Refills: 1 | Status: SHIPPED | OUTPATIENT
Start: 2020-01-17 | End: 2020-06-29

## 2020-01-17 NOTE — TELEPHONE ENCOUNTER
Called and discussed symptoms with patient. Reports having nausea and mild burning pain approximately once per day, but states that it will resolve quickly. Discussed findings of possible distal esophageal thickening consistent with esophagitis. Discussed proceeding with UGI vs trkal of omeprazole. Patient wishing to try omeprazole first and if no improvement, will then proceed with imaging. Advised to take daily for 8-12 weeks and call if no improvement or worsening. Script sent to [facility].

## 2020-01-19 RX ORDER — LEVOTHYROXINE SODIUM 100 UG/1
TABLET ORAL
Qty: 90 TAB | Refills: 4 | Status: SHIPPED | OUTPATIENT
Start: 2020-01-19 | End: 2020-04-21

## 2020-04-15 ENCOUNTER — APPOINTMENT (OUTPATIENT)
Dept: INTERNAL MEDICINE CLINIC | Age: 65
End: 2020-04-15

## 2020-04-16 LAB
25(OH)D3+25(OH)D2 SERPL-MCNC: 79.7 NG/ML (ref 30–100)
ALBUMIN SERPL-MCNC: 4 G/DL (ref 3.8–4.8)
ALBUMIN/CREAT UR: 37 MG/G CREAT (ref 0–29)
ALBUMIN/GLOB SERPL: 2 {RATIO} (ref 1.2–2.2)
ALP SERPL-CCNC: 60 IU/L (ref 39–117)
ALT SERPL-CCNC: 11 IU/L (ref 0–32)
APPEARANCE UR: CLEAR
AST SERPL-CCNC: 13 IU/L (ref 0–40)
BACTERIA #/AREA URNS HPF: NORMAL /[HPF]
BASOPHILS # BLD AUTO: 0 X10E3/UL (ref 0–0.2)
BASOPHILS NFR BLD AUTO: 0 %
BILIRUB SERPL-MCNC: 0.3 MG/DL (ref 0–1.2)
BILIRUB UR QL STRIP: NEGATIVE
BUN SERPL-MCNC: 21 MG/DL (ref 8–27)
BUN/CREAT SERPL: 26 (ref 12–28)
CALCIUM SERPL-MCNC: 9.4 MG/DL (ref 8.7–10.3)
CASTS URNS QL MICRO: NORMAL /LPF
CHLORIDE SERPL-SCNC: 103 MMOL/L (ref 96–106)
CHOLEST SERPL-MCNC: 174 MG/DL (ref 100–199)
CO2 SERPL-SCNC: 23 MMOL/L (ref 20–29)
COLOR UR: YELLOW
CREAT SERPL-MCNC: 0.81 MG/DL (ref 0.57–1)
CREAT UR-MCNC: 25.3 MG/DL
EOSINOPHIL # BLD AUTO: 0.1 X10E3/UL (ref 0–0.4)
EOSINOPHIL NFR BLD AUTO: 2 %
EPI CELLS #/AREA URNS HPF: NORMAL /HPF (ref 0–10)
ERYTHROCYTE [DISTWIDTH] IN BLOOD BY AUTOMATED COUNT: 11.5 % (ref 11.7–15.4)
GLOBULIN SER CALC-MCNC: 2 G/DL (ref 1.5–4.5)
GLUCOSE SERPL-MCNC: 139 MG/DL (ref 65–99)
GLUCOSE UR QL: NEGATIVE
HCT VFR BLD AUTO: 33.2 % (ref 34–46.6)
HDLC SERPL-MCNC: 65 MG/DL
HGB BLD-MCNC: 11.3 G/DL (ref 11.1–15.9)
HGB UR QL STRIP: NEGATIVE
IMM GRANULOCYTES # BLD AUTO: 0 X10E3/UL (ref 0–0.1)
IMM GRANULOCYTES NFR BLD AUTO: 0 %
INTERPRETATION, 910389: NORMAL
KETONES UR QL STRIP: NEGATIVE
LDLC SERPL CALC-MCNC: 90 MG/DL (ref 0–99)
LEUKOCYTE ESTERASE UR QL STRIP: ABNORMAL
LYMPHOCYTES # BLD AUTO: 1.2 X10E3/UL (ref 0.7–3.1)
LYMPHOCYTES NFR BLD AUTO: 21 %
MAGNESIUM SERPL-MCNC: 1.9 MG/DL (ref 1.6–2.3)
MCH RBC QN AUTO: 33.4 PG (ref 26.6–33)
MCHC RBC AUTO-ENTMCNC: 34 G/DL (ref 31.5–35.7)
MCV RBC AUTO: 98 FL (ref 79–97)
MICRO URNS: ABNORMAL
MICROALBUMIN UR-MCNC: 9.4 UG/ML
MONOCYTES # BLD AUTO: 0.4 X10E3/UL (ref 0.1–0.9)
MONOCYTES NFR BLD AUTO: 7 %
MUCOUS THREADS URNS QL MICRO: PRESENT
NEUTROPHILS # BLD AUTO: 3.9 X10E3/UL (ref 1.4–7)
NEUTROPHILS NFR BLD AUTO: 70 %
NITRITE UR QL STRIP: NEGATIVE
PH UR STRIP: 6 [PH] (ref 5–7.5)
PLATELET # BLD AUTO: 204 X10E3/UL (ref 150–450)
POTASSIUM SERPL-SCNC: 4.6 MMOL/L (ref 3.5–5.2)
PROT SERPL-MCNC: 6 G/DL (ref 6–8.5)
PROT UR QL STRIP: NEGATIVE
RBC # BLD AUTO: 3.38 X10E6/UL (ref 3.77–5.28)
RBC #/AREA URNS HPF: NORMAL /HPF (ref 0–2)
SODIUM SERPL-SCNC: 140 MMOL/L (ref 134–144)
SP GR UR: 1.01 (ref 1–1.03)
T4 FREE SERPL-MCNC: 1.75 NG/DL (ref 0.82–1.77)
TRIGL SERPL-MCNC: 95 MG/DL (ref 0–149)
TSH SERPL DL<=0.005 MIU/L-ACNC: 0.21 UIU/ML (ref 0.45–4.5)
UROBILINOGEN UR STRIP-MCNC: 0.2 MG/DL (ref 0.2–1)
VLDLC SERPL CALC-MCNC: 19 MG/DL (ref 5–40)
WBC # BLD AUTO: 5.6 X10E3/UL (ref 3.4–10.8)
WBC #/AREA URNS HPF: NORMAL /HPF (ref 0–5)

## 2020-04-21 ENCOUNTER — VIRTUAL VISIT (OUTPATIENT)
Dept: INTERNAL MEDICINE CLINIC | Age: 65
End: 2020-04-21

## 2020-04-21 ENCOUNTER — TELEPHONE (OUTPATIENT)
Dept: INTERNAL MEDICINE CLINIC | Age: 65
End: 2020-04-21

## 2020-04-21 DIAGNOSIS — E10.42 TYPE 1 DIABETES MELLITUS WITH DIABETIC POLYNEUROPATHY (HCC): ICD-10-CM

## 2020-04-21 DIAGNOSIS — E10.3553 STABLE PROLIFERATIVE DIABETIC RETINOPATHY OF BOTH EYES ASSOCIATED WITH TYPE 1 DIABETES MELLITUS (HCC): ICD-10-CM

## 2020-04-21 DIAGNOSIS — E03.4 HYPOTHYROIDISM DUE TO ACQUIRED ATROPHY OF THYROID: ICD-10-CM

## 2020-04-21 DIAGNOSIS — Z87.891 EX-CIGARETTE SMOKER: ICD-10-CM

## 2020-04-21 DIAGNOSIS — I25.10 CORONARY ARTERY CALCIFICATION SEEN ON CT SCAN: ICD-10-CM

## 2020-04-21 DIAGNOSIS — E10.22 TYPE 1 DIABETES MELLITUS WITH STAGE 3 CHRONIC KIDNEY DISEASE (HCC): ICD-10-CM

## 2020-04-21 DIAGNOSIS — N18.30 TYPE 1 DIABETES MELLITUS WITH STAGE 3 CHRONIC KIDNEY DISEASE (HCC): ICD-10-CM

## 2020-04-21 DIAGNOSIS — D75.89 MACROCYTOSIS: ICD-10-CM

## 2020-04-21 DIAGNOSIS — N18.30 CHRONIC KIDNEY DISEASE, STAGE III (MODERATE) (HCC): ICD-10-CM

## 2020-04-21 DIAGNOSIS — E10.29 MICROALBUMINURIA DUE TO TYPE 1 DIABETES MELLITUS (HCC): ICD-10-CM

## 2020-04-21 DIAGNOSIS — I10 ESSENTIAL HYPERTENSION: Primary | ICD-10-CM

## 2020-04-21 DIAGNOSIS — E55.9 VITAMIN D DEFICIENCY: ICD-10-CM

## 2020-04-21 DIAGNOSIS — E78.5 HYPERLIPIDEMIA, UNSPECIFIED HYPERLIPIDEMIA TYPE: ICD-10-CM

## 2020-04-21 DIAGNOSIS — R80.9 MICROALBUMINURIA DUE TO TYPE 1 DIABETES MELLITUS (HCC): ICD-10-CM

## 2020-04-21 RX ORDER — LEVOTHYROXINE SODIUM 100 UG/1
100 TABLET ORAL
Qty: 90 TAB | Refills: 4
Start: 2020-04-21 | End: 2020-12-29 | Stop reason: SDUPTHER

## 2020-04-21 RX ORDER — LEVOTHYROXINE SODIUM 88 UG/1
88 TABLET ORAL
Qty: 25 TAB | Refills: 4 | Status: SHIPPED | OUTPATIENT
Start: 2020-04-23 | End: 2020-12-29 | Stop reason: SDUPTHER

## 2020-04-21 NOTE — PROGRESS NOTES
Haylie Friend presents today for   Chief Complaint   Patient presents with    Diabetes     f/u with labs              Depression Screening:  3 most recent PHQ Screens 4/21/2020   Little interest or pleasure in doing things Not at all   Feeling down, depressed, irritable, or hopeless Not at all   Total Score PHQ 2 0       Learning Assessment:  Learning Assessment 5/29/2019   PRIMARY LEARNER Patient   HIGHEST LEVEL OF EDUCATION - PRIMARY LEARNER  > 4 YEARS 214 Scripted LEARNER NONE   CO-LEARNER CAREGIVER No   CO-LEARNER NAME -   PRIMARY LANGUAGE ENGLISH    NEED No   LEARNER PREFERENCE PRIMARY DEMONSTRATION     READING     LISTENING   ANSWERED BY patient   RELATIONSHIP SELF       Abuse Screening:  Abuse Screening Questionnaire 4/21/2020   Do you ever feel afraid of your partner? N   Are you in a relationship with someone who physically or mentally threatens you? N   Is it safe for you to go home? Y       Fall Risk  Fall Risk Assessment, last 12 mths 4/21/2020   Able to walk? Yes   Fall in past 12 months? No     Recent Travel Screening and Travel History documentation     Travel Screening       Question Response     Do you have any of the following symptoms? None of these     In the last month, have you been in contact with someone who was confirmed or suspected to have Coronavirus / COVID-19? No / Unsure     Have you traveled internationally in the last month? No      Travel History   Travel since 03/21/20     No documented travel since 03/21/20                    Coordination of Care:  1. Have you been to the ER, urgent care clinic since your last visit? Hospitalized since your last visit? no    2. Have you seen or consulted any other health care providers outside of the [address] [place] [address] [facility] since your last visit? Include any pap smears or colon screening.  no

## 2020-04-24 NOTE — PROGRESS NOTES
Allison Bryant is a 72 y.o. female who was seen by synchronous (real-time) audio-video technology on 4/21/2020. Consent: Allison Bryant, who was seen by synchronous (real-time) audio-video technology, and/or her healthcare decision maker, is aware that this patient-initiated, Telehealth encounter on 4/21/2020 is a billable service, with coverage as determined by her insurance carrier. She is aware that she may receive a bill and has provided verbal consent to proceed: Yes. HPI:   Allison Bryant is a 72y.o. year old female who has a history of hypertension, hyperlipidemia, diabetes mellitus type 1, chronic renal disease stage 3, and hypothyroidism. She reports that her diabetes control continues to be stable since using the [employer] She continues to adjust her blood sugars throughout the day as needed, and her HbA1c was 6.8 at her last visit with Dr. Los Wheeler in 2/2020. She has been following social distancing guidelines and remaining at home. She is trying to walk for exercise. She is otherwise without new complaints and feeling generally well. On 4/26/2018, she presented with worsening lower extremity edema. She stated that previously, her swelling would improve overnight, but she had been noticing recently that it did not resolve. She also reported some mild dyspnea with exertion, such as walking upstairs, which had been present chronically, although may have been slightly worse. She also reported some \"skipped beats\" although not prolonged. She denied any chest pain, shortness of breath at rest or with normal activities, lightheadedness, PND, or orthopnea. She was started on low dose lasix, and referred for an echocardiogram, Holter monitor, and lower extremity duplex scan, but prior to having these performed, presented to [facility] ED for evaluation of pain and swelling of her right ankle. While in ED, CBC, CMP, and D-dimer were unremarkable.  Cardiac enzymes x 2 were negative, and EKG was unchanged. She was observed overnight and underwent a pharmacologic nuclear stress test (5/3/2018) which was a normal low risk study, without evidence of ischemia or prior infarction, and EF 86%. She was subsequently discharged, and underwent an echocardiogram (5/10/2018) showed normal LV function (EF 55-60%) no RWMA, mild LAE, mild-moderate TR, RVSP normal (22 mmHg); holter monitor (5/1/2018) showing sinus rhythm with sinus arrhythmia at times; rare PVC's; one short run of non-sustained of SVT for 6 beats. Episode of sinus tachycardia recorded in diary occurred while she was undergoing pharmacologic nuclear stress test. No other symptoms reported; and lower extremity duplex (5/11/2018) which was negative for DVT and venous reflux. On 5/8/2018, she was evaluated by a podiatrist, Dr. Chioma Mcneal, for her right foot swelling. Felt that it was most likely secondary to sprain injury as x-rays negative and presentation not consistent with gout or other form of arthritis. She was started on a Medrol dose pack. However, her blood sugars increased to >400 and she was changed to ibuprofen 400 mg tid. She did have a right foot MRI (5/26/2018) which showed considerable edema involving lower leg, hindfoot and midfoot, nonlocalizing a nonspecific; evidence of old injury to the ATF ligament.  Other ankle ligaments are intact; mild peroneus brevis tendinopathy without measurable tear; small partial thickness longitudinal split in the peroneus longus tendon within the retromalleolar groove; mild distal Achilles tendinosis; and small subchondral cyst in the anterior facet of the talus with otherwise no degenerative change seen in any of the hindfoot or midfoot articulations. She states that she was told that the right foot swelling was due to an old injury, but she states that it persists although she does not feel much pain due to her neuropathy.      She has a history of diabetes mellitus type 1, with onset at age 6. She is currently under the care of Dr. Danilo Carey, and is being treated with insulin glargine and lispro. She had not been well-controlled, with HbA1c ranging from 7.5-8.0, although has ow improved since using the Freestyle meter as discussed. She does have proliferative diabetic retinopathy and is s/p pan-retinal photocoagulation therapy in both eyes. She is followed closely by Dr. Salena Peng, and recent exam showed regression bilaterally. She also has peripheral neuropathy, with burning and tingling in her feet at night. Renal function had been normal until 5/2016, with evidence of chronic renal disease, stage 3, since that time with baseline creatinine1.0-1.04/ eGFR 54-56. She also has a history of hypothyroidism, and she is currently on Synthroid. She denies cold intolerance, hair or skin changes. She has a history of hypertension, treated with quinapril. She had a pharmacologic nuclear stress test in 11/2012, which showed no evidence for ischemia, and normal LV wall motion (EF> 70%). She also had a carotid duplex scan in 7/2013 which showed mild (< 50%) bilateral internal carotid artery stenoses. She denies any chest pain, shortness of breath at rest or with exertion, palpitations, lightheadedness, or edema. She has a history of hyperlipidemia, and was on atorvastatin for many years until 10/2015 when she discontinued it because of myalgias. She was recently tried on rosuvastatin without success due to recurrent myalgias. She was started on ezetimibe in 9/2016 and has been tolerating it without difficulty. She successfully stopped smoking in 1/2015, and reports that she continues to abstain. She had a screening colonoscopy in 9/2014 by Dr. Nancy Colindres, which found a 6 mm sessile polyp in the descending colon and a 4-5 mm sessile polyp in the rectum/sigmoid (pathology: tubular adenomas).  She had a repeat colonoscopy in 9/2019 showing a 6 mm sessile polyp in the ileocecal valve (pathology unavailable). Recommendation was for follow-up colonoscopy in 5 years. She denies any abdominal pain, nausea, vomiting, melena, hematochezia, or change in bowel movements. In 3/2017, she was diagnosed with an upper respiratory tract infection and was prescribed doxycycline and prednisone by [facility]. She subsequently developed severe dizziness and vomiting, prompting a visit to the [facility] ED where she was diagnosed with benign positional vertigo and treated with meclizine and Zofran with improvement. In 10/2017, she noticed difficulty with her peripheral vision and was evaluated by Dr. Aleks Zelaya. She has regressed proliferative diabetic retinopathy and she reports that the laser therapy she received many years ago to treat this resulted in the current limitations in her peripheral vision. She does also have mild glaucoma for which she is receiving treatment. She states that her driving has been restricted to only during the day, and she is aware that she must turn her head completely to each side when navigating behind the wheel. In 2/2018, she began having difficulty with right elbow pain and was evaluated by Dr. Lynette Hatchet who diagnosed her with right lateral epicondylitis. She received a cortisone injection with improvement. In 10/2018, while hiking on vacation hiking in [place], she slipped on some wooden steps and landed on her left shoulder. She was transported to [facility] in [place], [place], and found to have a closed nondisplaced fracture of the left proximal humerus. She was placed in a sling and told to follow-up with orthopedics. She was being followed by Dr. Ketan Schmitz of [facility], and completed physical therapy with improvement. She is no longer requiring pain medication.        Past Medical History:   Diagnosis Date    Chronic renal disease, stage III ([address]     Diabetes mellitus type 1 ([address]     Diabetic peripheral neuropathy [facility]     Diabetic retinopathy ([facility]     Essential hypertension with goal blood pressure less than 140/90     Hearing decreased     Hyperlipidemia     Hypothyroidism      Past Surgical History:   Procedure Laterality Date    HX GYN      partial hysterectomy    HX GYN      3 D and C's    HX HEENT      tonsillectomy    HX HEENT      cataract removal bilateral    HX HEENT      multiple surgeries for retinopathy    HX ORTHOPAEDIC      carpral tunnel sugery bilateral     Current Outpatient Medications   Medication Sig    levothyroxine (SYNTHROID) 100 mcg tablet Take 1 Tab by mouth five (5) days a week. Take Mon-Fri. Take 88 mcg on Sat/Sun    levothyroxine (SYNTHROID) 88 mcg tablet Take 1 Tab by mouth two (2) days a week. Take on Saturday and Sunday. Take 100 mcg on Mon-Fri.  quinapril (ACCUPRIL) 10 mg tablet TAKE 1 TABLET EVERY EVENING ALONG WITH A 5 MG TABLET    quinapril (ACCUPRIL) 5 mg tablet TAKE 1 TABLET NIGHTLY WITH 10 MG TABLET EVERY EVENING    ezetimibe (ZETIA) 10 mg tablet TAKE 1 TABLET DAILY    Cholecalciferol, Vitamin D3, (VITAMIN D3) 2,000 unit cap capsule Take 2,000 Units by mouth daily.  ALPHAGAN P 0.1 % ophthalmic solution     glucose blood VI test strips (ONE TOUCH ULTRA TEST) strip USE TO TEST BLOOD SUGAR FOUR TIMES A DAY    cyanocobalamin (VITAMIN B-12) 500 mcg tablet Take 500 mcg by mouth daily.  aspirin delayed-release 81 mg tablet Take  by mouth daily.  insulin glargine (LANTUS SOLOSTAR) 100 unit/mL (3 mL) pen 23 Units by SubCUTAneous route daily.  insulin lispro (HUMALOG PEN) 100 unit/mL kwikpen by SubCUTAneous route. Sliding scale as needed      omeprazole (PRILOSEC) 20 mg capsule Take 1 Cap by mouth daily.  furosemide (LASIX) 20 mg tablet Take 1 Tab by mouth daily.  bisacodyl 5 mg tab Take  by mouth.  meclizine (ANTIVERT) 25 mg tablet Take  by mouth three (3) times daily as needed.     ondansetron (ZOFRAN ODT) 4 mg disintegrating tablet Take 4 mg by mouth every eight (8) hours as needed for Nausea.  loratadine (CLARITIN REDITABS) 10 mg dissolvable tablet Take 1 Tab by mouth daily. No current facility-administered medications for this visit. Allergies and Intolerances: Allergies   Allergen Reactions    Pcn [Penicillins] Hives    Sulfa (Sulfonamide Antibiotics) Nausea Only and Vertigo     Family History: Grandmother  from colon cancer. Mother had CAD and DM. Family History   Problem Relation Age of Onset    Diabetes Brother     Diabetes Mother     Heart Disease Mother      Social History:   She  reports that she quit smoking about 5 years ago. She has a 10.00 pack-year smoking history. She has never used smokeless tobacco. She is  and lives with her . 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 Social History     Substance and Sexual Activity   Alcohol Use Yes    Comment: infrequently     Immunization History:  Immunization History   Administered Date(s) Administered    Influenza Vaccine (Quad) PF 12/15/2015, 2016, 10/19/2017, 2018, 12/10/2019    Influenza Vaccine PF 2014    Influenza Vaccine Split 2011, 10/17/2012    Influenza Vaccine Whole 2010    Pneumococcal Conjugate (PCV-13) 2020    Pneumococcal Polysaccharide (PPSV-23) 2017, 2019    Tdap 2013    Zoster Vaccine, Live 2016       Review of Systems:   As above included in HPI. Otherwise 11 point review of systems negative including constitutional, skin, HENT, eyes, respiratory, cardiovascular, gastrointestinal, genitourinary, musculoskeletal, endo/heme/aller, neurological.    Physical:     Objective:   Vital Signs: (As obtained by patient/caregiver at home)  There were no vitals taken for this visit.      Constitutional: [x] Appears well-developed and well-nourished [x] No apparent distress      [] Abnormal -     Mental status: [x] Alert and awake  [x] Oriented to person/place/time [x] Able to follow commands    [] Abnormal -     Eyes:   EOM    [x]  Normal    [] Abnormal -   Sclera  [x]  Normal    [] Abnormal -          Discharge [x]  None visible   [] Abnormal -     HENT: [x] Normocephalic, atraumatic  [] Abnormal -   [x] Mouth/Throat: Mucous membranes are moist    External Ears [x] Normal  [] Abnormal -    Neck: [x] No visualized mass [] Abnormal -     Pulmonary/Chest: [x] Respiratory effort normal   [x] No visualized signs of difficulty breathing or respiratory distress        [] Abnormal -      Musculoskeletal:   [x] Normal gait with no signs of ataxia         [x] Normal range of motion of neck        [] Abnormal -     Neurological:        [x] No Facial Asymmetry (Cranial nerve 7 motor function) (limited exam due to video visit)          [x] No gaze palsy        [] Abnormal -          Skin:        [x] No significant exanthematous lesions or discoloration noted on facial skin         [] Abnormal -            Psychiatric:       [x] Normal Affect [] Abnormal -        [x] No Hallucinations      Review of Data:  Labs:   Office Visit on 12/10/2019   Component Date Value Ref Range Status    WBC 04/15/2020 5.6  3.4 - 10.8 x10E3/uL Final    RBC 04/15/2020 3.38* 3.77 - 5.28 x10E6/uL Final    HGB 04/15/2020 11.3  11.1 - 15.9 g/dL Final    HCT 04/15/2020 33.2* 34.0 - 46.6 % Final    MCV 04/15/2020 98* 79 - 97 fL Final    MCH 04/15/2020 33.4* 26.6 - 33.0 pg Final    MCHC 04/15/2020 34.0  31.5 - 35.7 g/dL Final    RDW 04/15/2020 11.5* 11.7 - 15.4 % Final    PLATELET 91/10/1713 618  150 - 450 x10E3/uL Final    NEUTROPHILS 04/15/2020 70  Not Estab. % Final    Lymphocytes 04/15/2020 21  Not Estab. % Final    MONOCYTES 04/15/2020 7  Not Estab. % Final    EOSINOPHILS 04/15/2020 2  Not Estab. % Final    BASOPHILS 04/15/2020 0  Not Estab. % Final    ABS.  NEUTROPHILS 04/15/2020 3.9  1.4 - 7.0 x10E3/uL Final    Abs Lymphocytes 04/15/2020 1.2  0.7 - 3.1 x10E3/uL Final  ABS. MONOCYTES 04/15/2020 0.4  0.1 - 0.9 x10E3/uL Final    ABS. EOSINOPHILS 04/15/2020 0.1  0.0 - 0.4 x10E3/uL Final    ABS. BASOPHILS 04/15/2020 0.0  0.0 - 0.2 x10E3/uL Final    IMMATURE GRANULOCYTES 04/15/2020 0  Not Estab. % Final    ABS. IMM. GRANS. 04/15/2020 0.0  0.0 - 0.1 x10E3/uL Final    Glucose 04/15/2020 139* 65 - 99 mg/dL Final    BUN 04/15/2020 21  8 - 27 mg/dL Final    Creatinine 04/15/2020 0.81  0.57 - 1.00 mg/dL Final    GFR est non-AA 04/15/2020 76  >59 mL/min/1.73 Final    GFR est AA 04/15/2020 88  >59 mL/min/1.73 Final    BUN/Creatinine ratio 04/15/2020 26  12 - 28 Final    Sodium 04/15/2020 140  134 - 144 mmol/L Final    Potassium 04/15/2020 4.6  3.5 - 5.2 mmol/L Final    Chloride 04/15/2020 103  96 - 106 mmol/L Final    CO2 04/15/2020 23  20 - 29 mmol/L Final    Calcium 04/15/2020 9.4  8.7 - 10.3 mg/dL Final    Protein, total 04/15/2020 6.0  6.0 - 8.5 g/dL Final    Albumin 04/15/2020 4.0  3.8 - 4.8 g/dL Final    GLOBULIN, TOTAL 04/15/2020 2.0  1.5 - 4.5 g/dL Final    A-G Ratio 04/15/2020 2.0  1.2 - 2.2 Final    Bilirubin, total 04/15/2020 0.3  0.0 - 1.2 mg/dL Final    Alk.  phosphatase 04/15/2020 60  39 - 117 IU/L Final    AST (SGOT) 04/15/2020 13  0 - 40 IU/L Final    ALT (SGPT) 04/15/2020 11  0 - 32 IU/L Final    Specific Gravity 04/15/2020 1.007  1.005 - 1.030 Final    pH (UA) 04/15/2020 6.0  5.0 - 7.5 Final    Color 04/15/2020 Yellow  Yellow Final    Appearance 04/15/2020 Clear  Clear Final    Leukocyte Esterase 04/15/2020 1+* Negative Final    Protein 04/15/2020 Negative  Negative/Trace Final    Glucose 04/15/2020 Negative  Negative Final    Ketone 04/15/2020 Negative  Negative Final    Blood 04/15/2020 Negative  Negative Final    Bilirubin 04/15/2020 Negative  Negative Final    Urobilinogen 04/15/2020 0.2  0.2 - 1.0 mg/dL Final    Nitrites 04/15/2020 Negative  Negative Final    Microscopic Examination 04/15/2020 See additional order   Final    WBC 04/15/2020 None seen  0 - 5 /hpf Final    RBC 04/15/2020 None seen  0 - 2 /hpf Final    Epithelial cells 04/15/2020 0-10  0 - 10 /hpf Final    Casts 04/15/2020 None seen  None seen /lpf Final    Mucus 04/15/2020 Present  Not Estab. Final    Bacteria 04/15/2020 Few  None seen/Few Final    Cholesterol, total 04/15/2020 174  100 - 199 mg/dL Final    Triglyceride 04/15/2020 95  0 - 149 mg/dL Final    HDL Cholesterol 04/15/2020 65  >39 mg/dL Final    VLDL, calculated 04/15/2020 19  5 - 40 mg/dL Final    LDL, calculated 04/15/2020 90  0 - 99 mg/dL Final    Creatinine, urine 04/15/2020 25.3  Not Estab. mg/dL Final    Microalbumin, urine 04/15/2020 9.4  Not Estab. ug/mL Final    Microalb/Creat ratio (ug/mg creat.) 04/15/2020 37* 0 - 29 mg/g creat Final    T4, Free 04/15/2020 1.75  0.82 - 1.77 ng/dL Final    VITAMIN D, 25-HYDROXY 04/15/2020 79.7  30.0 - 100.0 ng/mL Final    TSH 04/15/2020 0.208* 0.450 - 4.500 uIU/mL Final    Magnesium 04/15/2020 1.9  1.6 - 2.3 mg/dL Final    INTERPRETATION 04/15/2020 Note   Final   Abstract on 2019   Component Date Value Ref Range Status    Hemoglobin A1c, External 2019 6.9   Final    Urine Microalbumin, External 2019 8.2   Final       Health Maintenance:  Screening:    Mammogram: negative (2019). PAP smear: negative (2014). S/p YUNG, no further screening needed. Colorectal: colonsocopy (2019) ileocecal valve polyp. Dr. Faith Cool. Due . Depression: none   DM (HbA1c/FPG): HbA1c 6.8 (2020) Dr. Jose Alfredo Key   Hepatitis C: negative (2015)   Falls: none    DEXA: N/A    Glaucoma: mild primary open angle glaucoma and regressed proliferative diabetic retinopathy.  Followed by Dr. Simon Bear (last 3/2020)   Smokin pack year (stopped 2015)   Vitamin D: 56.8 (2019)   Medicare Wellness: due for Welcome to Medicare visit    Impression:  Patient Active Problem List   Diagnosis Code    Stable proliferative diabetic retinopathy of both eyes associated with type 1 diabetes mellitus ([facility] P91.3562    Hyperlipidemia E78.5    Hearing decreased H91.90    Diabetes mellitus with diabetic polyneuropathy ([facility] E11.42    Vitamin D deficiency E55.9    Ex-cigarette smoker (30 pack year), stopped 1/2015 Z87.891    Adenomatous polyp of colon D12.6    Essential hypertension I10    Hypothyroidism due to acquired atrophy of thyroid E03.4    Chronic kidney disease, stage III (moderate) (HCC) N18.3    Type 1 diabetes mellitus with stage 3 chronic kidney disease (HCC) E10.22, N18.3    Macrocytosis D75.89    Microalbuminuria due to type 1 diabetes mellitus ([facility]) E10.29, R80.9    Coronary artery calcification seen on CT scan I25.10       Plan:  1. Hypertension. Well controlled on quinapril 15 mg daily and lasix 20 mg daily. Renal function remains stable with creatinine 0.81/ eGFR 76. Continue to follow. 2. Lower extremity edema/dyspnea. Patient had reported worsened lower extremity edema not resolving overnight and some questionable mild increase in exertional dyspnea. No other associated symptoms except palpitations in the form of \"skipped beats\". EKG without change from baseline. Lower extremity duplex negative for DVT or venous reflux. Echocardiogram with normal LV function and normal right sided pressures. 48 hour Holter monitor unrevealing. Pharmacologic nuclear stress test peformed at ED was negative. Started on lasix 20 mg daily with improvement. Reports that now generally present during the day and resolving overnight. Using compression hose regularly. 3. Type 1 diabetes mellitus. HbA1c improved to 6.8 in 3/2020 since using new Fre[employer] sensor machine for blood sugar readings. On Lantus and Humalog. Being managed by Dr. Navin Gonzalez, Proliferative diabetic retinopathy in regression, followed by Dr. Carl Hager. Neuropathy symptoms in feet are mild and have not required treatment with medication. Foot exam performed (1/2019).  Renal function with evidence of chronic renal disease, stage 3, and also with evidence of mild microalbuminuria ((8/2018). On Ace-I. Unable to tolerate statins due to myalgias, but LDL effectively decreased with ezetimibe. Follow closely. 4. Hyperlipidemia. On ezetimibe with significant improvement in LDL to 90 and HDL 65. Not able to tolerate statin (atorvastatin/ rosuvastatin) due to myalgias. Moderate to severe aortic and coronary atherosclerosis and calcifications seen on chest CT scan (11/2018). Stressed importance of lifestyle modifications, especially diet, exercise and weight loss. 5. Chronic renal disease, stage 3. Evidence of mild disease with creatinine 1.00-1.04/ eGFR 54-56 since 5/2016. However, recent readings normalized and normal today. Most likely due to long standing diabetes mellitus and hypertension. Remaining stable. On Ace-I, but not on statin as discussed above. Counseled on avoiding NSAIDS and prerenal status. 6. Hypothyroidism. Synthroid dose decreased from 112 to 100 mcg in 4/2017 due to over replacement. TSH was therapeutic in 7/2017, but with evidence of over replacement on labs in 10/2017 and 11/2018. Dose decreased to 88 mcg daily, but TSH significantly elevated to 12.17 in 5/2019, and dose of Synthroid increased back to 100 mcg daily. TSH in range last visit, but overreplaced today. Instructed to change to 100 mcg M-F and 88 mcg Sat/Sun. Will reassess TSH in 3 months. 7. Former smoking. 30 pack year history and patient stopped in 1/2015. Meets criteria for lung cancer screening with low dose CT scan. Performed in 11/2017 showing a few tiny solid and one groundglass nodule identified (lung RADS 2). Did also note evidence of emphysema and prominence of the pulmonary vascular trunk which can be seen in pulmonary arterial hypertension. However, echocardiogram with normal PA pressures. Repeat in 11/2018 and 11/2019 without change. Next study due in 11/2020. Encouraged to continue with abstaining from smoking. 8. Anemia. Iron studies were consistent with anemia of chronic inflammation; B12 normal; and folate level slightly low last visit so advised to begin multivitamin with folate. Anemia improved and folate level normal. Macrocytosis persists. Will continue to follow. 9. Overweight. Emphasized importance of lifestyle modifications, including diet, exercise, and weight loss. Will readdress next visit. 10. Health maintenance. Already received influenza vaccine. Given script for Shingrix vaccine but not yet obtained. Other immunizations up to date. Mammogram up to date. Colonoscopy completed with Dr. Zulay Wilkerson. WIll discuss obtaining bone density study at next visit. Vitamin D level now normal. Continue maintenance dose supplement. Patient understands recommendations and agrees with plan. .  Follow-up appointment in 3 months. Will perform physical exam and Welcome to Medicare visit. We discussed the expected course, resolution and complications of the diagnosis(es) in detail. Medication risks, benefits, costs, interactions, and alternatives were discussed as indicated. I advised her to contact the office if her condition worsens, changes or fails to improve as anticipated. She expressed understanding with the diagnosis(es) and plan. Esha Penaloza is a 72 y.o. female who was evaluated by a video visit encounter for concerns as above. Patient identification was verified prior to start of the visit. A caregiver was present when appropriate. Due to this being a TeleHealth encounter (During UGI-68 public health emergency), evaluation of the following organ systems was limited: Vitals/Constitutional/EENT/Resp/CV/GI//MS/Neuro/Skin/Heme-Lymph-Imm.   Pursuant to the emergency declaration under the [facility], [facility] waiver authority and the [employer] and [employer] Act, this Virtual  Visit was conducted, with patient's (and/or legal guardian's) consent, to reduce the patient's risk of exposure to COVID-19 and provide necessary medical care. Services were provided through a video synchronous discussion virtually to substitute for in-person clinic visit. Patient was at home and provider was located in the office.       Rosie Feng MD

## 2020-07-29 ENCOUNTER — HOSPITAL ENCOUNTER (OUTPATIENT)
Dept: LAB | Age: 65
Discharge: HOME | End: 2020-07-29
Payer: MEDICARE

## 2020-07-29 ENCOUNTER — APPOINTMENT (OUTPATIENT)
Dept: INTERNAL MEDICINE CLINIC | Age: 65
End: 2020-07-29

## 2020-07-29 DIAGNOSIS — E10.29 MICROALBUMINURIA DUE TO TYPE 1 DIABETES MELLITUS (HCC): ICD-10-CM

## 2020-07-29 DIAGNOSIS — N18.30 CHRONIC KIDNEY DISEASE, STAGE III (MODERATE) (HCC): ICD-10-CM

## 2020-07-29 DIAGNOSIS — D75.89 MACROCYTOSIS: ICD-10-CM

## 2020-07-29 DIAGNOSIS — R80.9 MICROALBUMINURIA DUE TO TYPE 1 DIABETES MELLITUS (HCC): ICD-10-CM

## 2020-07-29 DIAGNOSIS — I10 ESSENTIAL HYPERTENSION: ICD-10-CM

## 2020-07-29 DIAGNOSIS — E10.42 TYPE 1 DIABETES MELLITUS WITH DIABETIC POLYNEUROPATHY (HCC): ICD-10-CM

## 2020-07-29 DIAGNOSIS — N18.30 TYPE 1 DIABETES MELLITUS WITH STAGE 3 CHRONIC KIDNEY DISEASE (HCC): ICD-10-CM

## 2020-07-29 DIAGNOSIS — E10.3553 STABLE PROLIFERATIVE DIABETIC RETINOPATHY OF BOTH EYES ASSOCIATED WITH TYPE 1 DIABETES MELLITUS (HCC): ICD-10-CM

## 2020-07-29 DIAGNOSIS — E10.22 TYPE 1 DIABETES MELLITUS WITH STAGE 3 CHRONIC KIDNEY DISEASE (HCC): ICD-10-CM

## 2020-07-29 DIAGNOSIS — E55.9 VITAMIN D DEFICIENCY: ICD-10-CM

## 2020-07-29 DIAGNOSIS — I25.10 CORONARY ARTERY CALCIFICATION SEEN ON CT SCAN: ICD-10-CM

## 2020-07-29 DIAGNOSIS — E03.4 HYPOTHYROIDISM DUE TO ACQUIRED ATROPHY OF THYROID: ICD-10-CM

## 2020-07-29 DIAGNOSIS — Z87.891 EX-CIGARETTE SMOKER: ICD-10-CM

## 2020-07-29 DIAGNOSIS — E78.5 HYPERLIPIDEMIA, UNSPECIFIED HYPERLIPIDEMIA TYPE: ICD-10-CM

## 2020-07-29 LAB
ALBUMIN SERPL-MCNC: 3.5 G/DL (ref 3.4–5)
ALBUMIN/GLOB SERPL: 1.3 {RATIO} (ref 0.8–1.7)
ALP SERPL-CCNC: 62 U/L (ref 45–117)
ALT SERPL-CCNC: 18 U/L (ref 13–56)
ANION GAP SERPL CALC-SCNC: 7 MMOL/L (ref 3–18)
AST SERPL-CCNC: 16 U/L (ref 10–38)
BASOPHILS # BLD: 0 K/UL (ref 0–0.1)
BASOPHILS NFR BLD: 0 % (ref 0–2)
BILIRUB SERPL-MCNC: 0.4 MG/DL (ref 0.2–1)
BUN SERPL-MCNC: 17 MG/DL (ref 7–18)
BUN/CREAT SERPL: 19 (ref 12–20)
CALCIUM SERPL-MCNC: 9.2 MG/DL (ref 8.5–10.1)
CHLORIDE SERPL-SCNC: 109 MMOL/L (ref 100–111)
CO2 SERPL-SCNC: 25 MMOL/L (ref 21–32)
CREAT SERPL-MCNC: 0.9 MG/DL (ref 0.6–1.3)
CREAT UR-MCNC: 72 MG/DL (ref 30–125)
CREATININE, EXTERNAL: 0.9
DIFFERENTIAL METHOD BLD: ABNORMAL
EOSINOPHIL # BLD: 0.1 K/UL (ref 0–0.4)
EOSINOPHIL NFR BLD: 1 % (ref 0–5)
ERYTHROCYTE [DISTWIDTH] IN BLOOD BY AUTOMATED COUNT: 12 % (ref 11.6–14.5)
GLOBULIN SER CALC-MCNC: 2.8 G/DL (ref 2–4)
GLUCOSE SERPL-MCNC: 123 MG/DL (ref 74–99)
HCT VFR BLD AUTO: 36 % (ref 35–45)
HGB BLD-MCNC: 12.1 G/DL (ref 12–16)
LDL-C, EXTERNAL: 90
LYMPHOCYTES # BLD: 1.2 K/UL (ref 0.9–3.6)
LYMPHOCYTES NFR BLD: 21 % (ref 21–52)
MAGNESIUM SERPL-MCNC: 2 MG/DL (ref 1.6–2.6)
MCH RBC QN AUTO: 32.8 PG (ref 24–34)
MCHC RBC AUTO-ENTMCNC: 33.6 G/DL (ref 31–37)
MCV RBC AUTO: 97.6 FL (ref 74–97)
MICROALBUMIN UR-MCNC: 0.77 MG/DL (ref 0–3)
MICROALBUMIN/CREAT UR-RTO: 11 MG/G (ref 0–30)
MONOCYTES # BLD: 0.5 K/UL (ref 0.05–1.2)
MONOCYTES NFR BLD: 8 % (ref 3–10)
NEUTS SEG # BLD: 4 K/UL (ref 1.8–8)
NEUTS SEG NFR BLD: 70 % (ref 40–73)
PLATELET # BLD AUTO: 268 K/UL (ref 135–420)
PMV BLD AUTO: 12.4 FL (ref 9.2–11.8)
POTASSIUM SERPL-SCNC: 4.5 MMOL/L (ref 3.5–5.5)
PROT SERPL-MCNC: 6.3 G/DL (ref 6.4–8.2)
RBC # BLD AUTO: 3.69 M/UL (ref 4.2–5.3)
SODIUM SERPL-SCNC: 141 MMOL/L (ref 136–145)
T4 FREE SERPL-MCNC: 1.7 NG/DL (ref 0.7–1.5)
TSH SERPL DL<=0.05 MIU/L-ACNC: 0.15 UIU/ML (ref 0.36–3.74)
WBC # BLD AUTO: 5.7 K/UL (ref 4.6–13.2)

## 2020-07-29 PROCEDURE — 80053 COMPREHEN METABOLIC PANEL: CPT

## 2020-07-29 PROCEDURE — 85025 COMPLETE CBC W/AUTO DIFF WBC: CPT

## 2020-07-29 PROCEDURE — 36415 COLL VENOUS BLD VENIPUNCTURE: CPT

## 2020-07-29 PROCEDURE — 84439 ASSAY OF FREE THYROXINE: CPT

## 2020-07-29 PROCEDURE — 84443 ASSAY THYROID STIM HORMONE: CPT

## 2020-07-29 PROCEDURE — 82043 UR ALBUMIN QUANTITATIVE: CPT

## 2020-07-29 PROCEDURE — 83735 ASSAY OF MAGNESIUM: CPT

## 2020-08-05 ENCOUNTER — OFFICE VISIT (OUTPATIENT)
Dept: INTERNAL MEDICINE CLINIC | Age: 65
End: 2020-08-05

## 2020-08-05 ENCOUNTER — TELEPHONE (OUTPATIENT)
Dept: INTERNAL MEDICINE CLINIC | Age: 65
End: 2020-08-05

## 2020-08-05 VITALS
HEIGHT: 65 IN | HEART RATE: 66 BPM | RESPIRATION RATE: 16 BRPM | DIASTOLIC BLOOD PRESSURE: 64 MMHG | WEIGHT: 170 LBS | BODY MASS INDEX: 28.32 KG/M2 | OXYGEN SATURATION: 97 % | SYSTOLIC BLOOD PRESSURE: 144 MMHG | TEMPERATURE: 97.7 F

## 2020-08-05 DIAGNOSIS — R80.9 MICROALBUMINURIA DUE TO TYPE 1 DIABETES MELLITUS (HCC): ICD-10-CM

## 2020-08-05 DIAGNOSIS — E10.3553 STABLE PROLIFERATIVE DIABETIC RETINOPATHY OF BOTH EYES ASSOCIATED WITH TYPE 1 DIABETES MELLITUS (HCC): ICD-10-CM

## 2020-08-05 DIAGNOSIS — E10.42 TYPE 1 DIABETES MELLITUS WITH DIABETIC POLYNEUROPATHY (HCC): ICD-10-CM

## 2020-08-05 DIAGNOSIS — E10.29 MICROALBUMINURIA DUE TO TYPE 1 DIABETES MELLITUS (HCC): ICD-10-CM

## 2020-08-05 DIAGNOSIS — Z12.31 ENCOUNTER FOR SCREENING MAMMOGRAM FOR MALIGNANT NEOPLASM OF BREAST: ICD-10-CM

## 2020-08-05 DIAGNOSIS — E03.4 HYPOTHYROIDISM DUE TO ACQUIRED ATROPHY OF THYROID: ICD-10-CM

## 2020-08-05 DIAGNOSIS — D75.89 MACROCYTOSIS: ICD-10-CM

## 2020-08-05 DIAGNOSIS — Z00.00 WELCOME TO MEDICARE PREVENTIVE VISIT: ICD-10-CM

## 2020-08-05 DIAGNOSIS — N18.30 TYPE 1 DIABETES MELLITUS WITH STAGE 3 CHRONIC KIDNEY DISEASE (HCC): ICD-10-CM

## 2020-08-05 DIAGNOSIS — Z78.0 POSTMENOPAUSAL STATE: ICD-10-CM

## 2020-08-05 DIAGNOSIS — E10.22 TYPE 1 DIABETES MELLITUS WITH STAGE 3 CHRONIC KIDNEY DISEASE (HCC): ICD-10-CM

## 2020-08-05 DIAGNOSIS — I10 ESSENTIAL HYPERTENSION: Primary | ICD-10-CM

## 2020-08-05 DIAGNOSIS — E78.5 HYPERLIPIDEMIA, UNSPECIFIED HYPERLIPIDEMIA TYPE: ICD-10-CM

## 2020-08-05 DIAGNOSIS — N18.30 CHRONIC KIDNEY DISEASE, STAGE III (MODERATE) (HCC): ICD-10-CM

## 2020-08-05 DIAGNOSIS — Z87.891 EX-CIGARETTE SMOKER: ICD-10-CM

## 2020-08-05 NOTE — PROGRESS NOTES
This is a \"Welcome to [employer]"  Initial Preventive Physical Examination (IPPE) providing Personalized Prevention Plan Services (Performed in the first 12 months of enrollment)    I have reviewed the patient's medical history in detail and updated the computerized patient record. History     Past Medical History:   Diagnosis Date    Chronic renal disease, stage III ([address]     Diabetes mellitus type 1 ([address]     Diabetic peripheral neuropathy (HCC)     Diabetic retinopathy ([address]     Essential hypertension with goal blood pressure less than 140/90     Hearing decreased     Hyperlipidemia     Hypothyroidism       Past Surgical History:   Procedure Laterality Date    HX GYN      partial hysterectomy    HX GYN      3 D and C's    HX HEENT      tonsillectomy    HX HEENT      cataract removal bilateral    HX HEENT      multiple surgeries for retinopathy    HX ORTHOPAEDIC      carpral tunnel sugery bilateral     Current Outpatient Medications   Medication Sig Dispense Refill    omeprazole (PRILOSEC) 20 mg capsule Take 1 Cap by mouth daily. 90 Cap 3    levothyroxine (SYNTHROID) 100 mcg tablet Take 1 Tab by mouth five (5) days a week. Take Mon-Fri. Take 88 mcg on Sat/Sun 90 Tab 4    levothyroxine (SYNTHROID) 88 mcg tablet Take 1 Tab by mouth two (2) days a week. Take on Saturday and Sunday. Take 100 mcg on Mon-Fri. 25 Tab 4    quinapril (ACCUPRIL) 10 mg tablet TAKE 1 TABLET EVERY EVENING ALONG WITH A 5 MG TABLET 90 Tab 4    quinapril (ACCUPRIL) 5 mg tablet TAKE 1 TABLET NIGHTLY WITH 10 MG TABLET EVERY EVENING 90 Tab 3    ezetimibe (ZETIA) 10 mg tablet TAKE 1 TABLET DAILY 90 Tab 3    Cholecalciferol, Vitamin D3, (VITAMIN D3) 2,000 unit cap capsule Take 2,000 Units by mouth daily.  30 Cap 5    ALPHAGAN P 0.1 % ophthalmic solution   3    glucose blood VI test strips (ONE TOUCH ULTRA TEST) strip USE TO TEST BLOOD SUGAR FOUR TIMES A  Strip PRN    cyanocobalamin (VITAMIN B-12) 500 mcg tablet Take 500 mcg by mouth daily.  aspirin delayed-release 81 mg tablet Take  by mouth daily.  insulin glargine (LANTUS SOLOSTAR) 100 unit/mL (3 mL) pen 23 Units by SubCUTAneous route daily.  insulin lispro (HUMALOG PEN) 100 unit/mL kwikpen by SubCUTAneous route. Sliding scale as needed         Allergies   Allergen Reactions    Pcn [Penicillins] Hives    Sulfa (Sulfonamide Antibiotics) Nausea Only and Vertigo       Family History   Problem Relation Age of Onset    Diabetes Brother     Diabetes Mother     Heart Disease Mother      Social History     Tobacco Use    Smoking status: Former Smoker     Packs/day: 1.00     Years: 10.00     Pack years: 10.00     Last attempt to quit: 2015     Years since quittin.6    Smokeless tobacco: Never Used   Substance Use Topics    Alcohol use: Yes     Comment: infrequently       Depression Risk Screen     3 most recent PHQ Screens 2020   Little interest or pleasure in doing things Not at all   Feeling down, depressed, irritable, or hopeless Not at all   Total Score PHQ 2 0       Alcohol Risk Factor Screening:   Do you average 1 drink per night or more than 7 drinks a week:  No    On any one occasion in the past three months have you have had more than 3 drinks containing alcohol:  No      Functional Ability and Level of Safety   Diet: The patient is prescribed and follows a special diet. Hearing: The patient wears hearing aids. Vision Screening:  Vision is poor. Followed closely by Dr. Sanjuana Givens. Visual Acuity Screening    Right eye Left eye Both eyes   Without correction:      With correction: 20/30 20/100 20/25        Activities of Daily Living: The home contains: no safety equipment. Patient does total self care     Ambulation: with no difficulty     Exercise level: moderately active     Fall Risk Screen:  Fall Risk Assessment, last 12 mths 2020   Able to walk? Yes   Fall in past 12 months?  No     Abuse Screen:  Patient is not abused Screening EKG   EKG order placed: Yes    Patient Care Team   Patient Care Team:  Peyton Doherty MD as PCP - General (Internal Medicine)  Peyton Doherty MD as PCP - [facility] Provider     End of Life Planning   Advanced care planning directives were discussed with the patient and /or family/caregiver. Assessment/Plan   Education and counseling provided:  Are appropriate based on today's review and evaluation  End-of-Life planning (with patient's consent)  Pneumococcal Vaccine  Influenza Vaccine  Screening Mammography  Colorectal cancer screening tests  Cardiovascular screening blood test  Bone mass measurement (DEXA)  Screening for glaucoma  Medical nutrition therapy for individuals with diabetes or renal disease  Shingrix vaccine    Diagnoses and all orders for this visit:    1. Essential hypertension  -     CBC WITH AUTOMATED DIFF; Future  -     LIPID PANEL; Future  -     TSH 3RD GENERATION; Future  -     T4, FREE; Future  -     VITAMIN D, 25 HYDROXY; Future  -     METABOLIC PANEL, COMPREHENSIVE; Future  -     MICROALBUMIN, UR, RAND W/ MICROALB/CREAT RATIO; Future    2. Hyperlipidemia, unspecified hyperlipidemia type  -     CBC WITH AUTOMATED DIFF; Future  -     LIPID PANEL; Future  -     TSH 3RD GENERATION; Future  -     T4, FREE; Future  -     VITAMIN D, 25 HYDROXY; Future  -     METABOLIC PANEL, COMPREHENSIVE; Future  -     MICROALBUMIN, UR, RAND W/ MICROALB/CREAT RATIO; Future    3. Type 1 diabetes mellitus with diabetic polyneuropathy (HCC)  -     CBC WITH AUTOMATED DIFF; Future  -     LIPID PANEL; Future  -     TSH 3RD GENERATION; Future  -     T4, FREE; Future  -     VITAMIN D, 25 HYDROXY; Future  -     METABOLIC PANEL, COMPREHENSIVE; Future  -     MICROALBUMIN, UR, RAND W/ MICROALB/CREAT RATIO; Future    4. Stable proliferative diabetic retinopathy of both eyes associated with type 1 diabetes mellitus ([facility]  -     CBC WITH AUTOMATED DIFF; Future  -     LIPID PANEL;  Future  -     TSH 3RD GENERATION; Future  -     T4, FREE; Future  -     VITAMIN D, 25 HYDROXY; Future  -     METABOLIC PANEL, COMPREHENSIVE; Future  -     MICROALBUMIN, UR, RAND W/ MICROALB/CREAT RATIO; Future    5. Type 1 diabetes mellitus with stage 3 chronic kidney disease (HCC)  -     CBC WITH AUTOMATED DIFF; Future  -     LIPID PANEL; Future  -     TSH 3RD GENERATION; Future  -     T4, FREE; Future  -     VITAMIN D, 25 HYDROXY; Future  -     METABOLIC PANEL, COMPREHENSIVE; Future  -     MICROALBUMIN, UR, RAND W/ MICROALB/CREAT RATIO; Future    6. Microalbuminuria due to type 1 diabetes mellitus (HCC)  -     CBC WITH AUTOMATED DIFF; Future  -     LIPID PANEL; Future  -     TSH 3RD GENERATION; Future  -     T4, FREE; Future  -     VITAMIN D, 25 HYDROXY; Future  -     METABOLIC PANEL, COMPREHENSIVE; Future  -     MICROALBUMIN, UR, RAND W/ MICROALB/CREAT RATIO; Future    7. Hypothyroidism due to acquired atrophy of thyroid  -     CBC WITH AUTOMATED DIFF; Future  -     LIPID PANEL; Future  -     TSH 3RD GENERATION; Future  -     T4, FREE; Future  -     VITAMIN D, 25 HYDROXY; Future  -     METABOLIC PANEL, COMPREHENSIVE; Future  -     MICROALBUMIN, UR, RAND W/ MICROALB/CREAT RATIO; Future    8. Chronic kidney disease, stage III (moderate) (HCC)  -     CBC WITH AUTOMATED DIFF; Future  -     LIPID PANEL; Future  -     TSH 3RD GENERATION; Future  -     T4, FREE; Future  -     VITAMIN D, 25 HYDROXY; Future  -     METABOLIC PANEL, COMPREHENSIVE; Future  -     MICROALBUMIN, UR, RAND W/ MICROALB/CREAT RATIO; Future    9. Welcome to Medicare preventive visit  -     AMB POC EKG ROUTINE W/ 12 LEADS, SCREEN ()    10. Encounter for screening mammogram for malignant neoplasm of breast  -     JUNIOR MAMMO BI SCREENING INCL CAD; Future    11. Postmenopausal state  -     DEXA BONE DENSITY STUDY AXIAL; Future    12. Ex-cigarette smoker (30 pack year), stopped 1/2015  -     CBC WITH AUTOMATED DIFF; Future  -     LIPID PANEL;  Future  -     TSH 3RD GENERATION; Future  -     T4, FREE; Future  -     VITAMIN D, 25 HYDROXY; Future  -     METABOLIC PANEL, COMPREHENSIVE; Future  -     MICROALBUMIN, UR, RAND W/ MICROALB/CREAT RATIO; Future    13. Macrocytosis  -     CBC WITH AUTOMATED DIFF; Future  -     LIPID PANEL; Future  -     TSH 3RD GENERATION; Future  -     T4, FREE; Future  -     VITAMIN D, 25 HYDROXY; Future  -     METABOLIC PANEL, COMPREHENSIVE;  Future  -     MICROALBUMIN, UR, RAND W/ MICROALB/CREAT RATIO; Future        Health Maintenance Due   Topic Date Due    Bone Densitometry (Dexa) Screening  04/08/2020    Influenza Age 5 to Adult  08/01/2020    GLAUCOMA SCREENING Q2Y  10/02/2020

## 2020-08-05 NOTE — PATIENT INSTRUCTIONS
Please monitor and record your blood pressure every morning and evening for the next 2 weeks at least two hours after taking your medications. Please deliver record of readings to our office for review. Decrease Synthroid to 88 mcg 3 days per week and 100 mcg 4 days per week. Medicare Wellness Visit, Female The best way to live healthy is to have a lifestyle where you eat a well-balanced diet, exercise regularly, limit alcohol use, and quit all forms of tobacco/nicotine, if applicable. Regular preventive services are another way to keep healthy. Preventive services (vaccines, screening tests, monitoring & exams) can help personalize your care plan, which helps you manage your own care. Screening tests can find health problems at the earliest stages, when they are easiest to treat. [facility] follows the current, evidence-based guidelines published by the [facility] ([facility]) when recommending preventive services for our patients. Because we follow these guidelines, sometimes recommendations change over time as research supports it. (For example, mammograms used to be recommended annually. Even though Medicare will still pay for an annual mammogram, the newer guidelines recommend a mammogram every two years for women of average risk). Of course, you and your doctor may decide to screen more often for some diseases, based on your risk and your co-morbidities (chronic disease you are already diagnosed with). Preventive services for you include: - Medicare offers their members a free annual wellness visit, which is time for you and your primary care provider to discuss and plan for your preventive service needs. Take advantage of this benefit every year! 
-All adults over the age of 72 should receive the recommended pneumonia vaccines. Current USPSTF guidelines recommend a series of two vaccines for the best pneumonia protection. -All adults should have a flu vaccine yearly and a tetanus vaccine every 10 years.  
-All adults age 48 and older should receive the shingles vaccines (series of two vaccines). -All adults age 38-68 who are overweight should have a diabetes screening test once every three years.  
-All adults born between [birth year] and 1965 should be screened once for Hepatitis C. 
-Other screening tests and preventive services for persons with diabetes include: an eye exam to screen for diabetic retinopathy, a kidney function test, a foot exam, and stricter control over your cholesterol.  
-Cardiovascular screening for adults with routine risk involves an electrocardiogram (ECG) at intervals determined by your doctor.  
-Colorectal cancer screenings should be done for adults age 54-65 with no increased risk factors for colorectal cancer. There are a number of acceptable methods of screening for this type of cancer. Each test has its own benefits and drawbacks. Discuss with your doctor what is most appropriate for you during your annual wellness visit. The different tests include: colonoscopy (considered the best screening method), a fecal occult blood test, a fecal DNA test, and sigmoidoscopy. 
 
-A bone mass density test is recommended when a woman turns 65 to screen for osteoporosis. This test is only recommended one time, as a screening. Some providers will use this same test as a disease monitoring tool if you already have osteoporosis. -Breast cancer screenings are recommended every other year for women of normal risk, age 54-69. 
-Cervical cancer screenings for women over age 72 are only recommended with certain risk factors. Here is a list of your current Health Maintenance items (your personalized list of preventive services) with a due date: 
Health Maintenance Due Topic Date Due  Bone Mineral Density   04/08/2020 [address] [place] [address] Annual Well Visit  04/15/2020  Flu Vaccine  08/01/2020  Glaucoma Screening   10/02/2020

## 2020-08-05 NOTE — TELEPHONE ENCOUNTER
Please request recent eye exam from Dr. Vicente Cunningham . Patient reports being seen every 3 months . Thank you.

## 2020-08-05 NOTE — ACP (ADVANCE CARE PLANNING)
Advance Care Planning       Advance Care Planning (ACP) Physician/NP/PA (Provider) Conversation        Date of ACP Conversation: 8/5/2020    Conversation Conducted with:   Patient with Decision Making [address] Maker:    Current Designated Health Care Decision Maker:   Primary Decision Maker: Radu Ospinaoms - [phone]  (If there is a [address] named in the [address] Makers\" box in the ACP activity, but it is not visible above, be sure to open that field and then select the health care decision maker relationship (ie \"primary\") in the blank space to the right of the name.)    Note: Assess and validate information in current ACP documents, as indicated. If no Authorized Decision Maker has previously been identified, then patient chooses [place]:  \"Who would you like to name as your primary health care decision-maker? \"    Name: Zacarias Noriega   Relationship:  Phone number: [phone]  [facility] this person be reached easily? \" YES      Note: If the relationship of these Decision-Makers to the patient does NOT follow your state's Next of Kin hierarchy, recommend that patient complete ACP document that meets state-specific requirements to allow them to act on the patient's behalf when appropriate. Care Preferences:    Hospitalization: \"If your health worsens and it becomes clear that your chance of recovery is unlikely, what would your preference be regarding hospitalization? \"  If the patient would want hospitalization, answer \"yes\". If the patient would prefer comfort-focused treatment without hospitalization, answer \"no\". yes      Ventilation: \"If you were in your present state of health and suddenly became very ill and were unable to breathe on your own, what would your preference be about the use of a ventilator (breathing machine) if it was available to you? \"    If patient would desire the use of a ventilator (breathing machine), answer \"yes\", if not answer \"no\":yes    \"If your health worsens and it becomes clear that your chance of recovery is unlikely, what would your preference be about the use of a ventilator (breathing machine) if it was available to you? \"   no      Resuscitation:  \"CPR works best to restart the heart when there is a sudden event, like a heart attack, in someone who is otherwise healthy. Unfortunately, CPR does not typically restart the heart for people who have serious health conditions or who are very sick. \"    \"In the event your heart stopped as a result of an underlying serious health condition, would you want attempts to be made to restart your heart (answer \"yes\" for attempt to resuscitate) or would you prefer a natural death (answer \"no\" for do not attempt to resuscitate)? \"   no    NOTE: If the patient has a valid advance directive AND provides care preference(s) that are inconsistent with that prior directive, advise the patient to consider either: creating a new advance directive that complies with state-specific requirements; or, if that is not possible, orally revoking that prior directive in accordance with state-specific requirements, which must be documented in the EHR.     Conversation Outcomes / Follow-Up Plan:   Recommended completion of Advance Directive      Length of Voluntary ACP Conversation in minutes:  16 minutes      Catha Nyhan, MD

## 2020-08-06 RX ORDER — OMEPRAZOLE 20 MG/1
20 CAPSULE, DELAYED RELEASE ORAL DAILY
Qty: 90 CAP | Refills: 3 | Status: SHIPPED | OUTPATIENT
Start: 2020-08-06 | End: 2020-11-05

## 2020-08-06 NOTE — TELEPHONE ENCOUNTER
Patient is requesting a new prescription for Prilosec to be sent to [facility]. Previous prescription was sent to [facility]. Please review and sign if appropriate. Thank you. Last visit 08/05/2020 MD Yogesh Solorio   Next appointment 11/05/2020 MD Martinez   Previous refill encounter(s) 06/29/2020 Prilosec #90 with  Refills. Requested Prescriptions     Pending Prescriptions Disp Refills    omeprazole (PRILOSEC) 20 mg capsule 90 Cap 3     Sig: Take 1 Cap by mouth daily.

## 2020-08-07 NOTE — PROGRESS NOTES
HPI:   Mak Puentes is a 72y.o. year old female who presents today for a physical exam. She has a history of hypertension, hyperlipidemia, diabetes mellitus type 1, chronic renal disease stage 3, and hypothyroidism. She reports that her diabetes control continues to be stable since using the [employer] She continues to adjust her blood sugars throughout the day as needed, and her HbA1c was 6.9 at her last visit with Dr. Stuart Underwood earlier this week. She has been following social distancing guidelines and remaining at home. She is trying to walk for exercise. She is otherwise without new complaints and feeling generally well. On 4/26/2018, she presented with worsening lower extremity edema. She stated that previously, her swelling would improve overnight, but she had been noticing recently that it did not resolve. She also reported some mild dyspnea with exertion, such as walking upstairs, which had been present chronically, although may have been slightly worse. She also reported some \"skipped beats\" although not prolonged. She denied any chest pain, shortness of breath at rest or with normal activities, lightheadedness, PND, or orthopnea. She was started on low dose lasix, and referred for an echocardiogram, Holter monitor, and lower extremity duplex scan, but prior to having these performed, presented to [facility] ED for evaluation of pain and swelling of her right ankle. While in ED, CBC, CMP, and D-dimer were unremarkable. Cardiac enzymes x 2 were negative, and EKG was unchanged. She was observed overnight and underwent a pharmacologic nuclear stress test (5/3/2018) which was a normal low risk study, without evidence of ischemia or prior infarction, and EF 86%.  She was subsequently discharged, and underwent an echocardiogram (5/10/2018) showed normal LV function (EF 55-60%) no RWMA, mild LAE, mild-moderate TR, RVSP normal (22 mmHg); holter monitor (5/1/2018) showing sinus rhythm with sinus arrhythmia at times; rare PVC's; one short run of non-sustained of SVT for 6 beats. Episode of sinus tachycardia recorded in diary occurred while she was undergoing pharmacologic nuclear stress test. No other symptoms reported; and lower extremity duplex (5/11/2018) which was negative for DVT and venous reflux. On 5/8/2018, she was evaluated by a podiatrist, Dr. Shahab Sapp, for her right foot swelling. Felt that it was most likely secondary to sprain injury as x-rays negative and presentation not consistent with gout or other form of arthritis. She was started on a Medrol dose pack. However, her blood sugars increased to >400 and she was changed to ibuprofen 400 mg tid. She did have a right foot MRI (5/26/2018) which showed considerable edema involving lower leg, hindfoot and midfoot, nonlocalizing a nonspecific; evidence of old injury to the ATF ligament.  Other ankle ligaments are intact; mild peroneus brevis tendinopathy without measurable tear; small partial thickness longitudinal split in the peroneus longus tendon within the retromalleolar groove; mild distal Achilles tendinosis; and small subchondral cyst in the anterior facet of the talus with otherwise no degenerative change seen in any of the hindfoot or midfoot articulations. She states that she was told that the right foot swelling was due to an old injury, but she states that it persists although she does not feel much pain due to her neuropathy. She has a history of diabetes mellitus type 1, with onset at age 6. She is currently under the care of Dr. Jesenia Preston, and is being treated with insulin glargine and lispro. She had not been well-controlled, with HbA1c ranging from 7.5-8.0, although has ow improved since using the Freestyle meter as discussed. She does have proliferative diabetic retinopathy and is s/p pan-retinal photocoagulation therapy in both eyes.  She is followed closely by Dr. Macie Luciano, and recent exam showed regression bilaterally. She also has peripheral neuropathy, with burning and tingling in her feet at night. Renal function had been normal until 5/2016, with evidence of chronic renal disease, stage 3, since that time with baseline creatinine1.0-1.04/ eGFR 54-56. She also has a history of hypothyroidism, and she is currently on Synthroid. She denies cold intolerance, hair or skin changes. She has a history of hypertension, treated with quinapril. She had a pharmacologic nuclear stress test in 11/2012, which showed no evidence for ischemia, and normal LV wall motion (EF> 70%). She also had a carotid duplex scan in 7/2013 which showed mild (< 50%) bilateral internal carotid artery stenoses. She denies any chest pain, shortness of breath at rest or with exertion, palpitations, lightheadedness, or edema. She has a history of hyperlipidemia, and was on atorvastatin for many years until 10/2015 when she discontinued it because of myalgias. She was recently tried on rosuvastatin without success due to recurrent myalgias. She was started on ezetimibe in 9/2016 and has been tolerating it without difficulty. She successfully stopped smoking in 1/2015, and reports that she continues to abstain. She had a screening colonoscopy in 9/2014 by Dr. Iqra Aguayo, which found a 6 mm sessile polyp in the descending colon and a 4-5 mm sessile polyp in the rectum/sigmoid (pathology: tubular adenomas). She had a repeat colonoscopy in 9/2019 showing a 6 mm sessile polyp in the ileocecal valve (pathology unavailable). Recommendation was for follow-up colonoscopy in 5 years. She denies any abdominal pain, nausea, vomiting, melena, hematochezia, or change in bowel movements.     In 3/2017, she was diagnosed with an upper respiratory tract infection and was prescribed doxycycline and prednisone by [facility]. She subsequently developed severe dizziness and vomiting, prompting a visit to the [facility] ED where she was diagnosed with benign positional vertigo and treated with meclizine and Zofran with improvement. In 10/2017, she noticed difficulty with her peripheral vision and was evaluated by Dr. Rosemarie Cruz. She has regressed proliferative diabetic retinopathy and she reports that the laser therapy she received many years ago to treat this resulted in the current limitations in her peripheral vision. She does also have mild glaucoma for which she is receiving treatment. She states that her driving has been restricted to only during the day, and she is aware that she must turn her head completely to each side when navigating behind the wheel. In 2/2018, she began having difficulty with right elbow pain and was evaluated by Dr. Tucker Johnston who diagnosed her with right lateral epicondylitis. She received a cortisone injection with improvement. In 10/2018, while hiking on vacation hiking in [place], she slipped on some wooden steps and landed on her left shoulder. She was transported to [facility] in [place], [place], and found to have a closed nondisplaced fracture of the left proximal humerus. She was placed in a sling and told to follow-up with orthopedics. She was being followed by Dr. Bella Jordan of [address] [place] [address], and completed physical therapy with improvement. She is no longer requiring pain medication.        Past Medical History:   Diagnosis Date    Chronic renal disease, stage III ([address]     Diabetes mellitus type 1 ([address]     Diabetic peripheral neuropathy (HCC)     Diabetic retinopathy ([address]     Essential hypertension with goal blood pressure less than 140/90     Hearing decreased     Hyperlipidemia     Hypothyroidism      Past Surgical History:   Procedure Laterality Date    HX GYN      partial hysterectomy    HX GYN      3 D and C's    HX HEENT      tonsillectomy    HX HEENT      cataract removal bilateral    HX HEENT      multiple surgeries for retinopathy    HX ORTHOPAEDIC carpral tunnel sugery bilateral     Current Outpatient Medications   Medication Sig    omeprazole (PRILOSEC) 20 mg capsule Take 1 Cap by mouth daily.  levothyroxine (SYNTHROID) 100 mcg tablet Take 1 Tab by mouth five (5) days a week. Take Mon-Fri. Take 88 mcg on Sat/Sun    levothyroxine (SYNTHROID) 88 mcg tablet Take 1 Tab by mouth two (2) days a week. Take on Saturday and . Take 100 mcg on Mon-Fri.  quinapril (ACCUPRIL) 10 mg tablet TAKE 1 TABLET EVERY EVENING ALONG WITH A 5 MG TABLET    quinapril (ACCUPRIL) 5 mg tablet TAKE 1 TABLET NIGHTLY WITH 10 MG TABLET EVERY EVENING    ezetimibe (ZETIA) 10 mg tablet TAKE 1 TABLET DAILY    Cholecalciferol, Vitamin D3, (VITAMIN D3) 2,000 unit cap capsule Take 2,000 Units by mouth daily.  ALPHAGAN P 0.1 % ophthalmic solution     glucose blood VI test strips (ONE TOUCH ULTRA TEST) strip USE TO TEST BLOOD SUGAR FOUR TIMES A DAY    cyanocobalamin (VITAMIN B-12) 500 mcg tablet Take 500 mcg by mouth daily.  aspirin delayed-release 81 mg tablet Take  by mouth daily.  insulin glargine (LANTUS SOLOSTAR) 100 unit/mL (3 mL) pen 23 Units by SubCUTAneous route daily.  insulin lispro (HUMALOG PEN) 100 unit/mL kwikpen by SubCUTAneous route. Sliding scale as needed       No current facility-administered medications for this visit. Allergies and Intolerances: Allergies   Allergen Reactions    Pcn [Penicillins] Hives    Sulfa (Sulfonamide Antibiotics) Nausea Only and Vertigo     Family History: Grandmother  from colon cancer. Mother had CAD and DM. Family History   Problem Relation Age of Onset    Diabetes Brother     Diabetes Mother     Heart Disease Mother      Social History:   She  reports that she quit smoking about 5 years ago. She has a 10.00 pack-year smoking history. She has never used smokeless tobacco. She is  and lives with her .  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 Social History     Substance and Sexual Activity   Alcohol Use Yes    Comment: infrequently     Immunization History:  Immunization History   Administered Date(s) Administered    Influenza Vaccine (Quad) PF 12/15/2015, 09/14/2016, 10/19/2017, 11/07/2018, 12/10/2019    Influenza Vaccine PF 12/22/2014    Influenza Vaccine Split 11/04/2011, 10/17/2012    Influenza Vaccine Whole 11/09/2010    Pneumococcal Conjugate (PCV-13) 04/18/2020    Pneumococcal Polysaccharide (PPSV-23) 04/13/2017, 05/31/2019    Tdap 06/25/2013    Zoster Vaccine, Live 05/31/2016       Review of Systems:   As above included in HPI. Otherwise 11 point review of systems negative including constitutional, skin, HENT, eyes, respiratory, cardiovascular, gastrointestinal, genitourinary, musculoskeletal, endo/heme/aller, neurological.      Physical:   Vitals:   BP: 144/64  HR: 66  WT: 170 lb (77.1 kg)  BMI:  28.29 kg/m2    Exam:   Patient appears in no apparent distress. Affect is appropriate. HEENT --Anicteric sclerae, tympanic membranes normal,  ear canals normal.  PERRL, EOMI, conjunctiva and lids normal.   Sinuses were nontender, turbinates normal, hearing normal.  Oropharynx without  erythema, normal tongue, oral mucosa and tonsils. No cervical lymphadenopathy. No thyromegaly, JVD, or bruits. Carotid pulses 2+ with normal upstroke. Lungs --Clear to auscultation. No wheezing or rales. Heart --Regular rate and rhythm, no murmurs, rubs, gallops, or clicks. Breasts -- patient declined  Chest wall --Nontender to palpation. PMI normal.  Abdomen -- Soft and nontender, no hepatosplenomegaly or masses. Extremities -- Without cyanosis, clubbing, edema. 2+ pulses equally and bilaterally.   Normal looking digits, ROM intact  Neuro -- CN 2-12 intact, strength 5/5 with intact soft touch in all extremities  Derm  no obvious abnormalities noted, no rash    Review of Data:  Labs:   Hospital Outpatient Visit on 07/29/2020   Component Date Value Ref Range Status    Microalbumin,urine random 07/29/2020 0.77  0 - 3.0 MG/DL Final    Creatinine, urine 07/29/2020 72.00  30 - 125 mg/dL Final    Microalbumin/Creat ratio (mg/g cre* 07/29/2020 11  0 - 30 mg/g Final    WBC 07/29/2020 5.7  4.6 - 13.2 K/uL Final    RBC 07/29/2020 3.69* 4.20 - 5.30 M/uL Final    HGB 07/29/2020 12.1  12.0 - 16.0 g/dL Final    HCT 07/29/2020 36.0  35.0 - 45.0 % Final    MCV 07/29/2020 97.6* 74.0 - 97.0 FL Final    MCH 07/29/2020 32.8  24.0 - 34.0 PG Final    MCHC 07/29/2020 33.6  31.0 - 37.0 g/dL Final    RDW 07/29/2020 12.0  11.6 - 14.5 % Final    PLATELET 75/85/8924 663  135 - 420 K/uL Final    MPV 07/29/2020 12.4* 9.2 - 11.8 FL Final    NEUTROPHILS 07/29/2020 70  40 - 73 % Final    LYMPHOCYTES 07/29/2020 21  21 - 52 % Final    MONOCYTES 07/29/2020 8  3 - 10 % Final    EOSINOPHILS 07/29/2020 1  0 - 5 % Final    BASOPHILS 07/29/2020 0  0 - 2 % Final    ABS. NEUTROPHILS 07/29/2020 4.0  1.8 - 8.0 K/UL Final    ABS. LYMPHOCYTES 07/29/2020 1.2  0.9 - 3.6 K/UL Final    ABS. MONOCYTES 07/29/2020 0.5  0.05 - 1.2 K/UL Final    ABS. EOSINOPHILS 07/29/2020 0.1  0.0 - 0.4 K/UL Final    ABS.  BASOPHILS 07/29/2020 0.0  0.0 - 0.1 K/UL Final    DF 07/29/2020 AUTOMATED    Final    T4, Free 07/29/2020 1.7* 0.7 - 1.5 NG/DL Final    Magnesium 07/29/2020 2.0  1.6 - 2.6 mg/dL Final    Sodium 07/29/2020 141  136 - 145 mmol/L Final    Potassium 07/29/2020 4.5  3.5 - 5.5 mmol/L Final    Chloride 07/29/2020 109  100 - 111 mmol/L Final    CO2 07/29/2020 25  21 - 32 mmol/L Final    Anion gap 07/29/2020 7  3.0 - 18 mmol/L Final    Glucose 07/29/2020 123* 74 - 99 mg/dL Final    BUN 07/29/2020 17  7.0 - 18 MG/DL Final    Creatinine 07/29/2020 0.90  0.6 - 1.3 MG/DL Final    BUN/Creatinine ratio 07/29/2020 19  12 - 20   Final    GFR est AA 07/29/2020 >60  >60 ml/min/1.73m2 Final    GFR est non-AA 07/29/2020 >60  >60 ml/min/1.73m2 Final    Calcium 07/29/2020 9.2  8.5 - 10.1 MG/DL Final    Bilirubin, total 2020 0.4  0.2 - 1.0 MG/DL Final    ALT (SGPT) 2020 18  13 - 56 U/L Final    AST (SGOT) 2020 16  10 - 38 U/L Final    Alk. phosphatase 2020 62  45 - 117 U/L Final    Protein, total 2020 6.3* 6.4 - 8.2 g/dL Final    Albumin 2020 3.5  3.4 - 5.0 g/dL Final    Globulin 2020 2.8  2.0 - 4.0 g/dL Final    A-G Ratio 2020 1.3  0.8 - 1.7   Final    TSH 2020 0.15* 0.36 - 3.74 uIU/mL Final     EKG (2020) sinus rhythm at 61 bpm, normal intervals, no ischemic changes. Health Maintenance:  Screening:    Mammogram: negative (2019) Due    PAP smear: negative (2014). S/p YUNG, no further screening needed. Colorectal: colonsocopy (2019) ileocecal valve polyp. Dr. Shaun Davies. Due . Depression: none   DM (HbA1c/FPG): HbA1c 6.9 (2020) Dr. Alistair Branch   Hepatitis C: negative (2015)   Falls: none    DEXA: will order   Glaucoma: mild primary open angle glaucoma and regressed proliferative diabetic retinopathy. Followed by Dr. Shanel Estrada (last 2020)   Smokin pack year (stopped 2015)   Vitamin D: 79.7 (2020)   Medicare Wellness:  Welcome visit today    Impression:  Patient Active Problem List   Diagnosis Code    Stable proliferative diabetic retinopathy of both eyes associated with type 1 diabetes mellitus ([facility] C88.0239    Hyperlipidemia E78.5    Hearing decreased H91.90    Diabetes mellitus with diabetic polyneuropathy ([facility] E11.42    Vitamin D deficiency E55.9    Ex-cigarette smoker (30 pack year), stopped 2015 Z87.891    Adenomatous polyp of colon D12.6    Essential hypertension I10    Hypothyroidism due to acquired atrophy of thyroid E03.4    Chronic kidney disease, stage III (moderate) (HCC) N18.3    Type 1 diabetes mellitus with stage 3 chronic kidney disease (HCC) E10.22, N18.3    Macrocytosis D75.89    Microalbuminuria due to type 1 diabetes mellitus (HCC) E10.29, R80.9    Coronary artery calcification seen on CT scan I25.10       Plan:  1. Hypertension. Blood pressure elevated today on quinapril 15 mg daily. Instructed to monitor and record her blood pressure every morning for the next 2 weeks at least two hours after taking her medications and send record of readings to our office for review. Renal function remains stable with creatinine 0.90/ eGFR >60. Continue to follow. 2. Lower extremity edema/dyspnea. Patient had reported worsened lower extremity edema not resolving overnight and some questionable mild increase in exertional dyspnea. No other associated symptoms except palpitations in the form of \"skipped beats\". EKG without change from baseline. Lower extremity duplex negative for DVT or venous reflux. Echocardiogram with normal LV function and normal right sided pressures. 48 hour Holter monitor unrevealing. Pharmacologic nuclear stress test peformed at ED was negative. Started on lasix 20 mg daily with improvement. Reports that now generally present during the day and resolving overnight. Using compression hose regularly. 3. Type 1 diabetes mellitus. HbA1c improved to 6.9 (8/2020) since using new Freestyle sensor machine for blood sugar readings. On Lantus and Humalog. Being managed by Dr. Joesph Haque, Proliferative diabetic retinopathy in regression, followed by Dr. Misha Mcduffie. Neuropathy symptoms in feet are mild and have not required treatment with medication. Foot exam performed (1/2019). Renal function with evidence of chronic renal disease, stage 3, and also with evidence of mild microalbuminuria (8/2018). However, resolved today with normal renal function. May be reflective of better blood sugar control. On Ace-I. Unable to tolerate statins due to myalgias, but LDL effectively decreased with ezetimibe. Follow closely. 4. Hyperlipidemia. On ezetimibe with significant improvement in LDL to 90 and HDL 65. Not able to tolerate statin (atorvastatin/ rosuvastatin) due to myalgias.  Moderate to severe aortic and coronary atherosclerosis and calcifications seen on chest CT scan (11/2018). Stressed importance of lifestyle modifications, especially diet, exercise and weight loss. 5. Chronic renal disease, stage 3. Evidence of mild disease with creatinine 1.00-1.04/ eGFR 54-56 since 5/2016. However, recent readings normalized and normal today. Most likely due to long standing diabetes mellitus and hypertension. Remaining stable. On Ace-I, but not on statin as discussed above. Counseled on avoiding NSAIDS and prerenal status. 6. Hypothyroidism. Synthroid dose decreased from 112 to 100 mcg in 4/2017 due to over replacement. TSH was therapeutic in 7/2017, but with evidence of over replacement on labs in 10/2017 and 11/2018. Dose decreased to 88 mcg daily, but TSH significantly elevated to 12.17 in 5/2019, and dose of Synthroid increased back to 100 mcg daily. TSH overreplaced last visit and dose changed to 100 mcg M-F and 88 mcg Sat/Sun. TSH low again today, and will change to 100 mcg 4 days per week and 88 mcg three days per week. Will reassess TSH in 3 months. 7. Former smoking. 30 pack year history and patient stopped in 1/2015. Meets criteria for lung cancer screening with low dose CT scan. Performed in 11/2017 showing a few tiny solid and one groundglass nodule identified (lung RADS 2). Did also note evidence of emphysema and prominence of the pulmonary vascular trunk which can be seen in pulmonary arterial hypertension. However, echocardiogram with normal PA pressures. Repeat in 11/2018 and 11/2019 without change. Next study due in 11/2020. Will order next visit. Encouraged to continue with abstaining from smoking. 8. Anemia. Iron studies were consistent with anemia of chronic inflammation; B12 normal; and folate level slightly low last visit so advised to begin multivitamin with folate. Anemia resolved today with mild persistence of macrocytosis. Will continue to follow. 9. Overweight. Emphasized importance of lifestyle modifications, including diet, exercise, and weight loss. Will readdress next visit. 10. Health maintenance. Urged to obtain influenza vaccine this fall. Given script for Shingrix vaccine but not yet obtained. Other immunizations up to date. Mammogram up to date. Colonoscopy completed with Dr. Oumar Kidd. Mammogram due and will obtain with baseline bone density study. Vitamin D level now normal. Continue maintenance dose supplement. In addition, a Welcome to Medicare visit was done today. Total time: 40 minutes spent with the patient on counseling, answering questions and/or coordination of care. Complex medical review and management performed. Patient understands recommendations and agrees with plan. .  Follow-up appointment in 3 months.

## 2020-08-08 RX ORDER — EZETIMIBE 10 MG/1
TABLET ORAL
Qty: 90 TAB | Refills: 3 | Status: SHIPPED | OUTPATIENT
Start: 2020-08-08 | End: 2021-08-03

## 2020-08-08 RX ORDER — QUINAPRIL 5 1/1
TABLET ORAL
Qty: 90 TAB | Refills: 3 | Status: SHIPPED | OUTPATIENT
Start: 2020-08-08 | End: 2021-08-03

## 2020-08-11 LAB — HBA1C MFR BLD HPLC: 6.9 %

## 2020-10-08 ENCOUNTER — HOSPITAL ENCOUNTER (OUTPATIENT)
Dept: BONE DENSITY | Age: 65
Discharge: HOME | End: 2020-10-08
Attending: INTERNAL MEDICINE
Payer: MEDICARE

## 2020-10-08 ENCOUNTER — HOSPITAL ENCOUNTER (OUTPATIENT)
Dept: MAMMOGRAPHY | Age: 65
Discharge: HOME | End: 2020-10-08
Attending: INTERNAL MEDICINE
Payer: MEDICARE

## 2020-10-08 DIAGNOSIS — Z78.0 POSTMENOPAUSAL STATE: ICD-10-CM

## 2020-10-08 DIAGNOSIS — Z12.31 ENCOUNTER FOR SCREENING MAMMOGRAM FOR MALIGNANT NEOPLASM OF BREAST: ICD-10-CM

## 2020-10-08 PROCEDURE — 77067 SCR MAMMO BI INCL CAD: CPT

## 2020-10-08 PROCEDURE — 77080 DXA BONE DENSITY AXIAL: CPT

## 2020-10-08 PROCEDURE — 77063 BREAST TOMOSYNTHESIS BI: CPT

## 2020-10-09 ENCOUNTER — DOCUMENTATION ONLY (OUTPATIENT)
Dept: INTERNAL MEDICINE CLINIC | Age: 65
End: 2020-10-09

## 2020-10-09 NOTE — PROGRESS NOTES
Reviewed bone density study from 10/8/2020 showing T-scores:  femoral neck left -1.0  /right -1.3 and lumbar -0.4. Calculated FRAX score estimates her 10 year risk of a major osteoporetic fracture at 6.5 % and hip fracture at 0.2 %, which are not an indication for biphosphonate treatment. Will discuss with the patient at her next visit that her bone density study showed evidence of mild osteopenia. Will advise that she continue taking Vitamin D, and advise her to begin calcium 600 mg bid. Also, will encourage her to exercise regularly, particularly weight bearing activities which may help to prevent progression. Yuridia Edgar

## 2020-11-03 ENCOUNTER — APPOINTMENT (OUTPATIENT)
Dept: INTERNAL MEDICINE CLINIC | Age: 65
End: 2020-11-03

## 2020-11-03 ENCOUNTER — HOSPITAL ENCOUNTER (OUTPATIENT)
Dept: LAB | Age: 65
Discharge: HOME | End: 2020-11-03
Payer: MEDICARE

## 2020-11-03 DIAGNOSIS — E10.29 MICROALBUMINURIA DUE TO TYPE 1 DIABETES MELLITUS (HCC): ICD-10-CM

## 2020-11-03 DIAGNOSIS — D75.89 MACROCYTOSIS: ICD-10-CM

## 2020-11-03 DIAGNOSIS — N18.30 CHRONIC KIDNEY DISEASE, STAGE III (MODERATE) (HCC): ICD-10-CM

## 2020-11-03 DIAGNOSIS — E10.3553 STABLE PROLIFERATIVE DIABETIC RETINOPATHY OF BOTH EYES ASSOCIATED WITH TYPE 1 DIABETES MELLITUS (HCC): ICD-10-CM

## 2020-11-03 DIAGNOSIS — E10.42 TYPE 1 DIABETES MELLITUS WITH DIABETIC POLYNEUROPATHY (HCC): ICD-10-CM

## 2020-11-03 DIAGNOSIS — R80.9 MICROALBUMINURIA DUE TO TYPE 1 DIABETES MELLITUS (HCC): ICD-10-CM

## 2020-11-03 DIAGNOSIS — E03.4 HYPOTHYROIDISM DUE TO ACQUIRED ATROPHY OF THYROID: ICD-10-CM

## 2020-11-03 DIAGNOSIS — E78.5 HYPERLIPIDEMIA, UNSPECIFIED HYPERLIPIDEMIA TYPE: ICD-10-CM

## 2020-11-03 DIAGNOSIS — N18.30 TYPE 1 DIABETES MELLITUS WITH STAGE 3 CHRONIC KIDNEY DISEASE (HCC): ICD-10-CM

## 2020-11-03 DIAGNOSIS — E10.22 TYPE 1 DIABETES MELLITUS WITH STAGE 3 CHRONIC KIDNEY DISEASE (HCC): ICD-10-CM

## 2020-11-03 DIAGNOSIS — Z87.891 EX-CIGARETTE SMOKER: ICD-10-CM

## 2020-11-03 DIAGNOSIS — I10 ESSENTIAL HYPERTENSION: ICD-10-CM

## 2020-11-03 LAB
25(OH)D3 SERPL-MCNC: 56.8 NG/ML (ref 30–100)
ALBUMIN SERPL-MCNC: 3.5 G/DL (ref 3.4–5)
ALBUMIN/GLOB SERPL: 1.3 {RATIO} (ref 0.8–1.7)
ALP SERPL-CCNC: 69 U/L (ref 45–117)
ALT SERPL-CCNC: 15 U/L (ref 13–56)
ANION GAP SERPL CALC-SCNC: 7 MMOL/L (ref 3–18)
AST SERPL-CCNC: 8 U/L (ref 10–38)
BASOPHILS # BLD: 0 K/UL (ref 0–0.1)
BASOPHILS NFR BLD: 1 % (ref 0–2)
BILIRUB SERPL-MCNC: 0.3 MG/DL (ref 0.2–1)
BUN SERPL-MCNC: 27 MG/DL (ref 7–18)
BUN/CREAT SERPL: 25 (ref 12–20)
CALCIUM SERPL-MCNC: 9.2 MG/DL (ref 8.5–10.1)
CHLORIDE SERPL-SCNC: 106 MMOL/L (ref 100–111)
CHOLEST SERPL-MCNC: 201 MG/DL
CO2 SERPL-SCNC: 25 MMOL/L (ref 21–32)
CREAT SERPL-MCNC: 1.1 MG/DL (ref 0.6–1.3)
CREAT UR-MCNC: 52 MG/DL (ref 30–125)
DIFFERENTIAL METHOD BLD: ABNORMAL
EOSINOPHIL # BLD: 0 K/UL (ref 0–0.4)
EOSINOPHIL NFR BLD: 1 % (ref 0–5)
ERYTHROCYTE [DISTWIDTH] IN BLOOD BY AUTOMATED COUNT: 12.3 % (ref 11.6–14.5)
GLOBULIN SER CALC-MCNC: 2.8 G/DL (ref 2–4)
GLUCOSE SERPL-MCNC: 247 MG/DL (ref 74–99)
HCT VFR BLD AUTO: 36.2 % (ref 35–45)
HDLC SERPL-MCNC: 69 MG/DL (ref 40–60)
HDLC SERPL: 2.9 {RATIO} (ref 0–5)
HGB BLD-MCNC: 12.1 G/DL (ref 12–16)
LDLC SERPL CALC-MCNC: 109.6 MG/DL (ref 0–100)
LIPID PROFILE,FLP: ABNORMAL
LYMPHOCYTES # BLD: 1.3 K/UL (ref 0.9–3.6)
LYMPHOCYTES NFR BLD: 23 % (ref 21–52)
MCH RBC QN AUTO: 32.2 PG (ref 24–34)
MCHC RBC AUTO-ENTMCNC: 33.4 G/DL (ref 31–37)
MCV RBC AUTO: 96.3 FL (ref 74–97)
MICROALBUMIN UR-MCNC: 0.62 MG/DL (ref 0–3)
MICROALBUMIN/CREAT UR-RTO: 12 MG/G (ref 0–30)
MONOCYTES # BLD: 0.5 K/UL (ref 0.05–1.2)
MONOCYTES NFR BLD: 8 % (ref 3–10)
NEUTS SEG # BLD: 3.7 K/UL (ref 1.8–8)
NEUTS SEG NFR BLD: 67 % (ref 40–73)
PLATELET # BLD AUTO: 251 K/UL (ref 135–420)
PMV BLD AUTO: 12.1 FL (ref 9.2–11.8)
POTASSIUM SERPL-SCNC: 5 MMOL/L (ref 3.5–5.5)
PROT SERPL-MCNC: 6.3 G/DL (ref 6.4–8.2)
RBC # BLD AUTO: 3.76 M/UL (ref 4.2–5.3)
SODIUM SERPL-SCNC: 138 MMOL/L (ref 136–145)
T4 FREE SERPL-MCNC: 1.4 NG/DL (ref 0.7–1.5)
TRIGL SERPL-MCNC: 112 MG/DL (ref ?–150)
TSH SERPL DL<=0.05 MIU/L-ACNC: 0.18 UIU/ML (ref 0.36–3.74)
VLDLC SERPL CALC-MCNC: 22.4 MG/DL
WBC # BLD AUTO: 5.5 K/UL (ref 4.6–13.2)

## 2020-11-03 PROCEDURE — 85025 COMPLETE CBC W/AUTO DIFF WBC: CPT

## 2020-11-03 PROCEDURE — 84439 ASSAY OF FREE THYROXINE: CPT

## 2020-11-03 PROCEDURE — 36415 COLL VENOUS BLD VENIPUNCTURE: CPT

## 2020-11-03 PROCEDURE — 82043 UR ALBUMIN QUANTITATIVE: CPT

## 2020-11-03 PROCEDURE — 80053 COMPREHEN METABOLIC PANEL: CPT

## 2020-11-03 PROCEDURE — 84443 ASSAY THYROID STIM HORMONE: CPT

## 2020-11-03 PROCEDURE — 80061 LIPID PANEL: CPT

## 2020-11-03 PROCEDURE — 82306 VITAMIN D 25 HYDROXY: CPT

## 2020-11-05 ENCOUNTER — OFFICE VISIT (OUTPATIENT)
Dept: INTERNAL MEDICINE CLINIC | Age: 65
End: 2020-11-05
Payer: MEDICARE

## 2020-11-05 VITALS
WEIGHT: 169 LBS | BODY MASS INDEX: 28.16 KG/M2 | HEIGHT: 65 IN | OXYGEN SATURATION: 99 % | HEART RATE: 72 BPM | RESPIRATION RATE: 16 BRPM | DIASTOLIC BLOOD PRESSURE: 76 MMHG | TEMPERATURE: 97.9 F | SYSTOLIC BLOOD PRESSURE: 138 MMHG

## 2020-11-05 DIAGNOSIS — Z23 NEEDS FLU SHOT: ICD-10-CM

## 2020-11-05 DIAGNOSIS — N18.31 TYPE 1 DIABETES MELLITUS WITH STAGE 3A CHRONIC KIDNEY DISEASE (HCC): ICD-10-CM

## 2020-11-05 DIAGNOSIS — E55.9 VITAMIN D DEFICIENCY: ICD-10-CM

## 2020-11-05 DIAGNOSIS — Z87.891 FORMER SMOKER: ICD-10-CM

## 2020-11-05 DIAGNOSIS — I10 ESSENTIAL HYPERTENSION: Primary | ICD-10-CM

## 2020-11-05 DIAGNOSIS — E78.5 HYPERLIPIDEMIA, UNSPECIFIED HYPERLIPIDEMIA TYPE: ICD-10-CM

## 2020-11-05 DIAGNOSIS — R80.9 MICROALBUMINURIA DUE TO TYPE 1 DIABETES MELLITUS (HCC): ICD-10-CM

## 2020-11-05 DIAGNOSIS — Z87.891 PERSONAL HISTORY OF TOBACCO USE, PRESENTING HAZARDS TO HEALTH: ICD-10-CM

## 2020-11-05 DIAGNOSIS — E10.29 MICROALBUMINURIA DUE TO TYPE 1 DIABETES MELLITUS (HCC): ICD-10-CM

## 2020-11-05 DIAGNOSIS — N18.31 STAGE 3A CHRONIC KIDNEY DISEASE (HCC): ICD-10-CM

## 2020-11-05 DIAGNOSIS — E03.4 HYPOTHYROIDISM DUE TO ACQUIRED ATROPHY OF THYROID: ICD-10-CM

## 2020-11-05 DIAGNOSIS — E10.22 TYPE 1 DIABETES MELLITUS WITH STAGE 3A CHRONIC KIDNEY DISEASE (HCC): ICD-10-CM

## 2020-11-05 DIAGNOSIS — K21.9 GASTROESOPHAGEAL REFLUX DISEASE, UNSPECIFIED WHETHER ESOPHAGITIS PRESENT: ICD-10-CM

## 2020-11-05 DIAGNOSIS — E10.42 TYPE 1 DIABETES MELLITUS WITH DIABETIC POLYNEUROPATHY (HCC): ICD-10-CM

## 2020-11-05 DIAGNOSIS — E10.3553 STABLE PROLIFERATIVE DIABETIC RETINOPATHY OF BOTH EYES ASSOCIATED WITH TYPE 1 DIABETES MELLITUS (HCC): ICD-10-CM

## 2020-11-05 PROCEDURE — G8427 DOCREV CUR MEDS BY ELIG CLIN: HCPCS | Performed by: INTERNAL MEDICINE

## 2020-11-05 PROCEDURE — G8752 SYS BP LESS 140: HCPCS | Performed by: INTERNAL MEDICINE

## 2020-11-05 PROCEDURE — G0463 HOSPITAL OUTPT CLINIC VISIT: HCPCS | Performed by: INTERNAL MEDICINE

## 2020-11-05 PROCEDURE — G8754 DIAS BP LESS 90: HCPCS | Performed by: INTERNAL MEDICINE

## 2020-11-05 PROCEDURE — 2022F DILAT RTA XM EVC RTNOPTHY: CPT | Performed by: INTERNAL MEDICINE

## 2020-11-05 PROCEDURE — 3017F COLORECTAL CA SCREEN DOC REV: CPT | Performed by: INTERNAL MEDICINE

## 2020-11-05 PROCEDURE — G8536 NO DOC ELDER MAL SCRN: HCPCS | Performed by: INTERNAL MEDICINE

## 2020-11-05 PROCEDURE — G8510 SCR DEP NEG, NO PLAN REQD: HCPCS | Performed by: INTERNAL MEDICINE

## 2020-11-05 PROCEDURE — G0296 VISIT TO DETERM LDCT ELIG: HCPCS | Performed by: INTERNAL MEDICINE

## 2020-11-05 PROCEDURE — G8419 CALC BMI OUT NRM PARAM NOF/U: HCPCS | Performed by: INTERNAL MEDICINE

## 2020-11-05 PROCEDURE — 1101F PT FALLS ASSESS-DOCD LE1/YR: CPT | Performed by: INTERNAL MEDICINE

## 2020-11-05 PROCEDURE — G9899 SCRN MAM PERF RSLTS DOC: HCPCS | Performed by: INTERNAL MEDICINE

## 2020-11-05 PROCEDURE — 1090F PRES/ABSN URINE INCON ASSESS: CPT | Performed by: INTERNAL MEDICINE

## 2020-11-05 PROCEDURE — 99214 OFFICE O/P EST MOD 30 MIN: CPT | Performed by: INTERNAL MEDICINE

## 2020-11-05 PROCEDURE — G8399 PT W/DXA RESULTS DOCUMENT: HCPCS | Performed by: INTERNAL MEDICINE

## 2020-11-05 PROCEDURE — 3046F HEMOGLOBIN A1C LEVEL >9.0%: CPT | Performed by: INTERNAL MEDICINE

## 2020-11-05 PROCEDURE — 90694 VACC AIIV4 NO PRSRV 0.5ML IM: CPT

## 2020-11-05 RX ORDER — OMEPRAZOLE 40 MG/1
40 CAPSULE, DELAYED RELEASE ORAL DAILY
Qty: 90 CAP | Refills: 2 | Status: SHIPPED | OUTPATIENT
Start: 2020-11-05 | End: 2021-07-14

## 2020-11-05 RX ORDER — INSULIN LISPRO 100 [IU]/ML
INJECTION, SOLUTION INTRAVENOUS; SUBCUTANEOUS
COMMUNITY
End: 2020-11-05 | Stop reason: SDUPTHER

## 2020-11-05 NOTE — PATIENT INSTRUCTIONS
Vaccine Information Statement Influenza (Flu) Vaccine (Inactivated or Recombinant): What You Need to Know Many Vaccine Information Statements are available in Occitan and other languages. See www.immunize.org/vis Hojas de información sobre vacunas están disponibles en español y en muchos otros idiomas. Visite www.immunize.org/vis 1. Why get vaccinated? Influenza vaccine can prevent influenza (flu). Flu is a contagious disease that spreads around the [facility] every year, usually between October and May. Anyone can get the flu, but it is more dangerous for some people. Infants and young children, people 72years of age and older, pregnant women, and people with certain health conditions or a weakened immune system are at greatest risk of flu complications. Pneumonia, bronchitis, sinus infections and ear infections are examples of flu-related complications. If you have a medical condition, such as heart disease, cancer or diabetes, flu can make it worse. Flu can cause fever and chills, sore throat, muscle aches, fatigue, cough, headache, and runny or stuffy nose. Some people may have vomiting and diarrhea, though this is more common in children than adults. Each year thousands of people in the [facility] die from flu, and many more are hospitalized. Flu vaccine prevents millions of illnesses and flu-related visits to the doctor each year. 2. Influenza vaccines CDC recommends everyone 10months of age and older get vaccinated every flu season. Children 6 months through 6years of age may need 2 doses during a single flu season. Everyone else needs only 1 dose each flu season. It takes about 2 weeks for protection to develop after vaccination. There are many flu viruses, and they are always changing. Each year a new flu vaccine is made to protect against three or four viruses that are likely to cause disease in the upcoming flu season.  Even when the vaccine doesnt exactly match these viruses, it may still provide some protection. Influenza vaccine does not cause flu. Influenza vaccine may be given at the same time as other vaccines. 3. Talk with your health care provider Tell your vaccine provider if the person getting the vaccine: 
 Has had an allergic reaction after a previous dose of influenza vaccine, or has any severe, life-threatening allergies.  Has ever had Guillain-Barré Syndrome (also called GBS). In some cases, your health care provider may decide to postpone influenza vaccination to a future visit. People with minor illnesses, such as a cold, may be vaccinated. People who are moderately or severely ill should usually wait until they recover before getting influenza vaccine. Your health care provider can give you more information. 4. Risks of a reaction  Soreness, redness, and swelling where shot is given, fever, muscle aches, and headache can happen after influenza vaccine.  There may be a very small increased risk of Guillain-Barré Syndrome (GBS) after inactivated influenza vaccine (the flu shot). [employer] children who get the flu shot along with pneumococcal vaccine (PCV13), and/or DTaP vaccine at the same time might be slightly more likely to have a seizure caused by fever. Tell your health care provider if a child who is getting flu vaccine has ever had a seizure. People sometimes faint after medical procedures, including vaccination. Tell your provider if you feel dizzy or have vision changes or ringing in the ears. As with any medicine, there is a very remote chance of a vaccine causing a severe allergic reaction, other serious injury, or death. 5. What if there is a serious problem? An allergic reaction could occur after the vaccinated person leaves the clinic.  If you see signs of a severe allergic reaction (hives, swelling of the face and throat, difficulty breathing, a fast heartbeat, dizziness, or weakness), call 9-1-1 and get the person to the nearest hospital. 
 
For other signs that concern you, call your health care provider. Adverse reactions should be reported to the Vaccine Adverse Event Reporting System (VAERS). Your health care provider will usually file this report, or you can do it yourself. Visit the VAERS website at www.vaers. hhs.gov or call 1-465.966.1437. VAERS is only for reporting reactions, and VAERS staff do not give medical advice. 6. The National Vaccine Injury Compensation Program 
 
The [facility] Vaccine Injury Compensation Program (VICP) is a federal program that was created to compensate people who may have been injured by certain vaccines. Visit the VICP website at www.[facility].gov/vaccinecompensation or call 8-321.569.7207 to learn about the program and about filing a claim. There is a time limit to file a claim for compensation. 7. How can I learn more?  Ask your health care provider.  Call your local or state health department.  Contact the Centers for Disease Control and Prevention (CDC): 
- Call 6-509.766.9662 (2-123-HRN-INFO) or 
- Visit CDCs influenza website at www.cdc.gov/flu Vaccine Information Statement (Interim) Inactivated Influenza Vaccine 8/15/2019 
[address] [id number] Department of Health and [place] Centers for Disease Control and Prevention Office Use Only Heart-Healthy Diet: Care Instructions Your Care Instructions A heart-healthy diet has lots of vegetables, fruits, nuts, beans, and whole grains, and is low in salt. It limits foods that are high in saturated fat, such as meats, cheeses, and fried foods. It may be hard to change your diet, but even small changes can lower your risk of heart attack and heart disease. Follow-up care is a key part of your treatment and safety.  Be sure to make and go to all appointments, and call your doctor if you are having problems. It's also a good idea to know your test results and keep a list of the medicines you take. How can you care for yourself at home? Watch your portions · Learn what a serving is. A \"serving\" and a \"portion\" are not always the same thing. Make sure that you are not eating larger portions than are recommended. For example, a serving of pasta is ½ cup. A serving size of meat is 2 to 3 ounces. A 3-ounce serving is about the size of a deck of cards. Measure serving sizes until you are good at [place]" them. Keep in mind that restaurants often serve portions that are 2 or 3 times the size of one serving. · To keep your energy level up and keep you from feeling hungry, eat often but in smaller portions. · Eat only the number of calories you need to stay at a healthy weight. If you need to lose weight, eat fewer calories than your body burns (through exercise and other physical activity). Eat more fruits and vegetables · Eat a variety of fruit and vegetables every day. Dark green, deep orange, red, or yellow fruits and vegetables are especially good for you. Examples include spinach, carrots, peaches, and berries. · Keep carrots, celery, and other veggies handy for snacks. Buy fruit that is in season and store it where you can see it so that you will be tempted to eat it. · Cook dishes that have a lot of veggies in them, such as stir-fries and soups. Limit saturated and trans fat · Read food labels, and try to avoid saturated and trans fats. They increase your risk of heart disease. · Use olive or canola oil when you cook. · Bake, broil, grill, or steam foods instead of frying them. · Choose lean meats instead of high-fat meats such as hot dogs and sausages. Cut off all visible fat when you prepare meat. · Eat fish, skinless poultry, and meat alternatives such as soy products instead of high-fat meats. Soy products, such as tofu, may be especially good for your heart. · Choose low-fat or fat-free milk and dairy products. Eat foods high in fiber · Eat a variety of grain products every day. Include whole-grain foods that have lots of fiber and nutrients. Examples of whole-grain foods include oats, whole wheat bread, and brown rice. · Buy whole-grain breads and cereals, instead of white bread or pastries. Limit salt and sodium · Limit how much salt and sodium you eat to help lower your blood pressure. · Taste food before you salt it. Add only a little salt when you think you need it. With time, your taste buds will adjust to less salt. · Eat fewer snack items, fast foods, and other high-salt, processed foods. Check food labels for the amount of sodium in packaged foods. · Choose low-sodium versions of canned goods (such as soups, vegetables, and beans). Limit sugar · Limit drinks and foods with added sugar. These include candy, desserts, and soda pop. Limit alcohol · Limit alcohol to no more than 2 drinks a day for men and 1 drink a day for women. Too much alcohol can cause health problems. When should you call for help? Watch closely for changes in your health, and be sure to contact your doctor if: 
  · You would like help planning heart-healthy meals. Where can you learn more? Go to http://www.pavon.com/ Enter V137 in the search box to learn more about \"Heart-Healthy Diet: Care Instructions. \" Current as of: August 22, 2019               Content Version: 12.6 © 2043-4219 [employer]. Care instructions adapted under license by [employer] (which disclaims liability or warranty for this information). If you have questions about a medical condition or this instruction, always ask your healthcare professional. [place] [address] any warranty or liability for your use of this information.

## 2020-11-05 NOTE — PROGRESS NOTES
HPI:   Joesph Schlatter is a 72y.o. year old female who presents today for a routine visit. She has a history of hypertension, hyperlipidemia, diabetes mellitus type 1, chronic renal disease stage 3, and hypothyroidism. She reports that her diabetes control continues to be stable since using the [employer] She continues to adjust her blood sugars throughout the day as needed, and her HbA1c was 6.9 at her last visit with Dr. Faina Deras earlier this week. She does complain of noticing burning epigastric pain and nausea each morning after drinking a large amount of coffee. She states that she will drink the entire pot herself, and discomfort will develop in late morning. She denies any vomiting, melena, hematochezia, change in bowel movements, dysphagia, or odynophagia, and her weight has been essentially stable. She has been following social distancing guidelines and remaining at home. She is trying to walk for exercise. She is otherwise without new complaints and feeling generally well. On 4/26/2018, she presented with worsening lower extremity edema. She stated that previously, her swelling would improve overnight, but she had been noticing recently that it did not resolve. She also reported some mild dyspnea with exertion, such as walking upstairs, which had been present chronically, although may have been slightly worse. She also reported some \"skipped beats\" although not prolonged. She denied any chest pain, shortness of breath at rest or with normal activities, lightheadedness, PND, or orthopnea. She was started on low dose lasix, and referred for an echocardiogram, Holter monitor, and lower extremity duplex scan, but prior to having these performed, presented to [facility] ED for evaluation of pain and swelling of her right ankle. While in ED, CBC, CMP, and D-dimer were unremarkable. Cardiac enzymes x 2 were negative, and EKG was unchanged.  She was observed overnight and underwent a pharmacologic nuclear stress test (5/3/2018) which was a normal low risk study, without evidence of ischemia or prior infarction, and EF 86%. She was subsequently discharged, and underwent an echocardiogram (5/10/2018) showed normal LV function (EF 55-60%) no RWMA, mild LAE, mild-moderate TR, RVSP normal (22 mmHg); holter monitor (5/1/2018) showing sinus rhythm with sinus arrhythmia at times; rare PVC's; one short run of non-sustained of SVT for 6 beats. Episode of sinus tachycardia recorded in diary occurred while she was undergoing pharmacologic nuclear stress test. No other symptoms reported; and lower extremity duplex (5/11/2018) which was negative for DVT and venous reflux. On 5/8/2018, she was evaluated by a podiatrist, Dr. Fernanda Galvez, for her right foot swelling. Felt that it was most likely secondary to sprain injury as x-rays negative and presentation not consistent with gout or other form of arthritis. She was started on a Medrol dose pack. However, her blood sugars increased to >400 and she was changed to ibuprofen 400 mg tid. She did have a right foot MRI (5/26/2018) which showed considerable edema involving lower leg, hindfoot and midfoot, nonlocalizing a nonspecific; evidence of old injury to the ATF ligament.  Other ankle ligaments are intact; mild peroneus brevis tendinopathy without measurable tear; small partial thickness longitudinal split in the peroneus longus tendon within the retromalleolar groove; mild distal Achilles tendinosis; and small subchondral cyst in the anterior facet of the talus with otherwise no degenerative change seen in any of the hindfoot or midfoot articulations. She states that she was told that the right foot swelling was due to an old injury, but she states that it persists although she does not feel much pain due to her neuropathy. She has a history of diabetes mellitus type 1, with onset at age 6.  She is currently under the care of Dr. Lisa Montes, and is being treated with insulin glargine and lispro. She had not been well-controlled, with HbA1c ranging from 7.5-8.0, although has ow improved since using the Freestyle meter as discussed. She does have proliferative diabetic retinopathy and is s/p pan-retinal photocoagulation therapy in both eyes. She is followed closely by Dr. Daniel Hawkins, and recent exam showed regression bilaterally. She also has peripheral neuropathy, with burning and tingling in her feet at night. Renal function had been normal until 5/2016, with evidence of chronic renal disease, stage 3, since that time with baseline creatinine1.0-1.04/ eGFR 54-56. She also has a history of hypothyroidism, and she is currently on Synthroid. She denies cold intolerance, hair or skin changes. She has a history of hypertension, treated with quinapril. She had a pharmacologic nuclear stress test in 11/2012, which showed no evidence for ischemia, and normal LV wall motion (EF> 70%). She also had a carotid duplex scan in 7/2013 which showed mild (< 50%) bilateral internal carotid artery stenoses. She denies any chest pain, shortness of breath at rest or with exertion, palpitations, lightheadedness, or edema. She has a history of hyperlipidemia, and was on atorvastatin for many years until 10/2015 when she discontinued it because of myalgias. She was recently tried on rosuvastatin without success due to recurrent myalgias. She was started on ezetimibe in 9/2016 and has been tolerating it without difficulty. She successfully stopped smoking in 1/2015, and reports that she continues to abstain. She had a screening colonoscopy in 9/2014 by Dr. Vania Lee, which found a 6 mm sessile polyp in the descending colon and a 4-5 mm sessile polyp in the rectum/sigmoid (pathology: tubular adenomas). She had a repeat colonoscopy in 9/2019 showing a 6 mm sessile polyp in the ileocecal valve (pathology unavailable). Recommendation was for follow-up colonoscopy in 5 years.  She denies any abdominal pain, nausea, vomiting, melena, hematochezia, or change in bowel movements. In 3/2017, she was diagnosed with an upper respiratory tract infection and was prescribed doxycycline and prednisone by [facility]. She subsequently developed severe dizziness and vomiting, prompting a visit to the [facility] [place] [facility] ED where she was diagnosed with benign positional vertigo and treated with meclizine and Zofran with improvement. In 10/2017, she noticed difficulty with her peripheral vision and was evaluated by Dr. Denys Su. She has regressed proliferative diabetic retinopathy and she reports that the laser therapy she received many years ago to treat this resulted in the current limitations in her peripheral vision. She does also have mild glaucoma for which she is receiving treatment. She states that her driving has been restricted to only during the day, and she is aware that she must turn her head completely to each side when navigating behind the wheel. In 2/2018, she began having difficulty with right elbow pain and was evaluated by Dr. Renaldo Bowen who diagnosed her with right lateral epicondylitis. She received a cortisone injection with improvement. In 10/2018, while hiking on vacation hiking in [place], she slipped on some wooden steps and landed on her left shoulder. She was transported to [facility] in [place], [place], and found to have a closed nondisplaced fracture of the left proximal humerus. She was placed in a sling and told to follow-up with orthopedics. She was being followed by [facility] Tammi Moreno [facility], and completed physical therapy with improvement. She is no longer requiring pain medication.        Past Medical History:   Diagnosis Date    Chronic renal disease, stage III     Diabetes mellitus type 1 ([address]     Diabetic peripheral neuropathy (HCC)     Diabetic retinopathy ([address]     Essential hypertension with goal blood pressure less than 140/90     Hearing decreased     Hyperlipidemia     Hypothyroidism      Past Surgical History:   Procedure Laterality Date    HX GYN      partial hysterectomy    HX GYN      3 D and C's    HX HEENT      tonsillectomy    HX HEENT      cataract removal bilateral    HX HEENT      multiple surgeries for retinopathy    HX HYSTERECTOMY      HX ORTHOPAEDIC      carpral tunnel sugery bilateral     Current Outpatient Medications   Medication Sig    omeprazole (PRILOSEC) 40 mg capsule Take 1 Cap by mouth daily.  quinapriL (ACCUPRIL) 5 mg tablet TAKE 1 TABLET NIGHTLY WITH 10 MG TABLET EVERY EVENING    ezetimibe (ZETIA) 10 mg tablet TAKE 1 TABLET DAILY    levothyroxine (SYNTHROID) 100 mcg tablet Take 1 Tab by mouth five (5) days a week. Take Mon-Fri. Take 88 mcg on Sat/Sun    levothyroxine (SYNTHROID) 88 mcg tablet Take 1 Tab by mouth two (2) days a week. Take on Saturday and . Take 100 mcg on Mon-Fri.  quinapril (ACCUPRIL) 10 mg tablet TAKE 1 TABLET EVERY EVENING ALONG WITH A 5 MG TABLET    Cholecalciferol, Vitamin D3, (VITAMIN D3) 2,000 unit cap capsule Take 2,000 Units by mouth daily.  ALPHAGAN P 0.1 % ophthalmic solution     glucose blood VI test strips (ONE TOUCH ULTRA TEST) strip USE TO TEST BLOOD SUGAR FOUR TIMES A DAY    cyanocobalamin (VITAMIN B-12) 500 mcg tablet Take 500 mcg by mouth daily.  aspirin delayed-release 81 mg tablet Take  by mouth daily.  insulin glargine (LANTUS SOLOSTAR) 100 unit/mL (3 mL) pen 23 Units by SubCUTAneous route daily.  insulin lispro (HUMALOG PEN) 100 unit/mL kwikpen by SubCUTAneous route. Sliding scale as needed       No current facility-administered medications for this visit. Allergies and Intolerances: Allergies   Allergen Reactions    Pcn [Penicillins] Hives    Sulfa (Sulfonamide Antibiotics) Nausea Only and Vertigo     Family History: Grandmother  from colon cancer. Mother had CAD and DM.   Family History Problem Relation Age of Onset    Diabetes Brother     Diabetes Mother     Heart Disease Mother      Social History:   She  reports that she quit smoking about 5 years ago. She has a 10.00 pack-year smoking history. She has never used smokeless tobacco. She is  and lives with her . 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 Social History     Substance and Sexual Activity   Alcohol Use Yes    Comment: infrequently     Immunization History:  Immunization History   Administered Date(s) Administered    Influenza Vaccine [employer]) PF (>6 Mo Flulaval, Fluarix, and >3 Yrs Afluria, Fluzone 90250) 12/15/2015, 09/14/2016, 10/19/2017, 11/07/2018, 12/10/2019    Influenza Vaccine PF 12/22/2014    Influenza Vaccine Split 11/04/2011, 10/17/2012    Influenza Vaccine Whole 11/09/2010    Influenza, Quadrivalent, Adjuvanted (>65 Yrs FLUAD QUAD [id number]) 11/05/2020    Pneumococcal Conjugate (PCV-13) 04/18/2020    Pneumococcal Polysaccharide (PPSV-23) 04/13/2017, 05/31/2019    Tdap 06/25/2013    Zoster Vaccine, Live 05/31/2016       Review of Systems:   As above included in HPI. Otherwise 11 point review of systems negative including constitutional, skin, HENT, eyes, respiratory, cardiovascular, gastrointestinal, genitourinary, musculoskeletal, endo/heme/aller, neurological.      Physical:   Vitals:   BP: 138/76  HR: 72  WT: 169 lb (76.7 kg)  BMI:  28.12 kg/m2    Exam:   Patient appears in no apparent distress. Affect is appropriate. HEENT: PERRLA, anicteric, oropharynx clear, no JVD, adenopathy or thyromegaly. No carotid bruits or radiated murmur. Lungs: clear to auscultation, no wheezes, rhonchi, or rales. Heart: regular rate and rhythm. No murmur, rubs, gallops  Abdomen: soft, nontender, nondistended, normal bowel sounds, no hepatosplenomegaly or masses. Extremities: without edema.         Review of Data:  Labs:   Hospital Outpatient Visit on 11/03/2020 Component Date Value Ref Range Status    WBC 11/03/2020 5.5  4.6 - 13.2 K/uL Final    RBC 11/03/2020 3.76* 4.20 - 5.30 M/uL Final    HGB 11/03/2020 12.1  12.0 - 16.0 g/dL Final    HCT 11/03/2020 36.2  35.0 - 45.0 % Final    MCV 11/03/2020 96.3  74.0 - 97.0 FL Final    MCH 11/03/2020 32.2  24.0 - 34.0 PG Final    MCHC 11/03/2020 33.4  31.0 - 37.0 g/dL Final    RDW 11/03/2020 12.3  11.6 - 14.5 % Final    PLATELET 60/05/8714 312  135 - 420 K/uL Final    MPV 11/03/2020 12.1* 9.2 - 11.8 FL Final    NEUTROPHILS 11/03/2020 67  40 - 73 % Final    LYMPHOCYTES 11/03/2020 23  21 - 52 % Final    MONOCYTES 11/03/2020 8  3 - 10 % Final    EOSINOPHILS 11/03/2020 1  0 - 5 % Final    BASOPHILS 11/03/2020 1  0 - 2 % Final    ABS. NEUTROPHILS 11/03/2020 3.7  1.8 - 8.0 K/UL Final    ABS. LYMPHOCYTES 11/03/2020 1.3  0.9 - 3.6 K/UL Final    ABS. MONOCYTES 11/03/2020 0.5  0.05 - 1.2 K/UL Final    ABS. EOSINOPHILS 11/03/2020 0.0  0.0 - 0.4 K/UL Final    ABS.  BASOPHILS 11/03/2020 0.0  0.0 - 0.1 K/UL Final    DF 11/03/2020 AUTOMATED    Final    LIPID PROFILE 11/03/2020        Final    Cholesterol, total 11/03/2020 201* <200 MG/DL Final    Triglyceride 11/03/2020 112  <150 MG/DL Final    HDL Cholesterol 11/03/2020 69* 40 - 60 MG/DL Final    LDL, calculated 11/03/2020 109.6* 0 - 100 MG/DL Final    VLDL, calculated 11/03/2020 22.4  MG/DL Final    CHOL/HDL Ratio 11/03/2020 2.9  0 - 5.0   Final    TSH 11/03/2020 0.18* 0.36 - 3.74 uIU/mL Final    T4, Free 11/03/2020 1.4  0.7 - 1.5 NG/DL Final    Vitamin D 25-Hydroxy 11/03/2020 56.8  30 - 100 ng/mL Final    Sodium 11/03/2020 138  136 - 145 mmol/L Final    Potassium 11/03/2020 5.0  3.5 - 5.5 mmol/L Final    Chloride 11/03/2020 106  100 - 111 mmol/L Final    CO2 11/03/2020 25  21 - 32 mmol/L Final    Anion gap 11/03/2020 7  3.0 - 18 mmol/L Final    Glucose 11/03/2020 247* 74 - 99 mg/dL Final    BUN 11/03/2020 27* 7.0 - 18 MG/DL Final    Creatinine 11/03/2020 1. 10  0.6 - 1.3 MG/DL Final    BUN/Creatinine ratio 11/03/2020 25* 12 - 20   Final    GFR est AA 11/03/2020 >60  >60 ml/min/1.73m2 Final    GFR est non-AA 11/03/2020 50* >60 ml/min/1.73m2 Final    Calcium 11/03/2020 9.2  8.5 - 10.1 MG/DL Final    Bilirubin, total 11/03/2020 0.3  0.2 - 1.0 MG/DL Final    ALT (SGPT) 11/03/2020 15  13 - 56 U/L Final    AST (SGOT) 11/03/2020 8* 10 - 38 U/L Final    Alk. phosphatase 11/03/2020 69  45 - 117 U/L Final    Protein, total 11/03/2020 6.3* 6.4 - 8.2 g/dL Final    Albumin 11/03/2020 3.5  3.4 - 5.0 g/dL Final    Globulin 11/03/2020 2.8  2.0 - 4.0 g/dL Final    A-G Ratio 11/03/2020 1.3  0.8 - 1.7   Final    Microalbumin,urine random 11/03/2020 0.62  0 - 3.0 MG/DL Final    Creatinine, urine 11/03/2020 52.00  30 - 125 mg/dL Final    Microalbumin/Creat ratio (mg/g cre* 11/03/2020 12  0 - 30 mg/g Final   Hospital Outpatient Visit on 07/29/2020   Component Date Value Ref Range Status    Microalbumin,urine random 07/29/2020 0.77  0 - 3.0 MG/DL Final    Creatinine, urine 07/29/2020 72.00  30 - 125 mg/dL Final    Microalbumin/Creat ratio (mg/g cre* 07/29/2020 11  0 - 30 mg/g Final    WBC 07/29/2020 5.7  4.6 - 13.2 K/uL Final    RBC 07/29/2020 3.69* 4.20 - 5.30 M/uL Final    HGB 07/29/2020 12.1  12.0 - 16.0 g/dL Final    HCT 07/29/2020 36.0  35.0 - 45.0 % Final    MCV 07/29/2020 97.6* 74.0 - 97.0 FL Final    MCH 07/29/2020 32.8  24.0 - 34.0 PG Final    MCHC 07/29/2020 33.6  31.0 - 37.0 g/dL Final    RDW 07/29/2020 12.0  11.6 - 14.5 % Final    PLATELET 56/04/7099 752  135 - 420 K/uL Final    MPV 07/29/2020 12.4* 9.2 - 11.8 FL Final    NEUTROPHILS 07/29/2020 70  40 - 73 % Final    LYMPHOCYTES 07/29/2020 21  21 - 52 % Final    MONOCYTES 07/29/2020 8  3 - 10 % Final    EOSINOPHILS 07/29/2020 1  0 - 5 % Final    BASOPHILS 07/29/2020 0  0 - 2 % Final    ABS. NEUTROPHILS 07/29/2020 4.0  1.8 - 8.0 K/UL Final    ABS.  LYMPHOCYTES 07/29/2020 1.2  0.9 - 3.6 K/UL Final    ABS. MONOCYTES 07/29/2020 0.5  0.05 - 1.2 K/UL Final    ABS. EOSINOPHILS 07/29/2020 0.1  0.0 - 0.4 K/UL Final    ABS. BASOPHILS 07/29/2020 0.0  0.0 - 0.1 K/UL Final    DF 07/29/2020 AUTOMATED    Final    T4, Free 07/29/2020 1.7* 0.7 - 1.5 NG/DL Final    Magnesium 07/29/2020 2.0  1.6 - 2.6 mg/dL Final    Sodium 07/29/2020 141  136 - 145 mmol/L Final    Potassium 07/29/2020 4.5  3.5 - 5.5 mmol/L Final    Chloride 07/29/2020 109  100 - 111 mmol/L Final    CO2 07/29/2020 25  21 - 32 mmol/L Final    Anion gap 07/29/2020 7  3.0 - 18 mmol/L Final    Glucose 07/29/2020 123* 74 - 99 mg/dL Final    BUN 07/29/2020 17  7.0 - 18 MG/DL Final    Creatinine 07/29/2020 0.90  0.6 - 1.3 MG/DL Final    BUN/Creatinine ratio 07/29/2020 19  12 - 20   Final    GFR est AA 07/29/2020 >60  >60 ml/min/1.73m2 Final    GFR est non-AA 07/29/2020 >60  >60 ml/min/1.73m2 Final    Calcium 07/29/2020 9.2  8.5 - 10.1 MG/DL Final    Bilirubin, total 07/29/2020 0.4  0.2 - 1.0 MG/DL Final    ALT (SGPT) 07/29/2020 18  13 - 56 U/L Final    AST (SGOT) 07/29/2020 16  10 - 38 U/L Final    Alk. phosphatase 07/29/2020 62  45 - 117 U/L Final    Protein, total 07/29/2020 6.3* 6.4 - 8.2 g/dL Final    Albumin 07/29/2020 3.5  3.4 - 5.0 g/dL Final    Globulin 07/29/2020 2.8  2.0 - 4.0 g/dL Final    A-G Ratio 07/29/2020 1.3  0.8 - 1.7   Final    TSH 07/29/2020 0.15* 0.36 - 3.74 uIU/mL Final       EKG (8/5/2020) sinus rhythm at 61 bpm, normal intervals, no ischemic changes. Health Maintenance:  Screening:    Mammogram: negative (10/2020)    PAP smear: negative (6/30/2014). S/p YUNG, no further screening needed. Colorectal: colonsocopy (9/2019) ileocecal valve polyp. Dr. Vicki Panchal. Due 9/2024. Depression: none   DM (HbA1c/FPG): HbA1c 6.9 (10/2020) Dr. Argentina Alcantara   Hepatitis C: negative (12/2015)   Falls: none    DEXA: osteopenia (10/2020)   Glaucoma: mild primary open angle glaucoma and regressed proliferative diabetic retinopathy. Followed by Dr. Paola Major (last 2020)   Smokin pack year (stopped 2015)   Vitamin D: 56.8 (2020)   Medicare Wellness: 2020 (Welcome)      Impression:  Patient Active Problem List   Diagnosis Code    Stable proliferative diabetic retinopathy of both eyes associated with type 1 diabetes mellitus ([facility] K06.0211    Hyperlipidemia E78.5    Hearing decreased H91.90    Diabetes mellitus with diabetic polyneuropathy ([facility] E11.42    Vitamin D deficiency E55.9    Ex-cigarette smoker (30 pack year), stopped 2015 Z87.891    Adenomatous polyp of colon D12.6    Essential hypertension I10    Hypothyroidism due to acquired atrophy of thyroid E03.4    Chronic kidney disease, stage III (moderate) N18.30    Type 1 diabetes mellitus with stage 3 chronic kidney disease (HCC) E10.22, N18.30    Macrocytosis D75.89    Microalbuminuria due to type 1 diabetes mellitus (HCC) E10.29, R80.9    Coronary artery calcification seen on CT scan I25.10       Plan:  1. Hypertension. Blood pressure mildly elevated initially today on quinapril 15 mg daily, but improved with recheck. Home readings generally controlled, ranging 114-131/ 60-70. Instructed to continue to monitor. Renal function mildly decreased with creatinine 1.10/ eGFR 50, but likely prerenal in origin given elevated BUN/creatinine ratio 25. Patient reports drinking a large amount of coffee in the morning. Advised to increase water intake. Continue to follow. 2. Lower extremity edema. Patient had reported worsened lower extremity edema not resolving overnight and some questionable mild increase in exertional dyspnea. No other associated symptoms except palpitations in the form of \"skipped beats\". EKG without change from baseline. Lower extremity duplex negative for DVT or venous reflux. Echocardiogram with normal LV function and normal right sided pressures. 48 hour Holter monitor unrevealing. Pharmacologic nuclear stress test peformed at ED was negative. Started on lasix 20 mg daily with improvement. Reports that now generally present during the day and resolving overnight. Using compression hose regularly. 3. Type 1 diabetes mellitus. HbA1c improved to 6.9 (11/2020) since using new Freestyle sensor machine for blood sugar readings. On Lantus and Humalog. Being managed by Dr. Vinod Camacho, Proliferative diabetic retinopathy in regression, followed by Dr. Hernando Ruby. Neuropathy symptoms in feet are mild and have not required treatment with medication. Foot exam performed by Dr. Vinod Camacho. Renal function with evidence of chronic renal disease, stage 3. Non evidence of microalbuminuria today. May be reflective of better blood sugar control. On Ace-I. Unable to tolerate statins due to myalgias, but LDL improved with ezetimibe. Follow closely. 4. Hyperlipidemia. On ezetimibe with significant improvement.  today with HDL 69. Not able to tolerate statin (atorvastatin/ rosuvastatin) due to myalgias. Moderate to severe aortic and coronary atherosclerosis and calcifications seen on chest CT scan (11/2018). Stressed importance of lifestyle modifications, especially diet, exercise and weight loss. 5. Chronic renal disease, stage 3. Evidence of mild disease with creatinine 1.00-1.04/ eGFR 54-56 since 5/2016. Most likely due to long standing diabetes mellitus and hypertension. However, recent readings normalized. Low again today with creatinine 1.10/ eGFR 50, but elevated BUN/creatinine ratio 25 suggestive that likely due to prerenal effect and advised to increase fluid intake. On Ace-I, but not on statin as discussed above. Counseled on avoiding NSAIDS and prerenal status. 6. Hypothyroidism. Synthroid dose decreased from 112 to 100 mcg in 4/2017 due to over replacement. TSH was therapeutic in 7/2017, but with evidence of over replacement on labs in 10/2017 and 11/2018.  Dose decreased to 88 mcg daily, but TSH significantly elevated to 12.17 in 5/2019, and dose of Synthroid increased back to 100 mcg daily. TSH overreplaced last visit and dose changed to 100 mcg M-F and 88 mcg Sat/Sun. TSH low again last visit, and changed to 100 mcg 4 days per week and 88 mcg three days per week. Repeat today is low, but patient reports that she had been taking it incorrectly in error over the last 2 weeks, taking only 100 mcg daily. Will not adjust dose today and will reassess TSH in 3 months. 7. Former smoking. Discussed with patient current guidelines for screening for lung cancer. Current recommendations are to obtain yearly screening LDCT yearly for age 46-80, or until smoke free for 15 years. Patient has 30 pack year history of cigarette smoking and currently stopped smoking in 1/2015. Discussed with patient risks and benefits of screening, including over-diagnosis, false positive rate, and total radiation exposure. Patient currently exhibits no signs or symptoms suggestive of lung cancer. Discussed with patient importance of compliance with yearly annual lung cancer screenings and willingness to undergo diagnosis and treatment if screening scan is positive. In addition, patient was counseled regarding remaining smoke free. Performed in 11/2017 showing a few tiny solid nodules and one groundglass nodule identified (lung RADS 2). Did also note evidence of emphysema and prominence of the pulmonary vascular trunk suggestive of pulmonary arterial hypertension. However, echocardiogram with normal PA pressures. Repeat in 11/2018 and 11/2019 without change. Next study due in 11/2020. Will place order. 8. Osteopenia. Baseline bone density scan 10/8/2020 . Using femoral neck T-scores, calculated FRAX score estimates her 10 year risk of a major osteoporetic fracture at 6.5 % and hip fracture at 0.2 %, which are not an indication for biphosphonate treatment (>20% and >3%, respectively). Advised to begin calcium in addition to Vitamin D.  Encouraged exercise, particularly weight bearing activities. Repeat bone density scan in 2022.   9. GERD. On omeprazole and previously controlled. Noting increase in burning discomfort and nausea in the late morning after drinking a pot of coffee. Advised to decrease coffee and caffeine intake and increase omeprazole to 40 mg daily to see if improves. Will reassess at next visit. If not improved, will refer to Dr. Abby Beckford for follow-up. 10. Anemia. Iron studies were consistent with anemia of chronic inflammation; B12 normal; and folate level slightly low last visit so advised to begin multivitamin with folate. Anemia resolved today with mild persistence of macrocytosis. Will continue to follow. 11. Overweight. Emphasized importance of lifestyle modifications, including diet, exercise, and weight loss. Will readdress next visit. 12. Health maintenance. Will give influenza vaccine today. Given script for Shingrix vaccine but not yet obtained. Other immunizations up to date. Mammogram completed. Colonoscopy completed with Dr. Abby Beckford. Vitamin D level now normal. Continue maintenance dose supplement. Welcome to Medicare visit up to date. Patient understands recommendations and agrees with plan. .  Follow-up appointment in 3 months.

## 2020-11-05 NOTE — PROGRESS NOTES
Annievinceel Stefan 1955 female who presents for routine immunizations. Patient denies any symptoms , reactions or allergies that would exclude them from being immunized today. Risks and adverse reactions were discussed and the VIS was given to them. All questions were addressed. Order placed for FARIDA Hunter,  per Verbal Order from Joseph with read back. Patient was observed for 15 min post injection. There were no reactions observed.     Lieutenant Joann LPN

## 2020-11-08 ENCOUNTER — TELEPHONE (OUTPATIENT)
Dept: INTERNAL MEDICINE CLINIC | Age: 65
End: 2020-11-08

## 2020-11-08 PROBLEM — K21.9 GERD (GASTROESOPHAGEAL REFLUX DISEASE): Status: ACTIVE | Noted: 2020-11-08

## 2020-11-09 NOTE — TELEPHONE ENCOUNTER
Pt aware of message below and verbalized understanding. Patient will reschedule the current chest CT for January. No further questions or concerns from pt at this time.

## 2020-11-09 NOTE — TELEPHONE ENCOUNTER
Patient's last low dose screening chest CT scan was on 1/8/2020. It was ordered at her visit with expected date in 1/2021. However, scheduling made appointment for  11/20/2020. Concerned that insurance may not cover the cost since it has not been a year since her last one. Please advise patient to reschedule her chest CT scan for January 2021. Thank you.

## 2020-11-09 NOTE — TELEPHONE ENCOUNTER
Please request recent eye exam from Dr. Shannon Farley . Patient reports being seen in 6/2020. Also, please request recent note from Dr. Mary Hurtado. She was seen earlier this month. Thank you.

## 2020-12-29 RX ORDER — LEVOTHYROXINE SODIUM 100 UG/1
100 TABLET ORAL
Qty: 50 TAB | Refills: 4 | Status: SHIPPED | OUTPATIENT
Start: 2020-12-29 | End: 2021-09-29 | Stop reason: DRUGHIGH

## 2020-12-29 RX ORDER — LEVOTHYROXINE SODIUM 88 UG/1
88 TABLET ORAL
Qty: 40 TAB | Refills: 4 | Status: SHIPPED | OUTPATIENT
Start: 2020-12-30 | End: 2021-09-29 | Stop reason: DRUGHIGH

## 2020-12-29 NOTE — TELEPHONE ENCOUNTER
Pt says she is out of the med early because the doctor increased the med to 3 times a week. Needs directions updated and [facility].

## 2020-12-29 NOTE — TELEPHONE ENCOUNTER
Dr. Jada Galloway,          Please see note in previous encounter regarding an increase in how many times patient takes the Synthroid 88 mcg. Pt states now taking 3 times a week and not 2 times a week. Please review. Last visit 11/05/2020 MD Martinez   Next appointment 02/11/2021 MD Martinez   Previous refill encounter(s)   04/23/2020 Synthroid 88 mcg #25 with 4 refills,  04/21/2020 Synthroid 100 mcg #90 wit 4 refills. Requested Prescriptions     Pending Prescriptions Disp Refills    levothyroxine (SYNTHROID) 88 mcg tablet 25 Tab 4     Sig: Take 1 Tab by mouth two (2) days a week. Take on Saturday and Sunday. Take 100 mcg on Mon-Fri.  levothyroxine (SYNTHROID) 100 mcg tablet 90 Tab 4     Sig: Take 1 Tab by mouth five (5) days a week. Take Mon-Fri.  Take 88 mcg on Sat/Sun

## 2021-01-06 ENCOUNTER — NURSE NAVIGATOR (OUTPATIENT)
Dept: OTHER | Age: 66
End: 2021-01-06

## 2021-01-06 NOTE — NURSE NAVIGATOR
Referring Provider: Chapin Cantrell MD      Lung Cancer Risk Profile:   Age: 72  Gender: Female  Height: 65\"  Weight: 169#    Smoking History:  Smoking Status: past use  # years smokin  # years quit: 7  Packs/day: 2  Pack years: 64    Patient discussed smoking cessation with PCP: Unknown    Patient participated in shared decision making process with PCP: Yes, per provider order    Patient is currently experiencing symptoms: No, per patient report    If yes what symptoms:     Co-Morbidities:      Cancer History:      Additional Risk Factors:    Exposure to second hand smoke      Patient's smoking history discussed via phone. Patient meets LDCT lung cancer screening criteria. Appointment scheduled for 2021 verified with Ms. Cooley.       MARTY WilderN, RN, OCN  Lung Health Nurse Navigator

## 2021-01-06 NOTE — NURSE NAVIGATOR
LMOVM regarding prescreening for LDCT lung cancer screening scheduled for 1/8/2021.  Prescreening must be complete prior to exam.      MARTY GuillaumeN, RN, [address] [address] [facility] Nurse Navigator

## 2021-01-08 ENCOUNTER — HOSPITAL ENCOUNTER (OUTPATIENT)
Dept: CT IMAGING | Age: 66
Discharge: HOME | End: 2021-01-08
Attending: INTERNAL MEDICINE
Payer: MEDICARE

## 2021-01-08 DIAGNOSIS — Z87.891 PERSONAL HISTORY OF TOBACCO USE, PRESENTING HAZARDS TO HEALTH: ICD-10-CM

## 2021-01-08 PROCEDURE — 71271 CT THORAX LUNG CANCER SCR C-: CPT

## 2021-01-11 ENCOUNTER — TELEPHONE (OUTPATIENT)
Dept: INTERNAL MEDICINE CLINIC | Age: 66
End: 2021-01-11

## 2021-01-11 DIAGNOSIS — K21.9 GASTROESOPHAGEAL REFLUX DISEASE, UNSPECIFIED WHETHER ESOPHAGITIS PRESENT: Primary | ICD-10-CM

## 2021-01-11 DIAGNOSIS — R93.89 ABNORMAL CT OF THE CHEST: ICD-10-CM

## 2021-01-11 NOTE — TELEPHONE ENCOUNTER
CT Results (most recent):  Results from Orders Only encounter on 01/08/21   CT LOW DOSE LUNG CANCER SCREENING    Narrative CT of the chest for lung cancer screening    HISTORY: Lung screening. Former smoker with 30 pack years smoking history, quit  5 years ago    COMPARISON: CT 1/8/20    TECHNIQUE: Low dose unenhanced multislice helical CT was performed from the  thoracic inlet to the adrenal glands without intravenous contrast  administration. Contiguous [1.25 mm] axial images were reconstructed and lung  and soft tissue windows were generated. Coronal MIP and sagittal reformations  were generated. All CT scans at this facility performed using dose optimization techniques as  appreciated to a performed exam, to include automated exposure control,  adjustment of the mA and or KU according to patient size (including appropriate  matching for site specific examination), or use of iterative reconstruction  technique. FINDINGS:    Several tiny lung nodules again noted and grossly stable since the prior CT,  including 2 mm nodule in right lower lobe on #106/4, 3 mm nodule abutting the  peripheral vascular branch in lateral right lower lobe #133/4, 2 x 5 mm  elongated density along the medial left major fissure on #96/4 2 mm lateral left  lower lobe #145/4 and 2 mm nodule posterior left lower lobe #192/4. No new  nodule seen. Mild centrilobular emphysema and chronic bronchitis with bronchial wall  calcifications. No acute airspace disease. There is no pleural effusion or  pneumothorax. The absence of intravenous contrast reduces the sensitivity for evaluation of  the mediastinum and upper abdominal organs. No mediastinal or hilar adenopathy  appreciated. The heart is not enlarged. Moderate burden of coronary artery  calcifications. No pericardial effusion. The aorta shows mild atherosclerotic  disease with normal caliber.  Poorly distended esophagus with questionable distal  esophageal wall prominence, unchanged since the prior study. The thyroid  appears unremarkable. The bones and soft tissues of the chest wall are within  normal limits. The visualized portions of the upper abdominal organs shows small  hiatal hernia. Impression IMPRESSION:     1. Multiple small lung nodules appear stable. 2. Mild emphysema and bronchitis. 3. Poorly distended distal esophagus with mild wall prominence, similar to the  prior study, probably due to under distention. Clinical correlation to exclude  potential esophagitis. LUNG RADS ASSESSMENT CATEGORIES:    Lung RADS 2 - Benign appearance or behavior. Management:  Continue annual screening with low dose CT in 12 months. Thank you for your referral.          Reviewed screening chest CT scan. Showed that her multiple small lung nodules are stable. There was still evidence of a poorly distended distal esophagus with mild wall prominence similar to prior study one year ago. Patient has been having increased GERD symptoms being treated with omeprazole. Called and discussed with patient, and agreeable to referral to GI for evaluation. Patient sees Dr. Jean Roger in [place]. Referral placed. Please fax copy of chest CT scan with referral.    Thanks.

## 2021-02-04 ENCOUNTER — APPOINTMENT (OUTPATIENT)
Dept: INTERNAL MEDICINE CLINIC | Age: 66
End: 2021-02-04

## 2021-02-04 ENCOUNTER — HOSPITAL ENCOUNTER (OUTPATIENT)
Dept: LAB | Age: 66
Discharge: HOME | End: 2021-02-04
Payer: MEDICARE

## 2021-02-04 DIAGNOSIS — E10.3553 STABLE PROLIFERATIVE DIABETIC RETINOPATHY OF BOTH EYES ASSOCIATED WITH TYPE 1 DIABETES MELLITUS (HCC): ICD-10-CM

## 2021-02-04 DIAGNOSIS — Z23 NEEDS FLU SHOT: ICD-10-CM

## 2021-02-04 DIAGNOSIS — R80.9 MICROALBUMINURIA DUE TO TYPE 1 DIABETES MELLITUS (HCC): ICD-10-CM

## 2021-02-04 DIAGNOSIS — N18.31 TYPE 1 DIABETES MELLITUS WITH STAGE 3A CHRONIC KIDNEY DISEASE (HCC): ICD-10-CM

## 2021-02-04 DIAGNOSIS — K21.9 GASTROESOPHAGEAL REFLUX DISEASE, UNSPECIFIED WHETHER ESOPHAGITIS PRESENT: ICD-10-CM

## 2021-02-04 DIAGNOSIS — E03.4 HYPOTHYROIDISM DUE TO ACQUIRED ATROPHY OF THYROID: ICD-10-CM

## 2021-02-04 DIAGNOSIS — I10 ESSENTIAL HYPERTENSION: ICD-10-CM

## 2021-02-04 DIAGNOSIS — Z87.891 FORMER SMOKER: ICD-10-CM

## 2021-02-04 DIAGNOSIS — N18.31 STAGE 3A CHRONIC KIDNEY DISEASE (HCC): ICD-10-CM

## 2021-02-04 DIAGNOSIS — E10.42 TYPE 1 DIABETES MELLITUS WITH DIABETIC POLYNEUROPATHY (HCC): ICD-10-CM

## 2021-02-04 DIAGNOSIS — E55.9 VITAMIN D DEFICIENCY: ICD-10-CM

## 2021-02-04 DIAGNOSIS — E78.5 HYPERLIPIDEMIA, UNSPECIFIED HYPERLIPIDEMIA TYPE: ICD-10-CM

## 2021-02-04 DIAGNOSIS — E10.22 TYPE 1 DIABETES MELLITUS WITH STAGE 3A CHRONIC KIDNEY DISEASE (HCC): ICD-10-CM

## 2021-02-04 DIAGNOSIS — E10.29 MICROALBUMINURIA DUE TO TYPE 1 DIABETES MELLITUS (HCC): ICD-10-CM

## 2021-02-04 DIAGNOSIS — Z87.891 PERSONAL HISTORY OF TOBACCO USE, PRESENTING HAZARDS TO HEALTH: ICD-10-CM

## 2021-02-04 LAB
25(OH)D3 SERPL-MCNC: 46.3 NG/ML (ref 30–100)
ALBUMIN SERPL-MCNC: 3.5 G/DL (ref 3.4–5)
ALBUMIN/GLOB SERPL: 1.3 {RATIO} (ref 0.8–1.7)
ALP SERPL-CCNC: 64 U/L (ref 45–117)
ALT SERPL-CCNC: 17 U/L (ref 13–56)
ANION GAP SERPL CALC-SCNC: 8 MMOL/L (ref 3–18)
AST SERPL-CCNC: 11 U/L (ref 10–38)
BASOPHILS # BLD: 0 K/UL (ref 0–0.1)
BASOPHILS NFR BLD: 1 % (ref 0–2)
BILIRUB SERPL-MCNC: 0.5 MG/DL (ref 0.2–1)
BUN SERPL-MCNC: 20 MG/DL (ref 7–18)
BUN/CREAT SERPL: 22 (ref 12–20)
CALCIUM SERPL-MCNC: 8.8 MG/DL (ref 8.5–10.1)
CHLORIDE SERPL-SCNC: 101 MMOL/L (ref 100–111)
CHOLEST SERPL-MCNC: 192 MG/DL
CO2 SERPL-SCNC: 26 MMOL/L (ref 21–32)
CREAT SERPL-MCNC: 0.9 MG/DL (ref 0.6–1.3)
CREAT UR-MCNC: 79 MG/DL (ref 30–125)
DIFFERENTIAL METHOD BLD: ABNORMAL
EOSINOPHIL # BLD: 0.1 K/UL (ref 0–0.4)
EOSINOPHIL NFR BLD: 2 % (ref 0–5)
ERYTHROCYTE [DISTWIDTH] IN BLOOD BY AUTOMATED COUNT: 12.3 % (ref 11.6–14.5)
GLOBULIN SER CALC-MCNC: 2.6 G/DL (ref 2–4)
GLUCOSE SERPL-MCNC: 136 MG/DL (ref 74–99)
HCT VFR BLD AUTO: 33.1 % (ref 35–45)
HDLC SERPL-MCNC: 77 MG/DL (ref 40–60)
HDLC SERPL: 2.5 {RATIO} (ref 0–5)
HGB BLD-MCNC: 11.5 G/DL (ref 12–16)
LDLC SERPL CALC-MCNC: 98.2 MG/DL (ref 0–100)
LIPID PROFILE,FLP: ABNORMAL
LYMPHOCYTES # BLD: 1.1 K/UL (ref 0.9–3.6)
LYMPHOCYTES NFR BLD: 18 % (ref 21–52)
MAGNESIUM SERPL-MCNC: 2 MG/DL (ref 1.6–2.6)
MCH RBC QN AUTO: 32.4 PG (ref 24–34)
MCHC RBC AUTO-ENTMCNC: 34.7 G/DL (ref 31–37)
MCV RBC AUTO: 93.2 FL (ref 74–97)
MICROALBUMIN UR-MCNC: 1.75 MG/DL (ref 0–3)
MICROALBUMIN/CREAT UR-RTO: 22 MG/G (ref 0–30)
MONOCYTES # BLD: 0.6 K/UL (ref 0.05–1.2)
MONOCYTES NFR BLD: 9 % (ref 3–10)
NEUTS SEG # BLD: 4.3 K/UL (ref 1.8–8)
NEUTS SEG NFR BLD: 70 % (ref 40–73)
PLATELET # BLD AUTO: 252 K/UL (ref 135–420)
PMV BLD AUTO: 11.6 FL (ref 9.2–11.8)
POTASSIUM SERPL-SCNC: 5.1 MMOL/L (ref 3.5–5.5)
PROT SERPL-MCNC: 6.1 G/DL (ref 6.4–8.2)
RBC # BLD AUTO: 3.55 M/UL (ref 4.2–5.3)
SODIUM SERPL-SCNC: 135 MMOL/L (ref 136–145)
T4 FREE SERPL-MCNC: 1.6 NG/DL (ref 0.7–1.5)
TRIGL SERPL-MCNC: 84 MG/DL (ref ?–150)
TSH SERPL DL<=0.05 MIU/L-ACNC: 0.36 UIU/ML (ref 0.36–3.74)
VLDLC SERPL CALC-MCNC: 16.8 MG/DL
WBC # BLD AUTO: 6.1 K/UL (ref 4.6–13.2)

## 2021-02-04 PROCEDURE — 80053 COMPREHEN METABOLIC PANEL: CPT

## 2021-02-04 PROCEDURE — 84443 ASSAY THYROID STIM HORMONE: CPT

## 2021-02-04 PROCEDURE — 82043 UR ALBUMIN QUANTITATIVE: CPT

## 2021-02-04 PROCEDURE — 80061 LIPID PANEL: CPT

## 2021-02-04 PROCEDURE — 84439 ASSAY OF FREE THYROXINE: CPT

## 2021-02-04 PROCEDURE — 85025 COMPLETE CBC W/AUTO DIFF WBC: CPT

## 2021-02-04 PROCEDURE — 82306 VITAMIN D 25 HYDROXY: CPT

## 2021-02-04 PROCEDURE — 36415 COLL VENOUS BLD VENIPUNCTURE: CPT

## 2021-02-04 PROCEDURE — 83735 ASSAY OF MAGNESIUM: CPT

## 2021-02-04 RX ORDER — QUINAPRIL 10 MG/1
TABLET ORAL
Qty: 90 TAB | Refills: 3 | Status: SHIPPED | OUTPATIENT
Start: 2021-02-04 | End: 2022-01-04

## 2021-02-11 ENCOUNTER — OFFICE VISIT (OUTPATIENT)
Dept: INTERNAL MEDICINE CLINIC | Age: 66
End: 2021-02-11
Payer: MEDICARE

## 2021-02-11 VITALS
DIASTOLIC BLOOD PRESSURE: 60 MMHG | BODY MASS INDEX: 28.46 KG/M2 | RESPIRATION RATE: 16 BRPM | SYSTOLIC BLOOD PRESSURE: 134 MMHG | OXYGEN SATURATION: 99 % | WEIGHT: 171 LBS | HEART RATE: 69 BPM | TEMPERATURE: 97.3 F

## 2021-02-11 DIAGNOSIS — I10 ESSENTIAL HYPERTENSION: Primary | ICD-10-CM

## 2021-02-11 DIAGNOSIS — E10.22 TYPE 1 DIABETES MELLITUS WITH STAGE 3A CHRONIC KIDNEY DISEASE (HCC): ICD-10-CM

## 2021-02-11 DIAGNOSIS — D64.9 ANEMIA, UNSPECIFIED TYPE: ICD-10-CM

## 2021-02-11 DIAGNOSIS — Z87.891 FORMER SMOKER: ICD-10-CM

## 2021-02-11 DIAGNOSIS — I25.10 CORONARY ARTERY CALCIFICATION SEEN ON CT SCAN: ICD-10-CM

## 2021-02-11 DIAGNOSIS — E10.42 TYPE 1 DIABETES MELLITUS WITH DIABETIC POLYNEUROPATHY (HCC): ICD-10-CM

## 2021-02-11 DIAGNOSIS — E03.4 HYPOTHYROIDISM DUE TO ACQUIRED ATROPHY OF THYROID: ICD-10-CM

## 2021-02-11 DIAGNOSIS — E10.3553 STABLE PROLIFERATIVE DIABETIC RETINOPATHY OF BOTH EYES ASSOCIATED WITH TYPE 1 DIABETES MELLITUS (HCC): ICD-10-CM

## 2021-02-11 DIAGNOSIS — M85.89 OSTEOPENIA OF MULTIPLE SITES: ICD-10-CM

## 2021-02-11 DIAGNOSIS — K21.9 GASTROESOPHAGEAL REFLUX DISEASE, UNSPECIFIED WHETHER ESOPHAGITIS PRESENT: ICD-10-CM

## 2021-02-11 DIAGNOSIS — E78.5 HYPERLIPIDEMIA, UNSPECIFIED HYPERLIPIDEMIA TYPE: ICD-10-CM

## 2021-02-11 DIAGNOSIS — N18.31 STAGE 3A CHRONIC KIDNEY DISEASE (HCC): ICD-10-CM

## 2021-02-11 DIAGNOSIS — N18.31 TYPE 1 DIABETES MELLITUS WITH STAGE 3A CHRONIC KIDNEY DISEASE (HCC): ICD-10-CM

## 2021-02-11 DIAGNOSIS — R68.89 OTHER GENERAL SYMPTOMS AND SIGNS: ICD-10-CM

## 2021-02-11 DIAGNOSIS — R80.9 MICROALBUMINURIA DUE TO TYPE 1 DIABETES MELLITUS (HCC): ICD-10-CM

## 2021-02-11 DIAGNOSIS — E10.29 MICROALBUMINURIA DUE TO TYPE 1 DIABETES MELLITUS (HCC): ICD-10-CM

## 2021-02-11 PROCEDURE — 3046F HEMOGLOBIN A1C LEVEL >9.0%: CPT | Performed by: INTERNAL MEDICINE

## 2021-02-11 PROCEDURE — 3017F COLORECTAL CA SCREEN DOC REV: CPT | Performed by: INTERNAL MEDICINE

## 2021-02-11 PROCEDURE — 1101F PT FALLS ASSESS-DOCD LE1/YR: CPT | Performed by: INTERNAL MEDICINE

## 2021-02-11 PROCEDURE — G9899 SCRN MAM PERF RSLTS DOC: HCPCS | Performed by: INTERNAL MEDICINE

## 2021-02-11 PROCEDURE — G8427 DOCREV CUR MEDS BY ELIG CLIN: HCPCS | Performed by: INTERNAL MEDICINE

## 2021-02-11 PROCEDURE — G8536 NO DOC ELDER MAL SCRN: HCPCS | Performed by: INTERNAL MEDICINE

## 2021-02-11 PROCEDURE — G8752 SYS BP LESS 140: HCPCS | Performed by: INTERNAL MEDICINE

## 2021-02-11 PROCEDURE — G8419 CALC BMI OUT NRM PARAM NOF/U: HCPCS | Performed by: INTERNAL MEDICINE

## 2021-02-11 PROCEDURE — 99214 OFFICE O/P EST MOD 30 MIN: CPT | Performed by: INTERNAL MEDICINE

## 2021-02-11 PROCEDURE — G8754 DIAS BP LESS 90: HCPCS | Performed by: INTERNAL MEDICINE

## 2021-02-11 PROCEDURE — 1090F PRES/ABSN URINE INCON ASSESS: CPT | Performed by: INTERNAL MEDICINE

## 2021-02-11 PROCEDURE — G0463 HOSPITAL OUTPT CLINIC VISIT: HCPCS | Performed by: INTERNAL MEDICINE

## 2021-02-11 PROCEDURE — G8399 PT W/DXA RESULTS DOCUMENT: HCPCS | Performed by: INTERNAL MEDICINE

## 2021-02-11 PROCEDURE — G8432 DEP SCR NOT DOC, RNG: HCPCS | Performed by: INTERNAL MEDICINE

## 2021-02-11 PROCEDURE — 2022F DILAT RTA XM EVC RTNOPTHY: CPT | Performed by: INTERNAL MEDICINE

## 2021-02-11 NOTE — PATIENT INSTRUCTIONS
Learning About Diabetes Food Guidelines Your Care Instructions Meal planning is important to manage diabetes. It helps keep your blood sugar at a target level (which you set with your doctor). You don't have to eat special foods. You can eat what your family eats, including sweets once in a while. But you do have to pay attention to how often you eat and how much you eat of certain foods. You may want to work with a dietitian or a certified diabetes educator (CDE) to help you plan meals and snacks. A dietitian or CDE can also help you lose weight if that is one of your goals. What should you know about eating carbs? Managing the amount of carbohydrate (carbs) you eat is an important part of healthy meals when you have diabetes. Carbohydrate is found in many foods. · Learn which foods have carbs. And learn the amounts of carbs in different foods. ? Bread, cereal, pasta, and rice have about 15 grams of carbs in a serving. A serving is 1 slice of bread (1 ounce), ½ cup of cooked cereal, or 1/3 cup of cooked pasta or rice. ? Fruits have 15 grams of carbs in a serving. A serving is 1 small fresh fruit, such as an apple or orange; ½ of a banana; ½ cup of cooked or canned fruit; ½ cup of fruit juice; 1 cup of melon or raspberries; or 2 tablespoons of dried fruit. ? Milk and no-sugar-added yogurt have 15 grams of carbs in a serving. A serving is 1 cup of milk or 2/3 cup of no-sugar-added yogurt. ? Starchy vegetables have 15 grams of carbs in a serving. A serving is ½ cup of mashed potatoes or sweet potato; 1 cup winter squash; ½ of a small baked potato; ½ cup of cooked beans; or ½ cup cooked corn or green peas. · Learn how much carbs to eat each day and at each meal. A dietitian or CDE can teach you how to keep track of the amount of carbs you eat. This is called carbohydrate counting. · If you are not sure how to count carbohydrate grams, use the Plate Method to plan meals. It is a good, quick way to make sure that you have a balanced meal. It also helps you spread carbs throughout the day. ? Divide your plate by types of foods. Put non-starchy vegetables on half the plate, meat or other protein food on one-quarter of the plate, and a grain or starchy vegetable in the final quarter of the plate. To this you can add a small piece of fruit and 1 cup of milk or yogurt, depending on how many carbs you are supposed to eat at a meal. 
· Try to eat about the same amount of carbs at each meal. Do not \"save up\" your daily allowance of carbs to eat at one meal. 
· Proteins have very little or no carbs per serving. Examples of proteins are beef, chicken, turkey, fish, eggs, tofu, cheese, cottage cheese, and peanut butter. A serving size of meat is 3 ounces, which is about the size of a deck of cards. Examples of meat substitute serving sizes (equal to 1 ounce of meat) are 1/4 cup of cottage cheese, 1 egg, 1 tablespoon of peanut butter, and ½ cup of tofu. How can you eat out and still eat healthy? · Learn to estimate the serving sizes of foods that have carbohydrate. If you measure food at home, it will be easier to estimate the amount in a serving of restaurant food. · If the meal you order has too much carbohydrate (such as potatoes, corn, or baked beans), ask to have a low-carbohydrate food instead. Ask for a salad or green vegetables. · If you use insulin, check your blood sugar before and after eating out to help you plan how much to eat in the future. · If you eat more carbohydrate at a meal than you had planned, take a walk or do other exercise. This will help lower your blood sugar. What else should you know? · Limit saturated fat, such as the fat from meat and dairy products. This is a healthy choice because people who have diabetes are at higher risk of heart disease. So choose lean cuts of meat and nonfat or low-fat dairy products. Use olive or canola oil instead of butter or shortening when cooking. · Don't skip meals. Your blood sugar may drop too low if you skip meals and take insulin or certain medicines for diabetes. · Check with your doctor before you drink alcohol. Alcohol can cause your blood sugar to drop too low. Alcohol can also cause a bad reaction if you take certain diabetes medicines. Follow-up care is a key part of your treatment and safety. Be sure to make and go to all appointments, and call your doctor if you are having problems. It's also a good idea to know your test results and keep a list of the medicines you take. Where can you learn more? Go to http://www.gray.com/ Enter J451 in the search box to learn more about \"Learning About Diabetes Food Guidelines. \" Current as of: December 20, 2019               Content Version: 12.6 © 7826-7101 [employer]. Care instructions adapted under license by [employer] (which disclaims liability or warranty for this information). If you have questions about a medical condition or this instruction, always ask your healthcare professional. [address] any warranty or liability for your use of this information. Gastroesophageal Reflux Disease (GERD): Care Instructions Overview Gastroesophageal reflux disease (GERD) is the backward flow of stomach acid into the esophagus. The esophagus is the tube that leads from your throat to your stomach. A one-way valve prevents the stomach acid from backing up into this tube. But when you have GERD, this valve does not close tightly enough. This can also cause pain and swelling in your esophagus. (This is called esophagitis.) If you have mild GERD symptoms including heartburn, you may be able to control the problem with antacids or over-the-counter medicine. You can also make lifestyle changes to help reduce your symptoms. These include changing your diet and eating habits, such as not eating late at night and losing weight. Follow-up care is a key part of your treatment and safety. Be sure to make and go to all appointments, and call your doctor if you are having problems. It's also a good idea to know your test results and keep a list of the medicines you take. How can you care for yourself at home? · Take your medicines exactly as prescribed. Call your doctor if you think you are having a problem with your medicine. · Your doctor may recommend over-the-counter medicine. For mild or occasional indigestion, antacids, such as Tums, Gaviscon, Mylanta, or Maalox, may help. Your doctor also may recommend over-the-counter acid reducers, such as famotidine (Pepcid AC), cimetidine (Tagamet HB), or omeprazole (Prilosec). Read and follow all instructions on the label. If you use these medicines often, talk with your doctor. · Change your eating habits. ? It's best to eat several small meals instead of two or three large meals. ? After you eat, wait 2 to 3 hours before you lie down. ? Chocolate, mint, and alcohol can make GERD worse. ? Spicy foods, foods that have a lot of acid (like tomatoes and oranges), and coffee can make GERD symptoms worse in some people. If your symptoms are worse after you eat a certain food, you may want to stop eating that food to see if your symptoms get better. · Do not smoke or chew tobacco. Smoking can make GERD worse. If you need help quitting, talk to your doctor about stop-smoking programs and medicines. These can increase your chances of quitting for good. · If you have GERD symptoms at night, raise the head of your bed 6 to 8 inches by putting the frame on blocks or placing a foam wedge under the head of your mattress. (Adding extra pillows does not work.) · Do not wear tight clothing around your middle. · Lose weight if you need to. Losing just 5 to 10 pounds can help. When should you call for help? Call your doctor now or seek immediate medical care if: 
  · You have new or different belly pain.  
  · Your stools are black and tarlike or have streaks of blood. Watch closely for changes in your health, and be sure to contact your doctor if: 
  · Your symptoms have not improved after 2 days.  
  · Food seems to catch in your throat or chest.  
Where can you learn more? Go to http://www.gray.com/ Enter B087 in the search box to learn more about \"Gastroesophageal Reflux Disease (GERD): Care Instructions. \" Current as of: April 15, 2020               Content Version: 12.6 © 2006-2020 [employer]. Care instructions adapted under license by [employer] (which disclaims liability or warranty for this information). If you have questions about a medical condition or this instruction, always ask your healthcare professional. [address] any warranty or liability for your use of this information.

## 2021-02-11 NOTE — PROGRESS NOTES
1. Have you been to the ER, urgent care clinic or hospitalized since your last visit? NO    2. Have you seen or consulted any other health care providers outside of the [address] [place] [address] since your last visit (Include any pap smears or colon screening)? NO

## 2021-02-12 ENCOUNTER — IMMUNIZATION (OUTPATIENT)
Dept: INTERNAL MEDICINE CLINIC | Age: 66
End: 2021-02-12
Payer: MEDICARE

## 2021-02-12 ENCOUNTER — DOCUMENTATION ONLY (OUTPATIENT)
Dept: INTERNAL MEDICINE CLINIC | Age: 66
End: 2021-02-12

## 2021-02-12 DIAGNOSIS — Z23 ENCOUNTER FOR ADMINISTRATION OF VACCINE: ICD-10-CM

## 2021-02-12 DIAGNOSIS — Z23 ENCOUNTER FOR IMMUNIZATION: Primary | ICD-10-CM

## 2021-02-12 PROCEDURE — 0011A COVID-19, MRNA, LNP-S, PF, 100MCG/0.5ML DOSE(MODERNA): CPT | Performed by: FAMILY MEDICINE

## 2021-02-12 PROCEDURE — 91301 COVID-19, MRNA, LNP-S, PF, 100MCG/0.5ML DOSE(MODERNA): CPT | Performed by: FAMILY MEDICINE

## 2021-02-12 NOTE — PROGRESS NOTES
Amanda Galdamez is a 72 y.o. female who presents for routine immunizations. She denies any symptoms , reactions or allergies that would exclude them from being immunized today. Risks and adverse reactions were discussed and the VIS was given to them. All questions were addressed. She was observed for 15 min post injection. There were no reactions observed. Elizabeth Singletary, LPN

## 2021-02-15 PROBLEM — M85.89 OSTEOPENIA OF MULTIPLE SITES: Status: ACTIVE | Noted: 2021-02-15

## 2021-02-15 NOTE — PROGRESS NOTES
HPI:   Shannan Vargas is a 72y.o. year old female who presents today for a routine visit. She has a history of hypertension, hyperlipidemia, diabetes mellitus type 1, chronic renal disease stage 3, and hypothyroidism. She reports that her diabetes control continues to be stable since using the [employer] She continues to adjust her blood sugars throughout the day as needed, and her HbA1c was 6.9 at her last visit with Dr. Rebecca Fontana in 12/2020. She reports improvement in her epigastric discomfort since increasing dose of omeprazole to 40 mg daily. However, she will develop recurrence of symptoms if she misses dose. She underwent a screening low dose chest CT scan on 1/8/2021, and it again showed a poorly distended distal esophagus with mild wall prominence, similar to the prior study in 1/2020, probably due to under distention. Given her persistent symptoms, she was referred to Dr. Vince Sequeira and is scheduled to undergo an upper endoscopy on 3/9/2021. She has been following social distancing guidelines and remaining at home. She is trying to walk for exercise. She is otherwise without new complaints and feeling generally well. On 4/26/2018, she presented with worsening lower extremity edema. She stated that previously, her swelling would improve overnight, but she had been noticing recently that it did not resolve. She also reported some mild dyspnea with exertion, such as walking upstairs, which had been present chronically, although may have been slightly worse. She also reported some \"skipped beats\" although not prolonged. She denied any chest pain, shortness of breath at rest or with normal activities, lightheadedness, PND, or orthopnea. She was started on low dose lasix, and referred for an echocardiogram, Holter monitor, and lower extremity duplex scan, but prior to having these performed, presented to [facility] ED for evaluation of pain and swelling of her right ankle.  While in ED, CBC, CMP, and D-dimer were unremarkable. Cardiac enzymes x 2 were negative, and EKG was unchanged. She was observed overnight and underwent a pharmacologic nuclear stress test (5/3/2018) which was a normal low risk study, without evidence of ischemia or prior infarction, and EF 86%. She was subsequently discharged, and underwent an echocardiogram (5/10/2018) showed normal LV function (EF 55-60%) no RWMA, mild LAE, mild-moderate TR, RVSP normal (22 mmHg); holter monitor (5/1/2018) showing sinus rhythm with sinus arrhythmia at times; rare PVC's; one short run of non-sustained of SVT for 6 beats. Episode of sinus tachycardia recorded in diary occurred while she was undergoing pharmacologic nuclear stress test. No other symptoms reported; and lower extremity duplex (5/11/2018) which was negative for DVT and venous reflux. On 5/8/2018, she was evaluated by a podiatrist, Dr. Yesica Gomez, for her right foot swelling. Felt that it was most likely secondary to sprain injury as x-rays negative and presentation not consistent with gout or other form of arthritis. She was started on a Medrol dose pack. However, her blood sugars increased to >400 and she was changed to ibuprofen 400 mg tid. She did have a right foot MRI (5/26/2018) which showed considerable edema involving lower leg, hindfoot and midfoot, nonlocalizing a nonspecific; evidence of old injury to the ATF ligament.  Other ankle ligaments are intact; mild peroneus brevis tendinopathy without measurable tear; small partial thickness longitudinal split in the peroneus longus tendon within the retromalleolar groove; mild distal Achilles tendinosis; and small subchondral cyst in the anterior facet of the talus with otherwise no degenerative change seen in any of the hindfoot or midfoot articulations.  She states that she was told that the right foot swelling was due to an old injury, but she states that it persists although she does not feel much pain due to her neuropathy. She has a history of diabetes mellitus type 1, with onset at age 6. She is currently under the care of Dr. Marielle Rolon, and is being treated with insulin glargine and lispro. She had not been well-controlled, with HbA1c ranging from 7.5-8.0, although has ow improved since using the Freestyle meter as discussed. She does have proliferative diabetic retinopathy and is s/p pan-retinal photocoagulation therapy in both eyes. She is followed closely by Dr. Will Hurd, and recent exam showed regression bilaterally. She also has peripheral neuropathy, with burning and tingling in her feet at night. Renal function had been normal until 5/2016, with evidence of chronic renal disease, stage 3, since that time with baseline creatinine1.0-1.04/ eGFR 54-56. She also has a history of hypothyroidism, and she is currently on Synthroid. She denies cold intolerance, hair or skin changes. She has a history of hypertension, treated with quinapril. She had a pharmacologic nuclear stress test in 11/2012, which showed no evidence for ischemia, and normal LV wall motion (EF> 70%). She also had a carotid duplex scan in 7/2013 which showed mild (< 50%) bilateral internal carotid artery stenoses. She denies any chest pain, shortness of breath at rest or with exertion, palpitations, lightheadedness, or edema. She has a history of hyperlipidemia, and was on atorvastatin for many years until 10/2015 when she discontinued it because of myalgias. She was recently tried on rosuvastatin without success due to recurrent myalgias. She was started on ezetimibe in 9/2016 and has been tolerating it without difficulty. She successfully stopped smoking in 1/2015, and reports that she continues to abstain. She had a screening colonoscopy in 9/2014 by Dr. Kennon Brittle, which found a 6 mm sessile polyp in the descending colon and a 4-5 mm sessile polyp in the rectum/sigmoid (pathology: tubular adenomas).  She had a repeat colonoscopy in 9/2019 showing a 6 mm sessile polyp in the ileocecal valve (pathology unavailable). Recommendation was for follow-up colonoscopy in 5 years. She denies any abdominal pain, nausea, vomiting, melena, hematochezia, or change in bowel movements. In 3/2017, she was diagnosed with an upper respiratory tract infection and was prescribed doxycycline and prednisone by [facility]. She subsequently developed severe dizziness and vomiting, prompting a visit to the [facility] ED where she was diagnosed with benign positional vertigo and treated with meclizine and Zofran with improvement. In 10/2017, she noticed difficulty with her peripheral vision and was evaluated by Dr. Tyron Lockhart. She has regressed proliferative diabetic retinopathy and she reports that the laser therapy she received many years ago to treat this resulted in the current limitations in her peripheral vision. She does also have mild glaucoma for which she is receiving treatment. She states that her driving has been restricted to only during the day, and she is aware that she must turn her head completely to each side when navigating behind the wheel. In 2/2018, she began having difficulty with right elbow pain and was evaluated by Dr. Leopoldo Mutters who diagnosed her with right lateral epicondylitis. She received a cortisone injection with improvement. In 10/2018, while hiking on vacation hiking in [place], she slipped on some wooden steps and landed on her left shoulder. She was transported to [facility] in [place], [place], and found to have a closed nondisplaced fracture of the left proximal humerus. She was placed in a sling and told to follow-up with orthopedics. She was being followed by Dr. Linda Meneses of [address] [place] [address], and completed physical therapy with improvement. She is no longer requiring pain medication.        Past Medical History:   Diagnosis Date    Chronic renal disease, stage III     Diabetes mellitus type 1 ([facility] [address]     Diabetic peripheral neuropathy (HCC)     Diabetic retinopathy ([facility] [address]     Essential hypertension with goal blood pressure less than 140/90     Hearing decreased     Hyperlipidemia     Hypothyroidism      Past Surgical History:   Procedure Laterality Date    HX GYN      partial hysterectomy    HX GYN      3 D and C's    HX HEENT      tonsillectomy    HX HEENT      cataract removal bilateral    HX HEENT      multiple surgeries for retinopathy    HX HYSTERECTOMY      HX ORTHOPAEDIC      carpral tunnel sugery bilateral     Current Outpatient Medications   Medication Sig    quinapriL (ACCUPRIL) 10 mg tablet TAKE 1 TABLET EVERY EVENING ALONG WITH A 5 MG TABLET    levothyroxine (SYNTHROID) 88 mcg tablet Take 1 Tab by mouth every Monday, Wednesday, Friday. Take 100 mcg on Tu/Th/Sat/Sun    levothyroxine (SYNTHROID) 100 mcg tablet Take 1 Tab by mouth every Tuesday, Thursday, Saturday & Sunday. Take 88 mcg on M/W/F    omeprazole (PRILOSEC) 40 mg capsule Take 1 Cap by mouth daily.  quinapriL (ACCUPRIL) 5 mg tablet TAKE 1 TABLET NIGHTLY WITH 10 MG TABLET EVERY EVENING    ezetimibe (ZETIA) 10 mg tablet TAKE 1 TABLET DAILY    Cholecalciferol, Vitamin D3, (VITAMIN D3) 2,000 unit cap capsule Take 2,000 Units by mouth daily.  ALPHAGAN P 0.1 % ophthalmic solution     glucose blood VI test strips (ONE TOUCH ULTRA TEST) strip USE TO TEST BLOOD SUGAR FOUR TIMES A DAY    cyanocobalamin (VITAMIN B-12) 500 mcg tablet Take 500 mcg by mouth daily.  aspirin delayed-release 81 mg tablet Take  by mouth daily.  insulin glargine (LANTUS SOLOSTAR) 100 unit/mL (3 mL) pen 23 Units by SubCUTAneous route daily.  insulin lispro (HUMALOG PEN) 100 unit/mL kwikpen by SubCUTAneous route. Sliding scale as needed       No current facility-administered medications for this visit. Allergies and Intolerances:    Allergies   Allergen Reactions    Pcn [Penicillins] Hives    Sulfa (Sulfonamide Antibiotics) Nausea Only and Vertigo     Family History: Grandmother  from colon cancer. Mother had CAD and DM. Family History   Problem Relation Age of Onset    Diabetes Brother     Diabetes Mother     Heart Disease Mother      Social History:   She  reports that she quit smoking about 6 years ago. She has a 10.00 pack-year smoking history. She has never used smokeless tobacco. She is  and lives with her . 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 Social History     Substance and Sexual Activity   Alcohol Use Yes    Comment: infrequently     Immunization History:  Immunization History   Administered Date(s) Administered    Covid-19, MODERNA, Mrna, Lnp-s, Pf, 100mcg/0.5mL 2021    Influenza Vaccine Bulu Box) PF (>6 Mo Flulaval, Fluarix, and >3 Yrs Afluria, Fluzone [id number]) 12/15/2015, 2016, 10/19/2017, 2018, 12/10/2019    Influenza Vaccine PF 2014    Influenza Vaccine Split 2011, 10/17/2012    Influenza Vaccine Whole 2010    Influenza, Quadrivalent, Adjuvanted (>65 Yrs FLUAD QUAD [id number]) 2020    Pneumococcal Conjugate (PCV-13) 2020    Pneumococcal Polysaccharide (PPSV-23) 2017, 2019    Tdap 2013    Zoster Vaccine, Live 2016       Review of Systems:   As above included in HPI. Otherwise 11 point review of systems negative including constitutional, skin, HENT, eyes, respiratory, cardiovascular, gastrointestinal, genitourinary, musculoskeletal, endo/heme/aller, neurological.      Physical:   Vitals:   BP: 134/60  HR: 69  WT: 171 lb (77.6 kg)  BMI:  28.46 kg/m2    Exam:   Patient appears in no apparent distress. Affect is appropriate. HEENT: PERRLA, anicteric, oropharynx clear, no JVD, adenopathy or thyromegaly. No carotid bruits or radiated murmur. Lungs: clear to auscultation, no wheezes, rhonchi, or rales. Heart: regular rate and rhythm.  No murmur, rubs, gallops  Abdomen: soft, nontender, nondistended, normal bowel sounds, no hepatosplenomegaly or masses. Extremities: without edema. Review of Data:  Labs:   Hospital Outpatient Visit on 02/04/2021   Component Date Value Ref Range Status    WBC 02/04/2021 6.1  4.6 - 13.2 K/uL Final    RBC 02/04/2021 3.55* 4.20 - 5.30 M/uL Final    HGB 02/04/2021 11.5* 12.0 - 16.0 g/dL Final    HCT 02/04/2021 33.1* 35.0 - 45.0 % Final    MCV 02/04/2021 93.2  74.0 - 97.0 FL Final    MCH 02/04/2021 32.4  24.0 - 34.0 PG Final    MCHC 02/04/2021 34.7  31.0 - 37.0 g/dL Final    RDW 02/04/2021 12.3  11.6 - 14.5 % Final    PLATELET 30/19/2648 947  135 - 420 K/uL Final    MPV 02/04/2021 11.6  9.2 - 11.8 FL Final    NEUTROPHILS 02/04/2021 70  40 - 73 % Final    LYMPHOCYTES 02/04/2021 18* 21 - 52 % Final    MONOCYTES 02/04/2021 9  3 - 10 % Final    EOSINOPHILS 02/04/2021 2  0 - 5 % Final    BASOPHILS 02/04/2021 1  0 - 2 % Final    ABS. NEUTROPHILS 02/04/2021 4.3  1.8 - 8.0 K/UL Final    ABS. LYMPHOCYTES 02/04/2021 1.1  0.9 - 3.6 K/UL Final    ABS. MONOCYTES 02/04/2021 0.6  0.05 - 1.2 K/UL Final    ABS. EOSINOPHILS 02/04/2021 0.1  0.0 - 0.4 K/UL Final    ABS.  BASOPHILS 02/04/2021 0.0  0.0 - 0.1 K/UL Final    DF 02/04/2021 AUTOMATED    Final    LIPID PROFILE 02/04/2021        Final    Cholesterol, total 02/04/2021 192  <200 MG/DL Final    Triglyceride 02/04/2021 84  <150 MG/DL Final    HDL Cholesterol 02/04/2021 77* 40 - 60 MG/DL Final    LDL, calculated 02/04/2021 98.2  0 - 100 MG/DL Final    VLDL, calculated 02/04/2021 16.8  MG/DL Final    CHOL/HDL Ratio 02/04/2021 2.5  0 - 5.0   Final    Magnesium 02/04/2021 2.0  1.6 - 2.6 mg/dL Final    Sodium 02/04/2021 135* 136 - 145 mmol/L Final    Potassium 02/04/2021 5.1  3.5 - 5.5 mmol/L Final    Chloride 02/04/2021 101  100 - 111 mmol/L Final    CO2 02/04/2021 26  21 - 32 mmol/L Final    Anion gap 02/04/2021 8  3.0 - 18 mmol/L Final    Glucose 02/04/2021 136* 74 - 99 mg/dL Final    BUN 02/04/2021 20* 7.0 - 18 MG/DL Final    Creatinine 02/04/2021 0.90  0.6 - 1.3 MG/DL Final    BUN/Creatinine ratio 02/04/2021 22* 12 - 20   Final    GFR est AA 02/04/2021 >60  >60 ml/min/1.73m2 Final    GFR est non-AA 02/04/2021 >60  >60 ml/min/1.73m2 Final    Calcium 02/04/2021 8.8  8.5 - 10.1 MG/DL Final    Bilirubin, total 02/04/2021 0.5  0.2 - 1.0 MG/DL Final    ALT (SGPT) 02/04/2021 17  13 - 56 U/L Final    AST (SGOT) 02/04/2021 11  10 - 38 U/L Final    Alk.  phosphatase 02/04/2021 64  45 - 117 U/L Final    Protein, total 02/04/2021 6.1* 6.4 - 8.2 g/dL Final    Albumin 02/04/2021 3.5  3.4 - 5.0 g/dL Final    Globulin 02/04/2021 2.6  2.0 - 4.0 g/dL Final    A-G Ratio 02/04/2021 1.3  0.8 - 1.7   Final    Microalbumin,urine random 02/04/2021 1.75  0 - 3.0 MG/DL Final    Creatinine, urine 02/04/2021 79.00  30 - 125 mg/dL Final    Microalbumin/Creat ratio (mg/g cre* 02/04/2021 22  0 - 30 mg/g Final    TSH 02/04/2021 0.36  0.36 - 3.74 uIU/mL Final    T4, Free 02/04/2021 1.6* 0.7 - 1.5 NG/DL Final    Vitamin D 25-Hydroxy 02/04/2021 46.3  30 - 100 ng/mL Final   Abstract on 12/07/2020   Component Date Value Ref Range Status    LDL-C, External 07/29/2020 90   Final    Creatinine, External 07/29/2020 0.9   Final    Hemoglobin A1c, External 08/11/2020 6.9  % Final   Hospital Outpatient Visit on 11/03/2020   Component Date Value Ref Range Status    WBC 11/03/2020 5.5  4.6 - 13.2 K/uL Final    RBC 11/03/2020 3.76* 4.20 - 5.30 M/uL Final    HGB 11/03/2020 12.1  12.0 - 16.0 g/dL Final    HCT 11/03/2020 36.2  35.0 - 45.0 % Final    MCV 11/03/2020 96.3  74.0 - 97.0 FL Final    MCH 11/03/2020 32.2  24.0 - 34.0 PG Final    MCHC 11/03/2020 33.4  31.0 - 37.0 g/dL Final    RDW 11/03/2020 12.3  11.6 - 14.5 % Final    PLATELET 75/72/2654 685  135 - 420 K/uL Final    MPV 11/03/2020 12.1* 9.2 - 11.8 FL Final    NEUTROPHILS 11/03/2020 67  40 - 73 % Final    LYMPHOCYTES 11/03/2020 23  21 - 52 % Final    MONOCYTES 11/03/2020 8  3 - 10 % Final    EOSINOPHILS 11/03/2020 1  0 - 5 % Final    BASOPHILS 11/03/2020 1  0 - 2 % Final    ABS. NEUTROPHILS 11/03/2020 3.7  1.8 - 8.0 K/UL Final    ABS. LYMPHOCYTES 11/03/2020 1.3  0.9 - 3.6 K/UL Final    ABS. MONOCYTES 11/03/2020 0.5  0.05 - 1.2 K/UL Final    ABS. EOSINOPHILS 11/03/2020 0.0  0.0 - 0.4 K/UL Final    ABS. BASOPHILS 11/03/2020 0.0  0.0 - 0.1 K/UL Final    DF 11/03/2020 AUTOMATED    Final    LIPID PROFILE 11/03/2020        Final    Cholesterol, total 11/03/2020 201* <200 MG/DL Final    Triglyceride 11/03/2020 112  <150 MG/DL Final    HDL Cholesterol 11/03/2020 69* 40 - 60 MG/DL Final    LDL, calculated 11/03/2020 109.6* 0 - 100 MG/DL Final    VLDL, calculated 11/03/2020 22.4  MG/DL Final    CHOL/HDL Ratio 11/03/2020 2.9  0 - 5.0   Final    TSH 11/03/2020 0.18* 0.36 - 3.74 uIU/mL Final    T4, Free 11/03/2020 1.4  0.7 - 1.5 NG/DL Final    Vitamin D 25-Hydroxy 11/03/2020 56.8  30 - 100 ng/mL Final    Sodium 11/03/2020 138  136 - 145 mmol/L Final    Potassium 11/03/2020 5.0  3.5 - 5.5 mmol/L Final    Chloride 11/03/2020 106  100 - 111 mmol/L Final    CO2 11/03/2020 25  21 - 32 mmol/L Final    Anion gap 11/03/2020 7  3.0 - 18 mmol/L Final    Glucose 11/03/2020 247* 74 - 99 mg/dL Final    BUN 11/03/2020 27* 7.0 - 18 MG/DL Final    Creatinine 11/03/2020 1.10  0.6 - 1.3 MG/DL Final    BUN/Creatinine ratio 11/03/2020 25* 12 - 20   Final    GFR est AA 11/03/2020 >60  >60 ml/min/1.73m2 Final    GFR est non-AA 11/03/2020 50* >60 ml/min/1.73m2 Final    Calcium 11/03/2020 9.2  8.5 - 10.1 MG/DL Final    Bilirubin, total 11/03/2020 0.3  0.2 - 1.0 MG/DL Final    ALT (SGPT) 11/03/2020 15  13 - 56 U/L Final    AST (SGOT) 11/03/2020 8* 10 - 38 U/L Final    Alk.  phosphatase 11/03/2020 69  45 - 117 U/L Final    Protein, total 11/03/2020 6.3* 6.4 - 8.2 g/dL Final    Albumin 11/03/2020 3.5  3.4 - 5.0 g/dL Final    Globulin 2020 2.8  2.0 - 4.0 g/dL Final    A-G Ratio 2020 1.3  0.8 - 1.7   Final    Microalbumin,urine random 2020 0.62  0 - 3.0 MG/DL Final    Creatinine, urine 2020 52.00  30 - 125 mg/dL Final    Microalbumin/Creat ratio (mg/g cre* 2020 12  0 - 30 mg/g Final       EKG (2020) sinus rhythm at 61 bpm, normal intervals, no ischemic changes. Health Maintenance:  Screening:    Mammogram: negative (10/2020)    PAP smear: negative (2014). S/p YUNG, no further screening needed. Colorectal: colonsocopy (2019) ileocecal valve polyp. Dr. Nancy Colindres. Due 2024. Depression: none   DM (HbA1c/FPG): HbA1c 6.9 (2020) Dr. Danilo Carey   Hepatitis C: negative (2015)   Falls: none    DEXA: osteopenia (10/2020)   Glaucoma: mild primary open angle glaucoma and regressed proliferative diabetic retinopathy. Followed by Dr. Salena Peng (last 2021)   Smokin pack year (stopped 2015)   Vitamin D: 46.3 (2021)   Medicare Wellness: 2020 (Welcome)      Impression:  Patient Active Problem List   Diagnosis Code    Stable proliferative diabetic retinopathy of both eyes associated with type 1 diabetes mellitus ([facility] [address] B67.9850    Hyperlipidemia E78.5    Hearing decreased H91.90    Diabetes mellitus with diabetic polyneuropathy ([facility] [address] E11.42    Vitamin D deficiency E55.9    Anemia D64.9    Former smoker Z87.891    Adenomatous polyp of colon D12.6    Essential hypertension I10    Hypothyroidism due to acquired atrophy of thyroid E03.4    Chronic kidney disease, stage III (moderate) N18.30    Type 1 diabetes mellitus with stage 3 chronic kidney disease (HCC) E10.22, N18.30    Macrocytosis D75.89    Microalbuminuria due to type 1 diabetes mellitus (HCC) E10.29, R80.9    Coronary artery calcification seen on CT scan I25.10    GERD (gastroesophageal reflux disease) K21.9    Osteopenia of multiple sites M85.89       Plan:  1. Hypertension.   Blood pressure mildly elevated initially today on quinapril 15 mg daily, but improved with recheck. Home readings generally controlled, ranging 114-131/ 60-70. Instructed to continue to monitor. Renal function normalized with creatinine 0.90/ eGFR >60, Continues to show elevated BUN/creatinine ratio 22 and advised to increase water intake. Continue to follow. 2. Lower extremity edema. Patient had reported worsened lower extremity edema not resolving overnight and some questionable mild increase in exertional dyspnea. No other associated symptoms except palpitations in the form of \"skipped beats\". EKG without change from baseline. Lower extremity duplex negative for DVT or venous reflux. Echocardiogram with normal LV function and normal right sided pressures. 48 hour Holter monitor unrevealing. Pharmacologic nuclear stress test peformed at ED was negative. Started on lasix 20 mg daily with improvement. Reports that now generally present during the day and resolving overnight. Using compression hose regularly. 3. Type 1 diabetes mellitus. HbA1c improved to 6.9 (11/2020) since using new Freestyle sensor machine for blood sugar readings. On Lantus and Humalog. Being managed by Dr. Kevin Munoz, Proliferative diabetic retinopathy in regression, followed by Dr. Kvng Koenig. Neuropathy symptoms in feet are mild and have not required treatment with medication. Foot exam performed by Dr. Kevin Munoz. Renal function with evidence of chronic renal disease, stage 3. Non evidence of microalbuminuria today (22 mg/g). May be reflective of better blood sugar control. On Ace-I. Unable to tolerate statins due to myalgias, but LDL improved with ezetimibe. Follow closely. 4. Hyperlipidemia. On ezetimibe with significant improvement. LDL 98 today with HDL 77 today. Not able to tolerate statin (atorvastatin/ rosuvastatin) due to myalgias. Moderate to severe aortic and coronary atherosclerosis and calcifications seen on chest CT scan (11/2018).   Stressed importance of lifestyle modifications, especially diet, exercise and weight loss. 5. Chronic renal disease, stage 3. Evidence of mild disease with creatinine 1.00-1.04/ eGFR 54-56 since 5/2016. Most likely due to long standing diabetes mellitus and hypertension. However, recent readings normalized. Low again today with creatinine 1.10/ eGFR 50, but elevated BUN/creatinine ratio 25 suggestive that likely due to prerenal effect and advised to increase fluid intake. Normalized today. On Ace-I, but not on statin as discussed above. Counseled on avoiding NSAIDS and prerenal status. 6. Hypothyroidism. TSH therapeutic today on levothyroxine 100 mcg 4 days per week and 88 mcg three days per week. Will continue to monitor. 7. Former smoking. Performed in 11/2017 showing a few tiny solid nodules and one groundglass nodule identified (lung RADS 2). Did also note evidence of emphysema and prominence of the pulmonary vascular trunk suggestive of pulmonary arterial hypertension. However, echocardiogram with normal PA pressures. Repeat in 11/2018 and 11/2019 without change, and also reported multiple small stable lung nodules on repeat in 1/2021. Will continue to monitor. Next due 1/2022.  8. Osteopenia. Baseline bone density scan 10/8/2020 . Using femoral neck T-scores, calculated FRAX score estimates her 10 year risk of a major osteoporetic fracture at 6.5 % and hip fracture at 0.2 %, which are not an indication for biphosphonate treatment (>20% and >3%, respectively). Advised to begin calcium in addition to Vitamin D. Encouraged exercise, particularly weight bearing activities. Repeat bone density scan in 2022.   9. GERD. On omeprazole and previously controlled. Noting increase in burning discomfort and nausea in the late morning after drinking a pot of coffee. Advised to decrease coffee and caffeine intake and increased dose of omeprazole to 40 mg daily. Reports improvement today, but states symptoms recur if she misses dose.  Also, chest CT scan (1/8/2021) noted poorly distended distal esophagus with mild wall prominence. Referred to Dr. Valarie Palacio, and scheduled to undergo upper endoscopy on 3/9/2021 to evaluate further. 10. Anemia. Chronic but stable. Iron studies (4/2017) were consistent with anemia of chronic inflammation; B12 normal; and folate level slightly low (4/2017) so advised to begin multivitamin with folate. Mild anemia persists today with resolution of macrocytosis today. Will continue to follow. 11. Overweight. Emphasized importance of lifestyle modifications, including diet, exercise, and weight loss. Will readdress next visit. 12. Health maintenance. Already received influenza vaccine. Given script for Shingrix vaccine but not yet obtained. Will give first dose of Moderna COVID 19 vaccine this week. Other immunizations up to date. Mammogram completed. Colonoscopy completed with Dr. Valarie Palacio. Vitamin D level now normal. Continue maintenance dose supplement. Welcome to Medicare visit up to date. Counseled patient regarding strict mitigation measures for COVID-19, including wearing a mask, social distancing and diligent hand washing, as recommended by the CDC. Patient understands recommendations and agrees with plan. .  Follow-up appointment in 3 months.

## 2021-03-12 ENCOUNTER — IMMUNIZATION (OUTPATIENT)
Dept: INTERNAL MEDICINE CLINIC | Age: 66
End: 2021-03-12
Payer: MEDICARE

## 2021-03-12 ENCOUNTER — DOCUMENTATION ONLY (OUTPATIENT)
Dept: INTERNAL MEDICINE CLINIC | Age: 66
End: 2021-03-12

## 2021-03-12 DIAGNOSIS — Z23 ENCOUNTER FOR IMMUNIZATION: Primary | ICD-10-CM

## 2021-03-12 PROCEDURE — 0012A COVID-19, MRNA, LNP-S, PF, 100MCG/0.5ML DOSE(MODERNA): CPT | Performed by: FAMILY MEDICINE

## 2021-03-12 PROCEDURE — 91301 COVID-19, MRNA, LNP-S, PF, 100MCG/0.5ML DOSE(MODERNA): CPT | Performed by: FAMILY MEDICINE

## 2021-03-12 NOTE — PROGRESS NOTES
Tigre Lai is a 72 y.o. female who presents for   Chief Complaint   Patient presents with    Immunization/Injection     2nd [facility] Vaccine   ,  She denies any symptoms , reactions or allergies that would exclude them from being immunized today. Risks and adverse reactions were discussed and the VIS was given to them. All questions were addressed. She was observed for 15 min post injection. There were no reactions observed.     Yandy Urbina LPN

## 2021-05-20 ENCOUNTER — HOSPITAL ENCOUNTER (OUTPATIENT)
Dept: LAB | Age: 66
Discharge: HOME | End: 2021-05-20
Payer: MEDICARE

## 2021-05-20 ENCOUNTER — APPOINTMENT (OUTPATIENT)
Dept: INTERNAL MEDICINE CLINIC | Age: 66
End: 2021-05-20

## 2021-05-20 DIAGNOSIS — I25.10 CORONARY ARTERY CALCIFICATION SEEN ON CT SCAN: ICD-10-CM

## 2021-05-20 DIAGNOSIS — E10.3553 STABLE PROLIFERATIVE DIABETIC RETINOPATHY OF BOTH EYES ASSOCIATED WITH TYPE 1 DIABETES MELLITUS (HCC): ICD-10-CM

## 2021-05-20 DIAGNOSIS — E10.42 TYPE 1 DIABETES MELLITUS WITH DIABETIC POLYNEUROPATHY (HCC): ICD-10-CM

## 2021-05-20 DIAGNOSIS — Z87.891 FORMER SMOKER: ICD-10-CM

## 2021-05-20 DIAGNOSIS — E10.29 MICROALBUMINURIA DUE TO TYPE 1 DIABETES MELLITUS (HCC): ICD-10-CM

## 2021-05-20 DIAGNOSIS — R68.89 OTHER GENERAL SYMPTOMS AND SIGNS: ICD-10-CM

## 2021-05-20 DIAGNOSIS — K21.9 GASTROESOPHAGEAL REFLUX DISEASE, UNSPECIFIED WHETHER ESOPHAGITIS PRESENT: ICD-10-CM

## 2021-05-20 DIAGNOSIS — N18.31 STAGE 3A CHRONIC KIDNEY DISEASE (HCC): ICD-10-CM

## 2021-05-20 DIAGNOSIS — E78.5 HYPERLIPIDEMIA, UNSPECIFIED HYPERLIPIDEMIA TYPE: ICD-10-CM

## 2021-05-20 DIAGNOSIS — N18.31 TYPE 1 DIABETES MELLITUS WITH STAGE 3A CHRONIC KIDNEY DISEASE (HCC): ICD-10-CM

## 2021-05-20 DIAGNOSIS — E03.4 HYPOTHYROIDISM DUE TO ACQUIRED ATROPHY OF THYROID: ICD-10-CM

## 2021-05-20 DIAGNOSIS — I10 ESSENTIAL HYPERTENSION: ICD-10-CM

## 2021-05-20 DIAGNOSIS — E10.22 TYPE 1 DIABETES MELLITUS WITH STAGE 3A CHRONIC KIDNEY DISEASE (HCC): ICD-10-CM

## 2021-05-20 DIAGNOSIS — D64.9 ANEMIA, UNSPECIFIED TYPE: ICD-10-CM

## 2021-05-20 DIAGNOSIS — R80.9 MICROALBUMINURIA DUE TO TYPE 1 DIABETES MELLITUS (HCC): ICD-10-CM

## 2021-05-20 DIAGNOSIS — M85.89 OSTEOPENIA OF MULTIPLE SITES: ICD-10-CM

## 2021-05-20 LAB
25(OH)D3 SERPL-MCNC: 40.5 NG/ML (ref 30–100)
ALBUMIN SERPL-MCNC: 3.5 G/DL (ref 3.4–5)
ALBUMIN/GLOB SERPL: 1.4 {RATIO} (ref 0.8–1.7)
ALP SERPL-CCNC: 62 U/L (ref 45–117)
ALT SERPL-CCNC: 15 U/L (ref 13–56)
ANION GAP SERPL CALC-SCNC: 4 MMOL/L (ref 3–18)
AST SERPL-CCNC: 12 U/L (ref 10–38)
BASOPHILS # BLD: 0 K/UL (ref 0–0.1)
BASOPHILS NFR BLD: 1 % (ref 0–2)
BILIRUB SERPL-MCNC: 0.4 MG/DL (ref 0.2–1)
BUN SERPL-MCNC: 10 MG/DL (ref 7–18)
BUN/CREAT SERPL: 11 (ref 12–20)
CALCIUM SERPL-MCNC: 9 MG/DL (ref 8.5–10.1)
CHLORIDE SERPL-SCNC: 101 MMOL/L (ref 100–111)
CHOLEST SERPL-MCNC: 206 MG/DL
CO2 SERPL-SCNC: 28 MMOL/L (ref 21–32)
CREAT SERPL-MCNC: 0.87 MG/DL (ref 0.6–1.3)
CREAT UR-MCNC: 46 MG/DL (ref 30–125)
DIFFERENTIAL METHOD BLD: ABNORMAL
EOSINOPHIL # BLD: 0.1 K/UL (ref 0–0.4)
EOSINOPHIL NFR BLD: 2 % (ref 0–5)
ERYTHROCYTE [DISTWIDTH] IN BLOOD BY AUTOMATED COUNT: 12 % (ref 11.6–14.5)
FERRITIN SERPL-MCNC: 165 NG/ML (ref 8–388)
FOLATE SERPL-MCNC: 4.7 NG/ML (ref 3.1–17.5)
GLOBULIN SER CALC-MCNC: 2.5 G/DL (ref 2–4)
GLUCOSE SERPL-MCNC: 153 MG/DL (ref 74–99)
HCT VFR BLD AUTO: 34.3 % (ref 35–45)
HDLC SERPL-MCNC: 80 MG/DL (ref 40–60)
HDLC SERPL: 2.6 {RATIO} (ref 0–5)
HGB BLD-MCNC: 11.8 G/DL (ref 12–16)
IRON SATN MFR SERPL: 41 % (ref 20–50)
IRON SERPL-MCNC: 91 UG/DL (ref 50–175)
LDLC SERPL CALC-MCNC: 111.6 MG/DL (ref 0–100)
LIPID PROFILE,FLP: ABNORMAL
LYMPHOCYTES # BLD: 1 K/UL (ref 0.9–3.6)
LYMPHOCYTES NFR BLD: 20 % (ref 21–52)
MAGNESIUM SERPL-MCNC: 2 MG/DL (ref 1.6–2.6)
MCH RBC QN AUTO: 32.8 PG (ref 24–34)
MCHC RBC AUTO-ENTMCNC: 34.4 G/DL (ref 31–37)
MCV RBC AUTO: 95.3 FL (ref 74–97)
MICROALBUMIN UR-MCNC: 2.39 MG/DL (ref 0–3)
MICROALBUMIN/CREAT UR-RTO: 52 MG/G (ref 0–30)
MONOCYTES # BLD: 0.4 K/UL (ref 0.05–1.2)
MONOCYTES NFR BLD: 8 % (ref 3–10)
NEUTS SEG # BLD: 3.3 K/UL (ref 1.8–8)
NEUTS SEG NFR BLD: 69 % (ref 40–73)
PLATELET # BLD AUTO: 242 K/UL (ref 135–420)
PMV BLD AUTO: 11.7 FL (ref 9.2–11.8)
POTASSIUM SERPL-SCNC: 5.2 MMOL/L (ref 3.5–5.5)
PROT SERPL-MCNC: 6 G/DL (ref 6.4–8.2)
RBC # BLD AUTO: 3.6 M/UL (ref 4.2–5.3)
SODIUM SERPL-SCNC: 133 MMOL/L (ref 136–145)
T4 FREE SERPL-MCNC: 1.4 NG/DL (ref 0.7–1.5)
TIBC SERPL-MCNC: 221 UG/DL (ref 250–450)
TRIGL SERPL-MCNC: 72 MG/DL (ref ?–150)
TSH SERPL DL<=0.05 MIU/L-ACNC: 0.35 UIU/ML (ref 0.36–3.74)
VIT B12 SERPL-MCNC: >2000 PG/ML (ref 211–911)
VLDLC SERPL CALC-MCNC: 14.4 MG/DL
WBC # BLD AUTO: 4.8 K/UL (ref 4.6–13.2)

## 2021-05-20 PROCEDURE — 85025 COMPLETE CBC W/AUTO DIFF WBC: CPT

## 2021-05-20 PROCEDURE — 84439 ASSAY OF FREE THYROXINE: CPT

## 2021-05-20 PROCEDURE — 82043 UR ALBUMIN QUANTITATIVE: CPT

## 2021-05-20 PROCEDURE — 80053 COMPREHEN METABOLIC PANEL: CPT

## 2021-05-20 PROCEDURE — 83735 ASSAY OF MAGNESIUM: CPT

## 2021-05-20 PROCEDURE — 83540 ASSAY OF IRON: CPT

## 2021-05-20 PROCEDURE — 82306 VITAMIN D 25 HYDROXY: CPT

## 2021-05-20 PROCEDURE — 82728 ASSAY OF FERRITIN: CPT

## 2021-05-20 PROCEDURE — 84443 ASSAY THYROID STIM HORMONE: CPT

## 2021-05-20 PROCEDURE — 82607 VITAMIN B-12: CPT

## 2021-05-20 PROCEDURE — 80061 LIPID PANEL: CPT

## 2021-05-26 ENCOUNTER — OFFICE VISIT (OUTPATIENT)
Dept: INTERNAL MEDICINE CLINIC | Age: 66
End: 2021-05-26
Payer: MEDICARE

## 2021-05-26 ENCOUNTER — TELEPHONE (OUTPATIENT)
Dept: INTERNAL MEDICINE CLINIC | Age: 66
End: 2021-05-26

## 2021-05-26 VITALS
RESPIRATION RATE: 16 BRPM | HEIGHT: 65 IN | WEIGHT: 168 LBS | SYSTOLIC BLOOD PRESSURE: 138 MMHG | DIASTOLIC BLOOD PRESSURE: 62 MMHG | OXYGEN SATURATION: 100 % | BODY MASS INDEX: 27.99 KG/M2 | HEART RATE: 63 BPM | TEMPERATURE: 97.5 F

## 2021-05-26 DIAGNOSIS — E10.3553 STABLE PROLIFERATIVE DIABETIC RETINOPATHY OF BOTH EYES ASSOCIATED WITH TYPE 1 DIABETES MELLITUS (HCC): ICD-10-CM

## 2021-05-26 DIAGNOSIS — Z87.891 FORMER SMOKER: ICD-10-CM

## 2021-05-26 DIAGNOSIS — E10.42 TYPE 1 DIABETES MELLITUS WITH DIABETIC POLYNEUROPATHY (HCC): ICD-10-CM

## 2021-05-26 DIAGNOSIS — R80.9 MICROALBUMINURIA DUE TO TYPE 1 DIABETES MELLITUS (HCC): ICD-10-CM

## 2021-05-26 DIAGNOSIS — K21.9 GASTROESOPHAGEAL REFLUX DISEASE, UNSPECIFIED WHETHER ESOPHAGITIS PRESENT: ICD-10-CM

## 2021-05-26 DIAGNOSIS — E10.22 TYPE 1 DIABETES MELLITUS WITH STAGE 3A CHRONIC KIDNEY DISEASE (HCC): ICD-10-CM

## 2021-05-26 DIAGNOSIS — N18.31 TYPE 1 DIABETES MELLITUS WITH STAGE 3A CHRONIC KIDNEY DISEASE (HCC): ICD-10-CM

## 2021-05-26 DIAGNOSIS — M85.89 OSTEOPENIA OF MULTIPLE SITES: ICD-10-CM

## 2021-05-26 DIAGNOSIS — I10 ESSENTIAL HYPERTENSION: Primary | ICD-10-CM

## 2021-05-26 DIAGNOSIS — I25.10 CORONARY ARTERY CALCIFICATION SEEN ON CT SCAN: ICD-10-CM

## 2021-05-26 DIAGNOSIS — E10.29 MICROALBUMINURIA DUE TO TYPE 1 DIABETES MELLITUS (HCC): ICD-10-CM

## 2021-05-26 DIAGNOSIS — E78.5 HYPERLIPIDEMIA, UNSPECIFIED HYPERLIPIDEMIA TYPE: ICD-10-CM

## 2021-05-26 DIAGNOSIS — I12.9 HYPERTENSIVE KIDNEY DISEASE WITH STAGE 3A CHRONIC KIDNEY DISEASE (HCC): ICD-10-CM

## 2021-05-26 DIAGNOSIS — D64.9 ANEMIA, UNSPECIFIED TYPE: ICD-10-CM

## 2021-05-26 DIAGNOSIS — N18.31 HYPERTENSIVE KIDNEY DISEASE WITH STAGE 3A CHRONIC KIDNEY DISEASE (HCC): ICD-10-CM

## 2021-05-26 DIAGNOSIS — E03.4 HYPOTHYROIDISM DUE TO ACQUIRED ATROPHY OF THYROID: ICD-10-CM

## 2021-05-26 PROCEDURE — G8399 PT W/DXA RESULTS DOCUMENT: HCPCS | Performed by: INTERNAL MEDICINE

## 2021-05-26 PROCEDURE — 3046F HEMOGLOBIN A1C LEVEL >9.0%: CPT | Performed by: INTERNAL MEDICINE

## 2021-05-26 PROCEDURE — 2022F DILAT RTA XM EVC RTNOPTHY: CPT | Performed by: INTERNAL MEDICINE

## 2021-05-26 PROCEDURE — G8754 DIAS BP LESS 90: HCPCS | Performed by: INTERNAL MEDICINE

## 2021-05-26 PROCEDURE — G9899 SCRN MAM PERF RSLTS DOC: HCPCS | Performed by: INTERNAL MEDICINE

## 2021-05-26 PROCEDURE — G0463 HOSPITAL OUTPT CLINIC VISIT: HCPCS | Performed by: INTERNAL MEDICINE

## 2021-05-26 PROCEDURE — G8427 DOCREV CUR MEDS BY ELIG CLIN: HCPCS | Performed by: INTERNAL MEDICINE

## 2021-05-26 PROCEDURE — G8536 NO DOC ELDER MAL SCRN: HCPCS | Performed by: INTERNAL MEDICINE

## 2021-05-26 PROCEDURE — 1090F PRES/ABSN URINE INCON ASSESS: CPT | Performed by: INTERNAL MEDICINE

## 2021-05-26 PROCEDURE — 99214 OFFICE O/P EST MOD 30 MIN: CPT | Performed by: INTERNAL MEDICINE

## 2021-05-26 PROCEDURE — 3017F COLORECTAL CA SCREEN DOC REV: CPT | Performed by: INTERNAL MEDICINE

## 2021-05-26 PROCEDURE — G8752 SYS BP LESS 140: HCPCS | Performed by: INTERNAL MEDICINE

## 2021-05-26 PROCEDURE — G8510 SCR DEP NEG, NO PLAN REQD: HCPCS | Performed by: INTERNAL MEDICINE

## 2021-05-26 PROCEDURE — G8419 CALC BMI OUT NRM PARAM NOF/U: HCPCS | Performed by: INTERNAL MEDICINE

## 2021-05-26 PROCEDURE — 1101F PT FALLS ASSESS-DOCD LE1/YR: CPT | Performed by: INTERNAL MEDICINE

## 2021-05-26 NOTE — TELEPHONE ENCOUNTER
Please request copy of upper endoscopy performed by Dr. Navin Holt at [facility]. Thanks.

## 2021-05-26 NOTE — PATIENT INSTRUCTIONS
Heart-Healthy Diet: Care Instructions Your Care Instructions A heart-healthy diet has lots of vegetables, fruits, nuts, beans, and whole grains, and is low in salt. It limits foods that are high in saturated fat, such as meats, cheeses, and fried foods. It may be hard to change your diet, but even small changes can lower your risk of heart attack and heart disease. Follow-up care is a key part of your treatment and safety. Be sure to make and go to all appointments, and call your doctor if you are having problems. It's also a good idea to know your test results and keep a list of the medicines you take. How can you care for yourself at home? Watch your portions · Learn what a serving is. A \"serving\" and a \"portion\" are not always the same thing. Make sure that you are not eating larger portions than are recommended. For example, a serving of pasta is ½ cup. A serving size of meat is 2 to 3 ounces. A 3-ounce serving is about the size of a deck of cards. Measure serving sizes until you are good at [place]" them. Keep in mind that restaurants often serve portions that are 2 or 3 times the size of one serving. · To keep your energy level up and keep you from feeling hungry, eat often but in smaller portions. · Eat only the number of calories you need to stay at a healthy weight. If you need to lose weight, eat fewer calories than your body burns (through exercise and other physical activity). Eat more fruits and vegetables · Eat a variety of fruit and vegetables every day. Dark green, deep orange, red, or yellow fruits and vegetables are especially good for you. Examples include spinach, carrots, peaches, and berries. · Keep carrots, celery, and other veggies handy for snacks. Buy fruit that is in season and store it where you can see it so that you will be tempted to eat it. · Cook dishes that have a lot of veggies in them, such as stir-fries and soups. Limit saturated and trans fat · Read food labels, and try to avoid saturated and trans fats. They increase your risk of heart disease. · Use olive or canola oil when you cook. · Bake, broil, grill, or steam foods instead of frying them. · Choose lean meats instead of high-fat meats such as hot dogs and sausages. Cut off all visible fat when you prepare meat. · Eat fish, skinless poultry, and meat alternatives such as soy products instead of high-fat meats. Soy products, such as tofu, may be especially good for your heart. · Choose low-fat or fat-free milk and dairy products. Eat foods high in fiber · Eat a variety of grain products every day. Include whole-grain foods that have lots of fiber and nutrients. Examples of whole-grain foods include oats, whole wheat bread, and brown rice. · Buy whole-grain breads and cereals, instead of white bread or pastries. Limit salt and sodium · Limit how much salt and sodium you eat to help lower your blood pressure. · Taste food before you salt it. Add only a little salt when you think you need it. With time, your taste buds will adjust to less salt. · Eat fewer snack items, fast foods, and other high-salt, processed foods. Check food labels for the amount of sodium in packaged foods. · Choose low-sodium versions of canned goods (such as soups, vegetables, and beans). Limit sugar · Limit drinks and foods with added sugar. These include candy, desserts, and soda pop. Limit alcohol · Limit alcohol to no more than 2 drinks a day for men and 1 drink a day for women. Too much alcohol can cause health problems. When should you call for help? Watch closely for changes in your health, and be sure to contact your doctor if: 
  · You would like help planning heart-healthy meals. Where can you learn more? Go to http://www.UsherBuddy.com/ Enter V137 in the search box to learn more about \"Heart-Healthy Diet: Care Instructions. \" Current as of: August 22, 2019               Content Version: 12.6 © 3858-5288 FoxyTasks. Care instructions adapted under license by CAPPTURE (which disclaims liability or warranty for this information). If you have questions about a medical condition or this instruction, always ask your healthcare professional. [address] any warranty or liability for your use of this information. Learning About Low-Sodium Foods What foods are low in sodium? The foods you eat contain nutrients, such as vitamins and minerals. Sodium is a nutrient. Your body needs the right amount to stay healthy and work as it should. You can use the list below to help you make choices about which foods to eat. Fruits · Fresh, frozen, canned, or dried fruit Vegetables · Fresh or frozen vegetables, with no added salt · Canned vegetables, low-sodium or with no added salt Grains · Bagels without salted tops · Cereal with no added salt · Corn tortillas · Crackers with no added salt · Oatmeal, cooked without salt · Popcorn with no salt · Pasta and noodles, cooked without salt · Rice, cooked without salt · Unsalted pretzels Dairy and dairy alternatives · Butter, unsalted · Cream cheese · Ice cream 
· Milk · Soy milk Meats and other protein foods · Beans and peas, canned with no salt · Eggs · Fresh fish (not smoked) · Fresh meats (not smoked or cured) · Nuts and nut butter, prepared without salt · Poultry, not packaged with sodium solution · Tofu, unseasoned · Tuna, canned without salt Seasonings · Garlic · Herbs and spices · Lemon juice · Mustard · Olive oil · Salt-free seasoning mixes · Vinegar Work with your doctor to find out how much of this nutrient you need. Depending on your health, you may need more or less of it in your diet. Where can you learn more? Go to http://www.gray.com/ Enter I120 in the search box to learn more about \"Learning About Low-Sodium Foods. \" Current as of: August 22, 2019               Content Version: 12.6 © 7614-3535 [employer] Biologics. Care instructions adapted under license by Poxel (which disclaims liability or warranty for this information). If you have questions about a medical condition or this instruction, always ask your healthcare professional. [address] any warranty or liability for your use of this information. Low Sodium Diet (2,000 Milligram): Care Instructions Your Care Instructions Too much sodium causes your body to hold on to extra water. This can raise your blood pressure and force your heart and kidneys to work harder. In very serious cases, this could cause you to be put in the hospital. It might even be life-threatening. By limiting sodium, you will feel better and lower your risk of serious problems. The most common source of sodium is salt. People get most of the salt in their diet from canned, prepared, and packaged foods. Fast food and restaurant meals also are very high in sodium. Your doctor will probably limit your sodium to less than 2,000 milligrams (mg) a day. This limit counts all the sodium in prepared and packaged foods and any salt you add to your food. Follow-up care is a key part of your treatment and safety. Be sure to make and go to all appointments, and call your doctor if you are having problems. It's also a good idea to know your test results and keep a list of the medicines you take. How can you care for yourself at home? Read food labels · Read labels on cans and food packages. The labels tell you how much sodium is in each serving. Make sure that you look at the serving size. If you eat more than the serving size, you have eaten more sodium. · Food labels also tell you the Percent Daily Value for sodium. Choose products with low Percent Daily Values for sodium.  
· Be aware that sodium can come in forms other than salt, including monosodium glutamate (MSG), sodium citrate, and sodium bicarbonate (baking soda). MSG is often added to Asian food. When you eat out, you can sometimes ask for food without MSG or added salt. Buy low-sodium foods · Buy foods that are labeled \"unsalted\" (no salt added), \"sodium-free\" (less than 5 mg of sodium per serving), or \"low-sodium\" (less than 140 mg of sodium per serving). Foods labeled \"reduced-sodium\" and \"light sodium\" may still have too much sodium. Be sure to read the label to see how much sodium you are getting. · Buy fresh vegetables, or frozen vegetables without added sauces. Buy low-sodium versions of canned vegetables, soups, and other canned goods. Prepare low-sodium meals · Cut back on the amount of salt you use in cooking. This will help you adjust to the taste. Do not add salt after cooking. One teaspoon of salt has about 2,300 mg of sodium. · Take the salt shaker off the table. · Flavor your food with garlic, lemon juice, onion, vinegar, herbs, and spices. Do not use soy sauce, lite soy sauce, steak sauce, onion salt, garlic salt, celery salt, mustard, or ketchup on your food. · Use low-sodium salad dressings, sauces, and ketchup. Or make your own salad dressings and sauces without adding salt. · Use less salt (or none) when recipes call for it. You can often use half the salt a recipe calls for without losing flavor. Other foods such as rice, pasta, and grains do not need added salt. · Rinse canned vegetables, and cook them in fresh water. This removes somebut not allof the salt. · Avoid water that is naturally high in sodium or that has been treated with water softeners, which add sodium. Call your local water company to find out the sodium content of your water supply. If you buy bottled water, read the label and choose a sodium-free brand. Avoid high-sodium foods · Avoid eating: 
? Smoked, cured, salted, and canned meat, fish, and poultry. ? Ham, ovalle, hot dogs, and luncheon meats.  
? Regular, hard, and processed cheese and regular peanut butter. ? Crackers with salted tops, and other salted snack foods such as pretzels, chips, and salted popcorn. ? Frozen prepared meals, unless labeled low-sodium. ? Canned and dried soups, broths, and bouillon, unless labeled sodium-free or low-sodium. ? Canned vegetables, unless labeled sodium-free or low-sodium. ? [place] fries, pizza, tacos, and other fast foods. ? Pickles, olives, ketchup, and other condiments, especially soy sauce, unless labeled sodium-free or low-sodium. Where can you learn more? Go to http://www.gray.com/ Enter B592 in the search box to learn more about \"Low Sodium Diet (2,000 Milligram): Care Instructions. \" Current as of: August 22, 2019               Content Version: 12.6 © 1138-6912 GoHealth. Care instructions adapted under license by GetNinjas (which disclaims liability or warranty for this information). If you have questions about a medical condition or this instruction, always ask your healthcare professional. [place] [address] any warranty or liability for your use of this information. High Cholesterol: Care Instructions Your Care Instructions Cholesterol is a type of fat in your blood. It is needed for many body functions, such as making new cells. Cholesterol is made by your body. It also comes from food you eat. High cholesterol means that you have too much of the fat in your blood. This raises your risk of a heart attack and stroke. LDL and HDL are part of your total cholesterol. LDL is the \"bad\" cholesterol. High LDL can raise your risk for heart disease, heart attack, and stroke. HDL is the \"good\" cholesterol. It helps clear bad cholesterol from the body. High HDL is linked with a lower risk of heart disease, heart attack, and stroke.  
Your cholesterol levels help your doctor find out your risk for having a heart attack or stroke. You and your doctor can talk about whether you need to lower your risk and what treatment is best for you. A heart-healthy lifestyle along with medicines can help lower your cholesterol and your risk. The way you choose to lower your risk will depend on how high your risk is for heart attack and stroke. It will also depend on how you feel about taking medicines. Follow-up care is a key part of your treatment and safety. Be sure to make and go to all appointments, and call your doctor if you are having problems. It's also a good idea to know your test results and keep a list of the medicines you take. How can you care for yourself at home? · Eat a variety of foods every day. Good choices include fruits, vegetables, whole grains (like oatmeal), dried beans and peas, nuts and seeds, soy products (like tofu), and fat-free or low-fat dairy products. · Replace butter, margarine, and hydrogenated or partially hydrogenated oils with olive and canola oils. (Canola oil margarine without trans fat is fine.) · Replace red meat with fish, poultry, and soy protein (like tofu). · Limit processed and packaged foods like chips, crackers, and cookies. · Bake, broil, or steam foods. Don't balderrama them. · Be physically active. Get at least 30 minutes of exercise on most days of the week. Walking is a good choice. You also may want to do other activities, such as running, swimming, cycling, or playing tennis or team sports. · Stay at a healthy weight or lose weight by making the changes in eating and physical activity listed above. Losing just a small amount of weight, even 5 to 10 pounds, can reduce your risk for having a heart attack or stroke. · Do not smoke. When should you call for help? Watch closely for changes in your health, and be sure to contact your doctor if: 
  · You need help making lifestyle changes. · You have questions about your medicine. Where can you learn more?  
Go to http://www.gray.com/ Enter O804 in the search box to learn more about \"High Cholesterol: Care Instructions. \" Current as of: December 16, 2019               Content Version: 12.6 © 5457-2545 Codementor. Care instructions adapted under license by Videofropper (which disclaims liability or warranty for this information). If you have questions about a medical condition or this instruction, always ask your healthcare professional. [address] any warranty or liability for your use of this information. Learning About Diabetes Food Guidelines Your Care Instructions Meal planning is important to manage diabetes. It helps keep your blood sugar at a target level (which you set with your doctor). You don't have to eat special foods. You can eat what your family eats, including sweets once in a while. But you do have to pay attention to how often you eat and how much you eat of certain foods. You may want to work with a dietitian or a certified diabetes educator (CDE) to help you plan meals and snacks. A dietitian or CDE can also help you lose weight if that is one of your goals. What should you know about eating carbs? Managing the amount of carbohydrate (carbs) you eat is an important part of healthy meals when you have diabetes. Carbohydrate is found in many foods. · Learn which foods have carbs. And learn the amounts of carbs in different foods. ? Bread, cereal, pasta, and rice have about 15 grams of carbs in a serving. A serving is 1 slice of bread (1 ounce), ½ cup of cooked cereal, or 1/3 cup of cooked pasta or rice. ? Fruits have 15 grams of carbs in a serving. A serving is 1 small fresh fruit, such as an apple or orange; ½ of a banana; ½ cup of cooked or canned fruit; ½ cup of fruit juice; 1 cup of melon or raspberries; or 2 tablespoons of dried fruit. ? Milk and no-sugar-added yogurt have 15 grams of carbs in a serving.  A serving is 1 cup of milk or 2/3 cup of no-sugar-added yogurt. ? Starchy vegetables have 15 grams of carbs in a serving. A serving is ½ cup of mashed potatoes or sweet potato; 1 cup winter squash; ½ of a small baked potato; ½ cup of cooked beans; or ½ cup cooked corn or green peas. · Learn how much carbs to eat each day and at each meal. A dietitian or CDE can teach you how to keep track of the amount of carbs you eat. This is called carbohydrate counting. · If you are not sure how to count carbohydrate grams, use the Plate Method to plan meals. It is a good, quick way to make sure that you have a balanced meal. It also helps you spread carbs throughout the day. ? Divide your plate by types of foods. Put non-starchy vegetables on half the plate, meat or other protein food on one-quarter of the plate, and a grain or starchy vegetable in the final quarter of the plate. To this you can add a small piece of fruit and 1 cup of milk or yogurt, depending on how many carbs you are supposed to eat at a meal. 
· Try to eat about the same amount of carbs at each meal. Do not \"save up\" your daily allowance of carbs to eat at one meal. 
· Proteins have very little or no carbs per serving. Examples of proteins are beef, chicken, turkey, fish, eggs, tofu, cheese, cottage cheese, and peanut butter. A serving size of meat is 3 ounces, which is about the size of a deck of cards. Examples of meat substitute serving sizes (equal to 1 ounce of meat) are 1/4 cup of cottage cheese, 1 egg, 1 tablespoon of peanut butter, and ½ cup of tofu. How can you eat out and still eat healthy? · Learn to estimate the serving sizes of foods that have carbohydrate. If you measure food at home, it will be easier to estimate the amount in a serving of restaurant food. · If the meal you order has too much carbohydrate (such as potatoes, corn, or baked beans), ask to have a low-carbohydrate food instead. Ask for a salad or green vegetables.  
· If you use insulin, check your blood sugar before and after eating out to help you plan how much to eat in the future. · If you eat more carbohydrate at a meal than you had planned, take a walk or do other exercise. This will help lower your blood sugar. What else should you know? · Limit saturated fat, such as the fat from meat and dairy products. This is a healthy choice because people who have diabetes are at higher risk of heart disease. So choose lean cuts of meat and nonfat or low-fat dairy products. Use olive or canola oil instead of butter or shortening when cooking. · Don't skip meals. Your blood sugar may drop too low if you skip meals and take insulin or certain medicines for diabetes. · Check with your doctor before you drink alcohol. Alcohol can cause your blood sugar to drop too low. Alcohol can also cause a bad reaction if you take certain diabetes medicines. Follow-up care is a key part of your treatment and safety. Be sure to make and go to all appointments, and call your doctor if you are having problems. It's also a good idea to know your test results and keep a list of the medicines you take. Where can you learn more? Go to http://www.gray.com/ Enter J997 in the search box to learn more about \"Learning About Diabetes Food Guidelines. \" Current as of: December 20, 2019               Content Version: 12.6 © 4102-4620 nxtControl, Incorporated. Care instructions adapted under license by SnapUp (which disclaims liability or warranty for this information). If you have questions about a medical condition or this instruction, always ask your healthcare professional. [place] [address] any warranty or liability for your use of this information.

## 2021-05-26 NOTE — PROGRESS NOTES
Depression Screening:  3 most recent PHQ Screens 5/26/2021   Little interest or pleasure in doing things Not at all   Feeling down, depressed, irritable, or hopeless Not at all   Total Score PHQ 2 0       Learning Assessment:  Learning Assessment 5/26/2021   PRIMARY LEARNER Patient   HIGHEST LEVEL OF EDUCATION - PRIMARY LEARNER  [facility] [address] PRIMARY LEARNER HEARING     Nina Johnson 35 CAREGIVER -   Nina Wolf 47    NEED -   LEARNER PREFERENCE PRIMARY DEMONSTRATION     -     -   ANSWERED BY patient   RELATIONSHIP SELF       Abuse Screening:  Abuse Screening Questionnaire 5/26/2021   Do you ever feel afraid of your partner? N   Are you in a relationship with someone who physically or mentally threatens you? N   Is it safe for you to go home? Y       Fall Risk  Fall Risk Assessment, last 12 mths 5/26/2021   Able to walk? Yes   Fall in past 12 months? 0   Do you feel unsteady? 0   Are you worried about falling 0       ADL  No flowsheet data found. Travel Screening:    Travel Screening     Question   Response    In the last month, have you been in contact with someone who was confirmed or suspected to have Coronavirus / COVID-19? No / Unsure    Have you had a COVID-19 viral test in the last 14 days? No    Do you have any of the following new or worsening symptoms? Have you traveled internationally or domestically in the last month? No      Travel History   Travel since 04/26/21     No documented travel since 04/26/21          Health Maintenance reviewed and discussed and ordered per Provider. Health Maintenance Due   Topic Date Due    Shingrix Vaccine Age 49> (1 of 2) Never done    Foot Exam Q1  11/01/2020   . Janell Sheehan presents today for   Chief Complaint   Patient presents with    Follow-up       Is someone accompanying this pt? no    Is the patient using any DME equipment during OV? no    Coordination of Care:  1.  Have you been to the ER, urgent care clinic since your last visit? Hospitalized since your last visit? no    2. Have you seen or consulted any other health care providers outside of the [address] [place] [address] since your last visit? Include any pap smears or colon screening. No      Patient would like to discuss an upcoming surgery.

## 2021-05-30 NOTE — PROGRESS NOTES
HPI:   Marialuisa Huggins is a 77y.o. year old female who presents today for a routine visit. She has a history of hypertension, hyperlipidemia, diabetes mellitus type 1, chronic renal disease stage 3, and hypothyroidism. She reports that she is doing reasonably well. She completed the Moderna COVID 19 vaccine series. She states that diabetes continues to be well controlled with last HbA1c at 6.6 in Dr. Joanie Renee office last month. She underwent an upper endoscopy on 3/9/2021 by Dr. Bj Wang which showed a normal esophagus without mass or esophagitis, a hiatal hernia, and retained food in the stomach. She was advised to continue omeprazole 40 mg daily and to eat smaller more frequent meals. She states that she is currently following an intermittent fasting diet. She reports that she is interested in undergoing plastic surgery on her face to address the wrinkles and is tentatively scheduled with Dr. Elliott Daniel in 10/2021. She is otherwise without new complaints and feeling generally well. Summary of prior hospitalizations and medical history:  On 4/26/2018, she presented with worsening lower extremity edema. She stated that previously, her swelling would improve overnight, but she had been noticing recently that it did not resolve. She also reported some mild dyspnea with exertion, such as walking upstairs, which had been present chronically, although may have been slightly worse. She also reported some \"skipped beats\" although not prolonged. She denied any chest pain, shortness of breath at rest or with normal activities, lightheadedness, PND, or orthopnea. She was started on low dose lasix, and referred for an echocardiogram, Holter monitor, and lower extremity duplex scan, but prior to having these performed, presented to [facility] ED for evaluation of pain and swelling of her right ankle. While in ED, CBC, CMP, and D-dimer were unremarkable.  Cardiac enzymes x 2 were negative, and EKG was unchanged. She was observed overnight and underwent a pharmacologic nuclear stress test (5/3/2018) which was a normal low risk study, without evidence of ischemia or prior infarction, and EF 86%. She was subsequently discharged, and underwent an echocardiogram (5/10/2018) showed normal LV function (EF 55-60%) no RWMA, mild LAE, mild-moderate TR, RVSP normal (22 mmHg); holter monitor (5/1/2018) showing sinus rhythm with sinus arrhythmia at times; rare PVC's; one short run of non-sustained of SVT for 6 beats. Episode of sinus tachycardia recorded in diary occurred while she was undergoing pharmacologic nuclear stress test. No other symptoms reported; and lower extremity duplex (5/11/2018) which was negative for DVT and venous reflux. On 5/8/2018, she was evaluated by a podiatrist, Dr. Greg Briones, for her right foot swelling. Felt that it was most likely secondary to sprain injury as x-rays negative and presentation not consistent with gout or other form of arthritis. She was started on a Medrol dose pack. However, her blood sugars increased to >400 and she was changed to ibuprofen 400 mg tid. She did have a right foot MRI (5/26/2018) which showed considerable edema involving lower leg, hindfoot and midfoot, nonlocalizing a nonspecific; evidence of old injury to the ATF ligament.  Other ankle ligaments are intact; mild peroneus brevis tendinopathy without measurable tear; small partial thickness longitudinal split in the peroneus longus tendon within the retromalleolar groove; mild distal Achilles tendinosis; and small subchondral cyst in the anterior facet of the talus with otherwise no degenerative change seen in any of the hindfoot or midfoot articulations. She states that she was told that the right foot swelling was due to an old injury, but she states that it persists although she does not feel much pain due to her neuropathy. She has a history of diabetes mellitus type 1, with onset at age 6.  She is currently under the care of Dr. Félix Alejo, and is being treated with insulin glargine and lispro. She had not been well-controlled, with HbA1c ranging from 7.5-8.0, although has ow improved since using the Freestyle meter as discussed. She does have proliferative diabetic retinopathy and is s/p pan-retinal photocoagulation therapy in both eyes. She is followed closely by Dr. Butch Victoria, and recent exam showed regression bilaterally. She also has peripheral neuropathy, with burning and tingling in her feet at night. Renal function had been normal until 5/2016, with evidence of chronic renal disease, stage 3, since that time with baseline creatinine1.0-1.04/ eGFR 54-56. She also has a history of hypothyroidism, and she is currently on Synthroid. She denies cold intolerance, hair or skin changes. She has a history of hypertension, treated with quinapril. She had a pharmacologic nuclear stress test in 11/2012, which showed no evidence for ischemia, and normal LV wall motion (EF> 70%). She also had a carotid duplex scan in 7/2013 which showed mild (< 50%) bilateral internal carotid artery stenoses. She denies any chest pain, shortness of breath at rest or with exertion, palpitations, lightheadedness, or edema. She has a history of hyperlipidemia, and was on atorvastatin for many years until 10/2015 when she discontinued it because of myalgias. She was recently tried on rosuvastatin without success due to recurrent myalgias. She was started on ezetimibe in 9/2016 and has been tolerating it without difficulty. She successfully stopped smoking in 1/2015, and reports that she continues to abstain. She had a screening colonoscopy in 9/2014 by Dr. Ottoniel Cleveland, which found a 6 mm sessile polyp in the descending colon and a 4-5 mm sessile polyp in the rectum/sigmoid (pathology: tubular adenomas). She had a repeat colonoscopy in 9/2019 showing a 6 mm sessile polyp in the ileocecal valve (pathology unavailable).  Recommendation was for follow-up colonoscopy in 5 years. She denies any abdominal pain, nausea, vomiting, melena, hematochezia, or change in bowel movements. In 3/2017, she was diagnosed with an upper respiratory tract infection and was prescribed doxycycline and prednisone by [facility]. She subsequently developed severe dizziness and vomiting, prompting a visit to the [facility] ED where she was diagnosed with benign positional vertigo and treated with meclizine and Zofran with improvement. In 10/2017, she noticed difficulty with her peripheral vision and was evaluated by Dr. Yefri Ramsey. She has regressed proliferative diabetic retinopathy and she reports that the laser therapy she received many years ago to treat this resulted in the current limitations in her peripheral vision. She does also have mild glaucoma for which she is receiving treatment. She states that her driving has been restricted to only during the day, and she is aware that she must turn her head completely to each side when navigating behind the wheel. In 2/2018, she began having difficulty with right elbow pain and was evaluated by Dr. Chapo Pritchett who diagnosed her with right lateral epicondylitis. She received a cortisone injection with improvement. In 10/2018, while hiking on vacation hiking in [place], she slipped on some wooden steps and landed on her left shoulder. She was transported to [facility] in [place], [place], and found to have a closed nondisplaced fracture of the left proximal humerus. She was placed in a sling and told to follow-up with orthopedics. She was being [facility], and completed physical therapy with improvement. She is no longer requiring pain medication.        Past Medical History:   Diagnosis Date    Chronic renal disease, stage III ([address]     Diabetes mellitus type 1 ([address]     Diabetic peripheral neuropathy (HCC)     Diabetic retinopathy [facility])     Essential hypertension with goal blood pressure less than 140/90     Hearing decreased     Hyperlipidemia     Hypothyroidism      Past Surgical History:   Procedure Laterality Date    HX GYN      partial hysterectomy    HX GYN      3 D and C's    HX HEENT      tonsillectomy    HX HEENT      cataract removal bilateral    HX HEENT      multiple surgeries for retinopathy    HX HYSTERECTOMY      HX ORTHOPAEDIC      carpral tunnel sugery bilateral     Current Outpatient Medications   Medication Sig    quinapriL (ACCUPRIL) 10 mg tablet TAKE 1 TABLET EVERY EVENING ALONG WITH A 5 MG TABLET    levothyroxine (SYNTHROID) 88 mcg tablet Take 1 Tab by mouth every Monday, Wednesday, Friday. Take 100 mcg on /Sat/Sun    levothyroxine (SYNTHROID) 100 mcg tablet Take 1 Tab by mouth every Tuesday, Thursday, Saturday & . Take 88 mcg on     omeprazole (PRILOSEC) 40 mg capsule Take 1 Cap by mouth daily.  quinapriL (ACCUPRIL) 5 mg tablet TAKE 1 TABLET NIGHTLY WITH 10 MG TABLET EVERY EVENING    ezetimibe (ZETIA) 10 mg tablet TAKE 1 TABLET DAILY    Cholecalciferol, Vitamin D3, (VITAMIN D3) 2,000 unit cap capsule Take 2,000 Units by mouth daily.  ALPHAGAN P 0.1 % ophthalmic solution     glucose blood VI test strips (ONE TOUCH ULTRA TEST) strip USE TO TEST BLOOD SUGAR FOUR TIMES A DAY    cyanocobalamin (VITAMIN B-12) 500 mcg tablet Take 500 mcg by mouth daily.  aspirin delayed-release 81 mg tablet Take  by mouth daily.  insulin lispro (HUMALOG PEN) 100 unit/mL kwikpen by SubCUTAneous route. Sliding scale as needed      insulin glargine (LANTUS SOLOSTAR) 100 unit/mL (3 mL) pen 18 Units by SubCUTAneous route daily. No current facility-administered medications for this visit. Allergies and Intolerances: Allergies   Allergen Reactions    Pcn [Penicillins] Hives    Sulfa (Sulfonamide Antibiotics) Nausea Only and Vertigo     Family History: Grandmother  from colon cancer. Mother had CAD and DM. Family History   Problem Relation Age of Onset    Diabetes Brother     Diabetes Mother     Heart Disease Mother      Social History:   She  reports that she quit smoking about 6 years ago. She has a 10.00 pack-year smoking history. She has never used smokeless tobacco. She is  and lives with her . 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 Social History     Substance and Sexual Activity   Alcohol Use Yes    Comment: infrequently     Immunization History:  Immunization History   Administered Date(s) Administered    Covid-19, MODERNA, Mrna, Lnp-s, Pf, 100mcg/0.5mL 02/12/2021, 03/12/2021    Influenza Vaccine (Quad) PF (>6 Mo Flulaval, Fluarix, and >3 Yrs Afluria, Fluzone [id number]) 12/15/2015, 09/14/2016, 10/19/2017, 11/07/2018, 12/10/2019    Influenza Vaccine PF 12/22/2014    Influenza Vaccine Split 11/04/2011, 10/17/2012    Influenza Vaccine Whole 11/09/2010    Influenza, Quadrivalent, Adjuvanted (>65 Yrs FLUAD QUAD [id number]) 11/05/2020    Pneumococcal Conjugate (PCV-13) 04/18/2020    Pneumococcal Polysaccharide (PPSV-23) 04/13/2017, 05/31/2019    Tdap 06/25/2013    Zoster Vaccine, Live 05/31/2016       Review of Systems:   As above included in HPI. Otherwise 11 point review of systems negative including constitutional, skin, HENT, eyes, respiratory, cardiovascular, gastrointestinal, genitourinary, musculoskeletal, endo/heme/aller, neurological.      Physical:   Visit Vitals  /62 (BP 1 Location: Left upper arm, BP Patient Position: Sitting)   Pulse 63   Temp 97.5 °F (36.4 °C) (Temporal)   Resp 16   Ht 5' 5\" (1.651 m)   Wt 168 lb (76.2 kg)   SpO2 100%   BMI 27.96 kg/m²       Exam:   Patient appears in no apparent distress. Affect is appropriate. HEENT: PERRLA, anicteric, oropharynx clear, no JVD, adenopathy or thyromegaly. No carotid bruits or radiated murmur.   Lungs: clear to auscultation, no wheezes, rhonchi, or rales.  Heart: regular rate and rhythm. No murmur, rubs, gallops  Abdomen: soft, nontender, nondistended, normal bowel sounds, no hepatosplenomegaly or masses. Extremities: without edema. Review of Data:  Labs:   Hospital Outpatient Visit on 05/20/2021   Component Date Value Ref Range Status    WBC 05/20/2021 4.8  4.6 - 13.2 K/uL Final    RBC 05/20/2021 3.60* 4.20 - 5.30 M/uL Final    HGB 05/20/2021 11.8* 12.0 - 16.0 g/dL Final    HCT 05/20/2021 34.3* 35.0 - 45.0 % Final    MCV 05/20/2021 95.3  74.0 - 97.0 FL Final    MCH 05/20/2021 32.8  24.0 - 34.0 PG Final    MCHC 05/20/2021 34.4  31.0 - 37.0 g/dL Final    RDW 05/20/2021 12.0  11.6 - 14.5 % Final    PLATELET 05/69/3294 976  135 - 420 K/uL Final    MPV 05/20/2021 11.7  9.2 - 11.8 FL Final    NEUTROPHILS 05/20/2021 69  40 - 73 % Final    LYMPHOCYTES 05/20/2021 20* 21 - 52 % Final    MONOCYTES 05/20/2021 8  3 - 10 % Final    EOSINOPHILS 05/20/2021 2  0 - 5 % Final    BASOPHILS 05/20/2021 1  0 - 2 % Final    ABS. NEUTROPHILS 05/20/2021 3.3  1.8 - 8.0 K/UL Final    ABS. LYMPHOCYTES 05/20/2021 1.0  0.9 - 3.6 K/UL Final    ABS. MONOCYTES 05/20/2021 0.4  0.05 - 1.2 K/UL Final    ABS. EOSINOPHILS 05/20/2021 0.1  0.0 - 0.4 K/UL Final    ABS.  BASOPHILS 05/20/2021 0.0  0.0 - 0.1 K/UL Final    DF 05/20/2021 AUTOMATED    Final    Ferritin 05/20/2021 165  8 - 388 NG/ML Final    LIPID PROFILE 05/20/2021        Final    Cholesterol, total 05/20/2021 206* <200 MG/DL Final    Triglyceride 05/20/2021 72  <150 MG/DL Final    HDL Cholesterol 05/20/2021 80* 40 - 60 MG/DL Final    LDL, calculated 05/20/2021 111.6* 0 - 100 MG/DL Final    VLDL, calculated 05/20/2021 14.4  MG/DL Final    CHOL/HDL Ratio 05/20/2021 2.6  0 - 5.0   Final    Magnesium 05/20/2021 2.0  1.6 - 2.6 mg/dL Final    Sodium 05/20/2021 133* 136 - 145 mmol/L Final    Potassium 05/20/2021 5.2  3.5 - 5.5 mmol/L Final    Chloride 05/20/2021 101  100 - 111 mmol/L Final    CO2 05/20/2021 28  21 - 32 mmol/L Final    Anion gap 05/20/2021 4  3.0 - 18 mmol/L Final    Glucose 05/20/2021 153* 74 - 99 mg/dL Final    BUN 05/20/2021 10  7.0 - 18 MG/DL Final    Creatinine 05/20/2021 0.87  0.6 - 1.3 MG/DL Final    BUN/Creatinine ratio 05/20/2021 11* 12 - 20   Final    GFR est AA 05/20/2021 >60  >60 ml/min/1.73m2 Final    GFR est non-AA 05/20/2021 >60  >60 ml/min/1.73m2 Final    Calcium 05/20/2021 9.0  8.5 - 10.1 MG/DL Final    Bilirubin, total 05/20/2021 0.4  0.2 - 1.0 MG/DL Final    ALT (SGPT) 05/20/2021 15  13 - 56 U/L Final    AST (SGOT) 05/20/2021 12  10 - 38 U/L Final    Alk. phosphatase 05/20/2021 62  45 - 117 U/L Final    Protein, total 05/20/2021 6.0* 6.4 - 8.2 g/dL Final    Albumin 05/20/2021 3.5  3.4 - 5.0 g/dL Final    Globulin 05/20/2021 2.5  2.0 - 4.0 g/dL Final    A-G Ratio 05/20/2021 1.4  0.8 - 1.7   Final    Microalbumin,urine random 05/20/2021 2.39  0 - 3.0 MG/DL Final    Creatinine, urine 05/20/2021 46.00  30 - 125 mg/dL Final    Microalbumin/Creat ratio (mg/g cre* 05/20/2021 52* 0 - 30 mg/g Final    TSH 05/20/2021 0.35* 0.36 - 3.74 uIU/mL Final    T4, Free 05/20/2021 1.4  0.7 - 1.5 NG/DL Final    Vitamin D 25-Hydroxy 05/20/2021 40.5  30 - 100 ng/mL Final    Vitamin B12 05/20/2021 >2,000* 211 - 911 pg/mL Final    Folate 05/20/2021 4.7  3.10 - 17.50 ng/mL Final    Iron 05/20/2021 91  50 - 175 ug/dL Final    TIBC 05/20/2021 221* 250 - 450 ug/dL Final    Iron % saturation 05/20/2021 41  20 - 50 % Final         EKG (8/5/2020) sinus rhythm at 61 bpm, normal intervals, no ischemic changes. Health Maintenance:  Screening:    Mammogram: negative (10/2020)    PAP smear: negative (6/30/2014). S/p YUNG, no further screening needed. Colorectal: colonsocopy (9/2019) ileocecal valve polyp. Dr. Gisell Orozco. Due 9/2024.    Depression: none   DM (HbA1c/FPG): HbA1c 6.6 (4/2021) Dr. Carter Solid   Hepatitis C: negative (12/2015)   Falls: none    DEXA: osteopenia (10/2020)   Glaucoma: mild primary open angle glaucoma and regressed proliferative diabetic retinopathy. Followed by Dr. Koby Donnelly (last 2021)   Smokin pack year (stopped 2015)   Vitamin D: 40.5 (2021)   Medicare Wellness: 2020 (Welcome)      Impression:  Patient Active Problem List   Diagnosis Code    Stable proliferative diabetic retinopathy of both eyes associated with type 1 diabetes mellitus ([facility] I09.9479    Hyperlipidemia E78.5    Hearing decreased H91.90    Diabetes mellitus with diabetic polyneuropathy ([facility] E11.42    Vitamin D deficiency E55.9    Anemia D64.9    Former smoker Z87.891    Adenomatous polyp of colon D12.6    Essential hypertension I10    Hypothyroidism due to acquired atrophy of thyroid E03.4    Hypertensive kidney disease with stage 3a chronic kidney disease (HCC) I12.9, N18.31    Type 1 diabetes mellitus with stage 3 chronic kidney disease (HCC) E10.22, N18.30    Macrocytosis D75.89    Microalbuminuria due to type 1 diabetes mellitus ([facility] E10.29, R80.9    Coronary artery calcification seen on CT scan I25.10    GERD (gastroesophageal reflux disease) K21.9    Osteopenia of multiple sites M85.89       Plan:  1. Hypertension. Blood pressure reasonably well controlled on quinapril 15 mg daily. Instructed to continue to monitor. Renal function normalized with creatinine 0.87/ eGFR >60, Will continue to follow. 2. Lower extremity edema. Negative cardiac evaluation in 2018 including EKG, echocardiogram, 48 hour Holter monitor, and pharmacologic nuclear stress test. Lower extremity duplex negative for DVT or venous reflux. Started on lasix 20 mg daily with improvement. Reports that now generally present during the day and resolving overnight. Using compression hose regularly. 3. Type 1 diabetes mellitus. HbA1c improved to 6.6 at recent Dr. Leyda Bryant office since using new Freestyle sensor machine for blood sugar readings. On Lantus and Humalog.  Being managed by  Wolf, Proliferative diabetic retinopathy in regression, followed by Dr. Fam Cornell. Neuropathy symptoms in feet are mild and have not required treatment with medication. Foot exam performed by Dr. Heather Salgado. Renal function with evidence of chronic renal disease, stage 3. Evidence of mild microalbuminuria today (52 mg/g). On Ace-I. Unable to tolerate statins due to myalgias, but LDL improved with ezetimibe. Will continue to monitor. 4. Hyperlipidemia. On ezetimibe with significant improvement.  today with HDL 80 today. Not able to tolerate statin (atorvastatin/ rosuvastatin) due to myalgias. Moderate to severe aortic and coronary atherosclerosis and calcifications seen on chest CT scan (11/2018). Stressed importance of lifestyle modifications, especially diet, exercise and weight loss. 5. Chronic renal disease, stage 3. Evidence of mild disease with creatinine 1.00-1.04/ eGFR 54-56 since 5/2016. Most likely due to long standing diabetes mellitus and hypertension. On Ace-I, but not on statin as discussed above. Counseled on avoiding NSAIDS and prerenal status. Will continue to monitor. 6. Hypothyroidism. TSH remains therapeutic today on levothyroxine 100 mcg 4 days per week and 88 mcg three days per week. Will continue to monitor. 7. Former smoking. 30 pack year and stopped in 2015. Performed in 11/2017 showing a few tiny solid nodules and one groundglass nodule identified (lung RADS 2). Did also note evidence of emphysema and prominence of the pulmonary vascular trunk suggestive of pulmonary arterial hypertension. However, echocardiogram (5/2018) with normal PA pressures. Repeat in 11/2018 and 11/2019 without change, and multiple small stable lung nodules noted on repeat in 1/2021. Will continue to monitor. Next screening LD chest CT scan due 1/2022.  8. Osteopenia. Baseline bone density scan 10/8/2020 .  Using femoral neck T-scores, calculated FRAX score estimates her 10 year risk of a major osteoporetic fracture at 6.5 % and hip fracture at 0.2 %, which are not an indication for biphosphonate treatment (>20% and >3%, respectively). Advised to begin calcium in addition to Vitamin D. Encouraged exercise, particularly weight bearing activities. Will consider repeat bone density scan in 2022.   9. GERD. On omeprazole and previously controlled. Noted increase in burning discomfort and nausea in the late morning after drinking a pot of coffee. Omeprazole increased to 40 mg daily with some improvement, but recurrence of symptoms if she missed dose. Also, chest CT scan (1/8/2021) noted poorly distended distal esophagus with mild wall prominence. Referred to Dr. Shandra Rosenthal, and underwent upper endoscopy on 3/9/2021 showing a normal esophagus without mass or esophagitis, hiatal hernia, and retained food in the stomach. Advised to continue omeprazole 40 mg daily. 10. Anemia. Chronic but stable. Iron studies today normal with ferritin 165 and transferrin 41%; B12 normal; and folate levels also now normal. On multivitamin with folate. Stable mild anemia persists today with resolution of macrocytosis. Will continue to follow. 11. Overweight. Emphasized importance of continued lifestyle modifications, including diet, exercise, and weight loss. Will readdress next visit. 12. Health maintenance. Completed Moderna COVID 19 vaccine series. Given script for Shingrix vaccine but not yet obtained. Other immunizations up to date. Mammogram completed. Colonoscopy completed. Vitamin D level now normal. Continue maintenance dose supplement. Welcome to Medicare visit completed. Patient understands recommendations and agrees with plan. .  Follow-up appointment in 4 months for preoperative evaluation.

## 2021-07-14 RX ORDER — OMEPRAZOLE 40 MG/1
CAPSULE, DELAYED RELEASE ORAL
Qty: 90 CAPSULE | Refills: 3 | Status: SHIPPED | OUTPATIENT
Start: 2021-07-14 | End: 2022-07-27

## 2021-08-03 RX ORDER — EZETIMIBE 10 MG/1
TABLET ORAL
Qty: 90 TABLET | Refills: 3 | Status: SHIPPED | OUTPATIENT
Start: 2021-08-03 | End: 2022-07-29

## 2021-08-03 RX ORDER — QUINAPRIL 5 1/1
TABLET ORAL
Qty: 90 TABLET | Refills: 3 | Status: SHIPPED | OUTPATIENT
Start: 2021-08-03 | End: 2022-08-26 | Stop reason: SDUPTHER

## 2021-09-21 LAB — HBA1C MFR BLD HPLC: 6.4 %

## 2021-09-22 ENCOUNTER — APPOINTMENT (OUTPATIENT)
Dept: INTERNAL MEDICINE CLINIC | Age: 66
End: 2021-09-22

## 2021-09-22 ENCOUNTER — HOSPITAL ENCOUNTER (OUTPATIENT)
Dept: LAB | Age: 66
Discharge: HOME | End: 2021-09-22
Payer: MEDICARE

## 2021-09-22 DIAGNOSIS — E03.4 HYPOTHYROIDISM DUE TO ACQUIRED ATROPHY OF THYROID: ICD-10-CM

## 2021-09-22 DIAGNOSIS — I25.10 CORONARY ARTERY CALCIFICATION SEEN ON CT SCAN: ICD-10-CM

## 2021-09-22 DIAGNOSIS — E78.5 HYPERLIPIDEMIA, UNSPECIFIED HYPERLIPIDEMIA TYPE: ICD-10-CM

## 2021-09-22 DIAGNOSIS — K21.9 GASTROESOPHAGEAL REFLUX DISEASE, UNSPECIFIED WHETHER ESOPHAGITIS PRESENT: ICD-10-CM

## 2021-09-22 DIAGNOSIS — E10.42 TYPE 1 DIABETES MELLITUS WITH DIABETIC POLYNEUROPATHY (HCC): ICD-10-CM

## 2021-09-22 DIAGNOSIS — N18.31 HYPERTENSIVE KIDNEY DISEASE WITH STAGE 3A CHRONIC KIDNEY DISEASE (HCC): ICD-10-CM

## 2021-09-22 DIAGNOSIS — E10.3553 STABLE PROLIFERATIVE DIABETIC RETINOPATHY OF BOTH EYES ASSOCIATED WITH TYPE 1 DIABETES MELLITUS (HCC): ICD-10-CM

## 2021-09-22 DIAGNOSIS — N18.31 TYPE 1 DIABETES MELLITUS WITH STAGE 3A CHRONIC KIDNEY DISEASE (HCC): ICD-10-CM

## 2021-09-22 DIAGNOSIS — R80.9 MICROALBUMINURIA DUE TO TYPE 1 DIABETES MELLITUS (HCC): ICD-10-CM

## 2021-09-22 DIAGNOSIS — M85.89 OSTEOPENIA OF MULTIPLE SITES: ICD-10-CM

## 2021-09-22 DIAGNOSIS — E10.22 TYPE 1 DIABETES MELLITUS WITH STAGE 3A CHRONIC KIDNEY DISEASE (HCC): ICD-10-CM

## 2021-09-22 DIAGNOSIS — E10.29 MICROALBUMINURIA DUE TO TYPE 1 DIABETES MELLITUS (HCC): ICD-10-CM

## 2021-09-22 DIAGNOSIS — Z87.891 FORMER SMOKER: ICD-10-CM

## 2021-09-22 DIAGNOSIS — I10 ESSENTIAL HYPERTENSION: ICD-10-CM

## 2021-09-22 DIAGNOSIS — I12.9 HYPERTENSIVE KIDNEY DISEASE WITH STAGE 3A CHRONIC KIDNEY DISEASE (HCC): ICD-10-CM

## 2021-09-22 DIAGNOSIS — D64.9 ANEMIA, UNSPECIFIED TYPE: ICD-10-CM

## 2021-09-22 LAB
25(OH)D3 SERPL-MCNC: 45.9 NG/ML (ref 30–100)
ALBUMIN SERPL-MCNC: 3.6 G/DL (ref 3.4–5)
ALBUMIN/GLOB SERPL: 1.3 {RATIO} (ref 0.8–1.7)
ALP SERPL-CCNC: 61 U/L (ref 45–117)
ALT SERPL-CCNC: 17 U/L (ref 13–56)
ANION GAP SERPL CALC-SCNC: 10 MMOL/L (ref 3–18)
APPEARANCE UR: CLEAR
AST SERPL-CCNC: 15 U/L (ref 10–38)
BACTERIA URNS QL MICRO: NEGATIVE /HPF
BASOPHILS # BLD: 0.1 K/UL (ref 0–0.1)
BASOPHILS NFR BLD: 1 % (ref 0–2)
BILIRUB SERPL-MCNC: 0.5 MG/DL (ref 0.2–1)
BILIRUB UR QL: NEGATIVE
BUN SERPL-MCNC: 16 MG/DL (ref 7–18)
BUN/CREAT SERPL: 16 (ref 12–20)
CALCIUM SERPL-MCNC: 9.2 MG/DL (ref 8.5–10.1)
CHLORIDE SERPL-SCNC: 99 MMOL/L (ref 100–111)
CHOLEST SERPL-MCNC: 194 MG/DL
CO2 SERPL-SCNC: 25 MMOL/L (ref 21–32)
COLOR UR: YELLOW
CREAT SERPL-MCNC: 0.97 MG/DL (ref 0.6–1.3)
CREAT UR-MCNC: 33 MG/DL (ref 30–125)
DIFFERENTIAL METHOD BLD: ABNORMAL
EOSINOPHIL # BLD: 0.1 K/UL (ref 0–0.4)
EOSINOPHIL NFR BLD: 1 % (ref 0–5)
EPITH CASTS URNS QL MICRO: NORMAL /LPF (ref 0–5)
ERYTHROCYTE [DISTWIDTH] IN BLOOD BY AUTOMATED COUNT: 11.8 % (ref 11.6–14.5)
GLOBULIN SER CALC-MCNC: 2.8 G/DL (ref 2–4)
GLUCOSE SERPL-MCNC: 88 MG/DL (ref 74–99)
GLUCOSE UR STRIP.AUTO-MCNC: NEGATIVE MG/DL
HCT VFR BLD AUTO: 33.2 % (ref 35–45)
HDLC SERPL-MCNC: 77 MG/DL (ref 40–60)
HDLC SERPL: 2.5 {RATIO} (ref 0–5)
HGB BLD-MCNC: 11 G/DL (ref 12–16)
HGB UR QL STRIP: NEGATIVE
KETONES UR QL STRIP.AUTO: NEGATIVE MG/DL
LDLC SERPL CALC-MCNC: 103.2 MG/DL (ref 0–100)
LEUKOCYTE ESTERASE UR QL STRIP.AUTO: ABNORMAL
LIPID PROFILE,FLP: ABNORMAL
LYMPHOCYTES # BLD: 1.3 K/UL (ref 0.9–3.6)
LYMPHOCYTES NFR BLD: 21 % (ref 21–52)
MAGNESIUM SERPL-MCNC: 2 MG/DL (ref 1.6–2.6)
MCH RBC QN AUTO: 33.3 PG (ref 24–34)
MCHC RBC AUTO-ENTMCNC: 33.1 G/DL (ref 31–37)
MCV RBC AUTO: 100.6 FL (ref 78–100)
MICROALBUMIN UR-MCNC: 0.84 MG/DL (ref 0–3)
MICROALBUMIN/CREAT UR-RTO: 25 MG/G (ref 0–30)
MONOCYTES # BLD: 0.4 K/UL (ref 0.05–1.2)
MONOCYTES NFR BLD: 6 % (ref 3–10)
NEUTS SEG # BLD: 4.4 K/UL (ref 1.8–8)
NEUTS SEG NFR BLD: 71 % (ref 40–73)
NITRITE UR QL STRIP.AUTO: NEGATIVE
PH UR STRIP: 7.5 [PH] (ref 5–8)
PLATELET # BLD AUTO: 257 K/UL (ref 135–420)
PMV BLD AUTO: 11.7 FL (ref 9.2–11.8)
POTASSIUM SERPL-SCNC: 4.9 MMOL/L (ref 3.5–5.5)
PROT SERPL-MCNC: 6.4 G/DL (ref 6.4–8.2)
PROT UR STRIP-MCNC: NEGATIVE MG/DL
RBC # BLD AUTO: 3.3 M/UL (ref 4.2–5.3)
RBC #/AREA URNS HPF: NEGATIVE /HPF (ref 0–5)
SODIUM SERPL-SCNC: 134 MMOL/L (ref 136–145)
SP GR UR REFRACTOMETRY: 1.01 (ref 1–1.03)
T4 FREE SERPL-MCNC: 1.6 NG/DL (ref 0.7–1.5)
TRIGL SERPL-MCNC: 69 MG/DL (ref ?–150)
TSH SERPL DL<=0.05 MIU/L-ACNC: 0.14 UIU/ML (ref 0.36–3.74)
UROBILINOGEN UR QL STRIP.AUTO: 1 EU/DL (ref 0.2–1)
VLDLC SERPL CALC-MCNC: 13.8 MG/DL
WBC # BLD AUTO: 6.2 K/UL (ref 4.6–13.2)
WBC URNS QL MICRO: NORMAL /HPF (ref 0–4)

## 2021-09-22 PROCEDURE — 80061 LIPID PANEL: CPT

## 2021-09-22 PROCEDURE — 84443 ASSAY THYROID STIM HORMONE: CPT

## 2021-09-22 PROCEDURE — 82043 UR ALBUMIN QUANTITATIVE: CPT

## 2021-09-22 PROCEDURE — 85025 COMPLETE CBC W/AUTO DIFF WBC: CPT

## 2021-09-22 PROCEDURE — 83735 ASSAY OF MAGNESIUM: CPT

## 2021-09-22 PROCEDURE — 80053 COMPREHEN METABOLIC PANEL: CPT

## 2021-09-22 PROCEDURE — 36415 COLL VENOUS BLD VENIPUNCTURE: CPT

## 2021-09-22 PROCEDURE — 84439 ASSAY OF FREE THYROXINE: CPT

## 2021-09-22 PROCEDURE — 82306 VITAMIN D 25 HYDROXY: CPT

## 2021-09-22 PROCEDURE — 81001 URINALYSIS AUTO W/SCOPE: CPT

## 2021-09-29 ENCOUNTER — OFFICE VISIT (OUTPATIENT)
Dept: INTERNAL MEDICINE CLINIC | Age: 66
End: 2021-09-29
Payer: MEDICARE

## 2021-09-29 VITALS
SYSTOLIC BLOOD PRESSURE: 128 MMHG | DIASTOLIC BLOOD PRESSURE: 60 MMHG | TEMPERATURE: 97.2 F | WEIGHT: 163 LBS | OXYGEN SATURATION: 97 % | HEIGHT: 65 IN | BODY MASS INDEX: 27.16 KG/M2 | RESPIRATION RATE: 16 BRPM | HEART RATE: 78 BPM

## 2021-09-29 DIAGNOSIS — E10.3553 STABLE PROLIFERATIVE DIABETIC RETINOPATHY OF BOTH EYES ASSOCIATED WITH TYPE 1 DIABETES MELLITUS (HCC): ICD-10-CM

## 2021-09-29 DIAGNOSIS — Z71.89 ADVANCED DIRECTIVES, COUNSELING/DISCUSSION: ICD-10-CM

## 2021-09-29 DIAGNOSIS — I10 ESSENTIAL HYPERTENSION: ICD-10-CM

## 2021-09-29 DIAGNOSIS — K21.9 GASTROESOPHAGEAL REFLUX DISEASE, UNSPECIFIED WHETHER ESOPHAGITIS PRESENT: ICD-10-CM

## 2021-09-29 DIAGNOSIS — N18.31 TYPE 1 DIABETES MELLITUS WITH STAGE 3A CHRONIC KIDNEY DISEASE (HCC): ICD-10-CM

## 2021-09-29 DIAGNOSIS — Z23 NEEDS FLU SHOT: ICD-10-CM

## 2021-09-29 DIAGNOSIS — D75.89 MACROCYTOSIS: ICD-10-CM

## 2021-09-29 DIAGNOSIS — N18.31 STAGE 3A CHRONIC KIDNEY DISEASE (HCC): ICD-10-CM

## 2021-09-29 DIAGNOSIS — Z13.31 SCREENING FOR DEPRESSION: ICD-10-CM

## 2021-09-29 DIAGNOSIS — D64.9 ANEMIA, UNSPECIFIED TYPE: ICD-10-CM

## 2021-09-29 DIAGNOSIS — E10.42 TYPE 1 DIABETES MELLITUS WITH DIABETIC POLYNEUROPATHY (HCC): ICD-10-CM

## 2021-09-29 DIAGNOSIS — E78.5 HYPERLIPIDEMIA, UNSPECIFIED HYPERLIPIDEMIA TYPE: ICD-10-CM

## 2021-09-29 DIAGNOSIS — Z87.891 FORMER SMOKER: ICD-10-CM

## 2021-09-29 DIAGNOSIS — E03.4 HYPOTHYROIDISM DUE TO ACQUIRED ATROPHY OF THYROID: ICD-10-CM

## 2021-09-29 DIAGNOSIS — M85.89 OSTEOPENIA OF MULTIPLE SITES: ICD-10-CM

## 2021-09-29 DIAGNOSIS — Z01.818 PREOPERATIVE EVALUATION TO RULE OUT SURGICAL CONTRAINDICATION: Primary | ICD-10-CM

## 2021-09-29 DIAGNOSIS — Z12.31 ENCOUNTER FOR SCREENING MAMMOGRAM FOR MALIGNANT NEOPLASM OF BREAST: ICD-10-CM

## 2021-09-29 DIAGNOSIS — Z00.00 INITIAL MEDICARE ANNUAL WELLNESS VISIT: ICD-10-CM

## 2021-09-29 DIAGNOSIS — E10.22 TYPE 1 DIABETES MELLITUS WITH STAGE 3A CHRONIC KIDNEY DISEASE (HCC): ICD-10-CM

## 2021-09-29 PROCEDURE — G0439 PPPS, SUBSEQ VISIT: HCPCS | Performed by: INTERNAL MEDICINE

## 2021-09-29 PROCEDURE — 93005 ELECTROCARDIOGRAM TRACING: CPT | Performed by: INTERNAL MEDICINE

## 2021-09-29 PROCEDURE — 93010 ELECTROCARDIOGRAM REPORT: CPT | Performed by: INTERNAL MEDICINE

## 2021-09-29 PROCEDURE — 2022F DILAT RTA XM EVC RTNOPTHY: CPT | Performed by: INTERNAL MEDICINE

## 2021-09-29 PROCEDURE — 3044F HG A1C LEVEL LT 7.0%: CPT | Performed by: INTERNAL MEDICINE

## 2021-09-29 PROCEDURE — G8536 NO DOC ELDER MAL SCRN: HCPCS | Performed by: INTERNAL MEDICINE

## 2021-09-29 PROCEDURE — G8427 DOCREV CUR MEDS BY ELIG CLIN: HCPCS | Performed by: INTERNAL MEDICINE

## 2021-09-29 PROCEDURE — 3017F COLORECTAL CA SCREEN DOC REV: CPT | Performed by: INTERNAL MEDICINE

## 2021-09-29 PROCEDURE — G8752 SYS BP LESS 140: HCPCS | Performed by: INTERNAL MEDICINE

## 2021-09-29 PROCEDURE — 99497 ADVNCD CARE PLAN 30 MIN: CPT | Performed by: INTERNAL MEDICINE

## 2021-09-29 PROCEDURE — G8510 SCR DEP NEG, NO PLAN REQD: HCPCS | Performed by: INTERNAL MEDICINE

## 2021-09-29 PROCEDURE — 1101F PT FALLS ASSESS-DOCD LE1/YR: CPT | Performed by: INTERNAL MEDICINE

## 2021-09-29 PROCEDURE — G8754 DIAS BP LESS 90: HCPCS | Performed by: INTERNAL MEDICINE

## 2021-09-29 PROCEDURE — 1090F PRES/ABSN URINE INCON ASSESS: CPT | Performed by: INTERNAL MEDICINE

## 2021-09-29 PROCEDURE — G0463 HOSPITAL OUTPT CLINIC VISIT: HCPCS | Performed by: INTERNAL MEDICINE

## 2021-09-29 PROCEDURE — 90694 VACC AIIV4 NO PRSRV 0.5ML IM: CPT | Performed by: INTERNAL MEDICINE

## 2021-09-29 PROCEDURE — G9899 SCRN MAM PERF RSLTS DOC: HCPCS | Performed by: INTERNAL MEDICINE

## 2021-09-29 PROCEDURE — G8399 PT W/DXA RESULTS DOCUMENT: HCPCS | Performed by: INTERNAL MEDICINE

## 2021-09-29 PROCEDURE — 99215 OFFICE O/P EST HI 40 MIN: CPT | Performed by: INTERNAL MEDICINE

## 2021-09-29 PROCEDURE — G8419 CALC BMI OUT NRM PARAM NOF/U: HCPCS | Performed by: INTERNAL MEDICINE

## 2021-09-29 RX ORDER — LEVOTHYROXINE SODIUM 100 UG/1
100 TABLET ORAL
Qty: 1 TABLET | Refills: 0
Start: 2021-09-29 | End: 2021-09-29 | Stop reason: SDUPTHER

## 2021-09-29 RX ORDER — LEVOTHYROXINE SODIUM 88 UG/1
88 TABLET ORAL
Qty: 48 TABLET | Refills: 3 | Status: SHIPPED | OUTPATIENT
Start: 2021-09-29 | End: 2022-08-03

## 2021-09-29 RX ORDER — LEVOTHYROXINE SODIUM 88 UG/1
88 TABLET ORAL
Qty: 1 TABLET | Refills: 0
Start: 2021-09-29 | End: 2021-09-29 | Stop reason: SDUPTHER

## 2021-09-29 RX ORDER — LEVOTHYROXINE SODIUM 100 UG/1
100 TABLET ORAL
Qty: 36 TABLET | Refills: 3 | Status: SHIPPED | OUTPATIENT
Start: 2021-09-29 | End: 2022-08-03 | Stop reason: DRUGHIGH

## 2021-09-29 NOTE — PROGRESS NOTES
This is an Initial Medicare Annual Wellness Exam (AWV) (Performed 12 months after IPPE or effective date of Medicare Part B enrollment, Once in a lifetime)    I have reviewed the patient's medical history in detail and updated the computerized patient record. Assessment/Plan   Education and counseling provided:  Are appropriate based on today's review and evaluation  End-of-Life planning (with patient's consent)  Influenza Vaccine  Screening Mammography  Colorectal cancer screening tests  Cardiovascular screening blood test  Bone mass measurement (DEXA)  Screening for glaucoma  Medical nutrition therapy for individuals with diabetes or renal disease    1. Preoperative evaluation to rule out surgical contraindication  -     AMB POC EKG ROUTINE W/ 12 LEADS, INTER & REP  2. Essential hypertension  -     CBC WITH AUTOMATED DIFF; Future  -     MAGNESIUM; Future  -     METABOLIC PANEL, COMPREHENSIVE; Future  -     MICROALBUMIN, UR, RAND W/ MICROALB/CREAT RATIO; Future  3. Type 1 diabetes mellitus with stage 3a chronic kidney disease (HCC)  -     CBC WITH AUTOMATED DIFF; Future  -     LIPID PANEL; Future  -     METABOLIC PANEL, COMPREHENSIVE; Future  -     MICROALBUMIN, UR, RAND W/ MICROALB/CREAT RATIO; Future  4. Stable proliferative diabetic retinopathy of both eyes associated with type 1 diabetes mellitus ([facility] [address]  -     CBC WITH AUTOMATED DIFF; Future  -     LIPID PANEL; Future  -     METABOLIC PANEL, COMPREHENSIVE; Future  -     MICROALBUMIN, UR, RAND W/ MICROALB/CREAT RATIO; Future  5. Type 1 diabetes mellitus with diabetic polyneuropathy (HCC)  -     CBC WITH AUTOMATED DIFF; Future  -     LIPID PANEL; Future  -     METABOLIC PANEL, COMPREHENSIVE; Future  -     MICROALBUMIN, UR, RAND W/ MICROALB/CREAT RATIO; Future  6. Hypothyroidism due to acquired atrophy of thyroid  -     TSH 3RD GENERATION; Future  -     T4, FREE; Future  7.  Gastroesophageal reflux disease, unspecified whether esophagitis present  -     CBC WITH AUTOMATED DIFF; Future  8. Hyperlipidemia, unspecified hyperlipidemia type  -     LIPID PANEL; Future  9. Anemia, unspecified type  10. Macrocytosis  11. Former smoker  15. Osteopenia of multiple sites  13. Initial Medicare annual wellness visit  -     ADVANCE CARE PLANNING FIRST 30 MINS  14. Advanced directives, counseling/discussion  -     ADVANCE CARE PLANNING FIRST 27 MINS  15. Screening for depression  16. Encounter for screening mammogram for malignant neoplasm of breast  -     JUNIOR MAMMO BI SCREENING INCL CAD; Future  17. Needs flu shot  -     FLU (FLUAD QUAD INFLUENZA VACCINE,QUAD,ADJUVANTED)  18. Stage 3a chronic kidney disease ([facility] [address]  -     VITAMIN D, 25 HYDROXY; Future       Depression Risk Factor Screening     3 most recent PHQ Screens 9/29/2021   Little interest or pleasure in doing things Not at all   Feeling down, depressed, irritable, or hopeless Not at all   Total Score PHQ 2 0       Alcohol Risk Screen    Do you average more than 1 drink per night or more than 7 drinks a week:  No    On any one occasion in the past three months have you have had more than 3 drinks containing alcohol:  No         Functional Ability and Level of Safety    Hearing: Hearing is good. The patient wears hearing aids. Activities of Daily Living: The home contains: no safety equipment. Patient does total self care     Ambulation: with no difficulty      Fall Risk:  Fall Risk Assessment, last 12 mths 9/29/2021   Able to walk? Yes   Fall in past 12 months? 0   Do you feel unsteady?  0   Are you worried about falling 0      Abuse Screen:  Patient is not abused       Cognitive Screening    Has your family/caregiver stated any concerns about your memory: no     Cognitive Screening: None performed today    Health Maintenance Due     Health Maintenance Due   Topic Date Due    Foot Exam Q1  11/01/2020       Patient Care Team   Patient Care Team:  Erik Rolon MD as PCP - General (Internal Medicine)  Star Ponto MD AMILCAR as PCP - [facility]  Sia Ashley MD as Physician (Endocrinology)  Claudene Colace, MD (Ophthalmology)  Susanne Velasquez MD (Gastroenterology)  Kena Tan MD (Plastic Surgery)    History     Patient Active Problem List   Diagnosis Code    Stable proliferative diabetic retinopathy of both eyes associated with type 1 diabetes mellitus ([address] E25.0885    Hyperlipidemia E78.5    Hearing decreased H91.90    Diabetes mellitus with diabetic polyneuropathy ([address] E11.42    Vitamin D deficiency E55.9    Anemia D64.9    Former smoker Z87.891    Adenomatous polyp of colon D12.6    Essential hypertension I10    Hypothyroidism due to acquired atrophy of thyroid E03.4    Hypertensive kidney disease with stage 3a chronic kidney disease ([address] I12.9, N18.31    Type 1 diabetes mellitus with stage 3 chronic kidney disease ([address] E10.22, N18.30    Macrocytosis D75.89    Microalbuminuria due to type 1 diabetes mellitus ([address] E10.29, R80.9    Coronary artery calcification seen on CT scan I25.10    GERD (gastroesophageal reflux disease) K21.9    Osteopenia of multiple sites M85.89     Past Medical History:   Diagnosis Date    Chronic renal disease, stage III ([address]     Diabetes mellitus type 1 ([address]     Diabetic peripheral neuropathy ([address]     Diabetic retinopathy ([address]     Essential hypertension with goal blood pressure less than 140/90     Hearing decreased     Hyperlipidemia     Hypothyroidism       Past Surgical History:   Procedure Laterality Date    HX GYN      partial hysterectomy    HX GYN      3 D and C's    HX HEENT      tonsillectomy    HX HEENT      cataract removal bilateral    HX HEENT      multiple surgeries for retinopathy    HX HYSTERECTOMY      HX ORTHOPAEDIC      carpral tunnel sugery bilateral     Current Outpatient Medications   Medication Sig Dispense Refill    levothyroxine (SYNTHROID) 100 mcg tablet Take 1 Tablet by mouth three (3) days a week.  Take 88 mcg on other days 36 Tablet 3    levothyroxine (SYNTHROID) 88 mcg tablet Take 1 Tablet by mouth four (4) days a week. Take 100 mcg on other days 48 Tablet 3    ezetimibe (ZETIA) 10 mg tablet TAKE 1 TABLET DAILY 90 Tablet 3    quinapriL (ACCUPRIL) 5 mg tablet TAKE 1 TABLET NIGHTLY WITH 10 MG TABLET EVERY EVENING 90 Tablet 3    omeprazole (PRILOSEC) 40 mg capsule TAKE 1 CAPSULE DAILY 90 Capsule 3    quinapriL (ACCUPRIL) 10 mg tablet TAKE 1 TABLET EVERY EVENING ALONG WITH A 5 MG TABLET 90 Tab 3    Cholecalciferol, Vitamin D3, (VITAMIN D3) 2,000 unit cap capsule Take 2,000 Units by mouth daily. 30 Cap 5    ALPHAGAN P 0.1 % ophthalmic solution   3    glucose blood VI test strips (ONE TOUCH ULTRA TEST) strip USE TO TEST BLOOD SUGAR FOUR TIMES A  Strip PRN    cyanocobalamin (VITAMIN B-12) 500 mcg tablet Take 500 mcg by mouth daily.  aspirin delayed-release 81 mg tablet Take  by mouth daily.  insulin glargine (LANTUS SOLOSTAR) 100 unit/mL (3 mL) pen 18 Units by SubCUTAneous route daily.  insulin lispro (HUMALOG PEN) 100 unit/mL kwikpen by SubCUTAneous route.  Sliding scale as needed         Allergies   Allergen Reactions    Pcn [Penicillins] Hives    Sulfa (Sulfonamide Antibiotics) Nausea Only and Vertigo       Family History   Problem Relation Age of Onset    Diabetes Brother     Diabetes Mother     Heart Disease Mother      Social History     Tobacco Use    Smoking status: Former Smoker     Packs/day: 1.00     Years: 10.00     Pack years: 10.00     Quit date: 2015     Years since quittin.7    Smokeless tobacco: Never Used   Substance Use Topics    Alcohol use: Yes     Comment: sena Cruz MD

## 2021-09-29 NOTE — PROGRESS NOTES
1. Have you been to the ER, urgent care clinic or hospitalized since your last visit? NO.     2. Have you seen or consulted any other health care providers outside of the [address] [place] [address] since your last visit (Include any pap smears or colon screening)? MARCO A Hall 1955 female who presents for routine immunizations. Patient denies any symptoms , reactions or allergies that would exclude them from being immunized today. Risks and adverse reactions were discussed and the VIS was given to them. All questions were addressed. Order placed for HD Influenza,  per Verbal Order from  with read back. Patient was observed for 15 min post injection. There were no reactions observed.     Baylee Moreno LPN

## 2021-09-29 NOTE — ACP (ADVANCE CARE PLANNING)
Advance Care Planning     Advance Care Planning (ACP) Physician/NP/PA Conversation      Date of Conversation: 9/29/2021  Conducted with: Patient with Decision Making Capacity    Healthcare Decision Maker:     Primary Decision Maker: Larry Wick - [phone]    Secondary Decision Maker: Chase Lima - [phone]  Click here to complete [address] including selection of the Healthcare Decision Maker Relationship (ie \"Primary\")        Today we documented Decision Maker(s) consistent with Legal Next of Kin hierarchy. Care Preferences:    Hospitalization: \"If your health worsens and it becomes clear that your chance of recovery is unlikely, what would be your preference regarding hospitalization? \"  The patient would prefer hospitalization. Ventilation: \"If you were unable to breathe on your own and your chance of recovery was unlikely, what would be your preference about the use of a ventilator (breathing machine) if it was available to you? \"   The patient would desire the use of a ventilator. Resuscitation: \"In the event your heart stopped as a result of an underlying serious health condition, would you want attempts to be made to restart your heart, or would you prefer a natural death? \"   Yes, attempt to resuscitate.     Additional topics discussed: treatment goals, benefit/burden of treatment options, ventilation preferences, hospitalization preferences, resuscitation preferences and end of life care preferences (vegetative state/imminent death)    Conversation Outcomes / Follow-Up Plan:   ACP in process - information provided, considering goals and options  Reviewed DNR/DNI and patient elects Full Code (Attempt Resuscitation)     Length of Voluntary ACP Conversation in minutes:  16 minutes    Derrick Bear MD

## 2021-09-29 NOTE — PATIENT INSTRUCTIONS
Vaccine Information Statement    Influenza (Flu) Vaccine (Inactivated or Recombinant): What You Need to Know    Many vaccine information statements are available in Faroese and other languages. See www.immunize.org/vis. Hojas de información sobre vacunas están disponibles en español y en muchos otros idiomas. Visite www.immunize.org/vis. 1. Why get vaccinated? Influenza vaccine can prevent influenza (flu). Flu is a contagious disease that spreads around the [facility] every year, usually between October and May. Anyone can get the flu, but it is more dangerous for some people. Infants and young children, people 72 years and older, pregnant people, and people with certain health conditions or a weakened immune system are at greatest risk of flu complications. Pneumonia, bronchitis, sinus infections, and ear infections are examples of flu-related complications. If you have a medical condition, such as heart disease, cancer, or diabetes, flu can make it worse. Flu can cause fever and chills, sore throat, muscle aches, fatigue, cough, headache, and runny or stuffy nose. Some people may have vomiting and diarrhea, though this is more common in children than adults. In an average year, thousands of people in the [facility] die from flu, and many more are hospitalized. Flu vaccine prevents millions of illnesses and flu-related visits to the doctor each year. 2. Influenza vaccines     CDC recommends everyone 6 months and older get vaccinated every flu season. Children 6 months through 6years of age may need 2 doses during a single flu season. Everyone else needs only 1 dose each flu season. It takes about 2 weeks for protection to develop after vaccination. There are many flu viruses, and they are always changing. Each year a new flu vaccine is made to protect against the influenza viruses believed to be likely to cause disease in the upcoming flu season.  Even when the vaccine doesnt exactly match these viruses, it may still provide some protection. Influenza vaccine does not cause flu. Influenza vaccine may be given at the same time as other vaccines. 3. Talk with your health care provider    Tell your vaccination provider if the person getting the vaccine:   Has had an allergic reaction after a previous dose of influenza vaccine, or has any severe, life-threatening allergies    Has ever had Guillain-Barré Syndrome (also called GBS)    In some cases, your health care provider may decide to postpone influenza vaccination until a future visit. Influenza vaccine can be administered at any time during pregnancy. People who are or will be pregnant during influenza season should receive inactivated influenza vaccine. People with minor illnesses, such as a cold, may be vaccinated. People who are moderately or severely ill should usually wait until they recover before getting influenza vaccine. Your health care provider can give you more information. 4. Risks of a vaccine reaction     Soreness, redness, and swelling where the shot is given, fever, muscle aches, and headache can happen after influenza vaccination.  There may be a very small increased risk of Guillain-Barré Syndrome (GBS) after inactivated influenza vaccine (the flu shot). Elza Saxena children who get the flu shot along with pneumococcal vaccine (PCV13) and/or DTaP vaccine at the same time might be slightly more likely to have a seizure caused by fever. Tell your health care provider if a child who is getting flu vaccine has ever had a seizure. People sometimes faint after medical procedures, including vaccination. Tell your provider if you feel dizzy or have vision changes or ringing in the ears. As with any medicine, there is a very remote chance of a vaccine causing a severe allergic reaction, other serious injury, or death. 5. What if there is a serious problem?     An allergic reaction could occur after the vaccinated person leaves the clinic. If you see signs of a severe allergic reaction (hives, swelling of the face and throat, difficulty breathing, a fast heartbeat, dizziness, or weakness), call 9-1-1 and get the person to the nearest hospital.    For other signs that concern you, call your health care provider. Adverse reactions should be reported to the Vaccine Adverse Event Reporting System (VAERS). Your health care provider will usually file this report, or you can do it yourself. Visit the VAERS website at www.vaers. [facility].gov or call 6-437.134.9551. VAERS is only for reporting reactions, and VAERS staff members do not give medical advice. 6. The National Vaccine Injury Compensation Program    The [facility] Vaccine Injury Compensation Program (VICP) is a federal program that was created to compensate people who may have been injured by certain vaccines. Claims regarding alleged injury or death due to vaccination have a time limit for filing, which may be as short as two years. Visit the VICP website at www.[facility].gov/vaccinecompensation or call 7-930.253.9428 to learn about the program and about filing a claim. 7. How can I learn more?  Ask your health care provider.  Call your local or state health department.  Visit the website of the Food and Drug Administration (FDA) for vaccine package inserts and additional information at www.fda.gov/vaccines-blood-biologics/vaccines.  Contact the Centers for Disease Control and Prevention (CDC):  - Call 2-519.216.6733 (4-603-ZQP-INFO) or  - Visit CDCs influenza website at www.cdc.gov/flu. Vaccine Information Statement   Inactivated Influenza Vaccine   8/6/2021  42 BELLE Haq [id number]   Department of Health and Human Services  Centers for Disease Control and Prevention    Office Use Only         Heart-Healthy Diet: Care Instructions  Your Care Instructions     A heart-healthy diet has lots of vegetables, fruits, nuts, beans, and whole grains, and is low in salt. It limits foods that are high in saturated fat, such as meats, cheeses, and fried foods. It may be hard to change your diet, but even small changes can lower your risk of heart attack and heart disease. Follow-up care is a key part of your treatment and safety. Be sure to make and go to all appointments, and call your doctor if you are having problems. It's also a good idea to know your test results and keep a list of the medicines you take. How can you care for yourself at home? Watch your portions  · Learn what a serving is. A \"serving\" and a \"portion\" are not always the same thing. Make sure that you are not eating larger portions than are recommended. For example, a serving of pasta is ½ cup. A serving size of meat is 2 to 3 ounces. A 3-ounce serving is about the size of a deck of cards. Measure serving sizes until you are good at [place]" them. Keep in mind that restaurants often serve portions that are 2 or 3 times the size of one serving. · To keep your energy level up and keep you from feeling hungry, eat often but in smaller portions. · Eat only the number of calories you need to stay at a healthy weight. If you need to lose weight, eat fewer calories than your body burns (through exercise and other physical activity). Eat more fruits and vegetables  · Eat a variety of fruit and vegetables every day. Dark green, deep orange, red, or yellow fruits and vegetables are especially good for you. Examples include spinach, carrots, peaches, and berries. · Keep carrots, celery, and other veggies handy for snacks. Buy fruit that is in season and store it where you can see it so that you will be tempted to eat it. · Cook dishes that have a lot of veggies in them, such as stir-fries and soups. Limit saturated and trans fat  · Read food labels, and try to avoid saturated and trans fats. They increase your risk of heart disease. · Use olive or canola oil when you cook.   · Bake, broil, grill, or steam foods instead of frying them. · Choose lean meats instead of high-fat meats such as hot dogs and sausages. Cut off all visible fat when you prepare meat. · Eat fish, skinless poultry, and meat alternatives such as soy products instead of high-fat meats. Soy products, such as tofu, may be especially good for your heart. · Choose low-fat or fat-free milk and dairy products. Eat foods high in fiber  · Eat a variety of grain products every day. Include whole-grain foods that have lots of fiber and nutrients. Examples of whole-grain foods include oats, whole wheat bread, and brown rice. · Buy whole-grain breads and cereals, instead of white bread or pastries. Limit salt and sodium  · Limit how much salt and sodium you eat to help lower your blood pressure. · Taste food before you salt it. Add only a little salt when you think you need it. With time, your taste buds will adjust to less salt. · Eat fewer snack items, fast foods, and other high-salt, processed foods. Check food labels for the amount of sodium in packaged foods. · Choose low-sodium versions of canned goods (such as soups, vegetables, and beans). Limit sugar  · Limit drinks and foods with added sugar. These include candy, desserts, and soda pop. Limit alcohol  · Limit alcohol to no more than 2 drinks a day for men and 1 drink a day for women. Too much alcohol can cause health problems. When should you call for help? Watch closely for changes in your health, and be sure to contact your doctor if:    · You would like help planning heart-healthy meals. Where can you learn more? Go to http://www.pavon.com/  Enter V137 in the search box to learn more about \"Heart-Healthy Diet: Care Instructions. \"  Current as of: August 22, 2019               Content Version: 12.6  © 6677-0780 [employer].    Care instructions adapted under license by [employer] (which disclaims liability or warranty for this information). If you have questions about a medical condition or this instruction, always ask your healthcare professional. [address] any warranty or liability for your use of this information. Learning About Low-Sodium Foods  What foods are low in sodium? The foods you eat contain nutrients, such as vitamins and minerals. Sodium is a nutrient. Your body needs the right amount to stay healthy and work as it should. You can use the list below to help you make choices about which foods to eat. Fruits  · Fresh, frozen, canned, or dried fruit  Vegetables  · Fresh or frozen vegetables, with no added salt  · Canned vegetables, low-sodium or with no added salt  Grains  · Bagels without salted tops  · Cereal with no added salt  · Corn tortillas  · Crackers with no added salt  · Oatmeal, cooked without salt  · Popcorn with no salt  · Pasta and noodles, cooked without salt  · Rice, cooked without salt  · Unsalted pretzels  Dairy and dairy alternatives  · Butter, unsalted  · Cream cheese  · Ice cream  · Milk  · Soy milk  Meats and other protein foods  · Beans and peas, canned with no salt  · Eggs  · Fresh fish (not smoked)  · Fresh meats (not smoked or cured)  · Nuts and nut butter, prepared without salt  · Poultry, not packaged with sodium solution  · Tofu, unseasoned  · Tuna, canned without salt  Seasonings  · Garlic  · Herbs and spices  · Lemon juice  · Mustard  · Olive oil  · Salt-free seasoning mixes  · Vinegar  Work with your doctor to find out how much of this nutrient you need. Depending on your health, you may need more or less of it in your diet. Where can you learn more? Go to http://www.gray.com/  Enter S460 in the search box to learn more about \"Learning About Low-Sodium Foods. \"  Current as of: August 22, 2019               Content Version: 12.6  © 3021-5888 [employer].    Care instructions adapted under license by [facility] (which disclaims liability or warranty for this information). If you have questions about a medical condition or this instruction, always ask your healthcare professional. [address] any warranty or liability for your use of this information. Low Sodium Diet (2,000 Milligram): Care Instructions  Your Care Instructions     Too much sodium causes your body to hold on to extra water. This can raise your blood pressure and force your heart and kidneys to work harder. In very serious cases, this could cause you to be put in the hospital. It might even be life-threatening. By limiting sodium, you will feel better and lower your risk of serious problems. The most common source of sodium is salt. People get most of the salt in their diet from canned, prepared, and packaged foods. Fast food and restaurant meals also are very high in sodium. Your doctor will probably limit your sodium to less than 2,000 milligrams (mg) a day. This limit counts all the sodium in prepared and packaged foods and any salt you add to your food. Follow-up care is a key part of your treatment and safety. Be sure to make and go to all appointments, and call your doctor if you are having problems. It's also a good idea to know your test results and keep a list of the medicines you take. How can you care for yourself at home? Read food labels  · Read labels on cans and food packages. The labels tell you how much sodium is in each serving. Make sure that you look at the serving size. If you eat more than the serving size, you have eaten more sodium. · Food labels also tell you the Percent Daily Value for sodium. Choose products with low Percent Daily Values for sodium. · Be aware that sodium can come in forms other than salt, including monosodium glutamate (MSG), sodium citrate, and sodium bicarbonate (baking soda). MSG is often added to Asian food.  When you eat out, you can sometimes ask for food without MSG or added salt. Buy low-sodium foods  · Buy foods that are labeled \"unsalted\" (no salt added), \"sodium-free\" (less than 5 mg of sodium per serving), or \"low-sodium\" (less than 140 mg of sodium per serving). Foods labeled \"reduced-sodium\" and \"light sodium\" may still have too much sodium. Be sure to read the label to see how much sodium you are getting. · Buy fresh vegetables, or frozen vegetables without added sauces. Buy low-sodium versions of canned vegetables, soups, and other canned goods. Prepare low-sodium meals  · Cut back on the amount of salt you use in cooking. This will help you adjust to the taste. Do not add salt after cooking. One teaspoon of salt has about 2,300 mg of sodium. · Take the salt shaker off the table. · Flavor your food with garlic, lemon juice, onion, vinegar, herbs, and spices. Do not use soy sauce, lite soy sauce, steak sauce, onion salt, garlic salt, celery salt, mustard, or ketchup on your food. · Use low-sodium salad dressings, sauces, and ketchup. Or make your own salad dressings and sauces without adding salt. · Use less salt (or none) when recipes call for it. You can often use half the salt a recipe calls for without losing flavor. Other foods such as rice, pasta, and grains do not need added salt. · Rinse canned vegetables, and cook them in fresh water. This removes some--but not all--of the salt. · Avoid water that is naturally high in sodium or that has been treated with water softeners, which add sodium. Call your local water company to find out the sodium content of your water supply. If you buy bottled water, read the label and choose a sodium-free brand. Avoid high-sodium foods  · Avoid eating:  ? Smoked, cured, salted, and canned meat, fish, and poultry. ? Ham, ovalle, hot dogs, and luncheon meats. ? Regular, hard, and processed cheese and regular peanut butter.   ? Crackers with salted tops, and other salted snack foods such as pretzels, chips, and salted popcorn. ? Frozen prepared meals, unless labeled low-sodium. ? Canned and dried soups, broths, and bouillon, unless labeled sodium-free or low-sodium. ? Canned vegetables, unless labeled sodium-free or low-sodium. ? [place] fries, pizza, tacos, and other fast foods. ? Pickles, olives, ketchup, and other condiments, especially soy sauce, unless labeled sodium-free or low-sodium. Where can you learn more? Go to http://www.gray.com/  Enter V843 in the search box to learn more about \"Low Sodium Diet (2,000 Milligram): Care Instructions. \"  Current as of: August 22, 2019               Content Version: 12.6  © 2871-8381 [employer]. Care instructions adapted under license by [employer] (which disclaims liability or warranty for this information). If you have questions about a medical condition or this instruction, always ask your healthcare professional. [place] [address] any warranty or liability for your use of this information. Learning About Diabetes Food Guidelines  Your Care Instructions     Meal planning is important to manage diabetes. It helps keep your blood sugar at a target level (which you set with your doctor). You don't have to eat special foods. You can eat what your family eats, including sweets once in a while. But you do have to pay attention to how often you eat and how much you eat of certain foods. You may want to work with a dietitian or a certified diabetes educator (CDE) to help you plan meals and snacks. A dietitian or CDE can also help you lose weight if that is one of your goals. What should you know about eating carbs? Managing the amount of carbohydrate (carbs) you eat is an important part of healthy meals when you have diabetes. Carbohydrate is found in many foods. · Learn which foods have carbs. And learn the amounts of carbs in different foods.   ? Bread, cereal, pasta, and rice have about 15 grams of carbs in a serving. A serving is 1 slice of bread (1 ounce), ½ cup of cooked cereal, or 1/3 cup of cooked pasta or rice. ? Fruits have 15 grams of carbs in a serving. A serving is 1 small fresh fruit, such as an apple or orange; ½ of a banana; ½ cup of cooked or canned fruit; ½ cup of fruit juice; 1 cup of melon or raspberries; or 2 tablespoons of dried fruit. ? Milk and no-sugar-added yogurt have 15 grams of carbs in a serving. A serving is 1 cup of milk or 2/3 cup of no-sugar-added yogurt. ? Starchy vegetables have 15 grams of carbs in a serving. A serving is ½ cup of mashed potatoes or sweet potato; 1 cup winter squash; ½ of a small baked potato; ½ cup of cooked beans; or ½ cup cooked corn or green peas. · Learn how much carbs to eat each day and at each meal. A dietitian or CDE can teach you how to keep track of the amount of carbs you eat. This is called carbohydrate counting. · If you are not sure how to count carbohydrate grams, use the Plate Method to plan meals. It is a good, quick way to make sure that you have a balanced meal. It also helps you spread carbs throughout the day. ? Divide your plate by types of foods. Put non-starchy vegetables on half the plate, meat or other protein food on one-quarter of the plate, and a grain or starchy vegetable in the final quarter of the plate. To this you can add a small piece of fruit and 1 cup of milk or yogurt, depending on how many carbs you are supposed to eat at a meal.  · Try to eat about the same amount of carbs at each meal. Do not \"save up\" your daily allowance of carbs to eat at one meal.  · Proteins have very little or no carbs per serving. Examples of proteins are beef, chicken, turkey, fish, eggs, tofu, cheese, cottage cheese, and peanut butter. A serving size of meat is 3 ounces, which is about the size of a deck of cards.  Examples of meat substitute serving sizes (equal to 1 ounce of meat) are 1/4 cup of cottage cheese, 1 egg, 1 tablespoon of peanut butter, and ½ cup of tofu. How can you eat out and still eat healthy? · Learn to estimate the serving sizes of foods that have carbohydrate. If you measure food at home, it will be easier to estimate the amount in a serving of restaurant food. · If the meal you order has too much carbohydrate (such as potatoes, corn, or baked beans), ask to have a low-carbohydrate food instead. Ask for a salad or green vegetables. · If you use insulin, check your blood sugar before and after eating out to help you plan how much to eat in the future. · If you eat more carbohydrate at a meal than you had planned, take a walk or do other exercise. This will help lower your blood sugar. What else should you know? · Limit saturated fat, such as the fat from meat and dairy products. This is a healthy choice because people who have diabetes are at higher risk of heart disease. So choose lean cuts of meat and nonfat or low-fat dairy products. Use olive or canola oil instead of butter or shortening when cooking. · Don't skip meals. Your blood sugar may drop too low if you skip meals and take insulin or certain medicines for diabetes. · Check with your doctor before you drink alcohol. Alcohol can cause your blood sugar to drop too low. Alcohol can also cause a bad reaction if you take certain diabetes medicines. Follow-up care is a key part of your treatment and safety. Be sure to make and go to all appointments, and call your doctor if you are having problems. It's also a good idea to know your test results and keep a list of the medicines you take. Where can you learn more? Go to http://www.[employer].com/  Enter I147 in the search box to learn more about \"Learning About Diabetes Food Guidelines. \"  Current as of: December 20, 2019               Content Version: 12.6  © 3724-1526 [employer].    Care instructions adapted under license by [employer] (which disclaims liability or warranty for this information). If you have questions about a medical condition or this instruction, always ask your healthcare professional. [place] [address] any warranty or liability for your use of this information. Medicare Wellness Visit, Female    The best way to improve and maintain good health is to have a healthy lifestyle by eating a well-balanced diet, exercising regularly, limiting alcohol and stopping smoking. Regular visits with your physician or non-physician health care provider also support your good health. Preventive screening tests can find health problems before they become diseases or illnesses. Preventive services such as immunizations prevent serious infections. All people over age 72 should have a Pneumovax and a Prevnar-13 shot to prevent potentially life threatening infections with the pneumococcus bacteria, a common cause of pneumonia. These are once in a lifetime unless you and your provider decide differently. All people over 65 should have a yearly influenza vaccine or \"flu\" shot. This does not prevent infection with cold viruses but has been proven to prevent hospitalization and death from influenza. Although Medicare part B \"regular Medicare\" currently only covers tetanus vaccination in the context of an injury, a tetanus vaccine (Tdap or Td) is recommended every 10 years. A shingles vaccine is recommended once in a lifetime after age 61. The Shingles vaccine is also not covered by Medicare part B. Note, however, that both the Shingles vaccine and Tdap/Td are generally covered by secondary carriers. Please check your coverage and out of pocket expenses. Consider contacting your local health department because it may stock these vaccines for a reasonable charge.     We currently have documentation of the following immunization history for you:  Immunization History   Administered Date(s) Administered    Covid-19, MODERNA, Mrna, Lnp-s, Pf, 100mcg/0.5mL 02/12/2021, 03/12/2021    Influenza Vaccine (Quad) PF (>6 Mo Flulaval, Fluarix, and >3 Yrs Afluria, Fluzone [id number]) 12/15/2015, 09/14/2016, 10/19/2017, 11/07/2018, 12/10/2019    Influenza Vaccine PF 12/22/2014    Influenza Vaccine Split 11/04/2011, 10/17/2012    Influenza Vaccine Whole 11/09/2010    Influenza, Quadrivalent, Adjuvanted (>65 Yrs FLUAD QUAD [id number]) 11/05/2020, 09/29/2021    Pneumococcal Conjugate (PCV-13) 04/18/2020    Pneumococcal Polysaccharide (PPSV-23) 04/13/2017, 05/31/2019    Tdap 06/25/2013    Zoster Recombinant 09/13/2021    Zoster Vaccine, Live 05/31/2016       Screening for infection with Hepatitis C is recommended for anyone born between [birth year] through [address]. The table at the bottom of this document indicates the status of this and other preventive services. A bone mass density test (DEXA) to screen for osteoporosis or thinning of the bones should be done at least once after age 72 and may be done up to every 2 years as determined by you and your health care provider. The most recent DEXA we have on file for you is:  DEXA Results (most recent):  Results from [place] encounter on 10/08/20    DEXA BONE DENSITY STUDY AXIAL    Narrative  DEXA BONE DENSITOMETRY, CENTRAL    CLINICAL INDICATION/HISTORY: Postmenopausal. Thyroid disorder/medication. Family  history of hip fracture. CLINICAL INDICATION/HISTORY: Using GE LUNAR Prodigy densitometer, bone density  measurement was performed in the lumbar spine in addition to the proximal left  and right femora. T score refers to standard deviations above or below average  compared to a young adult of the same sex. Z score refers to standard  deviations above or below average compared to a patient of the same sex, age,  race and weight. COMPARISON: The patient's prior studies are no longer available on the bone  densitometry unit for comparison trending due to a prior computer issue on the  densitometry unit.  The data from the prior study of May 14, 2007 has been  included on this study. FINDINGS:    Lumbar Spine Levels: L1-L4  Mean Bone Mineral Density (BMD):  1.148 g/cm2  (1.355 BMD on previous study)  T Score: -0.4     (1.3 T score on previous)  Z Score: 0.8    Left Total Proximal Femur BMD: 0.870  (1.071 BMD on previous study)  T Score: -1.1     (0.5 T score on previous)  Z Score: -0.2    Right Total Proximal Femur BMD: 0.869  (1.051 BMD on previous study)  T Score: -1.1     (0.3 T score on previous)  Z Score: -0.2    Left Femoral Neck BMD:  0.902 g/cm2  T Score: -1.0  Z Score: 0.2    Right Femoral Neck BMD:  0.864 g/cm2  T Score: -1.3  Z Score: 0.0    Impression  IMPRESSION:    1. BMD measures consistent with osteopenia/low bone density. 2.  This will serve as the new baseline study at this institution. Based upon current ISCD guidelines, the patient's overall diagnostic category,  selected using WHO criteria in postmenopausal women and males aged 48 and above,  is selected based upon the lowest T score from among the lumbar spine, total  femur, femoral neck, (or distal third radius if measured). WHO Definition of Osteoporosis and Osteopenia on DXA (specified for post  menopausal  females):    Normal:                     T Score at or above -1 SD  Osteopenia:               T Score between -1 and -2.5 SD  Osteoporosis:             T Score at or below -2.5 SD    The risk of fracture approximately doubles for each 1 SD decrease in T score. It is important to consider other factors in assessing a patient's risk of  fracture, including age, risk of falling/injury, history of fragility fracture,  family history of osteoporosis, smoking, low weight. Various fracture risk tools have been developed for adult patients and are  available online. For example, the FRAX tool developed by [facility] is widely used. Reference www.iscd.org.     It is also important to note that DXA measures bone density but does not  distinguish among causes of decreased bone density, which include primary vs.  secondary osteoporosis (such as metabolic bone disorders or possible effects of  medications) and also other conditions (such as osteomalacia). Clinical  considerations should determine what additional evaluation may be warranted to  exclude secondary conditions in a patient with low bone density. It is  suggested that secondary causes of low bone density be considered in patients  with Z score lower than -2.0, and patients who are not responding to therapy for  osteoporosis on followup DXA despite compliance with medications. Please note that reliable, valid comparisons can not be made between studies  which have been performed on different densitometers. If clinically warranted,  follow up study performed at this site would best permit assessment of trend for  possible change in bone mineral density over time in comparison to this study. Thank you for this referral.      Screening for diabetes mellitus with a blood sugar test (glucose) should be done at least every 3 years until age 79. You and your health care provider may decide whether to continue screening after age 79. The most recent blood glucose we have on file for you is:   Lab Results   Component Value Date/Time    Glucose 88 09/22/2021 11:05 AM         Glaucoma is a disease of the eye due to increased ocular pressure that can lead to blindness. People with risk factors for glaucoma ( race, diabetes, family history) should be screened at least every 2 years by an eye professional.     Cardiovascular screening tests that check for elevated lipids or cholesterol (fatty part of blood) which can lead to heart disease and strokes should be done every 4-6 years through age 79. You and your health care provider may decide whether to continue screening after age 79.  The most recent lipid panel we have on file for you is:   Lab Results   Component Value Date/Time    Cholesterol, total 194 09/22/2021 11:05 AM    HDL Cholesterol 77 (H) 09/22/2021 11:05 AM    LDL, calculated 103.2 (H) 09/22/2021 11:05 AM    VLDL, calculated 13.8 09/22/2021 11:05 AM    Triglyceride 69 09/22/2021 11:05 AM    CHOL/HDL Ratio 2.5 09/22/2021 11:05 AM       Colorectal cancer screening that evaluates for blood or polyps in your colon for people with average risk should be done yearly as a stool test, every five years as a flexible sigmoidoscope or every 10 years as a colonoscopy up to age 76. You and your health care provider may decide whether to continue screening after age 76. Breast cancer screening with a mammogram is recommended at least once every 2 years  for women age 54-69. You and your health care provider may decide whether to continue screening after age 76. The most recent mammogram we have on file for you is:   [facility] Results (most recent):  Results from [place] encounter on 10/08/20    [facility] 3D SANDY W MAMMO BI SCREENING INCL CAD    Narrative  BIRADS: 1-NEGATIVE    BREAST DENSITY: C-The breasts are heterogeneously dense, which may obscure small  masses. SCREENING MAMMOGRAM BILATERAL  3D tomosynthesis    HISTORY: Screening. COMPARISON: 19 and other priors    FINDINGS:  2D and 3D images were performed. Digital mammograms were performed. No suspicious microcalcifications, masses, or areas of architectural distortion. Interpretation performed in conjunction with computed assisted detection system. Impression  IMPRESSION:    No evidence of malignancy. Suggest routine follow-up. Screening for cervical cancer with a pap smear is recommended for all women with a cervix until age 72. The frequency of this test is based on the details of her prior pap smear testing. You and your health care provider may decide whether to continue screening after age 72.     People who have smoked the equivalent of 1 pack per day for 30 years or more may benefit from screening for lung cancer with a yearly low dose CT scan until they have been non smokers for 15 years or competing health conditions render this unlikely to be beneficial. Our records show: last 1/8/2021  Your Medicare Wellness Exam is recommended annually.     Here is a list of your current Health Maintenance items with a due date:  Health Maintenance   Topic Date Due    Foot Exam Q1  11/01/2020    A1C test (Diabetic or Prediabetic)  08/11/2021    Shingrix Vaccine Age 50> (2 of 2) 11/08/2021    MICROALBUMIN Q1  09/22/2022    Lipid Screen  09/22/2022    Medicare Yearly Exam  09/30/2022    Breast Cancer Screen Mammogram  10/08/2022    Eye Exam Retinal or Dilated  02/02/2023    DTaP/Tdap/Td series (2 - Td or Tdap) 06/25/2023    Pneumococcal 65+ years (2 of 2 - PPSV23) 05/31/2024    Colorectal Cancer Screening Combo  09/30/2024    Hepatitis C Screening  Completed    Bone Densitometry (Dexa) Screening  Completed    Flu Vaccine  Completed    COVID-19 Vaccine  Completed

## 2021-10-02 ENCOUNTER — TELEPHONE (OUTPATIENT)
Dept: INTERNAL MEDICINE CLINIC | Age: 66
End: 2021-10-02

## 2021-10-02 NOTE — PROGRESS NOTES
HPI:   Allison Bryant is a 77y.o. year old female who presents today for a routine visit and for preoperative evaluation. She has a history of hypertension, hyperlipidemia, diabetes mellitus type 1, chronic renal disease stage 3, and hypothyroidism. She completed the Moderna COVID 19 vaccine series. She reports that she is doing reasonably well. She states that her diabetes continues to be well controlled with last HbA1c at 6.4 in Dr. Anne Montejo office last week on 9/21/2021. She states that she is continuing to follow an intermittent fasting low carbohydrate diet and reports that she is eating more protein, salads, and fruit. She is pleased that she has lost 5 pounds since her last visit. She reports that she is planning to undergo plastic surgery on her face which will include a surgical left and laser treatment to address her wrinkles. She states that surgery will not be scheduled with Dr. Starr Gómez until they receive documentation of medical clearance. She reports that she is remaining active walking for exercise and denies any change in exercise tolerance. She is otherwise without new complaints and feeling generally well. Summary of prior hospitalizations and medical history:  On 4/26/2018, she presented with worsening lower extremity edema. She stated that previously, her swelling would improve overnight, but she had been noticing recently that it did not resolve. She also reported some mild dyspnea with exertion, such as walking upstairs, which had been present chronically, although may have been slightly worse. She also reported some \"skipped beats\" although not prolonged. She denied any chest pain, shortness of breath at rest or with normal activities, lightheadedness, PND, or orthopnea.  She was started on low dose lasix, and referred for an echocardiogram, Holter monitor, and lower extremity duplex scan, but prior to having these performed, presented to [facility] ED for evaluation of pain and swelling of her right ankle. While in ED, CBC, CMP, and D-dimer were unremarkable. Cardiac enzymes x 2 were negative, and EKG was unchanged. She was observed overnight and underwent a pharmacologic nuclear stress test (5/3/2018) which was a normal low risk study, without evidence of ischemia or prior infarction, and EF 86%. She was subsequently discharged, and underwent an echocardiogram (5/10/2018) showed normal LV function (EF 55-60%) no RWMA, mild LAE, mild-moderate TR, RVSP normal (22 mmHg); holter monitor (5/1/2018) showing sinus rhythm with sinus arrhythmia at times; rare PVC's; one short run of non-sustained of SVT for 6 beats. Episode of sinus tachycardia recorded in diary occurred while she was undergoing pharmacologic nuclear stress test. No other symptoms reported; and lower extremity duplex (5/11/2018) which was negative for DVT and venous reflux. On 5/8/2018, she was evaluated by a podiatrist, Dr. Renetta Nieves, for her right foot swelling. Felt that it was most likely secondary to sprain injury as x-rays negative and presentation not consistent with gout or other form of arthritis. She was started on a Medrol dose pack. However, her blood sugars increased to >400 and she was changed to ibuprofen 400 mg tid. She did have a right foot MRI (5/26/2018) which showed considerable edema involving lower leg, hindfoot and midfoot, nonlocalizing a nonspecific; evidence of old injury to the ATF ligament.  Other ankle ligaments are intact; mild peroneus brevis tendinopathy without measurable tear; small partial thickness longitudinal split in the peroneus longus tendon within the retromalleolar groove; mild distal Achilles tendinosis; and small subchondral cyst in the anterior facet of the talus with otherwise no degenerative change seen in any of the hindfoot or midfoot articulations.  She states that she was told that the right foot swelling was due to an old injury, but she states that it persists although she does not feel much pain due to her neuropathy. She has a history of diabetes mellitus type 1, with onset at age 6. She is currently under the care of Dr. Mavis Maravilla, and is being treated with insulin glargine and lispro. She had not been well-controlled, with HbA1c ranging from 7.5-8.0, although has ow improved since using the Freestyle meter as discussed. She does have proliferative diabetic retinopathy and is s/p pan-retinal photocoagulation therapy in both eyes. She is followed closely by Dr. Marisabel Acevedo, and recent exam showed regression bilaterally. She also has peripheral neuropathy, with burning and tingling in her feet at night. Renal function had been normal until 5/2016, with evidence of chronic renal disease, stage 3, since that time with baseline creatinine1.0-1.04/ eGFR 54-56. She also has a history of hypothyroidism, and she is currently on Synthroid. She denies cold intolerance, hair or skin changes. She has a history of hypertension, treated with quinapril. She had a pharmacologic nuclear stress test in 11/2012, which showed no evidence for ischemia, and normal LV wall motion (EF> 70%). She also had a carotid duplex scan in 7/2013 which showed mild (< 50%) bilateral internal carotid artery stenoses. She denies any chest pain, shortness of breath at rest or with exertion, palpitations, lightheadedness, or edema. She has a history of hyperlipidemia, and was on atorvastatin for many years until 10/2015 when she discontinued it because of myalgias. She was recently tried on rosuvastatin without success due to recurrent myalgias. She was started on ezetimibe in 9/2016 and has been tolerating it without difficulty. She successfully stopped smoking in 1/2015, and reports that she continues to abstain.     She had a screening colonoscopy in 9/2014 by Dr. Endy Cruz, which found a 6 mm sessile polyp in the descending colon and a 4-5 mm sessile polyp in the rectum/sigmoid (pathology: tubular adenomas). She had a repeat colonoscopy in 9/2019 showing a 6 mm sessile polyp in the ileocecal valve (pathology unavailable). Recommendation was for follow-up colonoscopy in 5 years. She underwent an upper endoscopy on 3/9/2021 by Dr. Saroj Dailey due to worsening reflux symptoms and abnormality seen on chest CT scan (1/8/2021) which noted an incidental finding of poorly distended distal esophagus with mild wall prominence. It showed a normal esophagus without mass or esophagitis, a hiatal hernia, and retained food in the stomach. She was advised to continue omeprazole 40 mg daily and to eat smaller more frequent meals. She denies any abdominal pain, nausea, vomiting, melena, hematochezia, or change in bowel movements. In 3/2017, she was diagnosed with an upper respiratory tract infection and was prescribed doxycycline and prednisone by [facility]. She subsequently developed severe dizziness and vomiting, prompting [place] [facility] [place] was diagnosed with benign positional vertigo and treated with meclizine and Zofran with improvement. In 10/2017, she noticed difficulty with her peripheral vision and was evaluated by Dr. Kvng Koenig. She has regressed proliferative diabetic retinopathy and she reports that the laser therapy she received many years ago to treat this resulted in the current limitations in her peripheral vision. She does also have mild glaucoma for which she is receiving treatment. She states that her driving has been restricted to only during the day, and she is aware that she must turn her head completely to each side when navigating behind the wheel. In 2/2018, she began having difficulty with right elbow pain and was evaluated by Dr. Triston Gerard who diagnosed her with right lateral epicondylitis. She received a cortisone injection with improvement.  In 10/2018, while hiking on vacation hiking in [place], she slipped on some wooden steps and landed on her left shoulder. She was transported to [facility] in [place], [place], and found to have a closed nondisplaced fracture of the left proximal humerus. She was placed in a sling and told to follow-up with orthopedics. She was being followed by Dr. Errol Leavitt [facility], and completed physical therapy with improvement. She is no longer requiring pain medication. Past Medical History:   Diagnosis Date    Chronic renal disease, stage III ([address]     Diabetes mellitus type 1 ([address]     Diabetic peripheral neuropathy (HCC)     Diabetic retinopathy ([address]     Essential hypertension with goal blood pressure less than 140/90     Hearing decreased     Hyperlipidemia     Hypothyroidism      Past Surgical History:   Procedure Laterality Date    HX GYN      partial hysterectomy    HX GYN      3 D and C's    HX HEENT      tonsillectomy    HX HEENT      cataract removal bilateral    HX HEENT      multiple surgeries for retinopathy    HX HYSTERECTOMY      HX ORTHOPAEDIC      carpral tunnel sugery bilateral     Current Outpatient Medications   Medication Sig    levothyroxine (SYNTHROID) 100 mcg tablet Take 1 Tablet by mouth three (3) days a week. Take 88 mcg on other days    levothyroxine (SYNTHROID) 88 mcg tablet Take 1 Tablet by mouth four (4) days a week. Take 100 mcg on other days    ezetimibe (ZETIA) 10 mg tablet TAKE 1 TABLET DAILY    quinapriL (ACCUPRIL) 5 mg tablet TAKE 1 TABLET NIGHTLY WITH 10 MG TABLET EVERY EVENING    omeprazole (PRILOSEC) 40 mg capsule TAKE 1 CAPSULE DAILY    quinapriL (ACCUPRIL) 10 mg tablet TAKE 1 TABLET EVERY EVENING ALONG WITH A 5 MG TABLET    Cholecalciferol, Vitamin D3, (VITAMIN D3) 2,000 unit cap capsule Take 2,000 Units by mouth daily.     ALPHAGAN P 0.1 % ophthalmic solution     glucose blood VI test strips (ONE TOUCH ULTRA TEST) strip USE TO TEST BLOOD SUGAR FOUR TIMES A DAY    cyanocobalamin (VITAMIN B-12) 500 mcg tablet Take 500 mcg by mouth daily.  aspirin delayed-release 81 mg tablet Take  by mouth daily.  insulin glargine (LANTUS SOLOSTAR) 100 unit/mL (3 mL) pen 18 Units by SubCUTAneous route daily.  insulin lispro (HUMALOG PEN) 100 unit/mL kwikpen by SubCUTAneous route. Sliding scale as needed       No current facility-administered medications for this visit. Allergies and Intolerances: Allergies   Allergen Reactions    Pcn [Penicillins] Hives    Sulfa (Sulfonamide Antibiotics) Nausea Only and Vertigo     Family History: Grandmother  from colon cancer. Mother had CAD and DM. Family History   Problem Relation Age of Onset    Diabetes Brother     Diabetes Mother     Heart Disease Mother      Social History:   She  reports that she quit smoking about 6 years ago. She has a 10.00 pack-year smoking history. She has never used smokeless tobacco. She is  and lives with her . 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 Social History     Substance and Sexual Activity   Alcohol Use Yes    Comment: infrequently     Immunization History:  Immunization History   Administered Date(s) Administered    Covid-19, MODERNA, Mrna, Lnp-s, Pf, 100mcg/0.5mL 2021, 2021    Influenza Vaccine (Quad) PF (>6 Mo Flulaval, Fluarix, and >3 Yrs Afluria, Fluzone 54892) 12/15/2015, 2016, 10/19/2017, 2018, 12/10/2019    Influenza Vaccine PF 2014    Influenza Vaccine Split 2011, 10/17/2012    Influenza Vaccine Whole 2010    Influenza, Quadrivalent, Adjuvanted (>65 Yrs FLUAD QUAD 45547) 2020, 2021    Pneumococcal Conjugate (PCV-13) 2020    Pneumococcal Polysaccharide (PPSV-23) 2017, 2019    Tdap 2013    Zoster Recombinant 2021    Zoster Vaccine, Live 2016       Review of Systems:   As above included in HPI.   Otherwise 11 point review of systems negative including constitutional, skin, HENT, eyes, respiratory, cardiovascular, gastrointestinal, genitourinary, musculoskeletal, endo/heme/aller, neurological.      Physical:   Visit Vitals  /60 (BP 1 Location: Left arm, BP Patient Position: Sitting)   Pulse 78   Temp 97.2 °F (36.2 °C) (Temporal)   Resp 16   Ht 5' 5\" (1.651 m)   Wt 163 lb (73.9 kg)   SpO2 97%   BMI 27.12 kg/m²       Exam:   Patient appears in no apparent distress. Affect is appropriate. HEENT: PERRLA, anicteric, oropharynx clear, no JVD, adenopathy or thyromegaly. No carotid bruits or radiated murmur. Lungs: clear to auscultation, no wheezes, rhonchi, or rales. Heart: regular rate and rhythm. No murmur, rubs, gallops  Abdomen: soft, nontender, nondistended, normal bowel sounds, no hepatosplenomegaly or masses. Extremities: without edema. Review of Data:  Labs:   Hospital Outpatient Visit on 09/22/2021   Component Date Value Ref Range Status    WBC 09/22/2021 6.2  4.6 - 13.2 K/uL Final    RBC 09/22/2021 3.30* 4.20 - 5.30 M/uL Final    HGB 09/22/2021 11.0* 12.0 - 16.0 g/dL Final    HCT 09/22/2021 33.2* 35.0 - 45.0 % Final    MCV 09/22/2021 100.6* 78.0 - 100.0 FL Final    MCH 09/22/2021 33.3  24.0 - 34.0 PG Final    MCHC 09/22/2021 33.1  31.0 - 37.0 g/dL Final    RDW 09/22/2021 11.8  11.6 - 14.5 % Final    PLATELET 00/96/3316 145  135 - 420 K/uL Final    MPV 09/22/2021 11.7  9.2 - 11.8 FL Final    NEUTROPHILS 09/22/2021 71  40 - 73 % Final    LYMPHOCYTES 09/22/2021 21  21 - 52 % Final    MONOCYTES 09/22/2021 6  3 - 10 % Final    EOSINOPHILS 09/22/2021 1  0 - 5 % Final    BASOPHILS 09/22/2021 1  0 - 2 % Final    ABS. NEUTROPHILS 09/22/2021 4.4  1.8 - 8.0 K/UL Final    ABS. LYMPHOCYTES 09/22/2021 1.3  0.9 - 3.6 K/UL Final    ABS. MONOCYTES 09/22/2021 0.4  0.05 - 1.2 K/UL Final    ABS. EOSINOPHILS 09/22/2021 0.1  0.0 - 0.4 K/UL Final    ABS.  BASOPHILS 09/22/2021 0.1  0.0 - 0.1 K/UL Final    DF 09/22/2021 AUTOMATED    Final    LIPID PROFILE 09/22/2021        Final    Cholesterol, total 09/22/2021 194  <200 MG/DL Final    Triglyceride 09/22/2021 69  <150 MG/DL Final    HDL Cholesterol 09/22/2021 77* 40 - 60 MG/DL Final    LDL, calculated 09/22/2021 103.2* 0 - 100 MG/DL Final    VLDL, calculated 09/22/2021 13.8  MG/DL Final    CHOL/HDL Ratio 09/22/2021 2.5  0 - 5.0   Final    Magnesium 09/22/2021 2.0  1.6 - 2.6 mg/dL Final    Sodium 09/22/2021 134* 136 - 145 mmol/L Final    Potassium 09/22/2021 4.9  3.5 - 5.5 mmol/L Final    Chloride 09/22/2021 99* 100 - 111 mmol/L Final    CO2 09/22/2021 25  21 - 32 mmol/L Final    Anion gap 09/22/2021 10  3.0 - 18 mmol/L Final    Glucose 09/22/2021 88  74 - 99 mg/dL Final    BUN 09/22/2021 16  7.0 - 18 MG/DL Final    Creatinine 09/22/2021 0.97  0.6 - 1.3 MG/DL Final    BUN/Creatinine ratio 09/22/2021 16  12 - 20   Final    GFR est AA 09/22/2021 >60  >60 ml/min/1.73m2 Final    GFR est non-AA 09/22/2021 57* >60 ml/min/1.73m2 Final    Calcium 09/22/2021 9.2  8.5 - 10.1 MG/DL Final    Bilirubin, total 09/22/2021 0.5  0.2 - 1.0 MG/DL Final    ALT (SGPT) 09/22/2021 17  13 - 56 U/L Final    AST (SGOT) 09/22/2021 15  10 - 38 U/L Final    Alk.  phosphatase 09/22/2021 61  45 - 117 U/L Final    Protein, total 09/22/2021 6.4  6.4 - 8.2 g/dL Final    Albumin 09/22/2021 3.6  3.4 - 5.0 g/dL Final    Globulin 09/22/2021 2.8  2.0 - 4.0 g/dL Final    A-G Ratio 09/22/2021 1.3  0.8 - 1.7   Final    Microalbumin,urine random 09/22/2021 0.84  0 - 3.0 MG/DL Final    Creatinine, urine 09/22/2021 33.00  30 - 125 mg/dL Final    Microalbumin/Creat ratio (mg/g cre* 09/22/2021 25  0 - 30 mg/g Final    TSH 09/22/2021 0.14* 0.36 - 3.74 uIU/mL Final    T4, Free 09/22/2021 1.6* 0.7 - 1.5 NG/DL Final    Vitamin D 25-Hydroxy 09/22/2021 45.9  30 - 100 ng/mL Final    Color 09/22/2021 YELLOW    Final    Appearance 09/22/2021 CLEAR    Final    Specific gravity 09/22/2021 1.007  1.005 - 1.030   Final    pH (UA) 09/22/2021 7.5  5.0 - 8.0   Final    Protein 2021 Negative  NEG mg/dL Final    Glucose 2021 Negative  NEG mg/dL Final    Ketone 2021 Negative  NEG mg/dL Final    Bilirubin 2021 Negative  NEG   Final    Blood 2021 Negative  NEG   Final    Urobilinogen 2021 1.0  0.2 - 1.0 EU/dL Final    Nitrites 2021 Negative  NEG   Final    Leukocyte Esterase 2021 SMALL* NEG   Final    WBC 2021 0 to 3  0 - 4 /hpf Final    RBC 2021 Negative  0 - 5 /hpf Final    Epithelial cells 2021 FEW  0 - 5 /lpf Final    Bacteria 2021 Negative  NEG /hpf Final     EKG Results     Procedure 720 Value Units Date/Time    AMB POC EKG ROUTINE W/ 12 LEADS, INTER & REP [[id number]] Collected: 21 1048    Order Status: Completed Updated: 21 1051        EKG (2021) sinus rhythm at 73 bpm, normal intervals, no ischemic changes. Health Maintenance:  Screening:    Mammogram: negative (10/2020)    PAP smear: negative (2014). S/p YUNG, no further screening needed. Colorectal: colonsocopy (2019) ileocecal valve polyp. Dr. Edu Joshi. Due 2024. Depression: none   DM (HbA1c/FPG): HbA1c 6.4 (2021) Dr. Dougie Bentley   Hepatitis C: negative (2015)   Falls: none    DEXA: osteopenia (10/2020)   Glaucoma: mild primary open angle glaucoma and regressed proliferative diabetic retinopathy. Followed by Dr. Natalee Veloz (last 2021). Scheduled.    Smokin pack year (stopped 2015)   Vitamin D: 45.9 (2021)   Medicare Wellness: today (Initial)      Impression:  Patient Active Problem List   Diagnosis Code    Stable proliferative diabetic retinopathy of both eyes associated with type 1 diabetes mellitus ([facility] C64.8734    Hyperlipidemia E78.5    Hearing decreased H91.90    Diabetes mellitus with diabetic polyneuropathy ([facility] E11.42    Vitamin D deficiency E55.9    Anemia D64.9    Former smoker Z87.891    Adenomatous polyp of colon D12.6    Essential hypertension I10    Hypothyroidism due to acquired atrophy of thyroid E03.4    Hypertensive kidney disease with stage 3a chronic kidney disease (HCC) I12.9, N18.31    Type 1 diabetes mellitus with stage 3 chronic kidney disease (HCC) E10.22, N18.30    Macrocytosis D75.89    Microalbuminuria due to type 1 diabetes mellitus ([facility] E10.29, R80.9    Coronary artery calcification seen on CT scan I25.10    GERD (gastroesophageal reflux disease) K21.9    Osteopenia of multiple sites M85.89       Plan:  Preoperative risk stratification:  Patient undergoing evaluation for facial plastic surgery by Dr. Juliana Chambers. She currently has no symptoms suggestive of angina or decompensated heart failure. She maintains an active lifestyle and has no functional limitations. ECG is normal and unchanged today. No further cardiac evaluation needed. She is currently clinically stable and without absolute medical contraindication for surgery. She is low-moderate risk for a neris-procedural cardiac event. Surgery may proceed as planned at the discretion of Dr. Juliana Chambers. Chronic issues:  1. Hypertension. Blood pressure remains well controlled on quinapril 15 mg daily. Renal function is stable with creatinine 0.97/ eGFR 57. Underwent evaluation for lower extremity edema in 2018 which included a negative cardiac evaluation including EKG, echocardiogram, 48 hour Holter monitor, and pharmacologic nuclear stress test. Lower extremity duplex was also negative for DVT or venous reflux. Using compression hose regularly with improvement. Will continue to monitor  2. Type 1 diabetes mellitus. HbA1c improved to 6.4 at visit with Dr. Deonte Oliveros on 9/21/2021. Using Freestyle sensor machine for blood sugar readings. On Lantus and Humalog. Complicated by proliferative diabetic retinopathy in regression, followed by Dr. Tsering Haq. Neuropathy symptoms in feet are mild and have not required treatment with medication. Foot exam performed by Dr. Deonte Oliveros.  Renal function with evidence of chronic renal disease, stage 3. Evidence of mild microalbuminuria in 5/2021 (52 mg/g), but normalized today (25 mg/g). On Ace-I. Unable to tolerate statins due to myalgias, but LDL improved with ezetimibe. Will continue to monitor. 3. Hyperlipidemia. On ezetimibe with significant improvement.  today with HDL 77 today. Not able to tolerate statin (atorvastatin/ rosuvastatin) due to myalgias. Moderate to severe aortic and coronary atherosclerosis and calcifications seen on chest CT scan (11/2018). Stressed importance of lifestyle modifications, especially diet, exercise and weight loss. 4. Chronic renal disease, stage 3. Evidence of mild disease with creatinine 1.00-1.04/ eGFR 54-56 since 5/2016. Most likely due to long standing diabetes mellitus and hypertension. On Ace-I, but not on statin as discussed above. Counseled on avoiding NSAIDS and prerenal status. Remains stable today. Will continue to monitor. 5. Hypothyroidism. TSH low today at 0.14 and free T4 increased to 1.6 indicative of over replacement. Currently on levothyroxine 100 mcg 4 days per week and 88 mcg three days per week. Advised to change dosing to levothyroxine 100 mcg 3 days/week and 88 mcg 4 days/week. Will reassess at next visit. 6. Former smoker. 30 pack year and stopped in 2015. Performed in 11/2017 showing a few tiny solid nodules and one groundglass nodule identified (lung RADS 2). Did also note evidence of emphysema and prominence of the pulmonary vascular trunk suggestive of pulmonary arterial hypertension. However, echocardiogram (5/2018) with normal PA pressures. Repeat in 11/2018 and 11/2019 without change, and multiple small stable lung nodules noted on repeat in 1/2021. Next screening LD chest CT scan due 1/2022. Will order next visit. 7. Osteopenia. Baseline bone density scan 10/8/2020 .  Using femoral neck T-scores, calculated FRAX score estimates her 10 year risk of a major osteoporetic fracture at 6.5 % and hip fracture at 0.2 %, which are not an indication for biphosphonate treatment (>20% and >3%, respectively). Advised to begin calcium in addition to Vitamin D. Encouraged exercise, particularly weight bearing activities. Will continue to monitor. Fall precautions stressed. 8. GERD. Chest CT scan (1/8/2021) noted incidental finding of poorly distended distal esophagus with mild wall prominence. Referred to Dr. Nickolas Barcenas, and underwent upper endoscopy on 3/9/2021 showing a normal esophagus without mass or esophagitis, hiatal hernia, and retained food in the stomach. Advised to continue omeprazole 40 mg daily. Reports good control of symptoms today. 9. Anemia, mild. Chronic but stable. Evaluation in 5/2021 showed iron studies normal with ferritin 165 and transferrin 41%; B12 normal; and folate levels also now normal. On multivitamin with folate. Will check gammopathy panel at next visit. Will continue to monitor. 10. Overweight. Weight decreased 5 pounds since her last visit. Currently following a low carbohydrate intermittent fasting diet with increased proteins, salads, and fruit. Emphasized importance of continued lifestyle modifications, including diet, exercise, and weight loss. Will readdress next visit. 11. Health maintenance. Completed Moderna COVID 19 vaccine series. Received 1/2 doses of Shingrix vaccine. Will give influenza vaccine today. Other immunizations up to date. Mammogram due and will place order. Colonoscopy up-to-date. Vitamin D level remains normal on maintenance dose supplement. In addition, an initial Medicare wellness visit was done today. Patient understands recommendations and agrees with plan. .  Follow-up in 3 months.

## 2021-10-03 NOTE — TELEPHONE ENCOUNTER
Please fax copy of office note for preoperative evaluation to Dr. Kimberly Tracey. Surgery to be scheduled once preoperative evaluation received. Thanks.

## 2021-10-04 ENCOUNTER — DOCUMENTATION ONLY (OUTPATIENT)
Dept: INTERNAL MEDICINE CLINIC | Age: 66
End: 2021-10-04

## 2021-10-08 DIAGNOSIS — Z12.31 ENCOUNTER FOR SCREENING MAMMOGRAM FOR MALIGNANT NEOPLASM OF BREAST: ICD-10-CM

## 2021-10-21 ENCOUNTER — HOSPITAL ENCOUNTER (OUTPATIENT)
Dept: MAMMOGRAPHY | Age: 66
Discharge: HOME | End: 2021-10-21
Attending: INTERNAL MEDICINE
Payer: MEDICARE

## 2021-10-21 DIAGNOSIS — Z12.31 ENCOUNTER FOR SCREENING MAMMOGRAM FOR MALIGNANT NEOPLASM OF BREAST: ICD-10-CM

## 2021-10-21 PROCEDURE — 77063 BREAST TOMOSYNTHESIS BI: CPT

## 2021-12-17 ENCOUNTER — PATIENT MESSAGE (OUTPATIENT)
Dept: INTERNAL MEDICINE CLINIC | Age: 66
End: 2021-12-17

## 2021-12-17 DIAGNOSIS — Z87.891 PERSONAL HISTORY OF TOBACCO USE, PRESENTING HAZARDS TO HEALTH: Primary | ICD-10-CM

## 2022-01-04 RX ORDER — QUINAPRIL 10 MG/1
TABLET ORAL
Qty: 90 TABLET | Refills: 3 | Status: SHIPPED | OUTPATIENT
Start: 2022-01-04 | End: 2022-08-26 | Stop reason: SDUPTHER

## 2022-01-06 ENCOUNTER — APPOINTMENT (OUTPATIENT)
Dept: INTERNAL MEDICINE CLINIC | Age: 67
End: 2022-01-06

## 2022-01-06 ENCOUNTER — HOSPITAL ENCOUNTER (OUTPATIENT)
Dept: LAB | Age: 67
Discharge: HOME | End: 2022-01-06
Payer: MEDICARE

## 2022-01-06 DIAGNOSIS — I10 ESSENTIAL HYPERTENSION: ICD-10-CM

## 2022-01-06 DIAGNOSIS — N18.31 TYPE 1 DIABETES MELLITUS WITH STAGE 3A CHRONIC KIDNEY DISEASE (HCC): ICD-10-CM

## 2022-01-06 DIAGNOSIS — E78.5 HYPERLIPIDEMIA, UNSPECIFIED HYPERLIPIDEMIA TYPE: ICD-10-CM

## 2022-01-06 DIAGNOSIS — E03.4 HYPOTHYROIDISM DUE TO ACQUIRED ATROPHY OF THYROID: ICD-10-CM

## 2022-01-06 DIAGNOSIS — E10.42 TYPE 1 DIABETES MELLITUS WITH DIABETIC POLYNEUROPATHY (HCC): ICD-10-CM

## 2022-01-06 DIAGNOSIS — N18.31 STAGE 3A CHRONIC KIDNEY DISEASE (HCC): ICD-10-CM

## 2022-01-06 DIAGNOSIS — E10.22 TYPE 1 DIABETES MELLITUS WITH STAGE 3A CHRONIC KIDNEY DISEASE (HCC): ICD-10-CM

## 2022-01-06 DIAGNOSIS — E10.3553 STABLE PROLIFERATIVE DIABETIC RETINOPATHY OF BOTH EYES ASSOCIATED WITH TYPE 1 DIABETES MELLITUS (HCC): ICD-10-CM

## 2022-01-06 DIAGNOSIS — K21.9 GASTROESOPHAGEAL REFLUX DISEASE, UNSPECIFIED WHETHER ESOPHAGITIS PRESENT: ICD-10-CM

## 2022-01-06 LAB
25(OH)D3 SERPL-MCNC: 43.8 NG/ML (ref 30–100)
ALBUMIN SERPL-MCNC: 3.8 G/DL (ref 3.4–5)
ALBUMIN/GLOB SERPL: 1.6 {RATIO} (ref 0.8–1.7)
ALP SERPL-CCNC: 61 U/L (ref 45–117)
ALT SERPL-CCNC: 21 U/L (ref 13–56)
ANION GAP SERPL CALC-SCNC: 7 MMOL/L (ref 3–18)
AST SERPL-CCNC: 16 U/L (ref 10–38)
BASOPHILS # BLD: 0 K/UL (ref 0–0.1)
BASOPHILS NFR BLD: 1 % (ref 0–2)
BILIRUB SERPL-MCNC: 0.5 MG/DL (ref 0.2–1)
BUN SERPL-MCNC: 19 MG/DL (ref 7–18)
BUN/CREAT SERPL: 23 (ref 12–20)
CALCIUM SERPL-MCNC: 8.8 MG/DL (ref 8.5–10.1)
CHLORIDE SERPL-SCNC: 102 MMOL/L (ref 100–111)
CHOLEST SERPL-MCNC: 178 MG/DL
CO2 SERPL-SCNC: 24 MMOL/L (ref 21–32)
CREAT SERPL-MCNC: 0.84 MG/DL (ref 0.6–1.3)
CREAT UR-MCNC: 51 MG/DL (ref 30–125)
DIFFERENTIAL METHOD BLD: ABNORMAL
EOSINOPHIL # BLD: 0.1 K/UL (ref 0–0.4)
EOSINOPHIL NFR BLD: 1 % (ref 0–5)
ERYTHROCYTE [DISTWIDTH] IN BLOOD BY AUTOMATED COUNT: 12.4 % (ref 11.6–14.5)
GLOBULIN SER CALC-MCNC: 2.4 G/DL (ref 2–4)
GLUCOSE SERPL-MCNC: 148 MG/DL (ref 74–99)
HCT VFR BLD AUTO: 35.2 % (ref 35–45)
HDLC SERPL-MCNC: 83 MG/DL (ref 40–60)
HDLC SERPL: 2.1 {RATIO} (ref 0–5)
HGB BLD-MCNC: 11.1 G/DL (ref 12–16)
IMM GRANULOCYTES # BLD AUTO: 0 K/UL (ref 0–0.04)
IMM GRANULOCYTES NFR BLD AUTO: 0 % (ref 0–0.5)
LDLC SERPL CALC-MCNC: 81.4 MG/DL (ref 0–100)
LIPID PROFILE,FLP: ABNORMAL
LYMPHOCYTES # BLD: 1.3 K/UL (ref 0.9–3.6)
LYMPHOCYTES NFR BLD: 22 % (ref 21–52)
MAGNESIUM SERPL-MCNC: 2.2 MG/DL (ref 1.6–2.6)
MCH RBC QN AUTO: 32.1 PG (ref 24–34)
MCHC RBC AUTO-ENTMCNC: 31.5 G/DL (ref 31–37)
MCV RBC AUTO: 101.7 FL (ref 78–100)
MICROALBUMIN UR-MCNC: 0.94 MG/DL (ref 0–3)
MICROALBUMIN/CREAT UR-RTO: 18 MG/G (ref 0–30)
MONOCYTES # BLD: 0.4 K/UL (ref 0.05–1.2)
MONOCYTES NFR BLD: 6 % (ref 3–10)
NEUTS SEG # BLD: 4 K/UL (ref 1.8–8)
NEUTS SEG NFR BLD: 70 % (ref 40–73)
NRBC # BLD: 0 K/UL (ref 0–0.01)
NRBC BLD-RTO: 0 PER 100 WBC
PLATELET # BLD AUTO: 218 K/UL (ref 135–420)
PMV BLD AUTO: 12.3 FL (ref 9.2–11.8)
POTASSIUM SERPL-SCNC: 4.8 MMOL/L (ref 3.5–5.5)
PROT SERPL-MCNC: 6.2 G/DL (ref 6.4–8.2)
RBC # BLD AUTO: 3.46 M/UL (ref 4.2–5.3)
SODIUM SERPL-SCNC: 133 MMOL/L (ref 136–145)
T4 FREE SERPL-MCNC: 1.3 NG/DL (ref 0.7–1.5)
TRIGL SERPL-MCNC: 68 MG/DL (ref ?–150)
TSH SERPL DL<=0.05 MIU/L-ACNC: 0.48 UIU/ML (ref 0.36–3.74)
VLDLC SERPL CALC-MCNC: 13.6 MG/DL
WBC # BLD AUTO: 5.7 K/UL (ref 4.6–13.2)

## 2022-01-06 PROCEDURE — 84443 ASSAY THYROID STIM HORMONE: CPT

## 2022-01-06 PROCEDURE — 85025 COMPLETE CBC W/AUTO DIFF WBC: CPT

## 2022-01-06 PROCEDURE — 80061 LIPID PANEL: CPT

## 2022-01-06 PROCEDURE — 80053 COMPREHEN METABOLIC PANEL: CPT

## 2022-01-06 PROCEDURE — 83735 ASSAY OF MAGNESIUM: CPT

## 2022-01-06 PROCEDURE — 82043 UR ALBUMIN QUANTITATIVE: CPT

## 2022-01-06 PROCEDURE — 84439 ASSAY OF FREE THYROXINE: CPT

## 2022-01-06 PROCEDURE — 82306 VITAMIN D 25 HYDROXY: CPT

## 2022-01-09 DIAGNOSIS — Z87.891 PERSONAL HISTORY OF TOBACCO USE, PRESENTING HAZARDS TO HEALTH: ICD-10-CM

## 2022-01-10 ENCOUNTER — NURSE NAVIGATOR (OUTPATIENT)
Dept: OTHER | Age: 67
End: 2022-01-10

## 2022-01-10 ENCOUNTER — HOSPITAL ENCOUNTER (OUTPATIENT)
Dept: CT IMAGING | Age: 67
Discharge: HOME | End: 2022-01-10
Attending: INTERNAL MEDICINE
Payer: MEDICARE

## 2022-01-10 VITALS — BODY MASS INDEX: 27.49 KG/M2 | WEIGHT: 165 LBS | HEIGHT: 65 IN

## 2022-01-10 PROCEDURE — 71271 CT THORAX LUNG CANCER SCR C-: CPT

## 2022-01-10 NOTE — PROGRESS NOTES
Referring Provider: Lajuanda Ganser, MD      Lung Cancer Risk Profile:   Age: 77  Gender: Female  Height: 65\"  Weight: 163#    Smoking History:  Smoking Status: past use  # years smokin  # years quit: 8  Packs/day: 2  Pack years: 64    Patient discussed smoking cessation with PCP: Yes, per provider order    Patient participated in shared decision making process with PCP: Yes, per provider order    Patient is currently experiencing symptoms: No    If yes what symptoms:     Co-Morbidities:      Cancer History:      Additional Risk Factors:  Exposure to second hand smoke      Patient's smoking history verified via EMR and provider note. Patient meets LDCT lung cancer screening criteria.        MARTY RamN, RN, OCN  Lung Health Nurse Navigator

## 2022-01-12 NOTE — PROGRESS NOTES
1. \"Have you been to the ER, urgent care clinic since your last visit? Hospitalized since your last visit? \" No    2. \"Have you seen or consulted any other health care providers outside of the [address] [place] [address] since your last visit? \" PQ.[id number]- plastic surgeon     3. For patients aged 39-70: Has the patient had a colonoscopy / FIT/ Cologuard? Yes, HM satisfied with blue hyperlink     If the patient is female:    4. For patients aged 41-77: Has the patient had a mammogram within the past 2 years? Yes, HM satisfied with blue hyperlink    5. For patients aged 21-65: Has the patient had a pap smear?  NA based on age or sex

## 2022-01-13 ENCOUNTER — OFFICE VISIT (OUTPATIENT)
Dept: INTERNAL MEDICINE CLINIC | Age: 67
End: 2022-01-13
Payer: MEDICARE

## 2022-01-13 VITALS
TEMPERATURE: 97.9 F | BODY MASS INDEX: 27.69 KG/M2 | DIASTOLIC BLOOD PRESSURE: 67 MMHG | RESPIRATION RATE: 16 BRPM | HEART RATE: 67 BPM | WEIGHT: 166.2 LBS | SYSTOLIC BLOOD PRESSURE: 125 MMHG | HEIGHT: 65 IN | OXYGEN SATURATION: 100 %

## 2022-01-13 DIAGNOSIS — N18.31 STAGE 3A CHRONIC KIDNEY DISEASE (HCC): ICD-10-CM

## 2022-01-13 DIAGNOSIS — E10.42 TYPE 1 DIABETES MELLITUS WITH DIABETIC POLYNEUROPATHY (HCC): ICD-10-CM

## 2022-01-13 DIAGNOSIS — E78.5 HYPERLIPIDEMIA, UNSPECIFIED HYPERLIPIDEMIA TYPE: ICD-10-CM

## 2022-01-13 DIAGNOSIS — E10.3553 STABLE PROLIFERATIVE DIABETIC RETINOPATHY OF BOTH EYES ASSOCIATED WITH TYPE 1 DIABETES MELLITUS (HCC): ICD-10-CM

## 2022-01-13 DIAGNOSIS — D64.9 ANEMIA, UNSPECIFIED TYPE: ICD-10-CM

## 2022-01-13 DIAGNOSIS — N18.31 TYPE 1 DIABETES MELLITUS WITH STAGE 3A CHRONIC KIDNEY DISEASE (HCC): Primary | ICD-10-CM

## 2022-01-13 DIAGNOSIS — E10.22 TYPE 1 DIABETES MELLITUS WITH STAGE 3A CHRONIC KIDNEY DISEASE (HCC): Primary | ICD-10-CM

## 2022-01-13 DIAGNOSIS — K21.9 GASTROESOPHAGEAL REFLUX DISEASE, UNSPECIFIED WHETHER ESOPHAGITIS PRESENT: ICD-10-CM

## 2022-01-13 DIAGNOSIS — I10 ESSENTIAL HYPERTENSION: ICD-10-CM

## 2022-01-13 DIAGNOSIS — E03.4 HYPOTHYROIDISM DUE TO ACQUIRED ATROPHY OF THYROID: ICD-10-CM

## 2022-01-13 DIAGNOSIS — Z87.891 FORMER SMOKER: ICD-10-CM

## 2022-01-13 PROCEDURE — G8419 CALC BMI OUT NRM PARAM NOF/U: HCPCS | Performed by: INTERNAL MEDICINE

## 2022-01-13 PROCEDURE — G8399 PT W/DXA RESULTS DOCUMENT: HCPCS | Performed by: INTERNAL MEDICINE

## 2022-01-13 PROCEDURE — 1101F PT FALLS ASSESS-DOCD LE1/YR: CPT | Performed by: INTERNAL MEDICINE

## 2022-01-13 PROCEDURE — G8536 NO DOC ELDER MAL SCRN: HCPCS | Performed by: INTERNAL MEDICINE

## 2022-01-13 PROCEDURE — 99214 OFFICE O/P EST MOD 30 MIN: CPT | Performed by: INTERNAL MEDICINE

## 2022-01-13 PROCEDURE — 3046F HEMOGLOBIN A1C LEVEL >9.0%: CPT | Performed by: INTERNAL MEDICINE

## 2022-01-13 PROCEDURE — G8754 DIAS BP LESS 90: HCPCS | Performed by: INTERNAL MEDICINE

## 2022-01-13 PROCEDURE — G8752 SYS BP LESS 140: HCPCS | Performed by: INTERNAL MEDICINE

## 2022-01-13 PROCEDURE — 2022F DILAT RTA XM EVC RTNOPTHY: CPT | Performed by: INTERNAL MEDICINE

## 2022-01-13 PROCEDURE — G0463 HOSPITAL OUTPT CLINIC VISIT: HCPCS | Performed by: INTERNAL MEDICINE

## 2022-01-13 PROCEDURE — 3017F COLORECTAL CA SCREEN DOC REV: CPT | Performed by: INTERNAL MEDICINE

## 2022-01-13 PROCEDURE — G9899 SCRN MAM PERF RSLTS DOC: HCPCS | Performed by: INTERNAL MEDICINE

## 2022-01-13 PROCEDURE — 1090F PRES/ABSN URINE INCON ASSESS: CPT | Performed by: INTERNAL MEDICINE

## 2022-01-13 PROCEDURE — 83036 HEMOGLOBIN GLYCOSYLATED A1C: CPT | Performed by: INTERNAL MEDICINE

## 2022-01-13 PROCEDURE — G8510 SCR DEP NEG, NO PLAN REQD: HCPCS | Performed by: INTERNAL MEDICINE

## 2022-01-13 PROCEDURE — G8427 DOCREV CUR MEDS BY ELIG CLIN: HCPCS | Performed by: INTERNAL MEDICINE

## 2022-01-13 NOTE — PATIENT INSTRUCTIONS
Heart-Healthy Diet: Care Instructions  Your Care Instructions     A heart-healthy diet has lots of vegetables, fruits, nuts, beans, and whole grains, and is low in salt. It limits foods that are high in saturated fat, such as meats, cheeses, and fried foods. It may be hard to change your diet, but even small changes can lower your risk of heart attack and heart disease. Follow-up care is a key part of your treatment and safety. Be sure to make and go to all appointments, and call your doctor if you are having problems. It's also a good idea to know your test results and keep a list of the medicines you take. How can you care for yourself at home? Watch your portions  · Learn what a serving is. A \"serving\" and a \"portion\" are not always the same thing. Make sure that you are not eating larger portions than are recommended. For example, a serving of pasta is ½ cup. A serving size of meat is 2 to 3 ounces. A 3-ounce serving is about the size of a deck of cards. Measure serving sizes until you are good at [place]" them. Keep in mind that restaurants often serve portions that are 2 or 3 times the size of one serving. · To keep your energy level up and keep you from feeling hungry, eat often but in smaller portions. · Eat only the number of calories you need to stay at a healthy weight. If you need to lose weight, eat fewer calories than your body burns (through exercise and other physical activity). Eat more fruits and vegetables  · Eat a variety of fruit and vegetables every day. Dark green, deep orange, red, or yellow fruits and vegetables are especially good for you. Examples include spinach, carrots, peaches, and berries. · Keep carrots, celery, and other veggies handy for snacks. Buy fruit that is in season and store it where you can see it so that you will be tempted to eat it. · Cook dishes that have a lot of veggies in them, such as stir-fries and soups.   Limit saturated and trans fat  · Read food labels, and try to avoid saturated and trans fats. They increase your risk of heart disease. · Use olive or canola oil when you cook. · Bake, broil, grill, or steam foods instead of frying them. · Choose lean meats instead of high-fat meats such as hot dogs and sausages. Cut off all visible fat when you prepare meat. · Eat fish, skinless poultry, and meat alternatives such as soy products instead of high-fat meats. Soy products, such as tofu, may be especially good for your heart. · Choose low-fat or fat-free milk and dairy products. Eat foods high in fiber  · Eat a variety of grain products every day. Include whole-grain foods that have lots of fiber and nutrients. Examples of whole-grain foods include oats, whole wheat bread, and brown rice. · Buy whole-grain breads and cereals, instead of white bread or pastries. Limit salt and sodium  · Limit how much salt and sodium you eat to help lower your blood pressure. · Taste food before you salt it. Add only a little salt when you think you need it. With time, your taste buds will adjust to less salt. · Eat fewer snack items, fast foods, and other high-salt, processed foods. Check food labels for the amount of sodium in packaged foods. · Choose low-sodium versions of canned goods (such as soups, vegetables, and beans). Limit sugar  · Limit drinks and foods with added sugar. These include candy, desserts, and soda pop. Limit alcohol  · Limit alcohol to no more than 2 drinks a day for men and 1 drink a day for women. Too much alcohol can cause health problems. When should you call for help? Watch closely for changes in your health, and be sure to contact your doctor if:    · You would like help planning heart-healthy meals. Where can you learn more? Go to http://www.CellP[employer].com/  Enter V137 in the search box to learn more about \"Heart-Healthy Diet: Care Instructions. \"  Current as of: August 22, 2019               Content Version: 12.6  © 1518-8774 [employer]. Care instructions adapted under license by [employer] (which disclaims liability or warranty for this information). If you have questions about a medical condition or this instruction, always ask your healthcare professional. [address] any warranty or liability for your use of this information. Learning About Low-Sodium Foods  What foods are low in sodium? The foods you eat contain nutrients, such as vitamins and minerals. Sodium is a nutrient. Your body needs the right amount to stay healthy and work as it should. You can use the list below to help you make choices about which foods to eat. Fruits  · Fresh, frozen, canned, or dried fruit  Vegetables  · Fresh or frozen vegetables, with no added salt  · Canned vegetables, low-sodium or with no added salt  Grains  · Bagels without salted tops  · Cereal with no added salt  · Corn tortillas  · Crackers with no added salt  · Oatmeal, cooked without salt  · Popcorn with no salt  · Pasta and noodles, cooked without salt  · Rice, cooked without salt  · Unsalted pretzels  Dairy and dairy alternatives  · Butter, unsalted  · Cream cheese  · Ice cream  · Milk  · Soy milk  Meats and other protein foods  · Beans and peas, canned with no salt  · Eggs  · Fresh fish (not smoked)  · Fresh meats (not smoked or cured)  · Nuts and nut butter, prepared without salt  · Poultry, not packaged with sodium solution  · Tofu, unseasoned  · Tuna, canned without salt  Seasonings  · Garlic  · Herbs and spices  · Lemon juice  · Mustard  · Olive oil  · Salt-free seasoning mixes  · Vinegar  Work with your doctor to find out how much of this nutrient you need. Depending on your health, you may need more or less of it in your diet. Where can you learn more?   Go to http://www.gray.com/  Enter S460 in the search box to learn more about \"Learning About Low-Sodium Foods.\"  Current as of: August 22, 2019               Content Version: 12.6  © 7445-1024 [employer]. Care instructions adapted under license by [employer] (which disclaims liability or warranty for this information). If you have questions about a medical condition or this instruction, always ask your healthcare professional. [address] any warranty or liability for your use of this information. Low Sodium Diet (2,000 Milligram): Care Instructions  Your Care Instructions     Too much sodium causes your body to hold on to extra water. This can raise your blood pressure and force your heart and kidneys to work harder. In very serious cases, this could cause you to be put in the hospital. It might even be life-threatening. By limiting sodium, you will feel better and lower your risk of serious problems. The most common source of sodium is salt. People get most of the salt in their diet from canned, prepared, and packaged foods. Fast food and restaurant meals also are very high in sodium. Your doctor will probably limit your sodium to less than 2,000 milligrams (mg) a day. This limit counts all the sodium in prepared and packaged foods and any salt you add to your food. Follow-up care is a key part of your treatment and safety. Be sure to make and go to all appointments, and call your doctor if you are having problems. It's also a good idea to know your test results and keep a list of the medicines you take. How can you care for yourself at home? Read food labels  · Read labels on cans and food packages. The labels tell you how much sodium is in each serving. Make sure that you look at the serving size. If you eat more than the serving size, you have eaten more sodium. · Food labels also tell you the Percent Daily Value for sodium. Choose products with low Percent Daily Values for sodium.   · Be aware that sodium can come in forms other than salt, including monosodium glutamate (MSG), sodium citrate, and sodium bicarbonate (baking soda). MSG is often added to Asian food. When you eat out, you can sometimes ask for food without MSG or added salt. Buy low-sodium foods  · Buy foods that are labeled \"unsalted\" (no salt added), \"sodium-free\" (less than 5 mg of sodium per serving), or \"low-sodium\" (less than 140 mg of sodium per serving). Foods labeled \"reduced-sodium\" and \"light sodium\" may still have too much sodium. Be sure to read the label to see how much sodium you are getting. · Buy fresh vegetables, or frozen vegetables without added sauces. Buy low-sodium versions of canned vegetables, soups, and other canned goods. Prepare low-sodium meals  · Cut back on the amount of salt you use in cooking. This will help you adjust to the taste. Do not add salt after cooking. One teaspoon of salt has about 2,300 mg of sodium. · Take the salt shaker off the table. · Flavor your food with garlic, lemon juice, onion, vinegar, herbs, and spices. Do not use soy sauce, lite soy sauce, steak sauce, onion salt, garlic salt, celery salt, mustard, or ketchup on your food. · Use low-sodium salad dressings, sauces, and ketchup. Or make your own salad dressings and sauces without adding salt. · Use less salt (or none) when recipes call for it. You can often use half the salt a recipe calls for without losing flavor. Other foods such as rice, pasta, and grains do not need added salt. · Rinse canned vegetables, and cook them in fresh water. This removes some--but not all--of the salt. · Avoid water that is naturally high in sodium or that has been treated with water softeners, which add sodium. Call your local water company to find out the sodium content of your water supply. If you buy bottled water, read the label and choose a sodium-free brand. Avoid high-sodium foods  · Avoid eating:  ? Smoked, cured, salted, and canned meat, fish, and poultry.   ? Ham, ovalle, hot dogs, and luncheon meats. ? Regular, hard, and processed cheese and regular peanut butter. ? Crackers with salted tops, and other salted snack foods such as pretzels, chips, and salted popcorn. ? Frozen prepared meals, unless labeled low-sodium. ? Canned and dried soups, broths, and bouillon, unless labeled sodium-free or low-sodium. ? Canned vegetables, unless labeled sodium-free or low-sodium. ? [place] fries, pizza, tacos, and other fast foods. ? Pickles, olives, ketchup, and other condiments, especially soy sauce, unless labeled sodium-free or low-sodium. Where can you learn more? Go to http://www.gray.com/  Enter V843 in the search box to learn more about \"Low Sodium Diet (2,000 Milligram): Care Instructions. \"  Current as of: August 22, 2019               Content Version: 12.6  © 5370-9907 [employer]. Care instructions adapted under license by [employer] (which disclaims liability or warranty for this information). If you have questions about a medical condition or this instruction, always ask your healthcare professional. [place] [address] any warranty or liability for your use of this information. Learning About Diabetes Food Guidelines  Your Care Instructions     Meal planning is important to manage diabetes. It helps keep your blood sugar at a target level (which you set with your doctor). You don't have to eat special foods. You can eat what your family eats, including sweets once in a while. But you do have to pay attention to how often you eat and how much you eat of certain foods. You may want to work with a dietitian or a certified diabetes educator (CDE) to help you plan meals and snacks. A dietitian or CDE can also help you lose weight if that is one of your goals. What should you know about eating carbs? Managing the amount of carbohydrate (carbs) you eat is an important part of healthy meals when you have diabetes. Carbohydrate is found in many foods. · Learn which foods have carbs. And learn the amounts of carbs in different foods. ? Bread, cereal, pasta, and rice have about 15 grams of carbs in a serving. A serving is 1 slice of bread (1 ounce), ½ cup of cooked cereal, or 1/3 cup of cooked pasta or rice. ? Fruits have 15 grams of carbs in a serving. A serving is 1 small fresh fruit, such as an apple or orange; ½ of a banana; ½ cup of cooked or canned fruit; ½ cup of fruit juice; 1 cup of melon or raspberries; or 2 tablespoons of dried fruit. ? Milk and no-sugar-added yogurt have 15 grams of carbs in a serving. A serving is 1 cup of milk or 2/3 cup of no-sugar-added yogurt. ? Starchy vegetables have 15 grams of carbs in a serving. A serving is ½ cup of mashed potatoes or sweet potato; 1 cup winter squash; ½ of a small baked potato; ½ cup of cooked beans; or ½ cup cooked corn or green peas. · Learn how much carbs to eat each day and at each meal. A dietitian or CDE can teach you how to keep track of the amount of carbs you eat. This is called carbohydrate counting. · If you are not sure how to count carbohydrate grams, use the Plate Method to plan meals. It is a good, quick way to make sure that you have a balanced meal. It also helps you spread carbs throughout the day. ? Divide your plate by types of foods. Put non-starchy vegetables on half the plate, meat or other protein food on one-quarter of the plate, and a grain or starchy vegetable in the final quarter of the plate. To this you can add a small piece of fruit and 1 cup of milk or yogurt, depending on how many carbs you are supposed to eat at a meal.  · Try to eat about the same amount of carbs at each meal. Do not \"save up\" your daily allowance of carbs to eat at one meal.  · Proteins have very little or no carbs per serving. Examples of proteins are beef, chicken, turkey, fish, eggs, tofu, cheese, cottage cheese, and peanut butter.  A serving size of meat is 3 ounces, which is about the size of a deck of cards. Examples of meat substitute serving sizes (equal to 1 ounce of meat) are 1/4 cup of cottage cheese, 1 egg, 1 tablespoon of peanut butter, and ½ cup of tofu. How can you eat out and still eat healthy? · Learn to estimate the serving sizes of foods that have carbohydrate. If you measure food at home, it will be easier to estimate the amount in a serving of restaurant food. · If the meal you order has too much carbohydrate (such as potatoes, corn, or baked beans), ask to have a low-carbohydrate food instead. Ask for a salad or green vegetables. · If you use insulin, check your blood sugar before and after eating out to help you plan how much to eat in the future. · If you eat more carbohydrate at a meal than you had planned, take a walk or do other exercise. This will help lower your blood sugar. What else should you know? · Limit saturated fat, such as the fat from meat and dairy products. This is a healthy choice because people who have diabetes are at higher risk of heart disease. So choose lean cuts of meat and nonfat or low-fat dairy products. Use olive or canola oil instead of butter or shortening when cooking. · Don't skip meals. Your blood sugar may drop too low if you skip meals and take insulin or certain medicines for diabetes. · Check with your doctor before you drink alcohol. Alcohol can cause your blood sugar to drop too low. Alcohol can also cause a bad reaction if you take certain diabetes medicines. Follow-up care is a key part of your treatment and safety. Be sure to make and go to all appointments, and call your doctor if you are having problems. It's also a good idea to know your test results and keep a list of the medicines you take. Where can you learn more? Go to http://www.[employer].com/  Enter I147 in the search box to learn more about \"Learning About Diabetes Food Guidelines. \"  Current as of: December 20, 2019 Content Version: 12.6  © 6949-7974 [employer]. Care instructions adapted under license by [employer] (which disclaims liability or warranty for this information). If you have questions about a medical condition or this instruction, always ask your healthcare professional. [address] any warranty or liability for your use of this information.

## 2022-01-14 LAB — HBA1C MFR BLD HPLC: 6.7 %

## 2022-01-17 NOTE — PROGRESS NOTES
HPI:   Moncho Wan is a 77y.o. year old female who presents today for a routine visit. She has a history of hypertension, hyperlipidemia, diabetes mellitus type 1, chronic renal disease stage 3, and hypothyroidism. She completed the Moderna COVID 19 vaccine series and received the Moderna booster dose. She reports that she is doing reasonably well. She states that she is continuing to follow an intermittent fasting low carbohydrate diet and reports that she is eating more protein, salads, and fruit. She is excited about proceeding with plastic surgery on her face which will include a surgical left and laser treatment to address her wrinkles. She states that she is scheduled for surgery with Dr. Mick Murry on 1/15/2022 and states that it is being performed in his surgical center and not at a hospital.  She reports that she is remaining active walking for exercise and denies any change in exercise tolerance. She is otherwise without new complaints and feeling generally well. Summary of prior hospitalizations and medical history:  On 4/26/2018, she presented with worsening lower extremity edema. She stated that previously, her swelling would improve overnight, but she had been noticing recently that it did not resolve. She also reported some mild dyspnea with exertion, such as walking upstairs, which had been present chronically, although may have been slightly worse. She also reported some \"skipped beats\" although not prolonged. She denied any chest pain, shortness of breath at rest or with normal activities, lightheadedness, PND, or orthopnea. She was started on low dose lasix, and referred for an echocardiogram, Holter monitor, and lower extremity duplex scan, but prior to having these performed, presented to [facility] ED for evaluation of pain and swelling of her right ankle. While in ED, CBC, CMP, and D-dimer were unremarkable.  Cardiac enzymes x 2 were negative, and EKG was unchanged. She was observed overnight and underwent a pharmacologic nuclear stress test (5/3/2018) which was a normal low risk study, without evidence of ischemia or prior infarction, and EF 86%. She was subsequently discharged, and underwent an echocardiogram (5/10/2018) showed normal LV function (EF 55-60%) no RWMA, mild LAE, mild-moderate TR, RVSP normal (22 mmHg); holter monitor (5/1/2018) showing sinus rhythm with sinus arrhythmia at times; rare PVC's; one short run of non-sustained of SVT for 6 beats. Episode of sinus tachycardia recorded in diary occurred while she was undergoing pharmacologic nuclear stress test. No other symptoms reported; and lower extremity duplex (5/11/2018) which was negative for DVT and venous reflux. On 5/8/2018, she was evaluated by a podiatrist, Dr. Mitchell Barfield, for her right foot swelling. Felt that it was most likely secondary to sprain injury as x-rays negative and presentation not consistent with gout or other form of arthritis. She was started on a Medrol dose pack. However, her blood sugars increased to >400 and she was changed to ibuprofen 400 mg tid. She did have a right foot MRI (5/26/2018) which showed considerable edema involving lower leg, hindfoot and midfoot, nonlocalizing a nonspecific; evidence of old injury to the ATF ligament.  Other ankle ligaments are intact; mild peroneus brevis tendinopathy without measurable tear; small partial thickness longitudinal split in the peroneus longus tendon within the retromalleolar groove; mild distal Achilles tendinosis; and small subchondral cyst in the anterior facet of the talus with otherwise no degenerative change seen in any of the hindfoot or midfoot articulations. She states that she was told that the right foot swelling was due to an old injury, but she states that it persists although she does not feel much pain due to her neuropathy. She has a history of diabetes mellitus type 1, with onset at age 6.  She is currently under the care of Dr. Leeroy Villeda, and is being treated with insulin glargine and lispro. She had not been well-controlled, with HbA1c ranging from 7.5-8.0, although has ow improved since using the Freestyle meter as discussed. She does have proliferative diabetic retinopathy and is s/p pan-retinal photocoagulation therapy in both eyes. She is followed closely by Dr. Kenrick Wiley, and recent exam showed regression bilaterally. She also has peripheral neuropathy, with burning and tingling in her feet at night. Renal function had been normal until 5/2016, with evidence of chronic renal disease, stage 3, since that time with baseline creatinine1.0-1.04/ eGFR 54-56. She also has a history of hypothyroidism, and she is currently on Synthroid. She denies cold intolerance, hair or skin changes. She has a history of hypertension, treated with quinapril. She had a pharmacologic nuclear stress test in 11/2012, which showed no evidence for ischemia, and normal LV wall motion (EF> 70%). She also had a carotid duplex scan in 7/2013 which showed mild (< 50%) bilateral internal carotid artery stenoses. She denies any chest pain, shortness of breath at rest or with exertion, palpitations, lightheadedness, or edema. She has a history of hyperlipidemia, and was on atorvastatin for many years until 10/2015 when she discontinued it because of myalgias. She was recently tried on rosuvastatin without success due to recurrent myalgias. She was started on ezetimibe in 9/2016 and has been tolerating it without difficulty. She successfully stopped smoking in 1/2015, and reports that she continues to abstain. She had a screening colonoscopy in 9/2014 by Dr. Meme Elmore, which found a 6 mm sessile polyp in the descending colon and a 4-5 mm sessile polyp in the rectum/sigmoid (pathology: tubular adenomas). She had a repeat colonoscopy in 9/2019 showing a 6 mm sessile polyp in the ileocecal valve (pathology unavailable).  Recommendation was for follow-up colonoscopy in 5 years. She underwent an upper endoscopy on 3/9/2021 by Dr. Savannah Alcantara due to worsening reflux symptoms and abnormality seen on chest CT scan (1/8/2021) which noted an incidental finding of poorly distended distal esophagus with mild wall prominence. It showed a normal esophagus without mass or esophagitis, a hiatal hernia, and retained food in the stomach. She was advised to continue omeprazole 40 mg daily and to eat smaller more frequent meals. She denies any abdominal pain, nausea, vomiting, melena, hematochezia, or change in bowel movements. In 3/2017, she was diagnosed with an upper respiratory tract infection and was prescribed doxycycline and prednisone by [facility]. She subsequently developed severe dizziness and vomiting, prompting a visit to the [facility] ED where she was diagnosed with benign positional vertigo and treated with meclizine and Zofran with improvement. In 10/2017, she noticed difficulty with her peripheral vision and was evaluated by Dr. Jessica Arguelles. She has regressed proliferative diabetic retinopathy and she reports that the laser therapy she received many years ago to treat this resulted in the current limitations in her peripheral vision. She does also have mild glaucoma for which she is receiving treatment. She states that her driving has been restricted to only during the day, and she is aware that she must turn her head completely to each side when navigating behind the wheel. In 2/2018, she began having difficulty with right elbow pain and was evaluated by Dr. Emma Hermosillo who diagnosed her with right lateral epicondylitis. She received a cortisone injection with improvement. In 10/2018, while hiking on vacation hiking in [place], she slipped on some wooden steps and landed on her left shoulder.  She was transported to [facility] in [place], [place], and found to have a closed nondisplaced fracture of the left proximal humerus. She was placed in a sling and told to follow-up with orthopedics. She was being followed by Dr. Suki Castro of [address] [place] [address], and completed physical therapy with improvement. She is no longer requiring pain medication. Past Medical History:   Diagnosis Date    Chronic renal disease, stage III ([address]     Diabetes mellitus type 1 ([address]     Diabetic peripheral neuropathy (HCC)     Diabetic retinopathy ([facility]     Essential hypertension with goal blood pressure less than 140/90     Hearing decreased     Hyperlipidemia     Hypothyroidism      Past Surgical History:   Procedure Laterality Date    HX GYN      partial hysterectomy    HX GYN      3 D and C's    HX HEENT      tonsillectomy    HX HEENT      cataract removal bilateral    HX HEENT      multiple surgeries for retinopathy    HX HYSTERECTOMY      HX ORTHOPAEDIC      carpral tunnel sugery bilateral     Current Outpatient Medications   Medication Sig    quinapriL (ACCUPRIL) 10 mg tablet TAKE 1 TABLET EVERY EVENING ALONG WITH A 5 MG TABLET    levothyroxine (SYNTHROID) 100 mcg tablet Take 1 Tablet by mouth three (3) days a week. Take 88 mcg on other days    levothyroxine (SYNTHROID) 88 mcg tablet Take 1 Tablet by mouth four (4) days a week. Take 100 mcg on other days    ezetimibe (ZETIA) 10 mg tablet TAKE 1 TABLET DAILY    quinapriL (ACCUPRIL) 5 mg tablet TAKE 1 TABLET NIGHTLY WITH 10 MG TABLET EVERY EVENING    omeprazole (PRILOSEC) 40 mg capsule TAKE 1 CAPSULE DAILY    ALPHAGAN P 0.1 % ophthalmic solution     glucose blood VI test strips (ONE TOUCH ULTRA TEST) strip USE TO TEST BLOOD SUGAR FOUR TIMES A DAY    insulin glargine (LANTUS SOLOSTAR) 100 unit/mL (3 mL) pen 18 Units by SubCUTAneous route daily.  insulin lispro (HUMALOG PEN) 100 unit/mL kwikpen by SubCUTAneous route. Sliding scale as needed      Cholecalciferol, Vitamin D3, (VITAMIN D3) 2,000 unit cap capsule Take 2,000 Units by mouth daily. (Patient not taking: Reported on 2022)    cyanocobalamin (VITAMIN B-12) 500 mcg tablet Take 500 mcg by mouth daily. (Patient not taking: Reported on 2022)    aspirin delayed-release 81 mg tablet Take  by mouth daily. (Patient not taking: Reported on 2022)     No current facility-administered medications for this visit. Allergies and Intolerances: Allergies   Allergen Reactions    Pcn [Penicillins] Hives    Sulfa (Sulfonamide Antibiotics) Nausea Only and Vertigo     Family History: Grandmother  from colon cancer. Mother had CAD and DM. Family History   Problem Relation Age of Onset    Diabetes Brother     Diabetes Mother     Heart Disease Mother      Social History:   She  reports that she quit smoking about 7 years ago. She has a 10.00 pack-year smoking history. She has never used smokeless tobacco. She is  and lives with her . 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   Social History     Substance and Sexual Activity   Alcohol Use Yes    Comment: infrequently     Immunization History:  Immunization History   Administered Date(s) Administered    COVID-19, Moderna Booster, PF, 0.25mL Dose 10/24/2021    COVID-19, Moderna, Primary or Immunocompromised Series, MRNA, PF, 100mcg/0.5mL 2021, 2021    Influenza Vaccine (Quad) PF (>6 Mo Flulaval, Fluarix, and >3 Yrs Afluria, Fluzone [id number]) 12/15/2015, 2016, 10/19/2017, 2018, 12/10/2019    Influenza Vaccine PF 2014    Influenza Vaccine Split 2011, 10/17/2012    Influenza Vaccine Whole 2010    Influenza, Quadrivalent, Adjuvanted (>65 Yrs FLUAD QUAD [id number]) 2020, 2021    Pneumococcal Conjugate (PCV-13) 2020    Pneumococcal Polysaccharide (PPSV-23) 2017, 2019    Tdap 2013    Zoster Recombinant 2021    Zoster Vaccine, Live 2016       Review of Systems:   As above included in HPI.  Otherwise 11 point review of systems negative including constitutional, skin, HENT, eyes, respiratory, cardiovascular, gastrointestinal, genitourinary, musculoskeletal, endo/heme/aller, neurological.      Physical:   Visit Vitals  /67 (BP 1 Location: Left arm, BP Patient Position: Sitting)   Pulse 67   Temp 97.9 °F (36.6 °C) (Temporal)   Resp 16   Ht 5' 5\" (1.651 m)   Wt 166 lb 3.2 oz (75.4 kg)   SpO2 100%   BMI 27.66 kg/m²       Exam:   Patient appears in no apparent distress. Affect is appropriate. HEENT: PERRLA, anicteric, oropharynx clear, no JVD, adenopathy or thyromegaly. No carotid bruits or radiated murmur. Lungs: clear to auscultation, no wheezes, rhonchi, or rales. Heart: regular rate and rhythm. No murmur, rubs, gallops  Abdomen: soft, nontender, nondistended, normal bowel sounds, no hepatosplenomegaly or masses. Extremities: without edema. Review of Data:  Labs:   Hospital Outpatient Visit on 01/06/2022   Component Date Value Ref Range Status    WBC 01/06/2022 5.7  4.6 - 13.2 K/uL Final    RBC 01/06/2022 3.46* 4.20 - 5.30 M/uL Final    HGB 01/06/2022 11.1* 12.0 - 16.0 g/dL Final    HCT 01/06/2022 35.2  35.0 - 45.0 % Final    MCV 01/06/2022 101.7* 78.0 - 100.0 FL Final    MCH 01/06/2022 32.1  24.0 - 34.0 PG Final    MCHC 01/06/2022 31.5  31.0 - 37.0 g/dL Final    RDW 01/06/2022 12.4  11.6 - 14.5 % Final    PLATELET 62/94/1074 104  135 - 420 K/uL Final    MPV 01/06/2022 12.3* 9.2 - 11.8 FL Final    NRBC 01/06/2022 0.0  0  WBC Final    ABSOLUTE NRBC 01/06/2022 0.00  0.00 - 0.01 K/uL Final    NEUTROPHILS 01/06/2022 70  40 - 73 % Final    LYMPHOCYTES 01/06/2022 22  21 - 52 % Final    MONOCYTES 01/06/2022 6  3 - 10 % Final    EOSINOPHILS 01/06/2022 1  0 - 5 % Final    BASOPHILS 01/06/2022 1  0 - 2 % Final    IMMATURE GRANULOCYTES 01/06/2022 0  0.0 - 0.5 % Final    ABS. NEUTROPHILS 01/06/2022 4.0  1.8 - 8.0 K/UL Final    ABS.  LYMPHOCYTES 01/06/2022 1.3  0.9 - 3.6 K/UL Final    ABS. MONOCYTES 01/06/2022 0.4  0.05 - 1.2 K/UL Final    ABS. EOSINOPHILS 01/06/2022 0.1  0.0 - 0.4 K/UL Final    ABS. BASOPHILS 01/06/2022 0.0  0.0 - 0.1 K/UL Final    ABS. IMM. GRANS. 01/06/2022 0.0  0.00 - 0.04 K/UL Final    DF 01/06/2022 AUTOMATED    Final    LIPID PROFILE 01/06/2022        Final    Cholesterol, total 01/06/2022 178  <200 MG/DL Final    Triglyceride 01/06/2022 68  <150 MG/DL Final    HDL Cholesterol 01/06/2022 83* 40 - 60 MG/DL Final    LDL, calculated 01/06/2022 81.4  0 - 100 MG/DL Final    VLDL, calculated 01/06/2022 13.6  MG/DL Final    CHOL/HDL Ratio 01/06/2022 2.1  0 - 5.0   Final    Magnesium 01/06/2022 2.2  1.6 - 2.6 mg/dL Final    Sodium 01/06/2022 133* 136 - 145 mmol/L Final    Potassium 01/06/2022 4.8  3.5 - 5.5 mmol/L Final    Chloride 01/06/2022 102  100 - 111 mmol/L Final    CO2 01/06/2022 24  21 - 32 mmol/L Final    Anion gap 01/06/2022 7  3.0 - 18 mmol/L Final    Glucose 01/06/2022 148* 74 - 99 mg/dL Final    BUN 01/06/2022 19* 7.0 - 18 MG/DL Final    Creatinine 01/06/2022 0.84  0.6 - 1.3 MG/DL Final    BUN/Creatinine ratio 01/06/2022 23* 12 - 20   Final    GFR est AA 01/06/2022 >60  >60 ml/min/1.73m2 Final    GFR est non-AA 01/06/2022 >60  >60 ml/min/1.73m2 Final    Calcium 01/06/2022 8.8  8.5 - 10.1 MG/DL Final    Bilirubin, total 01/06/2022 0.5  0.2 - 1.0 MG/DL Final    ALT (SGPT) 01/06/2022 21  13 - 56 U/L Final    AST (SGOT) 01/06/2022 16  10 - 38 U/L Final    Alk.  phosphatase 01/06/2022 61  45 - 117 U/L Final    Protein, total 01/06/2022 6.2* 6.4 - 8.2 g/dL Final    Albumin 01/06/2022 3.8  3.4 - 5.0 g/dL Final    Globulin 01/06/2022 2.4  2.0 - 4.0 g/dL Final    A-G Ratio 01/06/2022 1.6  0.8 - 1.7   Final    Microalbumin,urine random 01/06/2022 0.94  0 - 3.0 MG/DL Final    Creatinine, urine 01/06/2022 51.00  30 - 125 mg/dL Final    Microalbumin/Creat ratio (mg/g cre* 01/06/2022 18  0 - 30 mg/g Final    TSH 01/06/2022 0.48 0.36 - 3.74 uIU/mL Final    T4, Free 2022 1.3  0.7 - 1.5 NG/DL Final    Vitamin D 25-Hydroxy 2022 43.8  30 - 100 ng/mL Final     EKG Results     None        EKG (2021) sinus rhythm at 73 bpm, normal intervals, no ischemic changes. Health Maintenance:  Screening:    Mammogram: negative (10/2021)    PAP smear: negative (2014). S/p YUNG, no further screening needed. Colorectal: colonsocopy (2019) ileocecal valve polyp. Dr. Alwin Hatchet. Due 2024. Depression: none   DM (HbA1c/FPG): HbA1c 6.7 (2022) Dr. Zeb Gutierrez   Hepatitis C: negative (2015)   Falls: none    DEXA: osteopenia (10/2020)   Glaucoma: mild primary open angle glaucoma and regressed proliferative diabetic retinopathy. Followed by Dr. Jaswinder Cruz (last 2021)   Smokin pack year (stopped 2015)   Vitamin D: 43.8 (2022)   Medicare Wellness: 2021 (Initial)      Impression:  Patient Active Problem List   Diagnosis Code    Stable proliferative diabetic retinopathy of both eyes associated with type 1 diabetes mellitus ([address] S23.1299    Hyperlipidemia E78.5    Hearing decreased H91.90    Diabetes mellitus with diabetic polyneuropathy ([address] E11.42    Vitamin D deficiency E55.9    Anemia D64.9    Former smoker Z87.891    Adenomatous polyp of colon D12.6    Essential hypertension I10    Hypothyroidism due to acquired atrophy of thyroid E03.4    Hypertensive kidney disease with stage 3a chronic kidney disease (HCC) I12.9, N18.31    Type 1 diabetes mellitus with stage 3 chronic kidney disease (HCC) E10.22, N18.30    Macrocytosis D75.89    Microalbuminuria due to type 1 diabetes mellitus ([address] E10.29, R80.9    Coronary artery calcification seen on CT scan I25.10    GERD (gastroesophageal reflux disease) K21.9    Osteopenia of multiple sites M85.89       Plan:  1. Hypertension. Blood pressure remains well controlled on quinapril 15 mg daily. Renal function is stable with creatinine 0.84/ eGFR >60.   Underwent evaluation for lower extremity edema in 2018 which included a negative cardiac evaluation including EKG, echocardiogram, 48 hour Holter monitor, and pharmacologic nuclear stress test. Lower extremity duplex was also negative for DVT or venous reflux. Using compression hose regularly with improvement. Will continue to monitor  2. Type 1 diabetes mellitus. HbA1c stable today at 6.7. Using Freestyle sensor machine for blood sugar readings. On Lantus and Humalog. Complicated by proliferative diabetic retinopathy in regression, followed by Dr. Alissa Meza. Neuropathy symptoms in feet are mild and have not required treatment with medication. Foot exam performed by Dr. Sahil Cotter. Renal function with evidence of chronic renal disease, stage 3. Evidence of mild microalbuminuria in 5/2021 (52 mg/g), but normalized today (18 mg/g). On Ace-I. Unable to tolerate statins due to myalgias, but LDL improved with ezetimibe. Will continue to monitor. 3. Hyperlipidemia. Not able to tolerate statin (atorvastatin/ rosuvastatin) due to myalgias. On ezetimibe with significant improvement today with LDL 81 and HDL 83. Likely due to dietary changes. Moderate to severe aortic and coronary atherosclerosis and calcifications seen on chest CT scan (11/2018). Stressed importance of continued lifestyle modifications, especially diet, exercise and weight loss. 4. Chronic renal disease, stage 3. Evidence of mild disease with creatinine 1.00-1.04/ eGFR 54-56 since 5/2016. Most likely due to long standing diabetes mellitus and hypertension. On Ace-I, but not on statin as discussed above. Counseled on avoiding NSAIDS and prerenal status. Normalized today with creatinine 0.84/eGFR >60. Will continue to monitor. 5. Hypothyroidism. TSH now therapeutic at 0.48 with free T4 at 1.3 on adjusted dose of levothyroxine 100 mcg 3 days/week and 88 mcg 4 days/week. Will continue to monitor. 6. Former smoker. 30 pack year and stopped in 2015.   Initial lung cancer screening performed in 11/2017 showing a few tiny solid nodules and one groundglass nodule identified (lung RADS 2). Did also note evidence of emphysema and prominence of the pulmonary vascular trunk suggestive of pulmonary arterial hypertension. However, echocardiogram (5/2018) with normal PA pressures. Being monitored annually without change, and most recent chest CT scan in 1/2022 showed multiple small stable bilateral lung nodules and emphysematous changes. Next screening LD chest CT scan due 1/2022. Will order next visit. 7. Osteopenia. Baseline bone density scan 10/8/2020 . Using femoral neck T-scores, calculated FRAX score estimates her 10 year risk of a major osteoporetic fracture at 6.5 % and hip fracture at 0.2 %, which are not an indication for biphosphonate treatment (>20% and >3%, respectively). Advised to begin calcium in addition to Vitamin D. Encouraged exercise, particularly weight bearing activities. Will continue to monitor. Fall precautions stressed. 8. GERD. Chest CT scan (1/8/2021) noted incidental finding of poorly distended distal esophagus with mild wall prominence. Referred to Dr. Luis Cavazos, and underwent upper endoscopy on 3/9/2021 showing a normal esophagus without mass or esophagitis, hiatal hernia, and retained food in the stomach. Advised to continue omeprazole 40 mg daily. Reports continued good control of symptoms today. 9. Anemia, mild. Chronic but stable. Evaluation in 5/2021 showed iron studies normal with ferritin 165 and transferrin 41%; B12 normal; and folate levels also now normal. On multivitamin with folate. Will check gammopathy panel and UPEP at next visit. Remains stable today. Will continue to monitor. 10. Overweight. Weight essentially stable over the last year. Currently following a low carbohydrate intermittent fasting diet with increased proteins, salads, and fruit.   Emphasized importance of continued lifestyle modifications, including diet, exercise, and weight loss. Will readdress next visit. 11. Health maintenance. Completed Moderna COVID 19 vaccine series and received the Moderna booster dose. Already received the influenza vaccine. Received 1/2 doses of Shingrix vaccine. Will address second dose at next visit. Other immunizations up to date. Mammogram due and will place order. Colonoscopy up-to-date. Vitamin D level remains normal on maintenance dose supplement. Initial Medicare wellness visit up-to-date    Patient understands recommendations and agrees with plan. .  Follow-up in 3 months.

## 2022-03-18 PROBLEM — E10.29 MICROALBUMINURIA DUE TO TYPE 1 DIABETES MELLITUS (HCC): Status: ACTIVE | Noted: 2018-04-29

## 2022-03-18 PROBLEM — R80.9 MICROALBUMINURIA DUE TO TYPE 1 DIABETES MELLITUS (HCC): Status: ACTIVE | Noted: 2018-04-29

## 2022-03-18 PROBLEM — D75.89 MACROCYTOSIS: Status: ACTIVE | Noted: 2017-04-16

## 2022-03-19 PROBLEM — K21.9 GERD (GASTROESOPHAGEAL REFLUX DISEASE): Status: ACTIVE | Noted: 2020-11-08

## 2022-03-19 PROBLEM — I25.10 CORONARY ARTERY CALCIFICATION SEEN ON CT SCAN: Status: ACTIVE | Noted: 2019-12-14

## 2022-03-20 PROBLEM — M85.89 OSTEOPENIA OF MULTIPLE SITES: Status: ACTIVE | Noted: 2021-02-15

## 2022-04-14 ENCOUNTER — APPOINTMENT (OUTPATIENT)
Dept: INTERNAL MEDICINE CLINIC | Age: 67
End: 2022-04-14

## 2022-04-14 ENCOUNTER — HOSPITAL ENCOUNTER (OUTPATIENT)
Dept: LAB | Age: 67
Discharge: HOME | End: 2022-04-14
Payer: MEDICARE

## 2022-04-14 DIAGNOSIS — N18.31 STAGE 3A CHRONIC KIDNEY DISEASE (HCC): ICD-10-CM

## 2022-04-14 DIAGNOSIS — D64.9 ANEMIA, UNSPECIFIED TYPE: ICD-10-CM

## 2022-04-14 DIAGNOSIS — E03.4 HYPOTHYROIDISM DUE TO ACQUIRED ATROPHY OF THYROID: ICD-10-CM

## 2022-04-14 DIAGNOSIS — E78.5 HYPERLIPIDEMIA, UNSPECIFIED HYPERLIPIDEMIA TYPE: ICD-10-CM

## 2022-04-14 DIAGNOSIS — E10.22 TYPE 1 DIABETES MELLITUS WITH STAGE 3A CHRONIC KIDNEY DISEASE (HCC): ICD-10-CM

## 2022-04-14 DIAGNOSIS — E10.42 TYPE 1 DIABETES MELLITUS WITH DIABETIC POLYNEUROPATHY (HCC): ICD-10-CM

## 2022-04-14 DIAGNOSIS — E10.3553 STABLE PROLIFERATIVE DIABETIC RETINOPATHY OF BOTH EYES ASSOCIATED WITH TYPE 1 DIABETES MELLITUS (HCC): ICD-10-CM

## 2022-04-14 DIAGNOSIS — I10 ESSENTIAL HYPERTENSION: ICD-10-CM

## 2022-04-14 DIAGNOSIS — N18.31 TYPE 1 DIABETES MELLITUS WITH STAGE 3A CHRONIC KIDNEY DISEASE (HCC): ICD-10-CM

## 2022-04-14 LAB
25(OH)D3 SERPL-MCNC: 53 NG/ML (ref 30–100)
ALBUMIN SERPL-MCNC: 3.8 G/DL (ref 3.4–5)
ALBUMIN/GLOB SERPL: 1.5 {RATIO} (ref 0.8–1.7)
ALP SERPL-CCNC: 65 U/L (ref 45–117)
ALT SERPL-CCNC: 22 U/L (ref 13–56)
ANION GAP SERPL CALC-SCNC: 6 MMOL/L (ref 3–18)
AST SERPL-CCNC: 16 U/L (ref 10–38)
BASOPHILS # BLD: 0.1 K/UL (ref 0–0.1)
BASOPHILS NFR BLD: 1 % (ref 0–2)
BILIRUB SERPL-MCNC: 0.7 MG/DL (ref 0.2–1)
BUN SERPL-MCNC: 20 MG/DL (ref 7–18)
BUN/CREAT SERPL: 19 (ref 12–20)
CALCIUM SERPL-MCNC: 9.5 MG/DL (ref 8.5–10.1)
CHLORIDE SERPL-SCNC: 102 MMOL/L (ref 100–111)
CHOLEST SERPL-MCNC: 219 MG/DL
CO2 SERPL-SCNC: 26 MMOL/L (ref 21–32)
CREAT SERPL-MCNC: 1.06 MG/DL (ref 0.6–1.3)
CREAT UR-MCNC: 42 MG/DL (ref 30–125)
DIFFERENTIAL METHOD BLD: ABNORMAL
EOSINOPHIL # BLD: 0.1 K/UL (ref 0–0.4)
EOSINOPHIL NFR BLD: 2 % (ref 0–5)
ERYTHROCYTE [DISTWIDTH] IN BLOOD BY AUTOMATED COUNT: 11.8 % (ref 11.6–14.5)
EST. AVERAGE GLUCOSE BLD GHB EST-MCNC: 160 MG/DL
GLOBULIN SER CALC-MCNC: 2.6 G/DL (ref 2–4)
GLUCOSE SERPL-MCNC: 58 MG/DL (ref 74–99)
HBA1C MFR BLD: 7.2 % (ref 4.2–5.6)
HCT VFR BLD AUTO: 32.6 % (ref 35–45)
HDLC SERPL-MCNC: 78 MG/DL (ref 40–60)
HDLC SERPL: 2.8 {RATIO} (ref 0–5)
HGB BLD-MCNC: 10.8 G/DL (ref 12–16)
IMM GRANULOCYTES # BLD AUTO: 0 K/UL (ref 0–0.04)
IMM GRANULOCYTES NFR BLD AUTO: 0 % (ref 0–0.5)
LDLC SERPL CALC-MCNC: 124.2 MG/DL (ref 0–100)
LIPID PROFILE,FLP: ABNORMAL
LYMPHOCYTES # BLD: 1.4 K/UL (ref 0.9–3.6)
LYMPHOCYTES NFR BLD: 21 % (ref 21–52)
MAGNESIUM SERPL-MCNC: 2.1 MG/DL (ref 1.6–2.6)
MCH RBC QN AUTO: 33.5 PG (ref 24–34)
MCHC RBC AUTO-ENTMCNC: 33.1 G/DL (ref 31–37)
MCV RBC AUTO: 101.2 FL (ref 78–100)
MICROALBUMIN UR-MCNC: 0.6 MG/DL (ref 0–3)
MICROALBUMIN/CREAT UR-RTO: 14 MG/G (ref 0–30)
MONOCYTES # BLD: 0.4 K/UL (ref 0.05–1.2)
MONOCYTES NFR BLD: 6 % (ref 3–10)
NEUTS SEG # BLD: 4.5 K/UL (ref 1.8–8)
NEUTS SEG NFR BLD: 70 % (ref 40–73)
NRBC # BLD: 0 K/UL (ref 0–0.01)
NRBC BLD-RTO: 0 PER 100 WBC
PLATELET # BLD AUTO: 271 K/UL (ref 135–420)
PMV BLD AUTO: 11.4 FL (ref 9.2–11.8)
POTASSIUM SERPL-SCNC: 5.2 MMOL/L (ref 3.5–5.5)
PROT SERPL-MCNC: 6.4 G/DL (ref 6.4–8.2)
RBC # BLD AUTO: 3.22 M/UL (ref 4.2–5.3)
SODIUM SERPL-SCNC: 134 MMOL/L (ref 136–145)
T4 FREE SERPL-MCNC: 1.3 NG/DL (ref 0.7–1.5)
TRIGL SERPL-MCNC: 84 MG/DL (ref ?–150)
TSH SERPL DL<=0.05 MIU/L-ACNC: 0.72 UIU/ML (ref 0.36–3.74)
VLDLC SERPL CALC-MCNC: 16.8 MG/DL
WBC # BLD AUTO: 6.4 K/UL (ref 4.6–13.2)

## 2022-04-14 PROCEDURE — 82784 ASSAY IGA/IGD/IGG/IGM EACH: CPT

## 2022-04-14 PROCEDURE — 80053 COMPREHEN METABOLIC PANEL: CPT

## 2022-04-14 PROCEDURE — 80061 LIPID PANEL: CPT

## 2022-04-14 PROCEDURE — 83735 ASSAY OF MAGNESIUM: CPT

## 2022-04-14 PROCEDURE — 82043 UR ALBUMIN QUANTITATIVE: CPT

## 2022-04-14 PROCEDURE — 82306 VITAMIN D 25 HYDROXY: CPT

## 2022-04-14 PROCEDURE — 83036 HEMOGLOBIN GLYCOSYLATED A1C: CPT

## 2022-04-14 PROCEDURE — 84439 ASSAY OF FREE THYROXINE: CPT

## 2022-04-14 PROCEDURE — 36415 COLL VENOUS BLD VENIPUNCTURE: CPT

## 2022-04-14 PROCEDURE — 84443 ASSAY THYROID STIM HORMONE: CPT

## 2022-04-14 PROCEDURE — 85025 COMPLETE CBC W/AUTO DIFF WBC: CPT

## 2022-04-14 PROCEDURE — 84156 ASSAY OF PROTEIN URINE: CPT

## 2022-04-18 LAB
ALBUMIN 24H MFR UR ELPH: 100 %
ALBUMIN SERPL ELPH-MCNC: 3.9 G/DL (ref 2.9–4.4)
ALBUMIN/GLOB SERPL: 1.6 {RATIO} (ref 0.7–1.7)
ALPHA1 GLOB 24H MFR UR ELPH: 0 %
ALPHA1 GLOB SERPL ELPH-MCNC: 0.3 G/DL (ref 0–0.4)
ALPHA2 GLOB 24H MFR UR ELPH: 0 %
ALPHA2 GLOB SERPL ELPH-MCNC: 0.7 G/DL (ref 0.4–1)
B-GLOBULIN MFR UR ELPH: 0 %
B-GLOBULIN SERPL ELPH-MCNC: 0.8 G/DL (ref 0.7–1.3)
GAMMA GLOB 24H MFR UR ELPH: 0 %
GAMMA GLOB SERPL ELPH-MCNC: 0.7 G/DL (ref 0.4–1.8)
GLOBULIN SER-MCNC: 2.5 G/DL (ref 2.2–3.9)
IGA SERPL-MCNC: 144 MG/DL (ref 87–352)
IGG SERPL-MCNC: 748 MG/DL (ref 586–1602)
IGM SERPL-MCNC: 57 MG/DL (ref 26–217)
INTERPRETATION SERPL IEP-IMP: ABNORMAL
INTERPRETATION UR IFE-IMP: NORMAL
KAPPA LC FREE SER-MCNC: 23.8 MG/L (ref 3.3–19.4)
KAPPA LC FREE/LAMBDA FREE SER: 1.33 {RATIO} (ref 0.26–1.65)
LAMBDA LC FREE SERPL-MCNC: 17.9 MG/L (ref 5.7–26.3)
M PROTEIN 24H MFR UR ELPH: NORMAL %
M PROTEIN SERPL ELPH-MCNC: ABNORMAL G/DL
NOTE, 149533: NORMAL
PROT SERPL-MCNC: 6.4 G/DL (ref 6–8.5)
PROT UR-MCNC: <4 MG/DL

## 2022-04-21 ENCOUNTER — VIRTUAL VISIT (OUTPATIENT)
Dept: INTERNAL MEDICINE CLINIC | Age: 67
End: 2022-04-21
Payer: MEDICARE

## 2022-04-21 DIAGNOSIS — M85.89 OSTEOPENIA OF MULTIPLE SITES: ICD-10-CM

## 2022-04-21 DIAGNOSIS — E10.22 TYPE 1 DIABETES MELLITUS WITH STAGE 3A CHRONIC KIDNEY DISEASE (HCC): Primary | ICD-10-CM

## 2022-04-21 DIAGNOSIS — K21.9 GASTROESOPHAGEAL REFLUX DISEASE, UNSPECIFIED WHETHER ESOPHAGITIS PRESENT: ICD-10-CM

## 2022-04-21 DIAGNOSIS — I12.9 HYPERTENSIVE KIDNEY DISEASE WITH STAGE 3A CHRONIC KIDNEY DISEASE (HCC): ICD-10-CM

## 2022-04-21 DIAGNOSIS — N18.31 TYPE 1 DIABETES MELLITUS WITH STAGE 3A CHRONIC KIDNEY DISEASE (HCC): Primary | ICD-10-CM

## 2022-04-21 DIAGNOSIS — D64.9 ANEMIA, UNSPECIFIED TYPE: ICD-10-CM

## 2022-04-21 DIAGNOSIS — E10.3553 STABLE PROLIFERATIVE DIABETIC RETINOPATHY OF BOTH EYES ASSOCIATED WITH TYPE 1 DIABETES MELLITUS (HCC): ICD-10-CM

## 2022-04-21 DIAGNOSIS — N18.31 STAGE 3A CHRONIC KIDNEY DISEASE (HCC): ICD-10-CM

## 2022-04-21 DIAGNOSIS — E78.5 HYPERLIPIDEMIA, UNSPECIFIED HYPERLIPIDEMIA TYPE: ICD-10-CM

## 2022-04-21 DIAGNOSIS — E10.42 TYPE 1 DIABETES MELLITUS WITH DIABETIC POLYNEUROPATHY (HCC): ICD-10-CM

## 2022-04-21 DIAGNOSIS — N18.31 HYPERTENSIVE KIDNEY DISEASE WITH STAGE 3A CHRONIC KIDNEY DISEASE (HCC): ICD-10-CM

## 2022-04-21 DIAGNOSIS — E03.4 HYPOTHYROIDISM DUE TO ACQUIRED ATROPHY OF THYROID: ICD-10-CM

## 2022-04-21 DIAGNOSIS — I10 ESSENTIAL HYPERTENSION: ICD-10-CM

## 2022-04-21 PROCEDURE — 1101F PT FALLS ASSESS-DOCD LE1/YR: CPT | Performed by: INTERNAL MEDICINE

## 2022-04-21 PROCEDURE — G8399 PT W/DXA RESULTS DOCUMENT: HCPCS | Performed by: INTERNAL MEDICINE

## 2022-04-21 PROCEDURE — G8427 DOCREV CUR MEDS BY ELIG CLIN: HCPCS | Performed by: INTERNAL MEDICINE

## 2022-04-21 PROCEDURE — 3051F HG A1C>EQUAL 7.0%<8.0%: CPT | Performed by: INTERNAL MEDICINE

## 2022-04-21 PROCEDURE — 2022F DILAT RTA XM EVC RTNOPTHY: CPT | Performed by: INTERNAL MEDICINE

## 2022-04-21 PROCEDURE — G8756 NO BP MEASURE DOC: HCPCS | Performed by: INTERNAL MEDICINE

## 2022-04-21 PROCEDURE — 99214 OFFICE O/P EST MOD 30 MIN: CPT | Performed by: INTERNAL MEDICINE

## 2022-04-21 PROCEDURE — 3017F COLORECTAL CA SCREEN DOC REV: CPT | Performed by: INTERNAL MEDICINE

## 2022-04-21 PROCEDURE — G8510 SCR DEP NEG, NO PLAN REQD: HCPCS | Performed by: INTERNAL MEDICINE

## 2022-04-21 PROCEDURE — G0463 HOSPITAL OUTPT CLINIC VISIT: HCPCS | Performed by: INTERNAL MEDICINE

## 2022-04-21 PROCEDURE — G9899 SCRN MAM PERF RSLTS DOC: HCPCS | Performed by: INTERNAL MEDICINE

## 2022-04-21 PROCEDURE — 1090F PRES/ABSN URINE INCON ASSESS: CPT | Performed by: INTERNAL MEDICINE

## 2022-04-21 NOTE — PROGRESS NOTES
1. \"Have you been to the ER, urgent care clinic since your last visit? Hospitalized since your last visit? \" No    2. \"Have you seen or consulted any other health care providers outside of the [address] [place] [address] since your last visit? \" [facility]     3. For patients aged 39-70: Has the patient had a colonoscopy / FIT/ Cologuard? Yes - no Care Gap present      If the patient is female:    4. For patients aged 41-77: Has the patient had a mammogram within the past 2 years? Yes - no Care Gap present      5. For patients aged 21-65: Has the patient had a pap smear?  NA - based on age or sex

## 2022-04-21 NOTE — PATIENT INSTRUCTIONS
Heart-Healthy Diet: Care Instructions  Your Care Instructions     A heart-healthy diet has lots of vegetables, fruits, nuts, beans, and whole grains, and is low in salt. It limits foods that are high in saturated fat, such as meats, cheeses, and fried foods. It may be hard to change your diet, but even small changes can lower your risk of heart attack and heart disease. Follow-up care is a key part of your treatment and safety. Be sure to make and go to all appointments, and call your doctor if you are having problems. It's also a good idea to know your test results and keep a list of the medicines you take. How can you care for yourself at home? Watch your portions  · Learn what a serving is. A \"serving\" and a \"portion\" are not always the same thing. Make sure that you are not eating larger portions than are recommended. For example, a serving of pasta is ½ cup. A serving size of meat is 2 to 3 ounces. A 3-ounce serving is about the size of a deck of cards. Measure serving sizes until you are good at [place]" them. Keep in mind that restaurants often serve portions that are 2 or 3 times the size of one serving. · To keep your energy level up and keep you from feeling hungry, eat often but in smaller portions. · Eat only the number of calories you need to stay at a healthy weight. If you need to lose weight, eat fewer calories than your body burns (through exercise and other physical activity). Eat more fruits and vegetables  · Eat a variety of fruit and vegetables every day. Dark green, deep orange, red, or yellow fruits and vegetables are especially good for you. Examples include spinach, carrots, peaches, and berries. · Keep carrots, celery, and other veggies handy for snacks. Buy fruit that is in season and store it where you can see it so that you will be tempted to eat it. · Cook dishes that have a lot of veggies in them, such as stir-fries and soups.   Limit saturated and trans fat  · Read food labels, and try to avoid saturated and trans fats. They increase your risk of heart disease. · Use olive or canola oil when you cook. · Bake, broil, grill, or steam foods instead of frying them. · Choose lean meats instead of high-fat meats such as hot dogs and sausages. Cut off all visible fat when you prepare meat. · Eat fish, skinless poultry, and meat alternatives such as soy products instead of high-fat meats. Soy products, such as tofu, may be especially good for your heart. · Choose low-fat or fat-free milk and dairy products. Eat foods high in fiber  · Eat a variety of grain products every day. Include whole-grain foods that have lots of fiber and nutrients. Examples of whole-grain foods include oats, whole wheat bread, and brown rice. · Buy whole-grain breads and cereals, instead of white bread or pastries. Limit salt and sodium  · Limit how much salt and sodium you eat to help lower your blood pressure. · Taste food before you salt it. Add only a little salt when you think you need it. With time, your taste buds will adjust to less salt. · Eat fewer snack items, fast foods, and other high-salt, processed foods. Check food labels for the amount of sodium in packaged foods. · Choose low-sodium versions of canned goods (such as soups, vegetables, and beans). Limit sugar  · Limit drinks and foods with added sugar. These include candy, desserts, and soda pop. Limit alcohol  · Limit alcohol to no more than 2 drinks a day for men and 1 drink a day for women. Too much alcohol can cause health problems. When should you call for help? Watch closely for changes in your health, and be sure to contact your doctor if:    · You would like help planning heart-healthy meals. Where can you learn more? Go to http://www.Vixlo.com/  Enter V137 in the search box to learn more about \"Heart-Healthy Diet: Care Instructions. \"  Current as of: August 22, 2019               Content Version: 12.6  © 5402-2180 [employer]. Care instructions adapted under license by [employer] (which disclaims liability or warranty for this information). If you have questions about a medical condition or this instruction, always ask your healthcare professional. [address] any warranty or liability for your use of this information. Learning About Diabetes Food Guidelines  Your Care Instructions     Meal planning is important to manage diabetes. It helps keep your blood sugar at a target level (which you set with your doctor). You don't have to eat special foods. You can eat what your family eats, including sweets once in a while. But you do have to pay attention to how often you eat and how much you eat of certain foods. You may want to work with a dietitian or a certified diabetes educator (CDE) to help you plan meals and snacks. A dietitian or CDE can also help you lose weight if that is one of your goals. What should you know about eating carbs? Managing the amount of carbohydrate (carbs) you eat is an important part of healthy meals when you have diabetes. Carbohydrate is found in many foods. · Learn which foods have carbs. And learn the amounts of carbs in different foods. ? Bread, cereal, pasta, and rice have about 15 grams of carbs in a serving. A serving is 1 slice of bread (1 ounce), ½ cup of cooked cereal, or 1/3 cup of cooked pasta or rice. ? Fruits have 15 grams of carbs in a serving. A serving is 1 small fresh fruit, such as an apple or orange; ½ of a banana; ½ cup of cooked or canned fruit; ½ cup of fruit juice; 1 cup of melon or raspberries; or 2 tablespoons of dried fruit. ? Milk and no-sugar-added yogurt have 15 grams of carbs in a serving. A serving is 1 cup of milk or 2/3 cup of no-sugar-added yogurt. ? Starchy vegetables have 15 grams of carbs in a serving.  A serving is ½ cup of mashed potatoes or sweet potato; 1 cup winter squash; ½ of a small baked potato; ½ cup of cooked beans; or ½ cup cooked corn or green peas. · Learn how much carbs to eat each day and at each meal. A dietitian or CDE can teach you how to keep track of the amount of carbs you eat. This is called carbohydrate counting. · If you are not sure how to count carbohydrate grams, use the Plate Method to plan meals. It is a good, quick way to make sure that you have a balanced meal. It also helps you spread carbs throughout the day. ? Divide your plate by types of foods. Put non-starchy vegetables on half the plate, meat or other protein food on one-quarter of the plate, and a grain or starchy vegetable in the final quarter of the plate. To this you can add a small piece of fruit and 1 cup of milk or yogurt, depending on how many carbs you are supposed to eat at a meal.  · Try to eat about the same amount of carbs at each meal. Do not \"save up\" your daily allowance of carbs to eat at one meal.  · Proteins have very little or no carbs per serving. Examples of proteins are beef, chicken, turkey, fish, eggs, tofu, cheese, cottage cheese, and peanut butter. A serving size of meat is 3 ounces, which is about the size of a deck of cards. Examples of meat substitute serving sizes (equal to 1 ounce of meat) are 1/4 cup of cottage cheese, 1 egg, 1 tablespoon of peanut butter, and ½ cup of tofu. How can you eat out and still eat healthy? · Learn to estimate the serving sizes of foods that have carbohydrate. If you measure food at home, it will be easier to estimate the amount in a serving of restaurant food. · If the meal you order has too much carbohydrate (such as potatoes, corn, or baked beans), ask to have a low-carbohydrate food instead. Ask for a salad or green vegetables. · If you use insulin, check your blood sugar before and after eating out to help you plan how much to eat in the future.   · If you eat more carbohydrate at a meal than you had planned, take a walk or do other exercise. This will help lower your blood sugar. What else should you know? · Limit saturated fat, such as the fat from meat and dairy products. This is a healthy choice because people who have diabetes are at higher risk of heart disease. So choose lean cuts of meat and nonfat or low-fat dairy products. Use olive or canola oil instead of butter or shortening when cooking. · Don't skip meals. Your blood sugar may drop too low if you skip meals and take insulin or certain medicines for diabetes. · Check with your doctor before you drink alcohol. Alcohol can cause your blood sugar to drop too low. Alcohol can also cause a bad reaction if you take certain diabetes medicines. Follow-up care is a key part of your treatment and safety. Be sure to make and go to all appointments, and call your doctor if you are having problems. It's also a good idea to know your test results and keep a list of the medicines you take. Where can you learn more? Go to http://www.pavon.com/  Enter I147 in the search box to learn more about \"Learning About Diabetes Food Guidelines. \"  Current as of: December 20, 2019               Content Version: 12.6  © 6630-9651 [employer]. Care instructions adapted under license by [employer] (which disclaims liability or warranty for this information). If you have questions about a medical condition or this instruction, always ask your healthcare professional. [address] any warranty or liability for your use of this information. High Cholesterol: Care Instructions  Your Care Instructions     Cholesterol is a type of fat in your blood. It is needed for many body functions, such as making new cells. Cholesterol is made by your body. It also comes from food you eat. High cholesterol means that you have too much of the fat in your blood. This raises your risk of a heart attack and stroke.   LDL and HDL are part of your total cholesterol. LDL is the \"bad\" cholesterol. High LDL can raise your risk for heart disease, heart attack, and stroke. HDL is the \"good\" cholesterol. It helps clear bad cholesterol from the body. High HDL is linked with a lower risk of heart disease, heart attack, and stroke. Your cholesterol levels help your doctor find out your risk for having a heart attack or stroke. You and your doctor can talk about whether you need to lower your risk and what treatment is best for you. A heart-healthy lifestyle along with medicines can help lower your cholesterol and your risk. The way you choose to lower your risk will depend on how high your risk is for heart attack and stroke. It will also depend on how you feel about taking medicines. Follow-up care is a key part of your treatment and safety. Be sure to make and go to all appointments, and call your doctor if you are having problems. It's also a good idea to know your test results and keep a list of the medicines you take. How can you care for yourself at home? · Eat a variety of foods every day. Good choices include fruits, vegetables, whole grains (like oatmeal), dried beans and peas, nuts and seeds, soy products (like tofu), and fat-free or low-fat dairy products. · Replace butter, margarine, and hydrogenated or partially hydrogenated oils with olive and canola oils. (Canola oil margarine without trans fat is fine.)  · Replace red meat with fish, poultry, and soy protein (like tofu). · Limit processed and packaged foods like chips, crackers, and cookies. · Bake, broil, or steam foods. Don't balderrama them. · Be physically active. Get at least 30 minutes of exercise on most days of the week. Walking is a good choice. You also may want to do other activities, such as running, swimming, cycling, or playing tennis or team sports. · Stay at a healthy weight or lose weight by making the changes in eating and physical activity listed above.  Losing just a small amount of weight, even 5 to 10 pounds, can reduce your risk for having a heart attack or stroke. · Do not smoke. When should you call for help? Watch closely for changes in your health, and be sure to contact your doctor if:    · You need help making lifestyle changes. · You have questions about your medicine. Where can you learn more? Go to http://www.gray.com/  Enter K730 in the search box to learn more about \"High Cholesterol: Care Instructions. \"  Current as of: December 16, 2019               Content Version: 12.6  © 1110-2328 [employer]. Care instructions adapted under license by [employer] (which disclaims liability or warranty for this information). If you have questions about a medical condition or this instruction, always ask your healthcare professional. [address] any warranty or liability for your use of this information.

## 2022-04-23 PROBLEM — R80.9 MICROALBUMINURIA DUE TO TYPE 1 DIABETES MELLITUS (HCC): Status: RESOLVED | Noted: 2018-04-29 | Resolved: 2022-04-23

## 2022-04-23 PROBLEM — E10.29 MICROALBUMINURIA DUE TO TYPE 1 DIABETES MELLITUS (HCC): Status: RESOLVED | Noted: 2018-04-29 | Resolved: 2022-04-23

## 2022-04-24 NOTE — PROGRESS NOTES
Jak Griffith is a 79 y.o. female who was seen by synchronous (real-time) audio-video technology on 4/21/2022 for Hypertension (3 month follow up )    HPI:   Jak Griffith is a 79y.o. year old female who presents today for a routine visit. She has a history of hypertension, hyperlipidemia, diabetes mellitus type 1, chronic renal disease stage 3, and hypothyroidism. She completed the Moderna COVID 19 vaccine series and received the Moderna booster dose. She also reports that she received a second booster dose as recommended. She reports that she is doing reasonably well. She reports that she completed plastic surgery on her face on 1/5/2022 by Dr. Kathie Hull, which included a surgical left and laser treatment to address her wrinkles. She reports that surgery went well without complication. She expresses some disappointment that the wrinkles on her forehead were not corrected during surgery but she has begun receiving Botox injections to address this area. She reports that she is remaining working in her yard and denies any change in exercise tolerance. She is otherwise without new complaints and feeling generally well. Summary of prior hospitalizations and medical history:  On 4/26/2018, she presented with worsening lower extremity edema. She stated that previously, her swelling would improve overnight, but she had been noticing recently that it did not resolve. She also reported some mild dyspnea with exertion, such as walking upstairs, which had been present chronically, although may have been slightly worse. She also reported some \"skipped beats\" although not prolonged. She denied any chest pain, shortness of breath at rest or with normal activities, lightheadedness, PND, or orthopnea.  She was started on low dose lasix, and referred for an echocardiogram, Holter monitor, and lower extremity duplex scan, but prior to having these performed, presented to [facility] ED for evaluation of pain and swelling of her right ankle. While in ED, CBC, CMP, and D-dimer were unremarkable. Cardiac enzymes x 2 were negative, and EKG was unchanged. She was observed overnight and underwent a pharmacologic nuclear stress test (5/3/2018) which was a normal low risk study, without evidence of ischemia or prior infarction, and EF 86%. She was subsequently discharged, and underwent an echocardiogram (5/10/2018) showed normal LV function (EF 55-60%) no RWMA, mild LAE, mild-moderate TR, RVSP normal (22 mmHg); holter monitor (5/1/2018) showing sinus rhythm with sinus arrhythmia at times; rare PVC's; one short run of non-sustained of SVT for 6 beats. Episode of sinus tachycardia recorded in diary occurred while she was undergoing pharmacologic nuclear stress test. No other symptoms reported; and lower extremity duplex (5/11/2018) which was negative for DVT and venous reflux. On 5/8/2018, she was evaluated by a podiatrist, Dr. Harry Duvall, for her right foot swelling. Felt that it was most likely secondary to sprain injury as x-rays negative and presentation not consistent with gout or other form of arthritis. She was started on a Medrol dose pack. However, her blood sugars increased to >400 and she was changed to ibuprofen 400 mg tid. She did have a right foot MRI (5/26/2018) which showed considerable edema involving lower leg, hindfoot and midfoot, nonlocalizing a nonspecific; evidence of old injury to the ATF ligament.  Other ankle ligaments are intact; mild peroneus brevis tendinopathy without measurable tear; small partial thickness longitudinal split in the peroneus longus tendon within the retromalleolar groove; mild distal Achilles tendinosis; and small subchondral cyst in the anterior facet of the talus with otherwise no degenerative change seen in any of the hindfoot or midfoot articulations.  She states that she was told that the right foot swelling was due to an old injury, but she states that it persists although she does not feel much pain due to her neuropathy. She has a history of diabetes mellitus type 1, with onset at age 6. She is currently under the care of Dr. Quirino Finley, and is being treated with insulin glargine and lispro. She had not been well-controlled, with HbA1c ranging from 7.5-8.0, although has ow improved since using the Freestyle meter as discussed. She does have proliferative diabetic retinopathy and is s/p pan-retinal photocoagulation therapy in both eyes. She is followed closely by Dr. Zulay Root, and recent exam showed regression bilaterally. She also has peripheral neuropathy, with burning and tingling in her feet at night. Renal function had been normal until 5/2016, with evidence of chronic renal disease, stage 3, since that time with baseline creatinine1.0-1.04/ eGFR 54-56. She also has a history of hypothyroidism, and she is currently on Synthroid. She denies cold intolerance, hair or skin changes. She has a history of hypertension, treated with quinapril. She had a pharmacologic nuclear stress test in 11/2012, which showed no evidence for ischemia, and normal LV wall motion (EF> 70%). She also had a carotid duplex scan in 7/2013 which showed mild (< 50%) bilateral internal carotid artery stenoses. She denies any chest pain, shortness of breath at rest or with exertion, palpitations, lightheadedness, or edema. She has a history of hyperlipidemia, and was on atorvastatin for many years until 10/2015 when she discontinued it because of myalgias. She was recently tried on rosuvastatin without success due to recurrent myalgias. She was started on ezetimibe in 9/2016 and has been tolerating it without difficulty. She successfully stopped smoking in 1/2015, and reports that she continues to abstain.     She had a screening colonoscopy in 9/2014 by Dr. Akira Alvarez, which found a 6 mm sessile polyp in the descending colon and a 4-5 mm sessile polyp in the rectum/sigmoid (pathology: tubular adenomas). She had a repeat colonoscopy in 9/2019 showing a 6 mm sessile polyp in the ileocecal valve (pathology unavailable). Recommendation was for follow-up colonoscopy in 5 years. She underwent an upper endoscopy on 3/9/2021 by Dr. Adrianna Parra due to worsening reflux symptoms and abnormality seen on chest CT scan (1/8/2021) which noted an incidental finding of poorly distended distal esophagus with mild wall prominence. It showed a normal esophagus without mass or esophagitis, a hiatal hernia, and retained food in the stomach. She was advised to continue omeprazole 40 mg daily and to eat smaller more frequent meals. She denies any abdominal pain, nausea, vomiting, melena, hematochezia, or change in bowel movements. In 3/2017, she was diagnosed with an upper respiratory tract infection and was prescribed doxycycline and prednisone by [facility]. She subsequently developed severe dizziness and vomiting, prompting a visit to the [facility] ED where she was diagnosed with benign positional vertigo and treated with meclizine and Zofran with improvement. In 10/2017, she noticed difficulty with her peripheral vision and was evaluated by Dr. Brendan Nina. She has regressed proliferative diabetic retinopathy and she reports that the laser therapy she received many years ago to treat this resulted in the current limitations in her peripheral vision. She does also have mild glaucoma for which she is receiving treatment. She states that her driving has been restricted to only during the day, and she is aware that she must turn her head completely to each side when navigating behind the wheel. In 2/2018, she began having difficulty with right elbow pain and was evaluated by Dr. Abelardo Waldrop who diagnosed her with right lateral epicondylitis. She received a cortisone injection with improvement.  In 10/2018, while hiking on vacation hiking in [place], she slipped on some wooden steps and landed on her left shoulder. She was transported to [facility] in [place], [place], and found to have a closed nondisplaced fracture of the left proximal humerus. She was placed in a sling and told to follow-up with orthopedics. She was being followed by Dr. Kathya Canales of [address] [place] [address], and completed physical therapy with improvement. She is no longer requiring pain medication. Past Medical History:   Diagnosis Date    Chronic renal disease, stage III ([address]     Diabetes mellitus type 1 ([address]     Diabetic peripheral neuropathy (HCC)     Diabetic retinopathy [facility]     Essential hypertension with goal blood pressure less than 140/90     Hearing decreased     Hyperlipidemia     Hypothyroidism      Past Surgical History:   Procedure Laterality Date    HX GYN      partial hysterectomy    HX GYN      3 D and C's    HX HEENT      tonsillectomy    HX HEENT      cataract removal bilateral    HX HEENT      multiple surgeries for retinopathy    HX HYSTERECTOMY      HX ORTHOPAEDIC      carpral tunnel sugery bilateral     Current Outpatient Medications   Medication Sig    quinapriL (ACCUPRIL) 10 mg tablet TAKE 1 TABLET EVERY EVENING ALONG WITH A 5 MG TABLET    levothyroxine (SYNTHROID) 100 mcg tablet Take 1 Tablet by mouth three (3) days a week. Take 88 mcg on other days    levothyroxine (SYNTHROID) 88 mcg tablet Take 1 Tablet by mouth four (4) days a week. Take 100 mcg on other days    ezetimibe (ZETIA) 10 mg tablet TAKE 1 TABLET DAILY    quinapriL (ACCUPRIL) 5 mg tablet TAKE 1 TABLET NIGHTLY WITH 10 MG TABLET EVERY EVENING    omeprazole (PRILOSEC) 40 mg capsule TAKE 1 CAPSULE DAILY    Cholecalciferol, Vitamin D3, (VITAMIN D3) 2,000 unit cap capsule Take 2,000 Units by mouth daily.     ALPHAGAN P 0.1 % ophthalmic solution     glucose blood VI test strips (ONE TOUCH ULTRA TEST) strip USE TO TEST BLOOD SUGAR FOUR TIMES A DAY    cyanocobalamin (VITAMIN B-12) 500 mcg tablet Take 500 mcg by mouth daily.  aspirin delayed-release 81 mg tablet Take  by mouth daily.  insulin glargine (LANTUS SOLOSTAR) 100 unit/mL (3 mL) pen 18 Units by SubCUTAneous route daily.  insulin lispro (HUMALOG PEN) 100 unit/mL kwikpen by SubCUTAneous route. Sliding scale as needed       No current facility-administered medications for this visit. Allergies and Intolerances: Allergies   Allergen Reactions    Pcn [Penicillins] Hives    Sulfa (Sulfonamide Antibiotics) Nausea Only and Vertigo     Family History: Grandmother  from colon cancer. Mother had CAD and DM. Family History   Problem Relation Age of Onset    Diabetes Brother     Diabetes Mother     Heart Disease Mother      Social History:   She  reports that she quit smoking about 7 years ago. She has a 10.00 pack-year smoking history. She has never used smokeless tobacco. She is  and lives with her . 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   Social History     Substance and Sexual Activity   Alcohol Use Yes    Comment: infrequently     Immunization History:  Immunization History   Administered Date(s) Administered    COVID-19, Moderna Booster, PF, 0.25mL Dose 10/24/2021, 2022    COVID-19, Moderna, Primary or Immunocompromised Series, MRNA, PF, 100mcg/0.5mL 2021, 2021    Influenza Vaccine (Quad) PF (>6 Mo Flulaval, Fluarix, and >3 Yrs Afluria, Fluzone [id number]) 12/15/2015, 2016, 10/19/2017, 2018, 12/10/2019    Influenza Vaccine PF 2014    Influenza Vaccine Split 2011, 10/17/2012    Influenza Vaccine Whole 2010    Influenza, Quadrivalent, Adjuvanted (>65 Yrs FLUAD QUAD [id number]) 2020, 2021    Pneumococcal Conjugate (PCV-13) 2020    Pneumococcal Polysaccharide (PPSV-23) 2017, 2019    Tdap 2013    Zoster Recombinant 2021    Zoster Vaccine, Live 2016       Review of Systems: As above included in HPI. Otherwise 11 point review of systems negative including constitutional, skin, HENT, eyes, respiratory, cardiovascular, gastrointestinal, genitourinary, musculoskeletal, endo/heme/aller, neurological.      Physical:   There were no vitals taken for this visit.     Exam:     Constitutional: [x] Appears well-developed and well-nourished [x] No apparent distress      [] Abnormal -     Mental status: [x] Alert and awake  [x] Oriented to person/place/time [x] Able to follow commands    [] Abnormal -     Eyes:   EOM    [x]  Normal    [] Abnormal -   Sclera  [x]  Normal    [] Abnormal -          Discharge [x]  None visible   [] Abnormal -     HENT: [x] Normocephalic, atraumatic  [] Abnormal -   [x] Mouth/Throat: Mucous membranes are moist    External Ears [x] Normal  [] Abnormal -    Neck: [x] No visualized mass [] Abnormal -     Pulmonary/Chest: [x] Respiratory effort normal   [x] No visualized signs of difficulty breathing or respiratory distress        [] Abnormal -      Musculoskeletal:   [] Normal gait with no signs of ataxia         [x] Normal range of motion of neck        [] Abnormal -     Neurological:        [x] No Facial Asymmetry (Cranial nerve 7 motor function) (limited exam due to video visit)          [x] No gaze palsy        [] Abnormal -          Skin:        [x] No significant exanthematous lesions or discoloration noted on facial skin         [] Abnormal -            Psychiatric:       [x] Normal Affect [] Abnormal -        [x] No Hallucinations          Review of Data:  Labs:   Hospital Outpatient Visit on 04/14/2022   Component Date Value Ref Range Status    Immunoglobulin G, Qt. 04/14/2022 748  586 - 1,602 mg/dL Final    Immunoglobulin A, Qt. 04/14/2022 144  87 - 352 mg/dL Final    Immunoglobulin M, Qt. 04/14/2022 57  26 - 217 mg/dL Final    Protein, total 04/14/2022 6.4  6.0 - 8.5 g/dL Final    Albumin 04/14/2022 3.9  2.9 - 4.4 g/dL Final    Alpha-1-Globulin 04/14/2022 0.3  0.0 - 0.4 g/dL Final    Alpha-2-Globulin 04/14/2022 0.7  0.4 - 1.0 g/dL Final    Beta Globulin 04/14/2022 0.8  0.7 - 1.3 g/dL Final    Gamma Globulin 04/14/2022 0.7  0.4 - 1.8 g/dL Final    M-Alexander 04/14/2022 Not Observed  Not Observed g/dL Final    Globulin, total 04/14/2022 2.5  2.2 - 3.9 g/dL Final    A/G ratio 04/14/2022 1.6  0.7 - 1.7   Final    Immunofixation Result 04/14/2022 Comment    Final    Free Kappa Lt Chains, serum 04/14/2022 23.8* 3.3 - 19.4 mg/L Final    Free Lambda Lt Chains, serum 04/14/2022 17.9  5.7 - 26.3 mg/L Final    Kappa/Lambda ratio, serum 04/14/2022 1.33  0.26 - 1.65   Final    Protein, urine random 04/14/2022 <4.0  Not Estab. mg/dL Final    Albumin, urine 04/14/2022 100.0  % Final    Alpha-1-Globulin, urine 04/14/2022 0.0  % Final    Alpha-2-Globulin, urine 04/14/2022 0.0  % Final    Beta-globulin, urine 04/14/2022 0.0  % Final    Gamma Globulin, urine 04/14/2022 0.0  % Final    M-Alexander, % 04/14/2022 Not Observed  Not Observed % Final    Immunofixation Result, urine 04/14/2022 Comment    Final    Note 04/14/2022 Comment    Final    WBC 04/14/2022 6.4  4.6 - 13.2 K/uL Final    RBC 04/14/2022 3.22* 4.20 - 5.30 M/uL Final    HGB 04/14/2022 10.8* 12.0 - 16.0 g/dL Final    HCT 04/14/2022 32.6* 35.0 - 45.0 % Final    MCV 04/14/2022 101.2* 78.0 - 100.0 FL Final    MCH 04/14/2022 33.5  24.0 - 34.0 PG Final    MCHC 04/14/2022 33.1  31.0 - 37.0 g/dL Final    RDW 04/14/2022 11.8  11.6 - 14.5 % Final    PLATELET 30/18/6066 268  135 - 420 K/uL Final    MPV 04/14/2022 11.4  9.2 - 11.8 FL Final    NRBC 04/14/2022 0.0  0  WBC Final    ABSOLUTE NRBC 04/14/2022 0.00  0.00 - 0.01 K/uL Final    NEUTROPHILS 04/14/2022 70  40 - 73 % Final    LYMPHOCYTES 04/14/2022 21  21 - 52 % Final    MONOCYTES 04/14/2022 6  3 - 10 % Final    EOSINOPHILS 04/14/2022 2  0 - 5 % Final    BASOPHILS 04/14/2022 1  0 - 2 % Final    IMMATURE GRANULOCYTES 04/14/2022 0  0.0 - 0.5 % Final    ABS. NEUTROPHILS 04/14/2022 4.5  1.8 - 8.0 K/UL Final    ABS. LYMPHOCYTES 04/14/2022 1.4  0.9 - 3.6 K/UL Final    ABS. MONOCYTES 04/14/2022 0.4  0.05 - 1.2 K/UL Final    ABS. EOSINOPHILS 04/14/2022 0.1  0.0 - 0.4 K/UL Final    ABS. BASOPHILS 04/14/2022 0.1  0.0 - 0.1 K/UL Final    ABS. IMM. GRANS. 04/14/2022 0.0  0.00 - 0.04 K/UL Final    DF 04/14/2022 AUTOMATED    Final    LIPID PROFILE 04/14/2022        Final    Cholesterol, total 04/14/2022 219* <200 MG/DL Final    Triglyceride 04/14/2022 84  <150 MG/DL Final    HDL Cholesterol 04/14/2022 78* 40 - 60 MG/DL Final    LDL, calculated 04/14/2022 124.2* 0 - 100 MG/DL Final    VLDL, calculated 04/14/2022 16.8  MG/DL Final    CHOL/HDL Ratio 04/14/2022 2.8  0 - 5.0   Final    Hemoglobin A1c 04/14/2022 7.2* 4.2 - 5.6 % Final    Est. average glucose 04/14/2022 160  mg/dL Final    Magnesium 04/14/2022 2.1  1.6 - 2.6 mg/dL Final    Sodium 04/14/2022 134* 136 - 145 mmol/L Final    Potassium 04/14/2022 5.2  3.5 - 5.5 mmol/L Final    Chloride 04/14/2022 102  100 - 111 mmol/L Final    CO2 04/14/2022 26  21 - 32 mmol/L Final    Anion gap 04/14/2022 6  3.0 - 18 mmol/L Final    Glucose 04/14/2022 58* 74 - 99 mg/dL Final    BUN 04/14/2022 20* 7.0 - 18 MG/DL Final    Creatinine 04/14/2022 1.06  0.6 - 1.3 MG/DL Final    BUN/Creatinine ratio 04/14/2022 19  12 - 20   Final    GFR est AA 04/14/2022 >60  >60 ml/min/1.73m2 Final    GFR est non-AA 04/14/2022 52* >60 ml/min/1.73m2 Final    Calcium 04/14/2022 9.5  8.5 - 10.1 MG/DL Final    Bilirubin, total 04/14/2022 0.7  0.2 - 1.0 MG/DL Final    ALT (SGPT) 04/14/2022 22  13 - 56 U/L Final    AST (SGOT) 04/14/2022 16  10 - 38 U/L Final    Alk.  phosphatase 04/14/2022 65  45 - 117 U/L Final    Protein, total 04/14/2022 6.4  6.4 - 8.2 g/dL Final    Albumin 04/14/2022 3.8  3.4 - 5.0 g/dL Final    Globulin 04/14/2022 2.6  2.0 - 4.0 g/dL Final    A-G Ratio 04/14/2022 1.5  0.8 - 1.7   Final    Microalbumin,urine random 2022 0.60  0 - 3.0 MG/DL Final    Creatinine, urine 2022 42.00  30 - 125 mg/dL Final    Microalbumin/Creat ratio (mg/g cre* 2022 14  0 - 30 mg/g Final    TSH 2022 0.72  0.36 - 3.74 uIU/mL Final    T4, Free 2022 1.3  0.7 - 1.5 NG/DL Final    Vitamin D 25-Hydroxy 2022 53.0  30 - 100 ng/mL Final     EKG Results     None        EKG (2021) sinus rhythm at 73 bpm, normal intervals, no ischemic changes. Health Maintenance:  Screening:    Mammogram: negative (10/2021)    PAP smear: negative (2014). S/p YUNG, no further screening needed. Colorectal: colonsocopy (2019) ileocecal valve polyp. Dr. Bre Shearer. Due 2024. Depression: none   DM (HbA1c/FPG): HbA1c 7.2 (2022) Dr. Milagros Ascencio   Hepatitis C: negative (2015)   Falls: none    DEXA: osteopenia (10/2020)   Glaucoma: mild primary open angle glaucoma and regressed proliferative diabetic retinopathy. Followed by Dr. Bobbi Smith (last 2022)   Smokin pack year (stopped 2015)   Vitamin D: 53.0 (2022)   Medicare Wellness: 2021 (Initial)      Impression:  Patient Active Problem List   Diagnosis Code    Stable proliferative diabetic retinopathy of both eyes associated with type 1 diabetes mellitus ([address] P47.4845    Hyperlipidemia E78.5    Hearing decreased H91.90    Diabetes mellitus with diabetic polyneuropathy ([address] E11.42    Vitamin D deficiency E55.9    Anemia D64.9    Former smoker Z87.891    Adenomatous polyp of colon D12.6    Essential hypertension I10    Hypothyroidism due to acquired atrophy of thyroid E03.4    Hypertensive kidney disease with stage 3a chronic kidney disease (HCC) I12.9, N18.31    Type 1 diabetes mellitus with stage 3 chronic kidney disease (HCC) E10.22, N18.30    Macrocytosis D75.89    Coronary artery calcification seen on CT scan I25.10    GERD (gastroesophageal reflux disease) K21.9    Osteopenia of multiple sites M85.89       Plan:  1. Hypertension. Blood pressure has been well controlled on quinapril 15 mg daily. Renal function is stable with creatinine 1.06/ eGFR 52. Underwent evaluation for lower extremity edema in 2018 which included a negative cardiac evaluation including EKG, echocardiogram, 48 hour Holter monitor, and pharmacologic nuclear stress test. Lower extremity duplex was also negative for DVT or venous reflux. Using compression hose regularly with improvement. Will continue to monitor  2. Type 1 diabetes mellitus. HbA1c worsened today to 7.2 and patient reports likely related to recent plastic surgery since was having some difficulty controlling blood sugars following the procedure. Continuing to use Freestyle sensor machine for blood sugar readings. On Lantus and Humalog. Complicated by proliferative diabetic retinopathy in regression, followed by Dr. Misty Ibanez. Neuropathy symptoms in feet are mild and have not required treatment with medication. Foot exam performed by Dr. Jairo Harden. Renal function with evidence of chronic renal disease, stage 3. Evidence of mild microalbuminuria in 5/2021 (52 mg/g), but normalized today (14 mg/g). On Ace-I. Unable to tolerate statins due to myalgias and on ezetimibe. Will continue to monitor. 3. Hyperlipidemia. Not able to tolerate statin (atorvastatin/ rosuvastatin) due to myalgias. On ezetimibe with reasonable response. Some worsening today with increase in LDL from 81 to 124 and HDL 78. Likely due to dietary changes. Moderate to severe aortic and coronary atherosclerosis and calcifications seen on chest CT scan (11/2018). Stressed importance of continued lifestyle modifications, especially heart healthy diet, regular exercise and weight loss. Will reassess at next visit. 4. Chronic renal disease, stage 3. Evidence of mild disease with creatinine 1.00-1.04/ eGFR 54-56 since 5/2016. Most likely due to long standing diabetes mellitus and hypertension.  On Ace-I, but not on statin as discussed above. Counseled on avoiding NSAIDS and prerenal status. Remaining stable today with creatinine 1.06/eGFR 52. Will continue to monitor. 5. Hypothyroidism. TSH remains therapeutic with TSH 0.72 on adjusted dose of levothyroxine 100 mcg 3 days/week and 88 mcg 4 days/week. Will continue to monitor. 6. Former smoker. 30 pack year and stopped in 2015. Initial lung cancer screening performed in 11/2017 showing a few tiny solid nodules and one groundglass nodule identified (lung RADS 2). Did also note evidence of emphysema and prominence of the pulmonary vascular trunk suggestive of pulmonary arterial hypertension. However, echocardiogram (5/2018) with normal PA pressures. Being monitored annually without change, and most recent chest CT scan in 1/2022 showed multiple small stable bilateral lung nodules and emphysematous changes. Next screening LD chest CT scan due 1/2023.    7. Osteopenia. Baseline bone density scan 10/8/2020 . Using femoral neck T-scores, calculated FRAX score estimates her 10 year risk of a major osteoporetic fracture at 6.5 % and hip fracture at 0.2 %, which are not an indication for biphosphonate treatment (>20% and >3%, respectively). Advised to begin calcium in addition to Vitamin D. Encouraged exercise, particularly weight bearing activities. Will continue to monitor. Fall precautions stressed. 8. GERD. Chest CT scan (1/8/2021) noted incidental finding of poorly distended distal esophagus with mild wall prominence. Referred to Dr. Monica Kaur, and underwent upper endoscopy on 3/9/2021 showing a normal esophagus without mass or esophagitis, hiatal hernia, and retained food in the stomach. Advised to continue omeprazole 40 mg daily. Reports continued good control of symptoms today. 9. Anemia, mild. Chronic but stable. Evaluation in 5/2021 showed iron studies normal with ferritin 165 and transferrin 41%; B12 normal; and folate levels also now normal. On multivitamin with folate. Gammopathy panel and UPEP negative today. Remains stable today with Hb 10.8/HCT 32.6. Will continue to monitor. 10. Overweight. Weight essentially stable over the last year. Reports continues to remain stable today. Currently following a low carbohydrate intermittent fasting diet with increased proteins, salads, and fruit. Emphasized importance of continued lifestyle modifications, including diet, exercise, and weight loss. Will readdress next visit. 11. Health maintenance. Completed Moderna COVID 19 vaccine series and received the Moderna booster dose. Also received second booster dose given new CDC recommendations. Received 1/2 doses of Shingrix vaccine. Will address second dose at next visit. Other immunizations up to date. Mammogram up to date. Colonoscopy up-to-date. Vitamin D level remains normal on maintenance dose supplement. Reports undergoing screening skin exam at [facility]. Initial Medicare wellness visit up-to-date    Patient understands recommendations and agrees with plan. .  Follow-up in 3 months. We discussed the expected course, resolution and complications of the diagnosis(es) in detail. Medication risks, benefits, costs, interactions, and alternatives were discussed as indicated. I advised her to contact the office if her condition worsens, changes or fails to improve as anticipated. She expressed understanding with the diagnosis(es) and plan. Dea Iraj, was evaluated through a synchronous (real-time) audio-video encounter. The patient (or guardian if applicable) is aware that this is a billable service, which includes applicable co-pays. Verbal consent to proceed has been obtained. The visit was conducted pursuant to the emergency declaration under the [facility], [address] [place] [address] authority and the [place] [employer] [place] [employer] Act.   Patient identification was verified, and a caregiver was present when appropriate. The patient was located at home in a state where the provider was licensed to provide care.     Myles Paris MD

## 2022-07-27 RX ORDER — OMEPRAZOLE 40 MG/1
CAPSULE, DELAYED RELEASE ORAL
Qty: 90 CAPSULE | Refills: 3 | Status: SHIPPED | OUTPATIENT
Start: 2022-07-27

## 2022-07-28 ENCOUNTER — HOSPITAL ENCOUNTER (OUTPATIENT)
Dept: LAB | Age: 67
Discharge: HOME | End: 2022-07-28
Payer: MEDICARE

## 2022-07-28 ENCOUNTER — APPOINTMENT (OUTPATIENT)
Dept: INTERNAL MEDICINE CLINIC | Age: 67
End: 2022-07-28

## 2022-07-28 DIAGNOSIS — E10.3553 STABLE PROLIFERATIVE DIABETIC RETINOPATHY OF BOTH EYES ASSOCIATED WITH TYPE 1 DIABETES MELLITUS (HCC): ICD-10-CM

## 2022-07-28 DIAGNOSIS — N18.31 STAGE 3A CHRONIC KIDNEY DISEASE (HCC): ICD-10-CM

## 2022-07-28 DIAGNOSIS — E78.5 HYPERLIPIDEMIA, UNSPECIFIED HYPERLIPIDEMIA TYPE: ICD-10-CM

## 2022-07-28 DIAGNOSIS — E10.22 TYPE 1 DIABETES MELLITUS WITH STAGE 3A CHRONIC KIDNEY DISEASE (HCC): ICD-10-CM

## 2022-07-28 DIAGNOSIS — D64.9 ANEMIA, UNSPECIFIED TYPE: ICD-10-CM

## 2022-07-28 DIAGNOSIS — E03.4 HYPOTHYROIDISM DUE TO ACQUIRED ATROPHY OF THYROID: ICD-10-CM

## 2022-07-28 DIAGNOSIS — E10.42 TYPE 1 DIABETES MELLITUS WITH DIABETIC POLYNEUROPATHY (HCC): ICD-10-CM

## 2022-07-28 DIAGNOSIS — N18.31 TYPE 1 DIABETES MELLITUS WITH STAGE 3A CHRONIC KIDNEY DISEASE (HCC): ICD-10-CM

## 2022-07-28 DIAGNOSIS — I10 ESSENTIAL HYPERTENSION: ICD-10-CM

## 2022-07-28 LAB
25(OH)D3 SERPL-MCNC: 49.8 NG/ML (ref 30–100)
ALBUMIN SERPL-MCNC: 3.6 G/DL (ref 3.4–5)
ALBUMIN/GLOB SERPL: 1.3 {RATIO} (ref 0.8–1.7)
ALP SERPL-CCNC: 64 U/L (ref 45–117)
ALT SERPL-CCNC: 16 U/L (ref 13–56)
ANION GAP SERPL CALC-SCNC: 6 MMOL/L (ref 3–18)
AST SERPL-CCNC: 16 U/L (ref 10–38)
BASOPHILS # BLD: 0 K/UL (ref 0–0.1)
BASOPHILS NFR BLD: 1 % (ref 0–2)
BILIRUB SERPL-MCNC: 0.5 MG/DL (ref 0.2–1)
BUN SERPL-MCNC: 15 MG/DL (ref 7–18)
BUN/CREAT SERPL: 16 (ref 12–20)
CALCIUM SERPL-MCNC: 8.8 MG/DL (ref 8.5–10.1)
CHLORIDE SERPL-SCNC: 96 MMOL/L (ref 100–111)
CHOLEST SERPL-MCNC: 205 MG/DL
CO2 SERPL-SCNC: 26 MMOL/L (ref 21–32)
CREAT SERPL-MCNC: 0.95 MG/DL (ref 0.6–1.3)
CREAT UR-MCNC: 33 MG/DL (ref 30–125)
DIFFERENTIAL METHOD BLD: ABNORMAL
EOSINOPHIL # BLD: 0.1 K/UL (ref 0–0.4)
EOSINOPHIL NFR BLD: 1 % (ref 0–5)
ERYTHROCYTE [DISTWIDTH] IN BLOOD BY AUTOMATED COUNT: 11.6 % (ref 11.6–14.5)
EST. AVERAGE GLUCOSE BLD GHB EST-MCNC: 171 MG/DL
FERRITIN SERPL-MCNC: 202 NG/ML (ref 8–388)
GLOBULIN SER CALC-MCNC: 2.7 G/DL (ref 2–4)
GLUCOSE SERPL-MCNC: 202 MG/DL (ref 74–99)
HBA1C MFR BLD: 7.6 % (ref 4.2–5.6)
HCT VFR BLD AUTO: 33.3 % (ref 35–45)
HDLC SERPL-MCNC: 82 MG/DL (ref 40–60)
HDLC SERPL: 2.5 {RATIO} (ref 0–5)
HGB BLD-MCNC: 11.2 G/DL (ref 12–16)
IMM GRANULOCYTES # BLD AUTO: 0 K/UL (ref 0–0.04)
IMM GRANULOCYTES NFR BLD AUTO: 0 % (ref 0–0.5)
IRON SATN MFR SERPL: 46 % (ref 20–50)
IRON SERPL-MCNC: 110 UG/DL (ref 50–175)
LDLC SERPL CALC-MCNC: 108 MG/DL (ref 0–100)
LIPID PROFILE,FLP: ABNORMAL
LYMPHOCYTES # BLD: 1.1 K/UL (ref 0.9–3.6)
LYMPHOCYTES NFR BLD: 22 % (ref 21–52)
MAGNESIUM SERPL-MCNC: 1.8 MG/DL (ref 1.6–2.6)
MCH RBC QN AUTO: 32.7 PG (ref 24–34)
MCHC RBC AUTO-ENTMCNC: 33.6 G/DL (ref 31–37)
MCV RBC AUTO: 97.1 FL (ref 78–100)
MICROALBUMIN UR-MCNC: 1.36 MG/DL (ref 0–3)
MICROALBUMIN/CREAT UR-RTO: 41 MG/G (ref 0–30)
MONOCYTES # BLD: 0.4 K/UL (ref 0.05–1.2)
MONOCYTES NFR BLD: 8 % (ref 3–10)
NEUTS SEG # BLD: 3.4 K/UL (ref 1.8–8)
NEUTS SEG NFR BLD: 68 % (ref 40–73)
NRBC # BLD: 0 K/UL (ref 0–0.01)
NRBC BLD-RTO: 0 PER 100 WBC
PLATELET # BLD AUTO: 257 K/UL (ref 135–420)
PMV BLD AUTO: 11.7 FL (ref 9.2–11.8)
POTASSIUM SERPL-SCNC: 5.7 MMOL/L (ref 3.5–5.5)
PROT SERPL-MCNC: 6.3 G/DL (ref 6.4–8.2)
RBC # BLD AUTO: 3.43 M/UL (ref 4.2–5.3)
SODIUM SERPL-SCNC: 128 MMOL/L (ref 136–145)
T4 FREE SERPL-MCNC: 1.6 NG/DL (ref 0.7–1.5)
TIBC SERPL-MCNC: 241 UG/DL (ref 250–450)
TRIGL SERPL-MCNC: 75 MG/DL (ref ?–150)
TSH SERPL DL<=0.05 MIU/L-ACNC: 0.18 UIU/ML (ref 0.36–3.74)
VLDLC SERPL CALC-MCNC: 15 MG/DL
WBC # BLD AUTO: 5.1 K/UL (ref 4.6–13.2)

## 2022-07-28 PROCEDURE — 82043 UR ALBUMIN QUANTITATIVE: CPT

## 2022-07-28 PROCEDURE — 84443 ASSAY THYROID STIM HORMONE: CPT

## 2022-07-28 PROCEDURE — 82728 ASSAY OF FERRITIN: CPT

## 2022-07-28 PROCEDURE — 80053 COMPREHEN METABOLIC PANEL: CPT

## 2022-07-28 PROCEDURE — 82306 VITAMIN D 25 HYDROXY: CPT

## 2022-07-28 PROCEDURE — 83036 HEMOGLOBIN GLYCOSYLATED A1C: CPT

## 2022-07-28 PROCEDURE — 85025 COMPLETE CBC W/AUTO DIFF WBC: CPT

## 2022-07-28 PROCEDURE — 84439 ASSAY OF FREE THYROXINE: CPT

## 2022-07-28 PROCEDURE — 80061 LIPID PANEL: CPT

## 2022-07-28 PROCEDURE — 83540 ASSAY OF IRON: CPT

## 2022-07-28 PROCEDURE — 83735 ASSAY OF MAGNESIUM: CPT

## 2022-07-28 PROCEDURE — 36415 COLL VENOUS BLD VENIPUNCTURE: CPT

## 2022-07-29 RX ORDER — EZETIMIBE 10 MG/1
TABLET ORAL
Qty: 90 TABLET | Refills: 3 | Status: SHIPPED | OUTPATIENT
Start: 2022-07-29

## 2022-08-03 ENCOUNTER — OFFICE VISIT (OUTPATIENT)
Dept: INTERNAL MEDICINE CLINIC | Age: 67
End: 2022-08-03
Payer: MEDICARE

## 2022-08-03 ENCOUNTER — APPOINTMENT (OUTPATIENT)
Dept: INTERNAL MEDICINE CLINIC | Age: 67
End: 2022-08-03

## 2022-08-03 VITALS
OXYGEN SATURATION: 98 % | BODY MASS INDEX: 27.16 KG/M2 | DIASTOLIC BLOOD PRESSURE: 64 MMHG | HEART RATE: 68 BPM | HEIGHT: 65 IN | SYSTOLIC BLOOD PRESSURE: 138 MMHG | RESPIRATION RATE: 16 BRPM | WEIGHT: 163 LBS | TEMPERATURE: 97 F

## 2022-08-03 DIAGNOSIS — E55.9 VITAMIN D DEFICIENCY: ICD-10-CM

## 2022-08-03 DIAGNOSIS — E10.3553 STABLE PROLIFERATIVE DIABETIC RETINOPATHY OF BOTH EYES ASSOCIATED WITH TYPE 1 DIABETES MELLITUS (HCC): ICD-10-CM

## 2022-08-03 DIAGNOSIS — E87.5 HYPERKALEMIA: ICD-10-CM

## 2022-08-03 DIAGNOSIS — N18.31 HYPERTENSIVE KIDNEY DISEASE WITH STAGE 3A CHRONIC KIDNEY DISEASE (HCC): ICD-10-CM

## 2022-08-03 DIAGNOSIS — D64.9 ANEMIA, UNSPECIFIED TYPE: ICD-10-CM

## 2022-08-03 DIAGNOSIS — E87.1 HYPONATREMIA: ICD-10-CM

## 2022-08-03 DIAGNOSIS — Z87.891 FORMER SMOKER: ICD-10-CM

## 2022-08-03 DIAGNOSIS — E10.22 TYPE 1 DIABETES MELLITUS WITH STAGE 3A CHRONIC KIDNEY DISEASE (HCC): ICD-10-CM

## 2022-08-03 DIAGNOSIS — N18.31 TYPE 1 DIABETES MELLITUS WITH STAGE 3A CHRONIC KIDNEY DISEASE (HCC): ICD-10-CM

## 2022-08-03 DIAGNOSIS — K21.9 GASTROESOPHAGEAL REFLUX DISEASE, UNSPECIFIED WHETHER ESOPHAGITIS PRESENT: ICD-10-CM

## 2022-08-03 DIAGNOSIS — E78.5 HYPERLIPIDEMIA, UNSPECIFIED HYPERLIPIDEMIA TYPE: ICD-10-CM

## 2022-08-03 DIAGNOSIS — E03.4 HYPOTHYROIDISM DUE TO ACQUIRED ATROPHY OF THYROID: ICD-10-CM

## 2022-08-03 DIAGNOSIS — I12.9 HYPERTENSIVE KIDNEY DISEASE WITH STAGE 3A CHRONIC KIDNEY DISEASE (HCC): ICD-10-CM

## 2022-08-03 DIAGNOSIS — I10 ESSENTIAL HYPERTENSION: Primary | ICD-10-CM

## 2022-08-03 DIAGNOSIS — E10.42 TYPE 1 DIABETES MELLITUS WITH DIABETIC POLYNEUROPATHY (HCC): ICD-10-CM

## 2022-08-03 PROCEDURE — G8510 SCR DEP NEG, NO PLAN REQD: HCPCS | Performed by: INTERNAL MEDICINE

## 2022-08-03 PROCEDURE — G8427 DOCREV CUR MEDS BY ELIG CLIN: HCPCS | Performed by: INTERNAL MEDICINE

## 2022-08-03 PROCEDURE — G8399 PT W/DXA RESULTS DOCUMENT: HCPCS | Performed by: INTERNAL MEDICINE

## 2022-08-03 PROCEDURE — 1101F PT FALLS ASSESS-DOCD LE1/YR: CPT | Performed by: INTERNAL MEDICINE

## 2022-08-03 PROCEDURE — G8417 CALC BMI ABV UP PARAM F/U: HCPCS | Performed by: INTERNAL MEDICINE

## 2022-08-03 PROCEDURE — G8536 NO DOC ELDER MAL SCRN: HCPCS | Performed by: INTERNAL MEDICINE

## 2022-08-03 PROCEDURE — 1090F PRES/ABSN URINE INCON ASSESS: CPT | Performed by: INTERNAL MEDICINE

## 2022-08-03 PROCEDURE — 99214 OFFICE O/P EST MOD 30 MIN: CPT | Performed by: INTERNAL MEDICINE

## 2022-08-03 PROCEDURE — 2022F DILAT RTA XM EVC RTNOPTHY: CPT | Performed by: INTERNAL MEDICINE

## 2022-08-03 PROCEDURE — G9899 SCRN MAM PERF RSLTS DOC: HCPCS | Performed by: INTERNAL MEDICINE

## 2022-08-03 PROCEDURE — 3051F HG A1C>EQUAL 7.0%<8.0%: CPT | Performed by: INTERNAL MEDICINE

## 2022-08-03 PROCEDURE — G8754 DIAS BP LESS 90: HCPCS | Performed by: INTERNAL MEDICINE

## 2022-08-03 PROCEDURE — 3017F COLORECTAL CA SCREEN DOC REV: CPT | Performed by: INTERNAL MEDICINE

## 2022-08-03 PROCEDURE — G0463 HOSPITAL OUTPT CLINIC VISIT: HCPCS | Performed by: INTERNAL MEDICINE

## 2022-08-03 PROCEDURE — G8752 SYS BP LESS 140: HCPCS | Performed by: INTERNAL MEDICINE

## 2022-08-03 PROCEDURE — 1123F ACP DISCUSS/DSCN MKR DOCD: CPT | Performed by: INTERNAL MEDICINE

## 2022-08-03 RX ORDER — LEVOTHYROXINE SODIUM 88 UG/1
88 TABLET ORAL
Qty: 90 TABLET | Refills: 3 | Status: SHIPPED | OUTPATIENT
Start: 2022-08-03

## 2022-08-03 NOTE — PATIENT INSTRUCTIONS
Heart-Healthy Diet: Care Instructions  Your Care Instructions     A heart-healthy diet has lots of vegetables, fruits, nuts, beans, and whole grains, and is low in salt. It limits foods that are high in saturated fat, such as meats, cheeses, and fried foods. It may be hard to change your diet, but even small changes can lower your risk of heart attack and heart disease. Follow-up care is a key part of your treatment and safety. Be sure to make and go to all appointments, and call your doctor if you are having problems. It's also a good idea to know your test results and keep a list of the medicines you take. How can you care for yourself at home? Watch your portions  Learn what a serving is. A \"serving\" and a \"portion\" are not always the same thing. Make sure that you are not eating larger portions than are recommended. For example, a serving of pasta is ½ cup. A serving size of meat is 2 to 3 ounces. A 3-ounce serving is about the size of a deck of cards. Measure serving sizes until you are good at [place]" them. Keep in mind that restaurants often serve portions that are 2 or 3 times the size of one serving. To keep your energy level up and keep you from feeling hungry, eat often but in smaller portions. Eat only the number of calories you need to stay at a healthy weight. If you need to lose weight, eat fewer calories than your body burns (through exercise and other physical activity). Eat more fruits and vegetables  Eat a variety of fruit and vegetables every day. Dark green, deep orange, red, or yellow fruits and vegetables are especially good for you. Examples include spinach, carrots, peaches, and berries. Keep carrots, celery, and other veggies handy for snacks. Buy fruit that is in season and store it where you can see it so that you will be tempted to eat it. Cook dishes that have a lot of veggies in them, such as stir-fries and soups.   Limit saturated and trans fat  Read food labels, and try to avoid saturated and trans fats. They increase your risk of heart disease. Use olive or canola oil when you cook. Bake, broil, grill, or steam foods instead of frying them. Choose lean meats instead of high-fat meats such as hot dogs and sausages. Cut off all visible fat when you prepare meat. Eat fish, skinless poultry, and meat alternatives such as soy products instead of high-fat meats. Soy products, such as tofu, may be especially good for your heart. Choose low-fat or fat-free milk and dairy products. Eat foods high in fiber  Eat a variety of grain products every day. Include whole-grain foods that have lots of fiber and nutrients. Examples of whole-grain foods include oats, whole wheat bread, and brown rice. Buy whole-grain breads and cereals, instead of white bread or pastries. Limit salt and sodium  Limit how much salt and sodium you eat to help lower your blood pressure. Taste food before you salt it. Add only a little salt when you think you need it. With time, your taste buds will adjust to less salt. Eat fewer snack items, fast foods, and other high-salt, processed foods. Check food labels for the amount of sodium in packaged foods. Choose low-sodium versions of canned goods (such as soups, vegetables, and beans). Limit sugar  Limit drinks and foods with added sugar. These include candy, desserts, and soda pop. Limit alcohol  Limit alcohol to no more than 2 drinks a day for men and 1 drink a day for women. Too much alcohol can cause health problems. When should you call for help? Watch closely for changes in your health, and be sure to contact your doctor if:    You would like help planning heart-healthy meals. Where can you learn more? Go to http://www.pavon.com/  Enter V137 in the search box to learn more about \"Heart-Healthy Diet: Care Instructions. \"  Current as of: August 22, 2019               Content Version: 12.6  © 6703-0559 Healthwise, Incorporated. Care instructions adapted under license by [employer] (which disclaims liability or warranty for this information). If you have questions about a medical condition or this instruction, always ask your healthcare professional. [address] any warranty or liability for your use of this information. Learning About Diabetes Food Guidelines  Your Care Instructions     Meal planning is important to manage diabetes. It helps keep your blood sugar at a target level (which you set with your doctor). You don't have to eat special foods. You can eat what your family eats, including sweets once in a while. But you do have to pay attention to how often you eat and how much you eat of certain foods. You may want to work with a dietitian or a certified diabetes educator (CDE) to help you plan meals and snacks. A dietitian or CDE can also help you lose weight if that is one of your goals. What should you know about eating carbs? Managing the amount of carbohydrate (carbs) you eat is an important part of healthy meals when you have diabetes. Carbohydrate is found in many foods. Learn which foods have carbs. And learn the amounts of carbs in different foods. Bread, cereal, pasta, and rice have about 15 grams of carbs in a serving. A serving is 1 slice of bread (1 ounce), ½ cup of cooked cereal, or 1/3 cup of cooked pasta or rice. Fruits have 15 grams of carbs in a serving. A serving is 1 small fresh fruit, such as an apple or orange; ½ of a banana; ½ cup of cooked or canned fruit; ½ cup of fruit juice; 1 cup of melon or raspberries; or 2 tablespoons of dried fruit. Milk and no-sugar-added yogurt have 15 grams of carbs in a serving. A serving is 1 cup of milk or 2/3 cup of no-sugar-added yogurt. Starchy vegetables have 15 grams of carbs in a serving.  A serving is ½ cup of mashed potatoes or sweet potato; 1 cup winter squash; ½ of a small baked potato; ½ cup of cooked beans; or ½ cup cooked corn or green peas. Learn how much carbs to eat each day and at each meal. A dietitian or CDE can teach you how to keep track of the amount of carbs you eat. This is called carbohydrate counting. If you are not sure how to count carbohydrate grams, use the Plate Method to plan meals. It is a good, quick way to make sure that you have a balanced meal. It also helps you spread carbs throughout the day. Divide your plate by types of foods. Put non-starchy vegetables on half the plate, meat or other protein food on one-quarter of the plate, and a grain or starchy vegetable in the final quarter of the plate. To this you can add a small piece of fruit and 1 cup of milk or yogurt, depending on how many carbs you are supposed to eat at a meal.  Try to eat about the same amount of carbs at each meal. Do not \"save up\" your daily allowance of carbs to eat at one meal.  Proteins have very little or no carbs per serving. Examples of proteins are beef, chicken, turkey, fish, eggs, tofu, cheese, cottage cheese, and peanut butter. A serving size of meat is 3 ounces, which is about the size of a deck of cards. Examples of meat substitute serving sizes (equal to 1 ounce of meat) are 1/4 cup of cottage cheese, 1 egg, 1 tablespoon of peanut butter, and ½ cup of tofu. How can you eat out and still eat healthy? Learn to estimate the serving sizes of foods that have carbohydrate. If you measure food at home, it will be easier to estimate the amount in a serving of restaurant food. If the meal you order has too much carbohydrate (such as potatoes, corn, or baked beans), ask to have a low-carbohydrate food instead. Ask for a salad or green vegetables. If you use insulin, check your blood sugar before and after eating out to help you plan how much to eat in the future. If you eat more carbohydrate at a meal than you had planned, take a walk or do other exercise. This will help lower your blood sugar.   What else should you know? Limit saturated fat, such as the fat from meat and dairy products. This is a healthy choice because people who have diabetes are at higher risk of heart disease. So choose lean cuts of meat and nonfat or low-fat dairy products. Use olive or canola oil instead of butter or shortening when cooking. Don't skip meals. Your blood sugar may drop too low if you skip meals and take insulin or certain medicines for diabetes. Check with your doctor before you drink alcohol. Alcohol can cause your blood sugar to drop too low. Alcohol can also cause a bad reaction if you take certain diabetes medicines. Follow-up care is a key part of your treatment and safety. Be sure to make and go to all appointments, and call your doctor if you are having problems. It's also a good idea to know your test results and keep a list of the medicines you take. Where can you learn more? Go to http://www.pavon.com/  Enter I147 in the search box to learn more about \"Learning About Diabetes Food Guidelines. \"  Current as of: December 20, 2019               Content Version: 12.6  © 8800-3026 [employer]. Care instructions adapted under license by [employer] (which disclaims liability or warranty for this information). If you have questions about a medical condition or this instruction, always ask your healthcare professional. [address] any warranty or liability for your use of this information. High Cholesterol: Care Instructions  Your Care Instructions     Cholesterol is a type of fat in your blood. It is needed for many body functions, such as making new cells. Cholesterol is made by your body. It also comes from food you eat. High cholesterol means that you have too much of the fat in your blood. This raises your risk of a heart attack and stroke. LDL and HDL are part of your total cholesterol. LDL is the \"bad\" cholesterol.  High LDL can raise your risk for heart disease, heart attack, and stroke. HDL is the \"good\" cholesterol. It helps clear bad cholesterol from the body. High HDL is linked with a lower risk of heart disease, heart attack, and stroke. Your cholesterol levels help your doctor find out your risk for having a heart attack or stroke. You and your doctor can talk about whether you need to lower your risk and what treatment is best for you. A heart-healthy lifestyle along with medicines can help lower your cholesterol and your risk. The way you choose to lower your risk will depend on how high your risk is for heart attack and stroke. It will also depend on how you feel about taking medicines. Follow-up care is a key part of your treatment and safety. Be sure to make and go to all appointments, and call your doctor if you are having problems. It's also a good idea to know your test results and keep a list of the medicines you take. How can you care for yourself at home? Eat a variety of foods every day. Good choices include fruits, vegetables, whole grains (like oatmeal), dried beans and peas, nuts and seeds, soy products (like tofu), and fat-free or low-fat dairy products. Replace butter, margarine, and hydrogenated or partially hydrogenated oils with olive and canola oils. (Canola oil margarine without trans fat is fine.)  Replace red meat with fish, poultry, and soy protein (like tofu). Limit processed and packaged foods like chips, crackers, and cookies. Bake, broil, or steam foods. Don't balderrama them. Be physically active. Get at least 30 minutes of exercise on most days of the week. Walking is a good choice. You also may want to do other activities, such as running, swimming, cycling, or playing tennis or team sports. Stay at a healthy weight or lose weight by making the changes in eating and physical activity listed above.  Losing just a small amount of weight, even 5 to 10 pounds, can reduce your risk for having a heart attack or stroke. Do not smoke. When should you call for help? Watch closely for changes in your health, and be sure to contact your doctor if:    You need help making lifestyle changes. You have questions about your medicine. Where can you learn more? Go to http://www.gray.com/  Enter T871 in the search box to learn more about \"High Cholesterol: Care Instructions. \"  Current as of: December 16, 2019               Content Version: 12.6  © 9658-2142 [employer]. Care instructions adapted under license by [employer] (which disclaims liability or warranty for this information). If you have questions about a medical condition or this instruction, always ask your healthcare professional. [address] any warranty or liability for your use of this information.

## 2022-08-03 NOTE — PROGRESS NOTES
1. \"Have you been to the ER, urgent care clinic since your last visit? Hospitalized since your last visit? \" No    2. \"Have you seen or consulted any other health care providers outside of the [address] [place] [address] since your last visit? \" No     3. For patients aged 39-70: Has the patient had a colonoscopy / FIT/ Cologuard? Yes - no Care Gap present      If the patient is female:    4. For patients aged 41-77: Has the patient had a mammogram within the past 2 years? Yes - no Care Gap present      5. For patients aged 21-65: Has the patient had a pap smear?  NA - based on age or sex

## 2022-08-07 NOTE — PROGRESS NOTES
HPI:   Eugenia Adams is a 79y.o. year old female who presents today for a routine visit. She has a history of hypertension, hyperlipidemia, diabetes mellitus type 1, chronic renal disease stage 3, and hypothyroidism. She completed the Moderna COVID 19 vaccine series and received two Moderna booster doses. She reports that she is doing reasonably well. She reports that for the last 4-6 weeks, she has noted some increasing difficulty with nausea and heartburn, as well as fatigue and decreased energy. She reports that she underwent extractions of her lower teeth and is in the process being fitted for a partial denture. She denies any change in appetite, vomiting, dysphagia, abdominal pain, chest pain, orthopnea, PND or edema. She states that she is remaining active walking and working in her yard. She has been taking her medications as prescribed. She is otherwise without new complaints. Summary of prior hospitalizations and medical history:  On 4/26/2018, she presented with worsening lower extremity edema. She stated that previously, her swelling would improve overnight, but she had been noticing recently that it did not resolve. She also reported some mild dyspnea with exertion, such as walking upstairs, which had been present chronically, although may have been slightly worse. She also reported some \"skipped beats\" although not prolonged. She denied any chest pain, shortness of breath at rest or with normal activities, lightheadedness, PND, or orthopnea. She was started on low dose lasix, and referred for an echocardiogram, Holter monitor, and lower extremity duplex scan, but prior to having these performed, presented to [facility] ED for evaluation of pain and swelling of her right ankle. While in ED, CBC, CMP, and D-dimer were unremarkable. Cardiac enzymes x 2 were negative, and EKG was unchanged.  She was observed overnight and underwent a pharmacologic nuclear stress test (5/3/2018) which was a normal low risk study, without evidence of ischemia or prior infarction, and EF 86%. She was subsequently discharged, and underwent an echocardiogram (5/10/2018) showed normal LV function (EF 55-60%) no RWMA, mild LAE, mild-moderate TR, RVSP normal (22 mmHg); holter monitor (5/1/2018) showing sinus rhythm with sinus arrhythmia at times; rare PVC's; one short run of non-sustained of SVT for 6 beats. Episode of sinus tachycardia recorded in diary occurred while she was undergoing pharmacologic nuclear stress test. No other symptoms reported; and lower extremity duplex (5/11/2018) which was negative for DVT and venous reflux. On 5/8/2018, she was evaluated by a podiatrist, Dr. Rachel Colin, for her right foot swelling. Felt that it was most likely secondary to sprain injury as x-rays negative and presentation not consistent with gout or other form of arthritis. She was started on a Medrol dose pack. However, her blood sugars increased to >400 and she was changed to ibuprofen 400 mg tid. She did have a right foot MRI (5/26/2018) which showed considerable edema involving lower leg, hindfoot and midfoot, nonlocalizing a nonspecific; evidence of old injury to the ATF ligament. Other ankle ligaments are intact; mild peroneus brevis tendinopathy without measurable tear; small partial thickness longitudinal split in the peroneus longus tendon within the retromalleolar groove; mild distal Achilles tendinosis; and small subchondral cyst in the anterior facet of the talus with otherwise no degenerative change seen in any of the hindfoot or midfoot articulations. She states that she was told that the right foot swelling was due to an old injury, but she states that it persists although she does not feel much pain due to her neuropathy. She has a history of diabetes mellitus type 1, with onset at age 6. She is currently under the care of Dr. Johnathan Tejada, and is being treated with insulin glargine and lispro. She had not been well-controlled, with HbA1c ranging from 7.5-8.0, although has ow improved since using the Freestyle meter as discussed. She does have proliferative diabetic retinopathy and is s/p pan-retinal photocoagulation therapy in both eyes. She is followed closely by Dr. Ad Lazo, and recent exam showed regression bilaterally. She also has peripheral neuropathy, with burning and tingling in her feet at night. Renal function had been normal until 5/2016, with evidence of chronic renal disease, stage 3, since that time with baseline creatinine1.0-1.04/ eGFR 54-56. She also has a history of hypothyroidism, and she is currently on Synthroid. She denies cold intolerance, hair or skin changes. She has a history of hypertension, treated with quinapril. She had a pharmacologic nuclear stress test in 11/2012, which showed no evidence for ischemia, and normal LV wall motion (EF> 70%). She also had a carotid duplex scan in 7/2013 which showed mild (< 50%) bilateral internal carotid artery stenoses. She denies any chest pain, shortness of breath at rest or with exertion, palpitations, lightheadedness, or edema. She has a history of hyperlipidemia, and was on atorvastatin for many years until 10/2015 when she discontinued it because of myalgias. She was recently tried on rosuvastatin without success due to recurrent myalgias. She was started on ezetimibe in 9/2016 and has been tolerating it without difficulty. She successfully stopped smoking in 1/2015, and reports that she continues to abstain. She had a screening colonoscopy in 9/2014 by Dr. Marilyn Stein, which found a 6 mm sessile polyp in the descending colon and a 4-5 mm sessile polyp in the rectum/sigmoid (pathology: tubular adenomas). She had a repeat colonoscopy in 9/2019 showing a 6 mm sessile polyp in the ileocecal valve (pathology unavailable). Recommendation was for follow-up colonoscopy in 5 years.  She underwent an upper endoscopy on 3/9/2021 by  Dorathy Fanti due to worsening reflux symptoms and abnormality seen on chest CT scan (1/8/2021) which noted an incidental finding of poorly distended distal esophagus with mild wall prominence. It showed a normal esophagus without mass or esophagitis, a hiatal hernia, and retained food in the stomach. She was advised to continue omeprazole 40 mg daily and to eat smaller more frequent meals. She denies any abdominal pain, nausea, vomiting, melena, hematochezia, or change in bowel movements. In 3/2017, she was diagnosed with an upper respiratory tract infection and was prescribed doxycycline and prednisone by [facility]. She subsequently developed severe dizziness and vomiting, prompting [place] [facility] [place] she was diagnosed with benign positional vertigo and treated with meclizine and Zofran with improvement. In 10/2017, she noticed difficulty with her peripheral vision and was evaluated by Dr. Kael Davis. She has regressed proliferative diabetic retinopathy and she reports that the laser therapy she received many years ago to treat this resulted in the current limitations in her peripheral vision. She does also have mild glaucoma for which she is receiving treatment. She states that her driving has been restricted to only during the day, and she is aware that she must turn her head completely to each side when navigating behind the wheel. In 2/2018, she began having difficulty with right elbow pain and was evaluated by Dr. Jimenez Pavon who diagnosed her with right lateral epicondylitis. She received a cortisone injection with improvement. In 10/2018, while hiking on vacation hiking in [place], she slipped on some wooden steps and landed on her left shoulder. She was transported to [facility] in [place], [place], and found to have a closed nondisplaced fracture of the left proximal humerus. She was placed in a sling and told to follow-up with orthopedics.  She was being followed by Dr. Cindi Jean of [address] [place] [address], and completed physical therapy with improvement. She is no longer requiring pain medication. On 1/5/2022, she completed plastic surgery on her face with Dr. Grzegorz Cardenas, which included a surgical lift and laser treatment to address her wrinkles. She reports that surgery went well without complication. She also completed Botox injections to address the wrinkles on her forehead which were not corrected during surgery. Past Medical History:   Diagnosis Date    Chronic renal disease, stage III ([address]     Diabetes mellitus type 1 ([address]     Diabetic peripheral neuropathy ([address]     Diabetic retinopathy ([address]     Essential hypertension with goal blood pressure less than 140/90     Hearing decreased     Hyperlipidemia     Hypothyroidism      Past Surgical History:   Procedure Laterality Date    HX GYN      partial hysterectomy    HX GYN      3 D and C's    HX HEENT      tonsillectomy    HX HEENT      cataract removal bilateral    HX HEENT      multiple surgeries for retinopathy    HX HYSTERECTOMY      HX ORTHOPAEDIC      carpral tunnel sugery bilateral     Current Outpatient Medications   Medication Sig    levothyroxine (SYNTHROID) 88 mcg tablet Take 1 Tablet by mouth Daily (before breakfast). ezetimibe (ZETIA) 10 mg tablet TAKE 1 TABLET DAILY    omeprazole (PRILOSEC) 40 mg capsule TAKE 1 CAPSULE DAILY    quinapriL (ACCUPRIL) 10 mg tablet TAKE 1 TABLET EVERY EVENING ALONG WITH A 5 MG TABLET    quinapriL (ACCUPRIL) 5 mg tablet TAKE 1 TABLET NIGHTLY WITH 10 MG TABLET EVERY EVENING    Cholecalciferol, Vitamin D3, (VITAMIN D3) 2,000 unit cap capsule Take 2,000 Units by mouth daily. ALPHAGAN P 0.1 % ophthalmic solution     glucose blood VI test strips (ONE TOUCH ULTRA TEST) strip USE TO TEST BLOOD SUGAR FOUR TIMES A DAY    cyanocobalamin (VITAMIN B12) 500 mcg tablet Take 500 mcg by mouth daily. aspirin delayed-release 81 mg tablet Take  by mouth daily. insulin glargine (LANTUS,BASAGLAR) 100 unit/mL (3 mL) inpn 18 Units by SubCUTAneous route daily. insulin lispro (HUMALOG) 100 unit/mL kwikpen by SubCUTAneous route. Sliding scale as needed       No current facility-administered medications for this visit. Allergies and Intolerances: Allergies   Allergen Reactions    Pcn [Penicillins] Hives    Sulfa (Sulfonamide Antibiotics) Nausea Only and Vertigo     Family History: Grandmother  from colon cancer. Mother had CAD and DM. Family History   Problem Relation Age of Onset    Diabetes Brother     Diabetes Mother     Heart Disease Mother      Social History:   She  reports that she quit smoking about 7 years ago. Her smoking use included cigarettes. She has a 10.00 pack-year smoking history. She has never used smokeless tobacco. She is  and lives with her . 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 Social History     Substance and Sexual Activity   Alcohol Use Yes    Comment: infrequently     Immunization History:  Immunization History   Administered Date(s) Administered    COVID-19, MODERNA [place] border, Primary or Immunocompromised, (age 18y+), IM, 100 mcg/0.5mL 2021, 2021    COVID-19, MODERNA Booster BLUE border, (age 18y+), IM, 50mcg/0.25mL 10/24/2021, 2022    Influenza Vaccine (Quad) PF (>6 Mo Flulaval, Fluarix, and >3 Yrs Afluria, Fluzone 13102) 12/15/2015, 2016, 10/19/2017, 2018, 12/10/2019    Influenza Vaccine PF 2014    Influenza Vaccine Split 2011, 10/17/2012    Influenza Vaccine Whole 2010    Influenza, Quadrivalent, Adjuvanted (>65 Yrs FLUAD QUAD [id number]) 2020, 2021    Pneumococcal Conjugate (PCV-13) 2020    Pneumococcal Polysaccharide (PPSV-23) 2017, 2019    Tdap 2013    Zoster Recombinant 2021    Zoster Vaccine, Live 2016       Review of Systems:   As above included in HPI.   Otherwise 11 point review of systems negative including constitutional, skin, HENT, eyes, respiratory, cardiovascular, gastrointestinal, genitourinary, musculoskeletal, endo/heme/aller, neurological.      Physical:   Visit Vitals  /64 (BP 1 Location: Left upper arm, BP Patient Position: Sitting)   Pulse 68   Temp 97 °F (36.1 °C) (Temporal)   Resp 16   Ht 5' 5\" (1.651 m)   Wt 163 lb (73.9 kg)   SpO2 98%   BMI 27.12 kg/m²       Exam:   Patient appears in no apparent distress. Affect is appropriate. HEENT: PERRLA, anicteric, oropharynx clear, no JVD, adenopathy or thyromegaly. No carotid bruits or radiated murmur. Lungs: clear to auscultation, no wheezes, rhonchi, or rales. Heart: regular rate and rhythm. No murmur, rubs, gallops  Abdomen: soft, nontender, nondistended, normal bowel sounds, no hepatosplenomegaly or masses. Extremities: without edema.       Diabetic foot exam:     Left Foot:   Visual Exam: normal    Pulse DP: 2+ (normal)   Filament test: normal sensation    Vibratory sensation: diminished      Right Foot:   Visual Exam: normal    Pulse DP: 2+ (normal)   Filament test: normal sensation    Vibratory sensation: diminished        Review of Data:  Labs:   Hospital Outpatient Visit on 07/28/2022   Component Date Value Ref Range Status    WBC 07/28/2022 5.1  4.6 - 13.2 K/uL Final    RBC 07/28/2022 3.43 (A) 4.20 - 5.30 M/uL Final    HGB 07/28/2022 11.2 (A) 12.0 - 16.0 g/dL Final    HCT 07/28/2022 33.3 (A) 35.0 - 45.0 % Final    MCV 07/28/2022 97.1  78.0 - 100.0 FL Final    MCH 07/28/2022 32.7  24.0 - 34.0 PG Final    MCHC 07/28/2022 33.6  31.0 - 37.0 g/dL Final    RDW 07/28/2022 11.6  11.6 - 14.5 % Final    PLATELET 09/46/0264 096  135 - 420 K/uL Final    MPV 07/28/2022 11.7  9.2 - 11.8 FL Final    NRBC 07/28/2022 0.0  0  WBC Final    ABSOLUTE NRBC 07/28/2022 0.00  0.00 - 0.01 K/uL Final    NEUTROPHILS 07/28/2022 68  40 - 73 % Final    LYMPHOCYTES 07/28/2022 22  21 - 52 % Final    MONOCYTES 07/28/2022 8  3 - 10 % Final    EOSINOPHILS 07/28/2022 1  0 - 5 % Final    BASOPHILS 07/28/2022 1  0 - 2 % Final    IMMATURE GRANULOCYTES 07/28/2022 0  0.0 - 0.5 % Final    ABS. NEUTROPHILS 07/28/2022 3.4  1.8 - 8.0 K/UL Final    ABS. LYMPHOCYTES 07/28/2022 1.1  0.9 - 3.6 K/UL Final    ABS. MONOCYTES 07/28/2022 0.4  0.05 - 1.2 K/UL Final    ABS. EOSINOPHILS 07/28/2022 0.1  0.0 - 0.4 K/UL Final    ABS. BASOPHILS 07/28/2022 0.0  0.0 - 0.1 K/UL Final    ABS. IMM. GRANS. 07/28/2022 0.0  0.00 - 0.04 K/UL Final    DF 07/28/2022 AUTOMATED    Final    Hemoglobin A1c 07/28/2022 7.6 (A) 4.2 - 5.6 % Final    Est. average glucose 07/28/2022 171  mg/dL Final    LIPID PROFILE 07/28/2022        Final    Cholesterol, total 07/28/2022 205 (A) <200 MG/DL Final    Triglyceride 07/28/2022 75  <150 MG/DL Final    HDL Cholesterol 07/28/2022 82 (A) 40 - 60 MG/DL Final    LDL, calculated 07/28/2022 108 (A) 0 - 100 MG/DL Final    VLDL, calculated 07/28/2022 15  MG/DL Final    CHOL/HDL Ratio 07/28/2022 2.5  0 - 5.0   Final    Magnesium 07/28/2022 1.8  1.6 - 2.6 mg/dL Final    Sodium 07/28/2022 128 (A) 136 - 145 mmol/L Final    Potassium 07/28/2022 5.7 (A) 3.5 - 5.5 mmol/L Final    Chloride 07/28/2022 96 (A) 100 - 111 mmol/L Final    CO2 07/28/2022 26  21 - 32 mmol/L Final    Anion gap 07/28/2022 6  3.0 - 18 mmol/L Final    Glucose 07/28/2022 202 (A) 74 - 99 mg/dL Final    BUN 07/28/2022 15  7.0 - 18 MG/DL Final    Creatinine 07/28/2022 0.95  0.6 - 1.3 MG/DL Final    BUN/Creatinine ratio 07/28/2022 16  12 - 20   Final    GFR est AA 07/28/2022 >60  >60 ml/min/1.73m2 Final    GFR est non-AA 07/28/2022 59 (A) >60 ml/min/1.73m2 Final    Calcium 07/28/2022 8.8  8.5 - 10.1 MG/DL Final    Bilirubin, total 07/28/2022 0.5  0.2 - 1.0 MG/DL Final    ALT (SGPT) 07/28/2022 16  13 - 56 U/L Final    AST (SGOT) 07/28/2022 16  10 - 38 U/L Final    Alk.  phosphatase 07/28/2022 64  45 - 117 U/L Final    Protein, total 07/28/2022 6.3 (A) 6.4 - 8.2 g/dL Final    Albumin 2022 3.6  3.4 - 5.0 g/dL Final    Globulin 2022 2.7  2.0 - 4.0 g/dL Final    A-G Ratio 2022 1.3  0.8 - 1.7   Final    Microalbumin,urine random 2022 1.36  0 - 3.0 MG/DL Final    Creatinine, urine 2022 33.00  30 - 125 mg/dL Final    Microalbumin/Creat ratio (mg/g cre* 2022 41 (A) 0 - 30 mg/g Final    TSH 2022 0.18 (A) 0.36 - 3.74 uIU/mL Final    T4, Free 2022 1.6 (A) 0.7 - 1.5 NG/DL Final    Vitamin D 25-Hydroxy 2022 49.8  30 - 100 ng/mL Final    Ferritin 2022 202  8 - 388 NG/ML Final    Iron 2022 110  50 - 175 ug/dL Final    TIBC 2022 241 (A) 250 - 450 ug/dL Final    Iron % saturation 2022 46  20 - 50 % Final     EKG Results       None          EKG (2021) sinus rhythm at 73 bpm, normal intervals, no ischemic changes. Health Maintenance:  Screening:    Mammogram: negative (10/2021)    PAP smear: negative (2014). S/p YUNG, no further screening needed. Colorectal: colonsocopy (2019) ileocecal valve polyp. Dr. Diego Barclay. Due 2024. Depression: none   DM (HbA1c/FPG): HbA1c 7.2 (2022) Dr. Hero Lynch   Hepatitis C: negative (2015)   Falls: none    DEXA: osteopenia (10/2020)   Glaucoma: mild primary open angle glaucoma and regressed proliferative diabetic retinopathy.  Followed by Dr. Antonette Lewis (last 2022)   Smokin pack year (stopped 2015)   Vitamin D: 53.0 (2022)   Medicare Wellness: 2021 (Initial)      Impression:  Patient Active Problem List   Diagnosis Code    Stable proliferative diabetic retinopathy of both eyes associated with type 1 diabetes mellitus ([address] Y26.7482    Hyperlipidemia E78.5    Hearing decreased H91.90    Diabetes mellitus with diabetic polyneuropathy ([facility] E11.42    Vitamin D deficiency E55.9    Anemia D64.9    Former smoker Z87.891    Adenomatous polyp of colon D12.6    Essential hypertension I10    Hypothyroidism due to acquired atrophy of thyroid E03.4    Hypertensive kidney disease with stage 3a chronic kidney disease (HCC) I12.9, N18.31    Type 1 diabetes mellitus with stage 3 chronic kidney disease (HCC) E10.22, N18.30    Macrocytosis D75.89    Coronary artery calcification seen on CT scan I25.10    GERD (gastroesophageal reflux disease) K21.9    Osteopenia of multiple sites M85.89       Plan:  1. Hypertension. Blood pressure remains well controlled on quinapril 15 mg daily. Renal function remains stable with creatinine 0.95/ eGFR 59. Evaluated for new onset lower extremity edema in 2018 with a negative cardiac evaluation including EKG, echocardiogram, 48 hour Holter monitor, and pharmacologic nuclear stress test. Lower extremity duplex also negative for DVT or venous reflux. Using compression hose regularly with improvement. Will continue to monitor  2. Type 1 diabetes mellitus. HbA1c worsened today to 7.6 and continuing to use Freestyle sensor machine for blood sugar readings. Reports overall compliance with diet but noting some increased readings at home. On Lantus 18-20 units and Humalog. Complicated by proliferative diabetic retinopathy in regression, and followed by Dr. Salena Peng. Neuropathy symptoms in feet are mild and have not required treatment with medication. Renal function with evidence of chronic renal disease, stage 3. Evidence of mild microalbuminuria today with urine microalbumin/creatinine ratio 41 mg/g. On Ace-I. Unable to tolerate statins due to myalgias and on ezetimibe. Continuing to follow with endocrinology. Will continue to monitor. 3. Hyperlipidemia. Not able to tolerate statin (atorvastatin/ rosuvastatin) due to myalgias. On ezetimibe with reasonable response. Some improvement today with  and HDL 82. Moderate to severe aortic and coronary atherosclerosis and calcifications seen on chest CT scan (11/2018). Stressed importance of continued lifestyle modifications, especially heart healthy diet, regular exercise and weight loss.   Will reassess at next visit.  4. Chronic renal disease, stage 3. Evidence of mild disease with creatinine 1.00-1.04/ eGFR 54-56 since 5/2016. Most likely due to long standing diabetes mellitus and hypertension. On Ace-I, but not on statin as discussed above. Counseled on avoiding NSAIDS and prerenal status. Remaining stable today with creatinine 0.95/eGFR 59. However, with evidence of hyponatremia at 128 and hyperkalemia at 5.7. May be related to dehydration and will obtain repeat renal function panel today to reassess. 5. Hypothyroidism. TSH low at 0.18 and elevated free T4 at 1.6 on adjusted dose of levothyroxine 100 mcg 3 days/week and 88 mcg 4 days/week. Advised to decrease levothyroxine to 88 mcg weekly and will reassess in 3 months. May be responsible for current symptoms of fatigue and feeling poorly. Will continue to monitor. 6. Former smoker. 30 pack year and stopped in 2015. Initial lung cancer screening performed in 11/2017 showing a few tiny solid nodules and one groundglass nodule identified (lung RADS 2). Did also note evidence of emphysema and prominence of the pulmonary vascular trunk suggestive of pulmonary arterial hypertension. However, echocardiogram (5/2018) with normal PA pressures. Being monitored annually without change, and most recent chest CT scan in 1/2022 showed multiple small stable bilateral lung nodules and emphysematous changes. Next screening LD chest CT scan due 1/2023.    7. Osteopenia. Baseline bone density scan 10/8/2020 . Using femoral neck T-scores, calculated FRAX score estimates her 10 year risk of a major osteoporetic fracture at 6.5 % and hip fracture at 0.2 %, which are not an indication for biphosphonate treatment (>20% and >3%, respectively). Advised to begin calcium in addition to Vitamin D. Encouraged exercise, particularly weight bearing activities. Will continue to monitor. Fall precautions stressed. 8. GERD.  Chest CT scan (1/8/2021) noted incidental finding of poorly distended distal esophagus with mild wall prominence. Referred to Dr. Christina Reyes, and underwent upper endoscopy on 3/9/2021 showing a normal esophagus without mass or esophagitis, hiatal hernia, and retained food in the stomach. Advised to continue omeprazole 40 mg daily. Some mild increase in symptoms today with nausea and heartburn, and advised to decrease caffeine and soft drink intake. Will increase dose of omeprazole to twice daily if persists. 9. Anemia, mild. Chronic but stable. Evaluation in 5/2021 showed iron studies normal with ferritin 165 and transferrin 41%; B12 normal; and folate levels also now normal. On multivitamin with folate. Gammopathy panel and UPEP negative today. Remains stable today with Hb 11.2/HCT 33.3 and repeat ferritin and iron studies without evidence of deficiency today. Will continue to monitor. 10. Overweight. Weight essentially stable over the last year. Reports continues to remain stable today. Currently following a low carbohydrate intermittent fasting diet with increased proteins, salads, and fruit. Emphasized importance of continued lifestyle modifications, including diet, exercise, and weight loss. Will readdress next visit. 11. Health maintenance. Completed Moderna COVID 19 vaccine series and received two Moderna booster doses. Discussed a booster dose and advised to obtain when available in the fall. Received 1/2 doses of Shingrix vaccine. Will address second dose at next visit. Other immunizations up to date. Mammogram up to date. Colonoscopy up-to-date. Vitamin D level remains normal on maintenance dose supplement. Reports underwent screening skin exam at [facility] dermatology. Initial Medicare wellness visit up-to-date    Patient understands recommendations and agrees with plan. .  Follow-up in 3 months.

## 2022-08-08 ENCOUNTER — APPOINTMENT (OUTPATIENT)
Dept: INTERNAL MEDICINE CLINIC | Age: 67
End: 2022-08-08

## 2022-08-08 ENCOUNTER — HOSPITAL ENCOUNTER (OUTPATIENT)
Dept: LAB | Age: 67
Discharge: HOME | End: 2022-08-08
Payer: MEDICARE

## 2022-08-08 DIAGNOSIS — I10 ESSENTIAL HYPERTENSION: ICD-10-CM

## 2022-08-08 DIAGNOSIS — N18.31 TYPE 1 DIABETES MELLITUS WITH STAGE 3A CHRONIC KIDNEY DISEASE (HCC): ICD-10-CM

## 2022-08-08 DIAGNOSIS — E10.22 TYPE 1 DIABETES MELLITUS WITH STAGE 3A CHRONIC KIDNEY DISEASE (HCC): ICD-10-CM

## 2022-08-08 DIAGNOSIS — N18.31 TYPE 1 DIABETES MELLITUS WITH STAGE 3A CHRONIC KIDNEY DISEASE (HCC): Primary | ICD-10-CM

## 2022-08-08 DIAGNOSIS — E10.22 TYPE 1 DIABETES MELLITUS WITH STAGE 3A CHRONIC KIDNEY DISEASE (HCC): Primary | ICD-10-CM

## 2022-08-08 LAB
ALBUMIN SERPL-MCNC: 3.6 G/DL (ref 3.4–5)
ANION GAP SERPL CALC-SCNC: 7 MMOL/L (ref 3–18)
BUN SERPL-MCNC: 13 MG/DL (ref 7–18)
BUN/CREAT SERPL: 15 (ref 12–20)
CALCIUM SERPL-MCNC: 9.3 MG/DL (ref 8.5–10.1)
CHLORIDE SERPL-SCNC: 98 MMOL/L (ref 100–111)
CO2 SERPL-SCNC: 27 MMOL/L (ref 21–32)
CREAT SERPL-MCNC: 0.89 MG/DL (ref 0.6–1.3)
GLUCOSE SERPL-MCNC: 160 MG/DL (ref 74–99)
PHOSPHATE SERPL-MCNC: 3.6 MG/DL (ref 2.5–4.9)
POTASSIUM SERPL-SCNC: 4.6 MMOL/L (ref 3.5–5.5)
SODIUM SERPL-SCNC: 132 MMOL/L (ref 136–145)

## 2022-08-08 PROCEDURE — 80069 RENAL FUNCTION PANEL: CPT

## 2022-08-08 PROCEDURE — 36415 COLL VENOUS BLD VENIPUNCTURE: CPT

## 2022-08-10 ENCOUNTER — TELEPHONE (OUTPATIENT)
Dept: INTERNAL MEDICINE CLINIC | Age: 67
End: 2022-08-10

## 2022-08-10 NOTE — TELEPHONE ENCOUNTER
Patient is aware Please let the patient know that her repeat labs are much improved with improved sodium level and normalization of your potassium level. Renal function has also normalized. Please advise her to continue to increase her fluid intake since dehydration was the cause of the abnormal lab results. Patient verbalizes understanding.

## 2022-08-10 NOTE — TELEPHONE ENCOUNTER
No visits with results within 2 Day(s) from this visit. Latest known visit with results is:   Hospital Outpatient Visit on 08/08/2022   Component Date Value Ref Range Status    Sodium 08/08/2022 132 (A) 136 - 145 mmol/L Final    Potassium 08/08/2022 4.6  3.5 - 5.5 mmol/L Final    Chloride 08/08/2022 98 (A) 100 - 111 mmol/L Final    CO2 08/08/2022 27  21 - 32 mmol/L Final    Anion gap 08/08/2022 7  3.0 - 18 mmol/L Final    Glucose 08/08/2022 160 (A) 74 - 99 mg/dL Final    BUN 08/08/2022 13  7.0 - 18 MG/DL Final    Creatinine 08/08/2022 0.89  0.6 - 1.3 MG/DL Final    BUN/Creatinine ratio 08/08/2022 15  12 - 20   Final    GFR est AA 08/08/2022 >60  >60 ml/min/1.73m2 Final    GFR est non-AA 08/08/2022 >60  >60 ml/min/1.73m2 Final    Calcium 08/08/2022 9.3  8.5 - 10.1 MG/DL Final    Phosphorus 08/08/2022 3.6  2.5 - 4.9 MG/DL Final    Albumin 08/08/2022 3.6  3.4 - 5.0 g/dL Final     Please let the patient know that her repeat labs are much improved with improved sodium level and normalization of your potassium level. Renal function has also normalized. Please advise her to continue to increase her fluid intake since dehydration was the cause of the abnormal lab results.

## 2022-08-26 RX ORDER — QUINAPRIL 10 MG/1
TABLET ORAL
Qty: 90 TABLET | Refills: 3 | Status: SHIPPED | OUTPATIENT
Start: 2022-08-26

## 2022-08-26 RX ORDER — QUINAPRIL 10 MG/1
TABLET ORAL
Qty: 90 TABLET | Refills: 3 | Status: CANCELLED | OUTPATIENT
Start: 2022-08-26

## 2022-08-26 RX ORDER — QUINAPRIL 5 1/1
TABLET ORAL
Qty: 90 TABLET | Refills: 3 | Status: SHIPPED | OUTPATIENT
Start: 2022-08-26

## 2022-08-26 NOTE — TELEPHONE ENCOUNTER
MD Jamshid Malcolm,     Patient sent [employer]t reply message that the quinapril 10mg was not available at [facility] and she is out. She needs the 10mg sent also. Thanks, Jay Puri    Last Visit: 8/3/22 MD Jamshid Malcolm  Next Appointment: 11/9/22 MD Martinez  Previous Refill Encounter(s): 1/4/22 90 + 3 (patient states it is not available via [facility])    Requested Prescriptions     Pending Prescriptions Disp Refills    quinapriL (ACCUPRIL) 10 mg tablet 90 Tablet 3     Sig: TAKE 1 TABLET EVERY EVENING ALONG WITH A 5 MG TABLET       For Pharmacy Admin Tracking Only    CPA in place:   Recommendation Provided To:    Intervention Detail: New Rx: 1, reason: Patient Preference  Gap Closed?:   Intervention Accepted By:   Time Spent (min): 5

## 2022-08-26 NOTE — TELEPHONE ENCOUNTER
[facility] request for refills of quinapril 5mg and 10mg. (Patient has refills available for the 10mg sent 1/4/22 90 + 3) [employer] message sent to patient with information. Thanks, Sourav Carranza    Last Visit: 8/3/22 MD Michael Palumbo  Next Appointment: 11/9/22 MD Martinez  Previous Refill Encounter(s): 8/3/21 90 + 3    Requested Prescriptions     Pending Prescriptions Disp Refills    quinapriL (ACCUPRIL) 5 mg tablet 90 Tablet 3     Sig: TAKE 1 TABLET NIGHTLY WITH 10 MG TABLET EVERY EVENING     For Pharmacy Admin Tracking Only    CPA in place:   Recommendation Provided To:    Intervention Detail: Discontinued Rx: 1, reason: Duplicate Therapy and New Rx: 1, reason: Patient Preference  Gap Closed?:   Intervention Accepted By:   Time Spent (min): 5

## 2022-11-15 ENCOUNTER — APPOINTMENT (OUTPATIENT)
Dept: INTERNAL MEDICINE CLINIC | Age: 67
End: 2022-11-15

## 2022-11-15 ENCOUNTER — DOCUMENTATION ONLY (OUTPATIENT)
Dept: INTERNAL MEDICINE CLINIC | Age: 67
End: 2022-11-15

## 2022-11-15 ENCOUNTER — HOSPITAL ENCOUNTER (OUTPATIENT)
Dept: LAB | Age: 67
Discharge: HOME | End: 2022-11-15
Payer: MEDICARE

## 2022-11-15 DIAGNOSIS — D64.9 ANEMIA, UNSPECIFIED TYPE: ICD-10-CM

## 2022-11-15 DIAGNOSIS — E10.42 TYPE 1 DIABETES MELLITUS WITH DIABETIC POLYNEUROPATHY (HCC): ICD-10-CM

## 2022-11-15 DIAGNOSIS — I10 ESSENTIAL HYPERTENSION: ICD-10-CM

## 2022-11-15 DIAGNOSIS — E87.5 HYPERKALEMIA: ICD-10-CM

## 2022-11-15 DIAGNOSIS — E55.9 VITAMIN D DEFICIENCY: ICD-10-CM

## 2022-11-15 DIAGNOSIS — E10.22 TYPE 1 DIABETES MELLITUS WITH STAGE 3A CHRONIC KIDNEY DISEASE (HCC): ICD-10-CM

## 2022-11-15 DIAGNOSIS — N18.31 HYPERTENSIVE KIDNEY DISEASE WITH STAGE 3A CHRONIC KIDNEY DISEASE (HCC): ICD-10-CM

## 2022-11-15 DIAGNOSIS — E78.5 HYPERLIPIDEMIA, UNSPECIFIED HYPERLIPIDEMIA TYPE: ICD-10-CM

## 2022-11-15 DIAGNOSIS — I12.9 HYPERTENSIVE KIDNEY DISEASE WITH STAGE 3A CHRONIC KIDNEY DISEASE (HCC): ICD-10-CM

## 2022-11-15 DIAGNOSIS — E03.4 HYPOTHYROIDISM DUE TO ACQUIRED ATROPHY OF THYROID: ICD-10-CM

## 2022-11-15 DIAGNOSIS — E10.3553 STABLE PROLIFERATIVE DIABETIC RETINOPATHY OF BOTH EYES ASSOCIATED WITH TYPE 1 DIABETES MELLITUS (HCC): ICD-10-CM

## 2022-11-15 DIAGNOSIS — N18.31 TYPE 1 DIABETES MELLITUS WITH STAGE 3A CHRONIC KIDNEY DISEASE (HCC): ICD-10-CM

## 2022-11-15 DIAGNOSIS — E87.1 HYPONATREMIA: ICD-10-CM

## 2022-11-15 LAB
25(OH)D3 SERPL-MCNC: 36 NG/ML (ref 30–100)
ALBUMIN SERPL-MCNC: 3.8 G/DL (ref 3.4–5)
ALBUMIN/GLOB SERPL: 1.4 {RATIO} (ref 0.8–1.7)
ALP SERPL-CCNC: 65 U/L (ref 45–117)
ALT SERPL-CCNC: 19 U/L (ref 13–56)
ANION GAP SERPL CALC-SCNC: 4 MMOL/L (ref 3–18)
APPEARANCE UR: CLEAR
AST SERPL-CCNC: 15 U/L (ref 10–38)
BASOPHILS # BLD: 0 K/UL (ref 0–0.1)
BASOPHILS NFR BLD: 1 % (ref 0–2)
BILIRUB SERPL-MCNC: 0.4 MG/DL (ref 0.2–1)
BILIRUB UR QL: NEGATIVE
BUN SERPL-MCNC: 25 MG/DL (ref 7–18)
BUN/CREAT SERPL: 27 (ref 12–20)
CALCIUM SERPL-MCNC: 9.3 MG/DL (ref 8.5–10.1)
CHLORIDE SERPL-SCNC: 104 MMOL/L (ref 100–111)
CHOLEST SERPL-MCNC: 191 MG/DL
CO2 SERPL-SCNC: 30 MMOL/L (ref 21–32)
COLOR UR: YELLOW
CREAT SERPL-MCNC: 0.92 MG/DL (ref 0.6–1.3)
CREAT UR-MCNC: 56 MG/DL (ref 30–125)
DIFFERENTIAL METHOD BLD: ABNORMAL
EOSINOPHIL # BLD: 0.1 K/UL (ref 0–0.4)
EOSINOPHIL NFR BLD: 2 % (ref 0–5)
EPITH CASTS URNS QL MICRO: NORMAL /LPF (ref 0–5)
ERYTHROCYTE [DISTWIDTH] IN BLOOD BY AUTOMATED COUNT: 11.6 % (ref 11.6–14.5)
EST. AVERAGE GLUCOSE BLD GHB EST-MCNC: 157 MG/DL
GLOBULIN SER CALC-MCNC: 2.8 G/DL (ref 2–4)
GLUCOSE SERPL-MCNC: 37 MG/DL (ref 74–99)
GLUCOSE UR STRIP.AUTO-MCNC: 100 MG/DL
HBA1C MFR BLD: 7.1 % (ref 4.2–5.6)
HCT VFR BLD AUTO: 36.2 % (ref 35–45)
HDLC SERPL-MCNC: 80 MG/DL (ref 40–60)
HDLC SERPL: 2.4 {RATIO} (ref 0–5)
HGB BLD-MCNC: 11.7 G/DL (ref 12–16)
HGB UR QL STRIP: NEGATIVE
IMM GRANULOCYTES # BLD AUTO: 0 K/UL (ref 0–0.04)
IMM GRANULOCYTES NFR BLD AUTO: 0 % (ref 0–0.5)
KETONES UR QL STRIP.AUTO: NEGATIVE MG/DL
LDLC SERPL CALC-MCNC: 97 MG/DL (ref 0–100)
LEUKOCYTE ESTERASE UR QL STRIP.AUTO: ABNORMAL
LIPID PROFILE,FLP: ABNORMAL
LYMPHOCYTES # BLD: 1.4 K/UL (ref 0.9–3.6)
LYMPHOCYTES NFR BLD: 22 % (ref 21–52)
MAGNESIUM SERPL-MCNC: 2 MG/DL (ref 1.6–2.6)
MCH RBC QN AUTO: 31.8 PG (ref 24–34)
MCHC RBC AUTO-ENTMCNC: 32.3 G/DL (ref 31–37)
MCV RBC AUTO: 98.4 FL (ref 78–100)
MICROALBUMIN UR-MCNC: 3.28 MG/DL (ref 0–3)
MICROALBUMIN/CREAT UR-RTO: 59 MG/G (ref 0–30)
MONOCYTES # BLD: 0.5 K/UL (ref 0.05–1.2)
MONOCYTES NFR BLD: 7 % (ref 3–10)
NEUTS SEG # BLD: 4.5 K/UL (ref 1.8–8)
NEUTS SEG NFR BLD: 68 % (ref 40–73)
NITRITE UR QL STRIP.AUTO: NEGATIVE
NRBC # BLD: 0 K/UL (ref 0–0.01)
NRBC BLD-RTO: 0 PER 100 WBC
PH UR STRIP: 6.5 [PH] (ref 5–8)
PLATELET # BLD AUTO: 308 K/UL (ref 135–420)
PMV BLD AUTO: 11.3 FL (ref 9.2–11.8)
POTASSIUM SERPL-SCNC: 4.4 MMOL/L (ref 3.5–5.5)
PROT SERPL-MCNC: 6.6 G/DL (ref 6.4–8.2)
PROT UR STRIP-MCNC: NEGATIVE MG/DL
RBC # BLD AUTO: 3.68 M/UL (ref 4.2–5.3)
RBC #/AREA URNS HPF: NORMAL /HPF (ref 0–5)
SODIUM SERPL-SCNC: 138 MMOL/L (ref 136–145)
SP GR UR REFRACTOMETRY: 1.01 (ref 1–1.03)
T4 FREE SERPL-MCNC: 1.4 NG/DL (ref 0.7–1.5)
TRIGL SERPL-MCNC: 70 MG/DL (ref ?–150)
TSH SERPL DL<=0.05 MIU/L-ACNC: 0.27 UIU/ML (ref 0.36–3.74)
UROBILINOGEN UR QL STRIP.AUTO: 1 EU/DL (ref 0.2–1)
VLDLC SERPL CALC-MCNC: 14 MG/DL
WBC # BLD AUTO: 6.5 K/UL (ref 4.6–13.2)
WBC URNS QL MICRO: NORMAL /HPF (ref 0–4)

## 2022-11-15 PROCEDURE — 82306 VITAMIN D 25 HYDROXY: CPT

## 2022-11-15 PROCEDURE — 85025 COMPLETE CBC W/AUTO DIFF WBC: CPT

## 2022-11-15 PROCEDURE — 83036 HEMOGLOBIN GLYCOSYLATED A1C: CPT

## 2022-11-15 PROCEDURE — 81001 URINALYSIS AUTO W/SCOPE: CPT

## 2022-11-15 PROCEDURE — 83735 ASSAY OF MAGNESIUM: CPT

## 2022-11-15 PROCEDURE — 80061 LIPID PANEL: CPT

## 2022-11-15 PROCEDURE — 84443 ASSAY THYROID STIM HORMONE: CPT

## 2022-11-15 PROCEDURE — 36415 COLL VENOUS BLD VENIPUNCTURE: CPT

## 2022-11-15 PROCEDURE — 84439 ASSAY OF FREE THYROXINE: CPT

## 2022-11-15 PROCEDURE — 82043 UR ALBUMIN QUANTITATIVE: CPT

## 2022-11-15 PROCEDURE — 80053 COMPREHEN METABOLIC PANEL: CPT

## 2022-11-15 NOTE — PROGRESS NOTES
On call 1710. Spoke with Max Scruggs, [facility] lab, Glucose 37. Called pt with no answer. Left message on vm with reason for my call. Will try again shortly. 1735 rec'd vm stating she was on the phone. Attempted to reach pts spouse via his cell phone and rec'd his vm. Left message stating purpose of call ie lab results. Will attempt to reach again. [phone] rec'd a text message from answering service stating pt sent message her BS is 115 and she is doing ok. Her phone keeps sending my calls to her vm.

## 2022-11-22 ENCOUNTER — OFFICE VISIT (OUTPATIENT)
Dept: INTERNAL MEDICINE CLINIC | Age: 67
End: 2022-11-22
Payer: MEDICARE

## 2022-11-22 VITALS
TEMPERATURE: 97.7 F | OXYGEN SATURATION: 98 % | SYSTOLIC BLOOD PRESSURE: 136 MMHG | BODY MASS INDEX: 27.86 KG/M2 | RESPIRATION RATE: 16 BRPM | HEART RATE: 75 BPM | DIASTOLIC BLOOD PRESSURE: 68 MMHG | HEIGHT: 65 IN | WEIGHT: 167.2 LBS

## 2022-11-22 DIAGNOSIS — K21.9 GASTROESOPHAGEAL REFLUX DISEASE, UNSPECIFIED WHETHER ESOPHAGITIS PRESENT: ICD-10-CM

## 2022-11-22 DIAGNOSIS — E10.42 TYPE 1 DIABETES MELLITUS WITH DIABETIC POLYNEUROPATHY (HCC): ICD-10-CM

## 2022-11-22 DIAGNOSIS — E03.4 HYPOTHYROIDISM DUE TO ACQUIRED ATROPHY OF THYROID: ICD-10-CM

## 2022-11-22 DIAGNOSIS — N18.31 STAGE 3A CHRONIC KIDNEY DISEASE (HCC): ICD-10-CM

## 2022-11-22 DIAGNOSIS — E78.5 HYPERLIPIDEMIA, UNSPECIFIED HYPERLIPIDEMIA TYPE: ICD-10-CM

## 2022-11-22 DIAGNOSIS — I10 ESSENTIAL HYPERTENSION: Primary | ICD-10-CM

## 2022-11-22 DIAGNOSIS — E10.3553 STABLE PROLIFERATIVE DIABETIC RETINOPATHY OF BOTH EYES ASSOCIATED WITH TYPE 1 DIABETES MELLITUS (HCC): ICD-10-CM

## 2022-11-22 DIAGNOSIS — E10.22 TYPE 1 DIABETES MELLITUS WITH STAGE 3A CHRONIC KIDNEY DISEASE (HCC): ICD-10-CM

## 2022-11-22 DIAGNOSIS — I12.9 HYPERTENSIVE KIDNEY DISEASE WITH STAGE 3A CHRONIC KIDNEY DISEASE (HCC): ICD-10-CM

## 2022-11-22 DIAGNOSIS — Z13.31 SCREENING FOR DEPRESSION: ICD-10-CM

## 2022-11-22 DIAGNOSIS — F17.211 NICOTINE DEPENDENCE, CIGARETTES, IN REMISSION: ICD-10-CM

## 2022-11-22 DIAGNOSIS — D64.9 ANEMIA, UNSPECIFIED TYPE: ICD-10-CM

## 2022-11-22 DIAGNOSIS — Z87.891 PERSONAL HISTORY OF TOBACCO USE, PRESENTING HAZARDS TO HEALTH: ICD-10-CM

## 2022-11-22 DIAGNOSIS — Z71.89 ADVANCED DIRECTIVES, COUNSELING/DISCUSSION: ICD-10-CM

## 2022-11-22 DIAGNOSIS — N18.31 TYPE 1 DIABETES MELLITUS WITH STAGE 3A CHRONIC KIDNEY DISEASE (HCC): ICD-10-CM

## 2022-11-22 DIAGNOSIS — Z00.00 MEDICARE ANNUAL WELLNESS VISIT, SUBSEQUENT: ICD-10-CM

## 2022-11-22 DIAGNOSIS — Z12.31 ENCOUNTER FOR SCREENING MAMMOGRAM FOR MALIGNANT NEOPLASM OF BREAST: ICD-10-CM

## 2022-11-22 DIAGNOSIS — N18.31 HYPERTENSIVE KIDNEY DISEASE WITH STAGE 3A CHRONIC KIDNEY DISEASE (HCC): ICD-10-CM

## 2022-11-22 PROCEDURE — 3078F DIAST BP <80 MM HG: CPT | Performed by: INTERNAL MEDICINE

## 2022-11-22 PROCEDURE — G8417 CALC BMI ABV UP PARAM F/U: HCPCS | Performed by: INTERNAL MEDICINE

## 2022-11-22 PROCEDURE — G9899 SCRN MAM PERF RSLTS DOC: HCPCS | Performed by: INTERNAL MEDICINE

## 2022-11-22 PROCEDURE — 1123F ACP DISCUSS/DSCN MKR DOCD: CPT | Performed by: INTERNAL MEDICINE

## 2022-11-22 PROCEDURE — 3074F SYST BP LT 130 MM HG: CPT | Performed by: INTERNAL MEDICINE

## 2022-11-22 PROCEDURE — G8427 DOCREV CUR MEDS BY ELIG CLIN: HCPCS | Performed by: INTERNAL MEDICINE

## 2022-11-22 PROCEDURE — G0463 HOSPITAL OUTPT CLINIC VISIT: HCPCS | Performed by: INTERNAL MEDICINE

## 2022-11-22 PROCEDURE — G8510 SCR DEP NEG, NO PLAN REQD: HCPCS | Performed by: INTERNAL MEDICINE

## 2022-11-22 PROCEDURE — G0296 VISIT TO DETERM LDCT ELIG: HCPCS | Performed by: INTERNAL MEDICINE

## 2022-11-22 PROCEDURE — 99497 ADVNCD CARE PLAN 30 MIN: CPT | Performed by: INTERNAL MEDICINE

## 2022-11-22 PROCEDURE — 2022F DILAT RTA XM EVC RTNOPTHY: CPT | Performed by: INTERNAL MEDICINE

## 2022-11-22 PROCEDURE — G8536 NO DOC ELDER MAL SCRN: HCPCS | Performed by: INTERNAL MEDICINE

## 2022-11-22 PROCEDURE — 99214 OFFICE O/P EST MOD 30 MIN: CPT | Performed by: INTERNAL MEDICINE

## 2022-11-22 PROCEDURE — 1101F PT FALLS ASSESS-DOCD LE1/YR: CPT | Performed by: INTERNAL MEDICINE

## 2022-11-22 PROCEDURE — 1090F PRES/ABSN URINE INCON ASSESS: CPT | Performed by: INTERNAL MEDICINE

## 2022-11-22 PROCEDURE — G8754 DIAS BP LESS 90: HCPCS | Performed by: INTERNAL MEDICINE

## 2022-11-22 PROCEDURE — G0439 PPPS, SUBSEQ VISIT: HCPCS | Performed by: INTERNAL MEDICINE

## 2022-11-22 PROCEDURE — G8399 PT W/DXA RESULTS DOCUMENT: HCPCS | Performed by: INTERNAL MEDICINE

## 2022-11-22 PROCEDURE — G8752 SYS BP LESS 140: HCPCS | Performed by: INTERNAL MEDICINE

## 2022-11-22 PROCEDURE — 3051F HG A1C>EQUAL 7.0%<8.0%: CPT | Performed by: INTERNAL MEDICINE

## 2022-11-22 PROCEDURE — 3017F COLORECTAL CA SCREEN DOC REV: CPT | Performed by: INTERNAL MEDICINE

## 2022-11-22 RX ORDER — LEVOTHYROXINE SODIUM 88 UG/1
88 TABLET ORAL
Qty: 1 TABLET | Refills: 0
Start: 2022-11-22

## 2022-11-22 NOTE — PROGRESS NOTES
This is the Subsequent Medicare Annual Wellness Exam, performed 12 months or more after the Initial AWV or the last Subsequent AWV    I have reviewed the patient's medical history in detail and updated the computerized patient record. Assessment/Plan   Education and counseling provided:  Are appropriate based on today's review and evaluation  End-of-Life planning (with patient's consent)  Influenza Vaccine  Screening Mammography  Colorectal cancer screening tests  Cardiovascular screening blood test  Bone mass measurement (DEXA)  Screening for glaucoma  Medical nutrition therapy for individuals with diabetes or renal disease  Lung cancer screening with low-dose chest CT scan    1. Essential hypertension  2. 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 Type 1 diabetes mellitus with stage 3a chronic kidney disease (HCC)  -     HEMOGLOBIN A1C WITH EAG; Future  -     METABOLIC PANEL, COMPREHENSIVE; Future  -     MICROALBUMIN, UR, RAND W/ MICROALB/CREAT RATIO; Future  4. Type 1 diabetes mellitus with diabetic polyneuropathy (HCC)  -     HEMOGLOBIN A1C WITH EAG; Future  -     METABOLIC PANEL, COMPREHENSIVE; Future  5. Hypertensive kidney disease with stage 3a chronic kidney disease ([facility]  -     MAGNESIUM; Future  -     METABOLIC PANEL, COMPREHENSIVE; Future  6. Stage 3a chronic kidney disease ([facility]  -     MAGNESIUM; Future  -     METABOLIC PANEL, COMPREHENSIVE; Future  -     MICROALBUMIN, UR, RAND W/ MICROALB/CREAT RATIO; Future  -     VITAMIN D, 25 HYDROXY; Future  7. Hyperlipidemia, unspecified hyperlipidemia type  -     LIPID PANEL; Future  8. Hypothyroidism due to acquired atrophy of thyroid  -     TSH 3RD GENERATION; Future  -     T4, FREE; Future  9. Gastroesophageal reflux disease, unspecified whether esophagitis present  10.  Anemia, unspecified type  -     CBC WITH AUTOMATED DIFF; Future  11. Medicare annual wellness visit, subsequent  -     ADVANCE CARE PLANNING FIRST 30 MINS  12. Advanced directives, counseling/discussion  -     ADVANCE CARE PLANNING FIRST 27 MINS  15. Screening for depression  14. Personal history of tobacco use, presenting hazards to health  -     CT LOW DOSE LUNG CANCER SCREENING; Future  15. Nicotine dependence, cigarettes, in remission  -     CT LOW DOSE LUNG CANCER SCREENING; Future  16. Encounter for screening mammogram for malignant neoplasm of breast  -     JUNIOR MAMMO BI SCREENING INCL CAD; Future       Depression Risk Factor Screening     3 most recent PHQ Screens 11/22/2022   Little interest or pleasure in doing things Not at all   Feeling down, depressed, irritable, or hopeless Not at all   Total Score PHQ 2 0       Alcohol & Drug Abuse Risk Screen    Do you average more than 1 drink per night or more than 7 drinks a week:  No    On any one occasion in the past three months have you have had more than 3 drinks containing alcohol:  No          Functional Ability and Level of Safety    Hearing: The patient wears hearing aids. Activities of Daily Living: The home contains: no safety equipment. Patient does total self care      Ambulation: with mild difficulty     Fall Risk:  Fall Risk Assessment, last 12 mths 11/22/2022   Able to walk? Yes   Fall in past 12 months? 0   Do you feel unsteady? 0   Are you worried about falling 0   Number of falls in past 12 months -   Fall with injury?  -      Abuse Screen:  Patient is not abused       Cognitive Screening    Has your family/caregiver stated any concerns about your memory: no     Cognitive Screening: None performed today    Health Maintenance Due     Health Maintenance Due   Topic Date Due    Shingrix Vaccine Age 49> (2 of 2) 11/08/2021    Flu Vaccine (1) 08/01/2022       Patient Care Team   Patient Care Team:  Baldomero Vieira MD as PCP - General (Internal Medicine Physician)  Baldomero Vieira MD as PCP - [facility]  Catina Judge MD as Physician (Endocrinology Physician)  Verna Rhodes MD (Ophthalmology)  Navin Sylvester MD (Gastroenterology)  Atiya Restrepo MD (Plastic Surgery)    History     Patient Active Problem List   Diagnosis Code    Stable proliferative diabetic retinopathy of both eyes associated with type 1 diabetes mellitus ([address] X56.3465    Hyperlipidemia E78.5    Hearing decreased H91.90    Diabetes mellitus with diabetic polyneuropathy ([address] E11.42    Vitamin D deficiency E55.9    Anemia D64.9    Former smoker Z87.891    Adenomatous polyp of colon D12.6    Essential hypertension I10    Hypothyroidism due to acquired atrophy of thyroid E03.4    Hypertensive kidney disease with stage 3a chronic kidney disease ([address] I12.9, N18.31    Type 1 diabetes mellitus with stage 3 chronic kidney disease (HCC) E10.22, N18.30    Macrocytosis D75.89    Coronary artery calcification seen on CT scan I25.10    GERD (gastroesophageal reflux disease) K21.9    Osteopenia of multiple sites M85.89     Past Medical History:   Diagnosis Date    Chronic renal disease, stage III ([address]     Diabetes mellitus type 1 ([address]     Diabetic peripheral neuropathy ([address]     Diabetic retinopathy ([address]     Essential hypertension with goal blood pressure less than 140/90     Hearing decreased     Hyperlipidemia     Hypothyroidism       Past Surgical History:   Procedure Laterality Date    HX GYN      partial hysterectomy    HX GYN      3 D and C's    HX HEENT      tonsillectomy    HX HEENT      cataract removal bilateral    HX HEENT      multiple surgeries for retinopathy    HX HYSTERECTOMY      HX ORTHOPAEDIC      carpral tunnel sugery bilateral     Current Outpatient Medications   Medication Sig Dispense Refill    levothyroxine (SYNTHROID) 88 mcg tablet Take 1 Tablet by mouth six (6) days a week.  1 Tablet 0    quinapriL (ACCUPRIL) 5 mg tablet TAKE 1 TABLET NIGHTLY WITH 10 MG TABLET EVERY EVENING 90 Tablet 3    quinapriL (ACCUPRIL) 10 mg tablet TAKE 1 TABLET EVERY EVENING ALONG WITH A 5 MG TABLET 90 Tablet 3    ezetimibe (ZETIA) 10 mg tablet TAKE 1 TABLET DAILY 90 Tablet 3    omeprazole (PRILOSEC) 40 mg capsule TAKE 1 CAPSULE DAILY 90 Capsule 3    Cholecalciferol, Vitamin D3, (VITAMIN D3) 2,000 unit cap capsule Take 2,000 Units by mouth daily. 30 Cap 5    ALPHAGAN P 0.1 % ophthalmic solution   3    glucose blood VI test strips (ONE TOUCH ULTRA TEST) strip USE TO TEST BLOOD SUGAR FOUR TIMES A  Strip PRN    cyanocobalamin (VITAMIN B12) 500 mcg tablet Take 500 mcg by mouth daily. aspirin delayed-release 81 mg tablet Take  by mouth daily. insulin glargine (LANTUS,BASAGLAR) 100 unit/mL (3 mL) inpn 18 Units by SubCUTAneous route daily. insulin lispro (HUMALOG) 100 unit/mL kwikpen by SubCUTAneous route.  Sliding scale as needed         Allergies   Allergen Reactions    Pcn [Penicillins] Hives    Sulfa (Sulfonamide Antibiotics) Nausea Only and Vertigo       Family History   Problem Relation Age of Onset    Diabetes Brother     Diabetes Mother     Heart Disease Mother      Social History     Tobacco Use    Smoking status: Former     Packs/day: 1.00     Years: 10.00     Pack years: 10.00     Types: Cigarettes     Quit date: 2015     Years since quittin.9    Smokeless tobacco: Never   Substance Use Topics    Alcohol use: Yes     Comment: sena Blount MD

## 2022-11-22 NOTE — PROGRESS NOTES
1. \"Have you been to the ER, urgent care clinic since your last visit? Hospitalized since your last visit? \" No    2. \"Have you seen or consulted any other health care providers outside of the [address] [place] [address] since your last visit? \" No     3. For patients aged 39-70: Has the patient had a colonoscopy / FIT/ Cologuard? Yes - no Care Gap present      If the patient is female:    4. For patients aged 41-77: Has the patient had a mammogram within the past 2 years? Yes - no Care Gap present      5. For patients aged 21-65: Has the patient had a pap smear?  NA - based on age or sex

## 2022-11-22 NOTE — ACP (ADVANCE CARE PLANNING)
Advance Care Planning     Advance Care Planning (ACP) Physician/NP/PA Conversation      Date of Conversation: 11/22/2022  Conducted with: Patient with Decision Making Capacity    Healthcare Decision Maker:     Primary Decision Maker: Natty Venegas - [phone]    Secondary Decision Maker: Dariana Smallwood - [phone]  Click here to complete [address] including selection of the Healthcare Decision Maker Relationship (ie \"Primary\")      Today we documented Decision Maker(s) consistent with Legal Next of Kin hierarchy. Care Preferences:    Hospitalization: \"If your health worsens and it becomes clear that your chance of recovery is unlikely, what would be your preference regarding hospitalization? \"  The patient would prefer hospitalization. Ventilation: \"If you were unable to breathe on your own and your chance of recovery was unlikely, what would be your preference about the use of a ventilator (breathing machine) if it was available to you? \"   The patient would desire the use of a ventilator. Resuscitation: \"In the event your heart stopped as a result of an underlying serious health condition, would you want attempts to be made to restart your heart, or would you prefer a natural death? \"   Yes, attempt to resuscitate.     Additional topics discussed: treatment goals, benefit/burden of treatment options, ventilation preferences, hospitalization preferences, resuscitation preferences, and end of life care preferences (vegetative state/imminent death)    Conversation Outcomes / Follow-Up Plan:   ACP in process - information provided, considering goals and options  Reviewed DNR/DNI and patient elects Full Code (Attempt Resuscitation)     Length of Voluntary ACP Conversation in minutes:  16 minutes    Tammy Palm MD

## 2022-11-22 NOTE — PATIENT INSTRUCTIONS
Please monitor and record your blood pressure every morning and evening for the next 2 weeks at least two hours after taking your medications. Please deliver record of readings to our office for review. Double current dose of Vitamin D    Decrease levothyroxine to 88 mcg 6 days per week. Heart-Healthy Diet: Care Instructions  Your Care Instructions     A heart-healthy diet has lots of vegetables, fruits, nuts, beans, and whole grains, and is low in salt. It limits foods that are high in saturated fat, such as meats, cheeses, and fried foods. It may be hard to change your diet, but even small changes can lower your risk of heart attack and heart disease. Follow-up care is a key part of your treatment and safety. Be sure to make and go to all appointments, and call your doctor if you are having problems. It's also a good idea to know your test results and keep a list of the medicines you take. How can you care for yourself at home? Watch your portions  Learn what a serving is. A \"serving\" and a \"portion\" are not always the same thing. Make sure that you are not eating larger portions than are recommended. For example, a serving of pasta is ½ cup. A serving size of meat is 2 to 3 ounces. A 3-ounce serving is about the size of a deck of cards. Measure serving sizes until you are good at [place]" them. Keep in mind that restaurants often serve portions that are 2 or 3 times the size of one serving. To keep your energy level up and keep you from feeling hungry, eat often but in smaller portions. Eat only the number of calories you need to stay at a healthy weight. If you need to lose weight, eat fewer calories than your body burns (through exercise and other physical activity). Eat more fruits and vegetables  Eat a variety of fruit and vegetables every day. Dark green, deep orange, red, or yellow fruits and vegetables are especially good for you.  Examples include spinach, carrots, peaches, and berries. Keep carrots, celery, and other veggies handy for snacks. Buy fruit that is in season and store it where you can see it so that you will be tempted to eat it. Cook dishes that have a lot of veggies in them, such as stir-fries and soups. Limit saturated and trans fat  Read food labels, and try to avoid saturated and trans fats. They increase your risk of heart disease. Use olive or canola oil when you cook. Bake, broil, grill, or steam foods instead of frying them. Choose lean meats instead of high-fat meats such as hot dogs and sausages. Cut off all visible fat when you prepare meat. Eat fish, skinless poultry, and meat alternatives such as soy products instead of high-fat meats. Soy products, such as tofu, may be especially good for your heart. Choose low-fat or fat-free milk and dairy products. Eat foods high in fiber  Eat a variety of grain products every day. Include whole-grain foods that have lots of fiber and nutrients. Examples of whole-grain foods include oats, whole wheat bread, and brown rice. Buy whole-grain breads and cereals, instead of white bread or pastries. Limit salt and sodium  Limit how much salt and sodium you eat to help lower your blood pressure. Taste food before you salt it. Add only a little salt when you think you need it. With time, your taste buds will adjust to less salt. Eat fewer snack items, fast foods, and other high-salt, processed foods. Check food labels for the amount of sodium in packaged foods. Choose low-sodium versions of canned goods (such as soups, vegetables, and beans). Limit sugar  Limit drinks and foods with added sugar. These include candy, desserts, and soda pop. Limit alcohol  Limit alcohol to no more than 2 drinks a day for men and 1 drink a day for women. Too much alcohol can cause health problems. When should you call for help?   Watch closely for changes in your health, and be sure to contact your doctor if:    You would like help planning heart-healthy meals. Where can you learn more? Go to http://www.pavon.com/  Enter V137 in the search box to learn more about \"Heart-Healthy Diet: Care Instructions. \"  Current as of: August 22, 2019               Content Version: 12.6  © 4645-6484 [employer]. Care instructions adapted under license by [employer] (which disclaims liability or warranty for this information). If you have questions about a medical condition or this instruction, always ask your healthcare professional. [address] any warranty or liability for your use of this information. Learning About Low-Sodium Foods  What foods are low in sodium? The foods you eat contain nutrients, such as vitamins and minerals. Sodium is a nutrient. Your body needs the right amount to stay healthy and work as it should. You can use the list below to help you make choices about which foods to eat. Fruits  Fresh, frozen, canned, or dried fruit  Vegetables  Fresh or frozen vegetables, with no added salt  Canned vegetables, low-sodium or with no added salt  Grains  Bagels without salted tops  Cereal with no added salt  Corn tortillas  Crackers with no added salt  Oatmeal, cooked without salt  Popcorn with no salt  Pasta and noodles, cooked without salt  Rice, cooked without salt  Unsalted pretzels  Dairy and dairy alternatives  Butter, unsalted  Cream cheese  Ice cream  Milk  Soy milk  Meats and other protein foods  Beans and peas, canned with no salt  Eggs  Fresh fish (not smoked)  Fresh meats (not smoked or cured)  Nuts and nut butter, prepared without salt  Poultry, not packaged with sodium solution  Tofu, unseasoned  Tuna, canned without salt  Seasonings  Garlic  Herbs and spices  Lemon juice  Mustard  Olive oil  Salt-free seasoning mixes  Vinegar  Work with your doctor to find out how much of this nutrient you need.  Depending on your health, you may need more or less of it in your diet. Where can you learn more? Go to http://www.gray.com/  Enter S460 in the search box to learn more about \"Learning About Low-Sodium Foods. \"  Current as of: August 22, 2019               Content Version: 12.6  © 8241-5638 [employer]. Care instructions adapted under license by [employer] (which disclaims liability or warranty for this information). If you have questions about a medical condition or this instruction, always ask your healthcare professional. [address] any warranty or liability for your use of this information. Low Sodium Diet (2,000 Milligram): Care Instructions  Your Care Instructions     Too much sodium causes your body to hold on to extra water. This can raise your blood pressure and force your heart and kidneys to work harder. In very serious cases, this could cause you to be put in the hospital. It might even be life-threatening. By limiting sodium, you will feel better and lower your risk of serious problems. The most common source of sodium is salt. People get most of the salt in their diet from canned, prepared, and packaged foods. Fast food and restaurant meals also are very high in sodium. Your doctor will probably limit your sodium to less than 2,000 milligrams (mg) a day. This limit counts all the sodium in prepared and packaged foods and any salt you add to your food. Follow-up care is a key part of your treatment and safety. Be sure to make and go to all appointments, and call your doctor if you are having problems. It's also a good idea to know your test results and keep a list of the medicines you take. How can you care for yourself at home? Read food labels  Read labels on cans and food packages. The labels tell you how much sodium is in each serving. Make sure that you look at the serving size. If you eat more than the serving size, you have eaten more sodium.   Food labels also tell you the Percent Daily Value for sodium. Choose products with low Percent Daily Values for sodium. Be aware that sodium can come in forms other than salt, including monosodium glutamate (MSG), sodium citrate, and sodium bicarbonate (baking soda). MSG is often added to Asian food. When you eat out, you can sometimes ask for food without MSG or added salt. Buy low-sodium foods  Buy foods that are labeled \"unsalted\" (no salt added), \"sodium-free\" (less than 5 mg of sodium per serving), or \"low-sodium\" (less than 140 mg of sodium per serving). Foods labeled \"reduced-sodium\" and \"light sodium\" may still have too much sodium. Be sure to read the label to see how much sodium you are getting. Buy fresh vegetables, or frozen vegetables without added sauces. Buy low-sodium versions of canned vegetables, soups, and other canned goods. Prepare low-sodium meals  Cut back on the amount of salt you use in cooking. This will help you adjust to the taste. Do not add salt after cooking. One teaspoon of salt has about 2,300 mg of sodium. Take the salt shaker off the table. Flavor your food with garlic, lemon juice, onion, vinegar, herbs, and spices. Do not use soy sauce, lite soy sauce, steak sauce, onion salt, garlic salt, celery salt, mustard, or ketchup on your food. Use low-sodium salad dressings, sauces, and ketchup. Or make your own salad dressings and sauces without adding salt. Use less salt (or none) when recipes call for it. You can often use half the salt a recipe calls for without losing flavor. Other foods such as rice, pasta, and grains do not need added salt. Rinse canned vegetables, and cook them in fresh water. This removes some--but not all--of the salt. Avoid water that is naturally high in sodium or that has been treated with water softeners, which add sodium. Call your local water company to find out the sodium content of your water supply.  If you buy bottled water, read the label and choose a sodium-free brand. Avoid high-sodium foods  Avoid eating:  Smoked, cured, salted, and canned meat, fish, and poultry. Ham, ovalle, hot dogs, and luncheon meats. Regular, hard, and processed cheese and regular peanut butter. Crackers with salted tops, and other salted snack foods such as pretzels, chips, and salted popcorn. Frozen prepared meals, unless labeled low-sodium. Canned and dried soups, broths, and bouillon, unless labeled sodium-free or low-sodium. Canned vegetables, unless labeled sodium-free or low-sodium. [place] fries, pizza, tacos, and other fast foods. Pickles, olives, ketchup, and other condiments, especially soy sauce, unless labeled sodium-free or low-sodium. Where can you learn more? Go to http://www.gray.com/  Enter V843 in the search box to learn more about \"Low Sodium Diet (2,000 Milligram): Care Instructions. \"  Current as of: August 22, 2019               Content Version: 12.6  © 3628-3500 [employer]. Care instructions adapted under license by [employer] (which disclaims liability or warranty for this information). If you have questions about a medical condition or this instruction, always ask your healthcare professional. [place] [address] any warranty or liability for your use of this information. Medicare Wellness Visit, Female    The best way to improve and maintain good health is to have a healthy lifestyle by eating a well-balanced diet, exercising regularly, limiting alcohol and stopping smoking. Regular visits with your physician or non-physician health care provider also support your good health. Preventive screening tests can find health problems before they become diseases or illnesses. Preventive services such as immunizations prevent serious infections.     All people over age 72 should have a Pneumovax and a Prevnar-13 shot to prevent potentially life threatening infections with the pneumococcus bacteria, a common cause of pneumonia. These are once in a lifetime unless you and your provider decide differently. All people over 65 should have a yearly influenza vaccine or \"flu\" shot. This does not prevent infection with cold viruses but has been proven to prevent hospitalization and death from influenza. Although Medicare part B \"regular Medicare\" currently only covers tetanus vaccination in the context of an injury, a tetanus vaccine (Tdap or Td) is recommended every 10 years. A shingles vaccine is recommended once in a lifetime after age 61. The Shingles vaccine is also not covered by Medicare part B. Note, however, that both the Shingles vaccine and Tdap/Td are generally covered by secondary carriers. Please check your coverage and out of pocket expenses. Consider contacting your local health department because it may stock these vaccines for a reasonable charge.     We currently have documentation of the following immunization history for you:  Immunization History   Administered Date(s) Administered    COVID-19, MODERNA BLUE border, Primary or Immunocompromised, (age 18y+), IM, 100 mcg/0.5mL 02/12/2021, 03/12/2021    COVID-19, MODERNA Booster BLUE border, (age 18y+), IM, 50mcg/0.25mL 10/24/2021, 03/31/2022    COVID-19, PFIZER Bivalent BOOSTER, (age 12y+), IM, 30 mcg/0.3 mL dose 10/05/2022    Influenza Vaccine PF 12/22/2014    Influenza Vaccine Split 11/04/2011, 10/17/2012    Influenza Vaccine Whole 11/09/2010    Influenza, FLUAD, (age 72 y+), Adjuvanted 11/05/2020, 09/29/2021    Influenza, FLUARIX, FLULAVAL, FLUZONE (age 10 mo+) AND AFLURIA, (age 1 y+), PF, 0.5mL 12/15/2015, 09/14/2016, 10/19/2017, 11/07/2018, 12/10/2019    Pneumococcal Conjugate (PCV-13) 04/18/2020    Pneumococcal Polysaccharide (PPSV-23) 04/13/2017, 05/31/2019    Tdap 06/25/2013    Zoster Recombinant 09/13/2021    Zoster Vaccine, Live 05/31/2016       Screening for infection with Hepatitis C is recommended for anyone born between [birth year] through [address]. The table at the bottom of this document indicates the status of this and other preventive services. A bone mass density test (DEXA) to screen for osteoporosis or thinning of the bones should be done at least once after age 72 and may be done up to every 2 years as determined by you and your health care provider. The most recent DEXA we have on file for you is:  DEXA Results (most recent):  Results from [place] encounter on 10/08/20    DEXA BONE DENSITY STUDY AXIAL    Narrative  DEXA BONE DENSITOMETRY, CENTRAL    CLINICAL INDICATION/HISTORY: Postmenopausal. Thyroid disorder/medication. Family  history of hip fracture. CLINICAL INDICATION/HISTORY: Using GE LUNAR Prodigy densitometer, bone density  measurement was performed in the lumbar spine in addition to the proximal left  and right femora. T score refers to standard deviations above or below average  compared to a young adult of the same sex. Z score refers to standard  deviations above or below average compared to a patient of the same sex, age,  race and weight. COMPARISON: The patient's prior studies are no longer available on the bone  densitometry unit for comparison trending due to a prior computer issue on the  densitometry unit. The data from the prior study of May 14, 2007 has been  included on this study. FINDINGS:    Lumbar Spine Levels: L1-L4  Mean Bone Mineral Density (BMD):  1.148 g/cm2  (1.355 BMD on previous study)  T Score: -0.4     (1.3 T score on previous)  Z Score: 0.8    Left Total Proximal Femur BMD: 0.870  (1.071 BMD on previous study)  T Score: -1.1     (0.5 T score on previous)  Z Score: -0.2    Right Total Proximal Femur BMD: 0.869  (1.051 BMD on previous study)  T Score: -1.1     (0.3 T score on previous)  Z Score: -0.2    Left Femoral Neck BMD:  0.902 g/cm2  T Score: -1.0  Z Score: 0.2    Right Femoral Neck BMD:  0.864 g/cm2  T Score: -1.3  Z Score: 0.0    Impression  IMPRESSION:    1. BMD measures consistent with osteopenia/low bone density. 2.  This will serve as the new baseline study at this institution. Based upon current ISCD guidelines, the patient's overall diagnostic category,  selected using WHO criteria in postmenopausal women and males aged 48 and above,  is selected based upon the lowest T score from among the lumbar spine, total  femur, femoral neck, (or distal third radius if measured). WHO Definition of Osteoporosis and Osteopenia on DXA (specified for post  menopausal  females):    Normal:                     T Score at or above -1 SD  Osteopenia:               T Score between -1 and -2.5 SD  Osteoporosis:             T Score at or below -2.5 SD    The risk of fracture approximately doubles for each 1 SD decrease in T score. It is important to consider other factors in assessing a patient's risk of  fracture, including age, risk of falling/injury, history of fragility fracture,  family history of osteoporosis, smoking, low weight. Various fracture risk tools have been developed for adult patients and are  available online. For example, the FRAX tool developed by [facility] is widely used. Reference www.iscd.org. It is also important to note that DXA measures bone density but does not  distinguish among causes of decreased bone density, which include primary vs.  secondary osteoporosis (such as metabolic bone disorders or possible effects of  medications) and also other conditions (such as osteomalacia). Clinical  considerations should determine what additional evaluation may be warranted to  exclude secondary conditions in a patient with low bone density. It is  suggested that secondary causes of low bone density be considered in patients  with Z score lower than -2.0, and patients who are not responding to therapy for  osteoporosis on followup DXA despite compliance with medications.     Please note that reliable, valid comparisons can not be made between studies  which have been performed on different densitometers. If clinically warranted,  follow up study performed at this site would best permit assessment of trend for  possible change in bone mineral density over time in comparison to this study. Thank you for this referral.      Screening for diabetes mellitus with a blood sugar test (glucose) should be done at least every 3 years until age 79. You and your health care provider may decide whether to continue screening after age 79. The most recent blood glucose we have on file for you is:   Lab Results   Component Value Date/Time    Glucose 37 (LL) 11/15/2022 09:58 AM         Glaucoma is a disease of the eye due to increased ocular pressure that can lead to blindness. People with risk factors for glaucoma ( race, diabetes, family history) should be screened at least every 2 years by an eye professional.     Cardiovascular screening tests that check for elevated lipids or cholesterol (fatty part of blood) which can lead to heart disease and strokes should be done every 4-6 years through age 79. You and your health care provider may decide whether to continue screening after age 79. The most recent lipid panel we have on file for you is:   Lab Results   Component Value Date/Time    Cholesterol, total 191 11/15/2022 09:58 AM    HDL Cholesterol 80 (H) 11/15/2022 09:58 AM    LDL, calculated 97 11/15/2022 09:58 AM    VLDL, calculated 14 11/15/2022 09:58 AM    Triglyceride 70 11/15/2022 09:58 AM    CHOL/HDL Ratio 2.4 11/15/2022 09:58 AM       Colorectal cancer screening that evaluates for blood or polyps in your colon for people with average risk should be done yearly as a stool test, every five years as a flexible sigmoidoscope or every 10 years as a colonoscopy up to age 76. You and your health care provider may decide whether to continue screening after age 76.     Breast cancer screening with a mammogram is recommended at least once every 2 years for women age 54-69. You and your health care provider may decide whether to continue screening after age 76. The most recent mammogram we have on file for you is:   [facility] Results (most recent):  Results from Hospital Encounter encounter on 10/21/21    JUNIOR 3D SANDY W MAMMO BI SCREENING INCL CAD    Narrative  EXAM: DIGITAL SCREENING BILAT MAMMOGRAM WITH TOMOSYNTHESIS    CLINICAL INDICATION/HISTORY: Screening. COMPARISON: 2014, 2016, 2017, 2019, 2020. TECHNIQUE: Digital 2D and 3D Tomosynthesis with CAD was performed. BREAST DENSITY:  C - The breasts are heterogeneously dense, which may obscure  small masses. FINDINGS:    No developing masses or suspicious calcifications are seen. Impression  No evidence for malignancy. BI-RADS CATEGORY: BI-RADS 1 : Negative    FOLLOWUP RECOMMENDATIONS: Routine annual followup. Thank you for enabling us to participate in the care of this patient. Screening for cervical cancer with a pap smear is recommended for all women with a cervix until age 72. The frequency of this test is based on the details of her prior pap smear testing. You and your health care provider may decide whether to continue screening after age 72. People who have smoked the equivalent of 1 pack per day for 30 years or more may benefit from screening for lung cancer with a yearly low dose CT scan until they have been non smokers for 15 years or competing health conditions render this unlikely to be beneficial. Our records show: n/a    Your Medicare Wellness Exam is recommended annually.     Here is a list of your current Health Maintenance items with a due date:  Health Maintenance   Topic Date Due    Shingrix Vaccine Age 49> (2 of 2) 11/08/2021    Flu Vaccine (1) 08/01/2022    DTaP/Tdap/Td series (2 - Td or Tdap) 06/25/2023    Depression Screen  08/03/2023    Foot Exam Q1  08/03/2023    Breast Cancer Screen Mammogram  10/21/2023    A1C test (Diabetic or Prediabetic)  11/15/2023 MICROALBUMIN Q1  11/15/2023    Lipid Screen  11/15/2023    Medicare Yearly Exam  11/23/2023    Eye Exam Retinal or Dilated  03/10/2024    Pneumococcal 65+ years (3 - PPSV23 if available, else PCV20) 05/31/2024    Colorectal Cancer Screening Combo  09/30/2024    Hepatitis C Screening  Completed    Bone Densitometry (Dexa) Screening  Completed    COVID-19 Vaccine  Completed

## 2022-11-23 ENCOUNTER — PATIENT MESSAGE (OUTPATIENT)
Dept: INTERNAL MEDICINE CLINIC | Age: 67
End: 2022-11-23

## 2022-11-25 DIAGNOSIS — Z12.31 ENCOUNTER FOR SCREENING MAMMOGRAM FOR MALIGNANT NEOPLASM OF BREAST: ICD-10-CM

## 2022-11-25 RX ORDER — QUINAPRIL 10 MG/1
TABLET ORAL
Qty: 90 TABLET | Refills: 3 | Status: CANCELLED | OUTPATIENT
Start: 2022-11-25

## 2022-11-25 RX ORDER — QUINAPRIL 5 1/1
TABLET ORAL
Qty: 90 TABLET | Refills: 3 | Status: CANCELLED | OUTPATIENT
Start: 2022-11-25

## 2022-11-25 NOTE — TELEPHONE ENCOUNTER
From: Helio Bahena  To: Nkechi Moreno MD  Sent: 11/23/2022 6:36 PM EST  Subject: Venu Krishnan 10 mg    [facility] could not refill my Quinipril 10 mg. The person I talked to said they were out and probably soon will be out of the 5 mg. The pharmacist suggested you may want to prescribe me something else. They can only order from manufacturers in the [facility]. We could try my [employer]. They do not have to abide my such rules. Any suggestions will be appreciated or please just go ahead and use [facility]. Thank you and let me know. Hope you had a great Thanksgiving!

## 2022-11-25 NOTE — PROGRESS NOTES
HPI:   Erwin Duong is a 79y.o. year old female who presents today for a routine visit. She has a history of hypertension, hyperlipidemia, diabetes mellitus type 1, chronic renal disease stage 3, and hypothyroidism. She completed the Moderna COVID 19 vaccine series and received two Moderna booster doses. She reports that she is doing reasonably well. She states that she is remaining active walking and working in her yard. She has been taking her medications as prescribed. She is otherwise without new complaints. Summary of prior hospitalizations and medical history:  On 4/26/2018, she presented with worsening lower extremity edema. She stated that previously, her swelling would improve overnight, but she had been noticing recently that it did not resolve. She also reported some mild dyspnea with exertion, such as walking upstairs, which had been present chronically, although may have been slightly worse. She also reported some \"skipped beats\" although not prolonged. She denied any chest pain, shortness of breath at rest or with normal activities, lightheadedness, PND, or orthopnea. She was started on low dose lasix, and referred for an echocardiogram, Holter monitor, and lower extremity duplex scan, but prior to having these performed, presented to [facility] ED for evaluation of pain and swelling of her right ankle. While in ED, CBC, CMP, and D-dimer were unremarkable. Cardiac enzymes x 2 were negative, and EKG was unchanged. She was observed overnight and underwent a pharmacologic nuclear stress test (5/3/2018) which was a normal low risk study, without evidence of ischemia or prior infarction, and EF 86%.  She was subsequently discharged, and underwent an echocardiogram (5/10/2018) showed normal LV function (EF 55-60%) no RWMA, mild LAE, mild-moderate TR, RVSP normal (22 mmHg); holter monitor (5/1/2018) showing sinus rhythm with sinus arrhythmia at times; rare PVC's; one short run of non-sustained of SVT for 6 beats. Episode of sinus tachycardia recorded in diary occurred while she was undergoing pharmacologic nuclear stress test. No other symptoms reported; and lower extremity duplex (5/11/2018) which was negative for DVT and venous reflux. On 5/8/2018, she was evaluated by a podiatrist, Dr. Diogenes Ahuja, for her right foot swelling. Felt that it was most likely secondary to sprain injury as x-rays negative and presentation not consistent with gout or other form of arthritis. She was started on a Medrol dose pack. However, her blood sugars increased to >400 and she was changed to ibuprofen 400 mg tid. She did have a right foot MRI (5/26/2018) which showed considerable edema involving lower leg, hindfoot and midfoot, nonlocalizing a nonspecific; evidence of old injury to the ATF ligament. Other ankle ligaments are intact; mild peroneus brevis tendinopathy without measurable tear; small partial thickness longitudinal split in the peroneus longus tendon within the retromalleolar groove; mild distal Achilles tendinosis; and small subchondral cyst in the anterior facet of the talus with otherwise no degenerative change seen in any of the hindfoot or midfoot articulations. She states that she was told that the right foot swelling was due to an old injury, but she states that it persists although she does not feel much pain due to her neuropathy. She has a history of diabetes mellitus type 1, with onset at age 6. She is currently under the care of Dr. Shobha Fine, and is being treated with insulin glargine and lispro. She had not been well-controlled, with HbA1c ranging from 7.5-8.0, although has ow improved since using the Freestyle meter as discussed. She does have proliferative diabetic retinopathy and is s/p pan-retinal photocoagulation therapy in both eyes. She is followed closely by Dr. Blanca Erwin, and recent exam showed regression bilaterally.  She also has peripheral neuropathy, with burning and tingling in her feet at night. Renal function had been normal until 5/2016, with evidence of chronic renal disease, stage 3, since that time with baseline creatinine1.0-1.04/ eGFR 54-56. She also has a history of hypothyroidism, and she is currently on Synthroid. She denies cold intolerance, hair or skin changes. She has a history of hypertension, treated with quinapril. She had a pharmacologic nuclear stress test in 11/2012, which showed no evidence for ischemia, and normal LV wall motion (EF> 70%). She also had a carotid duplex scan in 7/2013 which showed mild (< 50%) bilateral internal carotid artery stenoses. She denies any chest pain, shortness of breath at rest or with exertion, palpitations, lightheadedness, or edema. She has a history of hyperlipidemia, and was on atorvastatin for many years until 10/2015 when she discontinued it because of myalgias. She was recently tried on rosuvastatin without success due to recurrent myalgias. She was started on ezetimibe in 9/2016 and has been tolerating it without difficulty. She successfully stopped smoking in 1/2015, and reports that she continues to abstain. She had a screening colonoscopy in 9/2014 by Dr. Judith Heck, which found a 6 mm sessile polyp in the descending colon and a 4-5 mm sessile polyp in the rectum/sigmoid (pathology: tubular adenomas). She had a repeat colonoscopy in 9/2019 showing a 6 mm sessile polyp in the ileocecal valve (pathology unavailable). Recommendation was for follow-up colonoscopy in 5 years. She underwent an upper endoscopy on 3/9/2021 by Dr. Supriya Solorio due to worsening reflux symptoms and abnormality seen on chest CT scan (1/8/2021) which noted an incidental finding of poorly distended distal esophagus with mild wall prominence. It showed a normal esophagus without mass or esophagitis, a hiatal hernia, and retained food in the stomach. She was advised to continue omeprazole 40 mg daily and to eat smaller more frequent meals.  She denies any abdominal pain, nausea, vomiting, melena, hematochezia, or change in bowel movements. In 3/2017, she was diagnosed with an upper respiratory tract infection and was prescribed doxycycline and prednisone by [facility]. She subsequently developed severe dizziness and vomiting, prompting a visit to the [facility] [place] where she was diagnosed with benign positional vertigo and treated with meclizine and Zofran with improvement. In 10/2017, she noticed difficulty with her peripheral vision and was evaluated by Dr. Elsa Lakhani. She has regressed proliferative diabetic retinopathy and she reports that the laser therapy she received many years ago to treat this resulted in the current limitations in her peripheral vision. She does also have mild glaucoma for which she is receiving treatment. She states that her driving has been restricted to only during the day, and she is aware that she must turn her head completely to each side when navigating behind the wheel. In 2/2018, she began having difficulty with right elbow pain and was evaluated by Dr. David Siddiqi who diagnosed her with right lateral epicondylitis. She received a cortisone injection with improvement. In 10/2018, while hiking on vacation hiking in [place], she slipped on some wooden steps and landed on her left shoulder. She was transported to [facility] in [place], [place], and found to have a closed nondisplaced fracture of the left proximal humerus. She was placed in a sling and told to follow-up with orthopedics. She was being followed by Dr. Marjorie Aggarwal of [facility], and completed physical therapy with improvement. She is no longer requiring pain medication. On 1/5/2022, she completed plastic surgery on her face with Dr. Randy Melara, which included a surgical lift and laser treatment to address her wrinkles. She reports that surgery went well without complication.   She also completed Botox injections to address the wrinkles on her forehead which were not corrected during surgery. Past Medical History:   Diagnosis Date    Chronic renal disease, stage III ([address]     Diabetes mellitus type 1 ([address]     Diabetic peripheral neuropathy ([address]     Diabetic retinopathy ([address]     Essential hypertension with goal blood pressure less than 140/90     Hearing decreased     Hyperlipidemia     Hypothyroidism      Past Surgical History:   Procedure Laterality Date    HX GYN      partial hysterectomy    HX GYN      3 D and C's    HX HEENT      tonsillectomy    HX HEENT      cataract removal bilateral    HX HEENT      multiple surgeries for retinopathy    HX HYSTERECTOMY      HX ORTHOPAEDIC      carpral tunnel sugery bilateral     Current Outpatient Medications   Medication Sig    levothyroxine (SYNTHROID) 88 mcg tablet Take 1 Tablet by mouth six (6) days a week. quinapriL (ACCUPRIL) 5 mg tablet TAKE 1 TABLET NIGHTLY WITH 10 MG TABLET EVERY EVENING    quinapriL (ACCUPRIL) 10 mg tablet TAKE 1 TABLET EVERY EVENING ALONG WITH A 5 MG TABLET    ezetimibe (ZETIA) 10 mg tablet TAKE 1 TABLET DAILY    omeprazole (PRILOSEC) 40 mg capsule TAKE 1 CAPSULE DAILY    Cholecalciferol, Vitamin D3, (VITAMIN D3) 2,000 unit cap capsule Take 2,000 Units by mouth daily. ALPHAGAN P 0.1 % ophthalmic solution     glucose blood VI test strips (ONE TOUCH ULTRA TEST) strip USE TO TEST BLOOD SUGAR FOUR TIMES A DAY    cyanocobalamin (VITAMIN B12) 500 mcg tablet Take 500 mcg by mouth daily. aspirin delayed-release 81 mg tablet Take  by mouth daily. insulin glargine (LANTUS,BASAGLAR) 100 unit/mL (3 mL) inpn 18 Units by SubCUTAneous route daily. insulin lispro (HUMALOG) 100 unit/mL kwikpen by SubCUTAneous route. Sliding scale as needed       No current facility-administered medications for this visit. Allergies and Intolerances:    Allergies   Allergen Reactions    Pcn [Penicillins] Hives    Sulfa (Sulfonamide Antibiotics) Nausea Only and Vertigo     Family History: Grandmother  from colon cancer. Mother had CAD and DM. Family History   Problem Relation Age of Onset    Diabetes Brother     Diabetes Mother     Heart Disease Mother      Social History:   She  reports that she quit smoking about 7 years ago. Her smoking use included cigarettes. She has a 10.00 pack-year smoking history. She has never used smokeless tobacco. She is  and lives with her . 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 Social History     Substance and Sexual Activity   Alcohol Use Yes    Comment: infrequently     Immunization History:  Immunization History   Administered Date(s) Administered    COVID-19, MODERNA BLUE border, Primary or Immunocompromised, (age 18y+), IM, 100 mcg/0.5mL 2021, 2021    COVID-19, MODERNA Booster BLUE border, (age 18y+), IM, 50mcg/0.25mL 10/24/2021, 2022    COVID-19, PFIZER Bivalent BOOSTER, (age 12y+), IM, 30 mcg/0.3 mL dose 10/05/2022    Influenza Vaccine PF 2014    Influenza Vaccine Split 2011, 10/17/2012    Influenza Vaccine Whole 2010    Influenza, FLUAD, (age 72 y+), Adjuvanted 2020, 2021    Influenza, FLUARIX, FLULAVAL, FLUZONE (age 10 mo+) AND AFLURIA, (age 1 y+), PF, 0.5mL 12/15/2015, 2016, 10/19/2017, 2018, 12/10/2019    Pneumococcal Conjugate (PCV-13) 2020    Pneumococcal Polysaccharide (PPSV-23) 2017, 2019    Tdap 2013    Zoster Recombinant 2021    Zoster Vaccine, Live 2016       Review of Systems:   As above included in HPI.   Otherwise 11 point review of systems negative including constitutional, skin, HENT, eyes, respiratory, cardiovascular, gastrointestinal, genitourinary, musculoskeletal, endo/heme/aller, neurological.      Physical:   Visit Vitals  /68   Pulse 75   Temp 97.7 °F (36.5 °C) (Temporal)   Resp 16   Ht 5' 5\" (1.651 m)   Wt 167 lb 3.2 oz (75.8 kg)   SpO2 98%   BMI 27.82 kg/m²       Exam:   Patient appears in no apparent distress. Affect is appropriate. HEENT: PERRLA, anicteric, oropharynx clear, no JVD, adenopathy or thyromegaly. No carotid bruits or radiated murmur. Lungs: clear to auscultation, no wheezes, rhonchi, or rales. Heart: regular rate and rhythm. No murmur, rubs, gallops  Abdomen: soft, nontender, nondistended, normal bowel sounds, no hepatosplenomegaly or masses. Extremities: without edema. Review of Data:  Labs:   Hospital Outpatient Visit on 11/15/2022   Component Date Value Ref Range Status    WBC 11/15/2022 6.5  4.6 - 13.2 K/uL Final    RBC 11/15/2022 3.68 (A)  4.20 - 5.30 M/uL Final    HGB 11/15/2022 11.7 (A)  12.0 - 16.0 g/dL Final    HCT 11/15/2022 36.2  35.0 - 45.0 % Final    MCV 11/15/2022 98.4  78.0 - 100.0 FL Final    MCH 11/15/2022 31.8  24.0 - 34.0 PG Final    MCHC 11/15/2022 32.3  31.0 - 37.0 g/dL Final    RDW 11/15/2022 11.6  11.6 - 14.5 % Final    PLATELET 07/37/2366 893  135 - 420 K/uL Final    MPV 11/15/2022 11.3  9.2 - 11.8 FL Final    NRBC 11/15/2022 0.0  0  WBC Final    ABSOLUTE NRBC 11/15/2022 0.00  0.00 - 0.01 K/uL Final    NEUTROPHILS 11/15/2022 68  40 - 73 % Final    LYMPHOCYTES 11/15/2022 22  21 - 52 % Final    MONOCYTES 11/15/2022 7  3 - 10 % Final    EOSINOPHILS 11/15/2022 2  0 - 5 % Final    BASOPHILS 11/15/2022 1  0 - 2 % Final    IMMATURE GRANULOCYTES 11/15/2022 0  0.0 - 0.5 % Final    ABS. NEUTROPHILS 11/15/2022 4.5  1.8 - 8.0 K/UL Final    ABS. LYMPHOCYTES 11/15/2022 1.4  0.9 - 3.6 K/UL Final    ABS. MONOCYTES 11/15/2022 0.5  0.05 - 1.2 K/UL Final    ABS. EOSINOPHILS 11/15/2022 0.1  0.0 - 0.4 K/UL Final    ABS. BASOPHILS 11/15/2022 0.0  0.0 - 0.1 K/UL Final    ABS. IMM. GRANS.  11/15/2022 0.0  0.00 - 0.04 K/UL Final    DF 11/15/2022 AUTOMATED    Final    Hemoglobin A1c 11/15/2022 7.1 (A)  4.2 - 5.6 % Final    Est. average glucose 11/15/2022 157  mg/dL Final    LIPID PROFILE 11/15/2022 Final    Cholesterol, total 11/15/2022 191  <200 MG/DL Final    Triglyceride 11/15/2022 70  <150 MG/DL Final    HDL Cholesterol 11/15/2022 80 (A)  40 - 60 MG/DL Final    LDL, calculated 11/15/2022 97  0 - 100 MG/DL Final    VLDL, calculated 11/15/2022 14  MG/DL Final    CHOL/HDL Ratio 11/15/2022 2.4  0 - 5.0   Final    Magnesium 11/15/2022 2.0  1.6 - 2.6 mg/dL Final    Sodium 11/15/2022 138  136 - 145 mmol/L Final    Potassium 11/15/2022 4.4  3.5 - 5.5 mmol/L Final    Chloride 11/15/2022 104  100 - 111 mmol/L Final    CO2 11/15/2022 30  21 - 32 mmol/L Final    Anion gap 11/15/2022 4  3.0 - 18 mmol/L Final    Glucose 11/15/2022 37 (A)  74 - 99 mg/dL Final    BUN 11/15/2022 25 (A)  7.0 - 18 MG/DL Final    Creatinine 11/15/2022 0.92  0.6 - 1.3 MG/DL Final    BUN/Creatinine ratio 11/15/2022 27 (A)  12 - 20   Final    eGFR 11/15/2022 >60  >60 ml/min/1.73m2 Final    Calcium 11/15/2022 9.3  8.5 - 10.1 MG/DL Final    Bilirubin, total 11/15/2022 0.4  0.2 - 1.0 MG/DL Final    ALT (SGPT) 11/15/2022 19  13 - 56 U/L Final    AST (SGOT) 11/15/2022 15  10 - 38 U/L Final    Alk.  phosphatase 11/15/2022 65  45 - 117 U/L Final    Protein, total 11/15/2022 6.6  6.4 - 8.2 g/dL Final    Albumin 11/15/2022 3.8  3.4 - 5.0 g/dL Final    Globulin 11/15/2022 2.8  2.0 - 4.0 g/dL Final    A-G Ratio 11/15/2022 1.4  0.8 - 1.7   Final    Microalbumin,urine random 11/15/2022 3.28 (A)  0 - 3.0 MG/DL Final    Creatinine, urine random 11/15/2022 56.00  30 - 125 mg/dL Final    Microalbumin/Creat ratio (mg/g cre* 11/15/2022 59 (A)  0 - 30 mg/g Final    TSH 11/15/2022 0.27 (A)  0.36 - 3.74 uIU/mL Final    T4, Free 11/15/2022 1.4  0.7 - 1.5 NG/DL Final    Vitamin D 25-Hydroxy 11/15/2022 36.0  30 - 100 ng/mL Final    Color 11/15/2022 YELLOW    Final    Appearance 11/15/2022 CLEAR    Final    Specific gravity 11/15/2022 1.011  1.005 - 1.030   Final    pH (UA) 11/15/2022 6.5  5.0 - 8.0   Final    Protein 11/15/2022 Negative  NEG mg/dL Final    Glucose 11/15/2022 100 (A)  NEG mg/dL Final    Ketone 11/15/2022 Negative  NEG mg/dL Final    Bilirubin 11/15/2022 Negative  NEG   Final    Blood 11/15/2022 Negative  NEG   Final    Urobilinogen 11/15/2022 1.0  0.2 - 1.0 EU/dL Final    Nitrites 11/15/2022 Negative  NEG   Final    Leukocyte Esterase 11/15/2022 TRACE (A)  NEG   Final    WBC 11/15/2022 0 to 3  0 - 4 /hpf Final    RBC 11/15/2022 0 to 3  0 - 5 /hpf Final    Epithelial cells 11/15/2022 1+  0 - 5 /lpf Final          Health Maintenance:  Screening:    Mammogram: negative (10/2021)    PAP smear: negative (2014). S/p YUNG, no further screening needed. Colorectal: colonsocopy (2019) ileocecal valve polyp. Dr. Enma Manzano. Due 2024. Depression: none   DM (HbA1c/FPG): HbA1c 7.1 (2022) Dr. Frederick Little   Hepatitis C: negative (2015)   Falls: none    DEXA: osteopenia (10/2020)   Glaucoma: mild primary open angle glaucoma and regressed proliferative diabetic retinopathy. Followed by Dr. Leonie Crabtree (last 10/2022) every 3 months. Smokin pack year (stopped 2015)   Vitamin D: 36.0 (2022)   Medicare Wellness: today      Impression:  Patient Active Problem List   Diagnosis Code    Stable proliferative diabetic retinopathy of both eyes associated with type 1 diabetes mellitus ([facility] [address] G77.8861    Hyperlipidemia E78.5    Hearing decreased H91.90    Diabetes mellitus with diabetic polyneuropathy ([facility] [address] E11.42    Vitamin D deficiency E55.9    Anemia D64.9    Former smoker Z87.891    Adenomatous polyp of colon D12.6    Essential hypertension I10    Hypothyroidism due to acquired atrophy of thyroid E03.4    Hypertensive kidney disease with stage 3a chronic kidney disease (HCC) I12.9, N18.31    Type 1 diabetes mellitus with stage 3 chronic kidney disease (HCC) E10.22, N18.30    Macrocytosis D75.89    Coronary artery calcification seen on CT scan I25.10    GERD (gastroesophageal reflux disease) K21.9    Osteopenia of multiple sites M85.89       Plan:  1. Hypertension. Blood pressure remains mildly elevated today on quinapril 15 mg daily. Advised to monitor blood pressure daily for the next 2 weeks and send readings to the office for review to determine if dose of quinapril needs to be adjusted. Renal function remains stable with creatinine 0.92/ eGFR >60. Evaluated for new onset lower extremity edema in 2018 with a negative cardiac evaluation including EKG, echocardiogram, 48 hour Holter monitor, and pharmacologic nuclear stress test. Lower extremity duplex also negative for DVT or venous reflux. Using compression hose regularly with improvement. Will continue to monitor  2. Type 1 diabetes mellitus. HbA1c significantly improved today to 7.1 and continuing to use Freestyle sensor machine for blood sugar readings. On Lantus 20 units daily and Humalog sliding scale. Complicated by proliferative diabetic retinopathy in regression, and followed by Dr. Jania Churchill. Neuropathy symptoms in feet are mild and have not required treatment with medication. Renal function with evidence of chronic renal disease, stage 3. Evidence of mild microalbuminuria today with urine microalbumin/creatinine ratio 59 mg/g. On Ace-I. Unable to tolerate statins due to myalgias and on ezetimibe. Continuing to follow with endocrinology. Will continue to monitor. 3. Hyperlipidemia. Not able to tolerate statin (atorvastatin/ rosuvastatin) due to myalgias. On ezetimibe with reasonable response. Further improvement today with LDL 97 and HDL 80. Moderate to severe aortic and coronary atherosclerosis and calcifications seen on chest CT scan (11/2018). Stressed importance of continued lifestyle modifications, especially heart healthy diet, regular exercise and weight loss. Will reassess at next visit. 4. Chronic renal disease, stage 3. Evidence of mild disease with creatinine 1.00-1.04/ eGFR 54-56 since 5/2016. Most likely due to long standing diabetes mellitus and hypertension.  On Ace-I, but not on statin as discussed above. Counseled on avoiding NSAIDS and prerenal status. Remaining stable today with creatinine 0.92/eGFR > 60. Will continue to follow. 5. Hypothyroidism. TSH low at 0.18 and free T4 at 1.6 (7/2022) on levothyroxine 100 mcg 3 days/week and 88 mcg 4 days/week. Advised to decrease levothyroxine to 88 mcg weekly and repeat today with low TSH at 0.27 and free T4 1.4. Advised to decrease dose of levothyroxine to 88 mcg 6 days/week and will reassess in 3 months. Will continue to monitor. 6. Former smoker. 30 pack year and stopped in 2015. Initial lung cancer screening performed in 11/2017 showing a few tiny solid nodules and one groundglass nodule identified (lung RADS 2). Did also note evidence of emphysema and prominence of the pulmonary vascular trunk suggestive of pulmonary arterial hypertension. However, echocardiogram (5/2018) with normal PA pressures. Being monitored annually without change, and most recent chest CT scan in 1/2022 showed multiple small stable bilateral lung nodules and emphysematous changes. Next screening LD chest CT scan due 1/2023 and agreeable to proceed. Will place order today. Discussed with the patient the current USPSTF guidelines released March 9, 2021 for screening for lung cancer. For adults aged 48 to [de-identified] years who have a 20 pack-year smoking history and currently smoke or have quit within the past 15 years the grade B recommendation is to:  Screen for lung cancer with low-dose computed tomography (LDCT) every year. Stop screening once a person has not smoked for 15 years or has a health problem that limits life expectancy or the ability to have lung surgery. The patient has a 30 pack-year history of cigarette smoking and currently is not smoking since 2015. Discussed with patient the risks and benefits of screening, including over-diagnosis, false positive rate, and total radiation exposure.   The patient currently exhibits no signs or symptoms suggestive of lung cancer. Discussed with patient the importance of compliance with yearly annual lung cancer screenings and willingness to undergo diagnosis and treatment if screening scan is positive. In addition, the patient was counseled regarding the importance of remaining smoke free. Also reviewed the following if the patient has Medicare that as of February 10, 2022, Medicare only covers LDCT screening in patients aged 51-72 with at least a 20 pack-year smoking history who currently smoke or have quit in the last 15 years    9. Osteopenia. Baseline bone density scan 10/8/2020 . Using femoral neck T-scores, calculated FRAX score estimates her 10 year risk of a major osteoporetic fracture at 6.5 % and hip fracture at 0.2 %, which are not an indication for biphosphonate treatment (>20% and >3%, respectively). Advised to begin calcium in addition to Vitamin D. Encouraged exercise, particularly weight bearing activities. Will continue to monitor. Fall precautions stressed. 8. GERD. Chest CT scan (1/8/2021) noted incidental finding of poorly distended distal esophagus with mild wall prominence. Referred to Dr. Savannah Alcantara, and underwent upper endoscopy on 3/9/2021 showing a normal esophagus without mass or esophagitis, hiatal hernia, and retained food in the stomach. Advised to continue omeprazole 40 mg daily. Reports overall improvement in symptoms today. Will continue to follow. 9. Anemia, mild. Chronic but stable. Evaluation in 5/2021 showed iron studies normal with ferritin 165 and transferrin 41%; B12 normal; and folate levels also now normal. On multivitamin with folate. Gammopathy panel and UPEP negative today. Repeat ferritin and iron studies without evidence of deficiency (7/2022). Remains stable today with Hb 11.7/HCT 36.2. Will continue to monitor. 10. Overweight. Weight essentially stable over the last year.  Currently following a low carbohydrate intermittent fasting diet with increased proteins, salads, and fruit. Advised to minimize caffeine and soft drink intake. Emphasized importance of continued lifestyle modifications, including heart healthy diet, regular exercise, and weight loss. Will continue to monitor. 11. Health maintenance. Completed Moderna COVID 19 vaccine series and received two Moderna booster doses and the updated bivalent Pfizer booster dose. Advised to proceed with the influenza vaccine. Received 1/2 doses of Shingrix vaccine. Will address second dose at next visit. Other immunizations up to date. Mammogram overdue and will place order. .  Colonoscopy up-to-date. Vitamin D level low normal on maintenance dose supplement and advised to double current dose. Will reassess at next visit. Reports underwent screening skin exam at [facility] dermatology. In addition, an annual Medicare wellness visit was done today. Patient understands recommendations and agrees with plan. .  Follow-up in 3 months. Future orders:      ICD-10-CM ICD-9-CM    1. Essential hypertension  I10 401.9       2. Stable proliferative diabetic retinopathy of both eyes associated with type 1 diabetes mellitus ([facility] [address]  E10.3553 250.51 HEMOGLOBIN A1C WITH EAG     849.93 METABOLIC PANEL, COMPREHENSIVE      3. Type 1 diabetes mellitus with stage 3a chronic kidney disease (HCC)  E10.22 250.41 HEMOGLOBIN A1C WITH EAG    F82.73 823.6 METABOLIC PANEL, COMPREHENSIVE      MICROALBUMIN, UR, RAND W/ MICROALB/CREAT RATIO      4. Type 1 diabetes mellitus with diabetic polyneuropathy (HCC)  E10.42 250.61 HEMOGLOBIN A1C WITH EAG     989.6 METABOLIC PANEL, COMPREHENSIVE      5. Hypertensive kidney disease with stage 3a chronic kidney disease (HCC)  I12.9 403.90 MAGNESIUM    Y28.35 957.9 METABOLIC PANEL, COMPREHENSIVE      6. Stage 3a chronic kidney disease (HCC)  N18.31 585.3 MAGNESIUM      METABOLIC PANEL, COMPREHENSIVE      MICROALBUMIN, UR, RAND W/ MICROALB/CREAT RATIO      VITAMIN D, 25 HYDROXY      7.  Hyperlipidemia, unspecified hyperlipidemia type  E78.5 272.4 LIPID PANEL      8. Hypothyroidism due to acquired atrophy of thyroid  E03.4 244.8 TSH 3RD GENERATION     246.8 T4, FREE      9. Gastroesophageal reflux disease, unspecified whether esophagitis present  K21.9 530.81       10. Anemia, unspecified type  D64.9 285.9 CBC WITH AUTOMATED DIFF      11. Medicare annual wellness visit, subsequent  Z00.00 V70.0 ADVANCE CARE PLANNING FIRST 30 MINS      12. Advanced directives, counseling/discussion  Z71.89 V65.49 ADVANCE CARE PLANNING FIRST 30 MINS      13. Screening for depression  Z13.31 V79.0       14. Personal history of tobacco use, presenting hazards to health  Z87.891 V15.82 CT LOW DOSE LUNG CANCER SCREENING      15.  Nicotine dependence, cigarettes, in remission  F17.211 V15.82 CT LOW DOSE LUNG CANCER SCREENING      16. Encounter for screening mammogram for malignant neoplasm of breast  Z12.31 V76.12 JUNIOR MAMMO BI SCREENING INCL CAD

## 2022-11-28 RX ORDER — QUINAPRIL 10 MG/1
TABLET ORAL
Qty: 90 TABLET | Refills: 3 | Status: CANCELLED | OUTPATIENT
Start: 2022-11-28

## 2022-11-28 RX ORDER — LISINOPRIL 20 MG/1
20 TABLET ORAL DAILY
Qty: 90 TABLET | Refills: 3 | Status: SHIPPED | OUTPATIENT
Start: 2022-11-28

## 2022-11-28 RX ORDER — QUINAPRIL 5 1/1
TABLET ORAL
Qty: 90 TABLET | Refills: 3 | Status: CANCELLED | OUTPATIENT
Start: 2022-11-28

## 2022-11-28 NOTE — TELEPHONE ENCOUNTER
Called and spoke with patient. Discussed changing medication from quinapril to lisinopril given that it is more readily available. Patient states that she would prefer to continue through [facility] since it is more cost effective for her. Patient reporting blood pressure has been elevated on the 50 mg daily dose of quinapril with readings ranging 140-145/60-70. Advised that will begin lisinopril 20 mg daily. Cautioned of need to monitor blood pressure closely with change in medication until optimal dosing confirmed. Advised that if blood pressure appears low on the lisinopril 20 mg daily, she should decrease dose to 10 mg daily. Answered all questions. Prescription for lisinopril sent to [facility].

## 2022-12-14 ENCOUNTER — PATIENT MESSAGE (OUTPATIENT)
Dept: INTERNAL MEDICINE CLINIC | Age: 67
End: 2022-12-14

## 2022-12-14 NOTE — TELEPHONE ENCOUNTER
----- Message from Junaid Cooley sent at 12/14/2022 10:53 AM EST -----  Regarding: Lisinopril  I called the FDA about what happens UTI wise without the  Quinapril . Pf[employer] has voluntarily recalled it and only two lots of its content. [email or web address]. The pharmacist at the [address] [facility] said other manufacturers make it. However, I looked them up and those manufacturers are from [place], [place] etc.  Im guessing those manufacturers could be requested but no pharmacy in the [address] [place] [address],[address] will use them. Now what can we do to prevent my UTIs from returning. Help!!! I had just stopped the Quinapril two days before I got that horrible UTI LAST Friday. I had some 5mg Quinapril left over so have been using 4 of those a day instead of the Lisinopril. Still on the antibiotic for 1 and 1/2 more days. Sorry this is such a long note but hopefully you can help me. The pharmacist from the FDA also said you can call for options if you want to. MAGGIE Im also   going to call [facility] just because Im stubborn.

## 2022-12-16 NOTE — TELEPHONE ENCOUNTER
Called and spoke with patient. Advised that quinapril does not prevent urinary tract infections, but it does act to protect the kidney and diabetes mellitus. Discussed that lisinopril will be just as effective and advised that would recommend transitioning to lisinopril given recall of quinapril. Reports improvement in UTI symptoms with treatment with Macrobid and states that completed last dose yesterday. Advised that she should begin cranberry and D mannose tablets for prevention of future infections. Answered all questions.

## 2022-12-21 ENCOUNTER — PATIENT MESSAGE (OUTPATIENT)
Dept: INTERNAL MEDICINE CLINIC | Age: 67
End: 2022-12-21

## 2022-12-22 RX ORDER — LISINOPRIL 20 MG/1
40 TABLET ORAL DAILY
COMMUNITY

## 2023-01-06 ENCOUNTER — TELEPHONE (OUTPATIENT)
Dept: INTERNAL MEDICINE CLINIC | Age: 68
End: 2023-01-06

## 2023-01-06 NOTE — TELEPHONE ENCOUNTER
Reynold Baumgarten, MD R[place] Res, LPN; P [facility]  Please contact patient and see how her blood pressure is doing and if it is still elevated. She did not see this message. Thanks. Previous Messages     ----- Message -----   From: Reynold Baumgarten, MD   Sent: 12/22/2022   To: Sirisha Ramos   Subject: Unread Message Notification                       Hi Mrs. Cooley,     Based on your elevated blood pressure readings, I would recommend increasing the dose of lisinopril to 40 mg daily. You should take two (2) of the 20 mg daily tablets and continue to monitor. If blood pressure control improves, I can send in the 40 mg tablet so you would only have to take one tablet per day. Goal would be for your blood pressure to be below 130/80. Please let me know if you have any questions.      Sincerely,   Noé Benavidez MD

## 2023-01-06 NOTE — TELEPHONE ENCOUNTER
Spoke with patient provided her with the message below. She will increase her dosing and then update us with readings.

## 2023-01-11 DIAGNOSIS — F17.211 NICOTINE DEPENDENCE, CIGARETTES, IN REMISSION: ICD-10-CM

## 2023-01-11 DIAGNOSIS — Z87.891 PERSONAL HISTORY OF TOBACCO USE, PRESENTING HAZARDS TO HEALTH: ICD-10-CM

## 2023-01-12 ENCOUNTER — HOSPITAL ENCOUNTER (OUTPATIENT)
Dept: MAMMOGRAPHY | Age: 68
Discharge: HOME | End: 2023-01-12
Attending: INTERNAL MEDICINE
Payer: MEDICARE

## 2023-01-12 ENCOUNTER — HOSPITAL ENCOUNTER (OUTPATIENT)
Dept: CT IMAGING | Age: 68
End: 2023-01-12
Attending: INTERNAL MEDICINE
Payer: MEDICARE

## 2023-01-12 DIAGNOSIS — Z12.31 ENCOUNTER FOR SCREENING MAMMOGRAM FOR MALIGNANT NEOPLASM OF BREAST: ICD-10-CM

## 2023-01-12 PROCEDURE — 71271 CT THORAX LUNG CANCER SCR C-: CPT

## 2023-01-12 PROCEDURE — 77063 BREAST TOMOSYNTHESIS BI: CPT

## 2023-01-18 ENCOUNTER — TELEPHONE (OUTPATIENT)
Dept: INTERNAL MEDICINE CLINIC | Age: 68
End: 2023-01-18

## 2023-01-18 DIAGNOSIS — R93.89 ABNORMAL CT SCAN, CHEST: ICD-10-CM

## 2023-01-18 DIAGNOSIS — R93.89 ABNORMAL CT SCAN, CHEST: Primary | ICD-10-CM

## 2023-01-18 DIAGNOSIS — Q25.1 ABDOMINAL AORTIC STENOSIS: ICD-10-CM

## 2023-01-18 RX ORDER — LISINOPRIL 40 MG/1
40 TABLET ORAL DAILY
Qty: 90 TABLET | Refills: 3 | Status: SHIPPED | OUTPATIENT
Start: 2023-01-18 | End: 2023-01-19 | Stop reason: SDUPTHER

## 2023-01-18 RX ORDER — AMLODIPINE BESYLATE 5 MG/1
5 TABLET ORAL DAILY
Qty: 90 TABLET | Refills: 3 | Status: SHIPPED | OUTPATIENT
Start: 2023-01-18

## 2023-01-18 NOTE — TELEPHONE ENCOUNTER
CT Results (most recent):  Results from Orders Only encounter on 01/11/23    CT LOW DOSE LUNG CANCER SCREENING    Narrative  EXAM: CT CHEST WITHOUT CONTRAST    INDICATION: Tobacco use, former smoker, quit 7 years ago, 30 pack year smoking  history. COMPARISON: 1/10/2022. CONTRAST: None. TECHNIQUE: Low dose unenhanced multislice helical CT was performed from the  thoracic inlet to the adrenal glands without intravenous contrast  administration. Contiguous 5 mm axial images were reconstructed and lung and  soft tissue windows were generated. Coronal MIP and sagittal reformations were  generated. CT dose reduction was achieved through use of a standardized  protocol tailored for this examination and automatic exposure control for dose  modulation. FINDINGS:    Thyroid grossly unremarkable. Heart size is normal. Severe coronary  arteriosclerosis, most significant in the LAD. Mild aortic atherosclerosis in  the chest. No lymphadenopathy. No pleural effusion or pneumothorax. Mild bronchiectasis. Mild emphysema. Unchanged 5 x 2 mm (4 mm average) nodular  density at the anterior peripheral right lower lobe (series 3, image 124). Mild  subsegmental atelectasis or scarring at the bilateral lung bases. There is some  nodular thickening along the left major fissure measuring up to 4 x 2 mm (89),  unchanged and likely perifissural lymph nodes. No new suspicious findings. Small sliding hiatal hernia. Bilateral adrenal thickening. More significant  atherosclerosis in the abdominal aorta with some degree of aortic stenosis, and  not well evaluated without IV contrast.    Mild anasarca. Osteopenia. Degenerative disc disease, severe at the mid thoracic  spine. Impression  1. Similar 4 mm or less pulmonary nodules. Lung RADS 2: Benign appearance or  behavior. Management: 1 year follow-up low-dose lung screening CT. 2.  Severe coronary arteriosclerosis.  Significant atherosclerosis in the abdomen  with some degree of aortic stenosis, not well evaluated without IV contrast.  3.  Mild bronchiectasis and emphysema. 4.  Small sliding hiatal hernia. 5.  Mild anasarca. Called and spoke with patient regarding chest CT scan results. Advised of benign appearing nodules with recommendation for follow-up low-dose lung screening CT in 1 year. Also addressed atherosclerotic abdominal aorta with questionable stenosis and advised I would recommend proceeding with duplex abdominal ultrasound to evaluate. Patient agreeable and order placed. Patient also reports that blood pressure remains elevated on lisinopril 40 mg daily. Will add amlodipine 5 mg daily and advised her to send update in 2 weeks. Refill of lisinopril and new prescription for amlodipine sent to [facility].

## 2023-01-19 ENCOUNTER — TELEPHONE (OUTPATIENT)
Dept: INTERNAL MEDICINE CLINIC | Age: 68
End: 2023-01-19

## 2023-01-19 RX ORDER — LISINOPRIL 40 MG/1
40 TABLET ORAL DAILY
Qty: 90 TABLET | Refills: 3 | Status: SHIPPED | OUTPATIENT
Start: 2023-01-19

## 2023-01-19 NOTE — TELEPHONE ENCOUNTER
Sorry for the confusion. I reordered her lisinopril yesterday for the 40 mg tablets. She should take 1 tablet daily. She was previously on the 20 mg tablets taking 2 tablets daily. I have sent a corrected prescription into [facility].

## 2023-01-19 NOTE — TELEPHONE ENCOUNTER
Fax received from pharmacy they need clarification on last RX sent in for Lisinopril. Directions state  1 tab PO daily. Two tablets daily. LMTCB with patient to confirm her dosing and instructions. Asked patient to call us back and provide that info.

## 2023-01-23 ENCOUNTER — HOSPITAL ENCOUNTER (OUTPATIENT)
Dept: VASCULAR SURGERY | Age: 68
Discharge: HOME | End: 2023-01-23
Attending: INTERNAL MEDICINE
Payer: MEDICARE

## 2023-01-23 PROCEDURE — 93978 VASCULAR STUDY: CPT

## 2023-02-04 DIAGNOSIS — D64.9 ANEMIA, UNSPECIFIED TYPE: Primary | ICD-10-CM

## 2023-02-04 DIAGNOSIS — E10.3553 STABLE PROLIFERATIVE DIABETIC RETINOPATHY OF BOTH EYES ASSOCIATED WITH TYPE 1 DIABETES MELLITUS (HCC): Primary | ICD-10-CM

## 2023-02-04 DIAGNOSIS — N18.31 TYPE 1 DIABETES MELLITUS WITH STAGE 3A CHRONIC KIDNEY DISEASE (HCC): ICD-10-CM

## 2023-02-04 DIAGNOSIS — E10.22 TYPE 1 DIABETES MELLITUS WITH STAGE 3A CHRONIC KIDNEY DISEASE (HCC): ICD-10-CM

## 2023-02-05 DIAGNOSIS — N18.31 HYPERTENSIVE KIDNEY DISEASE WITH STAGE 3A CHRONIC KIDNEY DISEASE (HCC): Primary | ICD-10-CM

## 2023-02-05 DIAGNOSIS — N18.31 STAGE 3A CHRONIC KIDNEY DISEASE (HCC): Primary | ICD-10-CM

## 2023-02-05 DIAGNOSIS — N18.31 STAGE 3A CHRONIC KIDNEY DISEASE (HCC): ICD-10-CM

## 2023-02-05 DIAGNOSIS — I12.9 HYPERTENSIVE KIDNEY DISEASE WITH STAGE 3A CHRONIC KIDNEY DISEASE (HCC): Primary | ICD-10-CM

## 2023-02-06 DIAGNOSIS — E10.22 TYPE 1 DIABETES MELLITUS WITH STAGE 3A CHRONIC KIDNEY DISEASE (HCC): Primary | ICD-10-CM

## 2023-02-06 DIAGNOSIS — N18.31 TYPE 1 DIABETES MELLITUS WITH STAGE 3A CHRONIC KIDNEY DISEASE (HCC): Primary | ICD-10-CM

## 2023-02-06 DIAGNOSIS — E78.5 HYPERLIPIDEMIA, UNSPECIFIED HYPERLIPIDEMIA TYPE: Primary | ICD-10-CM

## 2023-02-06 DIAGNOSIS — N18.31 STAGE 3A CHRONIC KIDNEY DISEASE (HCC): ICD-10-CM

## 2023-02-07 DIAGNOSIS — E03.4 HYPOTHYROIDISM DUE TO ACQUIRED ATROPHY OF THYROID: Primary | ICD-10-CM

## 2023-02-07 DIAGNOSIS — E10.3553 STABLE PROLIFERATIVE DIABETIC RETINOPATHY OF BOTH EYES ASSOCIATED WITH TYPE 1 DIABETES MELLITUS (HCC): Primary | ICD-10-CM

## 2023-02-07 DIAGNOSIS — N18.31 TYPE 1 DIABETES MELLITUS WITH STAGE 3A CHRONIC KIDNEY DISEASE (HCC): ICD-10-CM

## 2023-02-07 DIAGNOSIS — E10.22 TYPE 1 DIABETES MELLITUS WITH STAGE 3A CHRONIC KIDNEY DISEASE (HCC): ICD-10-CM

## 2023-02-10 ENCOUNTER — TELEPHONE (OUTPATIENT)
Dept: INTERNAL MEDICINE CLINIC | Age: 68
End: 2023-02-10

## 2023-02-10 NOTE — TELEPHONE ENCOUNTER
Called and spoke with patient. Reports that blood pressure is significantly improved after the addition of amlodipine 5 mg daily. She also continues lisinopril 40 mg daily. Advised to continue to monitor blood pressure and will review at her next visit in 3/2023.

## 2023-03-21 ENCOUNTER — HOSPITAL ENCOUNTER (OUTPATIENT)
Facility: HOSPITAL | Age: 68
Setting detail: SPECIMEN
Discharge: HOME | End: 2023-03-24
Payer: MEDICARE

## 2023-03-21 DIAGNOSIS — E10.22 TYPE 1 DIABETES MELLITUS WITH STAGE 3A CHRONIC KIDNEY DISEASE (HCC): ICD-10-CM

## 2023-03-21 DIAGNOSIS — E03.4 HYPOTHYROIDISM DUE TO ACQUIRED ATROPHY OF THYROID: ICD-10-CM

## 2023-03-21 DIAGNOSIS — I12.9 HYPERTENSIVE KIDNEY DISEASE WITH STAGE 3A CHRONIC KIDNEY DISEASE (HCC): ICD-10-CM

## 2023-03-21 DIAGNOSIS — D64.9 ANEMIA, UNSPECIFIED TYPE: ICD-10-CM

## 2023-03-21 DIAGNOSIS — N18.31 HYPERTENSIVE KIDNEY DISEASE WITH STAGE 3A CHRONIC KIDNEY DISEASE (HCC): ICD-10-CM

## 2023-03-21 DIAGNOSIS — N18.31 STAGE 3A CHRONIC KIDNEY DISEASE (HCC): ICD-10-CM

## 2023-03-21 DIAGNOSIS — N18.31 TYPE 1 DIABETES MELLITUS WITH STAGE 3A CHRONIC KIDNEY DISEASE (HCC): ICD-10-CM

## 2023-03-21 DIAGNOSIS — E10.3553 STABLE PROLIFERATIVE DIABETIC RETINOPATHY OF BOTH EYES ASSOCIATED WITH TYPE 1 DIABETES MELLITUS (HCC): ICD-10-CM

## 2023-03-21 DIAGNOSIS — E78.5 HYPERLIPIDEMIA, UNSPECIFIED HYPERLIPIDEMIA TYPE: ICD-10-CM

## 2023-03-21 LAB
25(OH)D3 SERPL-MCNC: 57.7 NG/ML (ref 30–100)
ALBUMIN SERPL-MCNC: 3.5 G/DL (ref 3.4–5)
ALBUMIN/GLOB SERPL: 1.4 (ref 0.8–1.7)
ALP SERPL-CCNC: 54 U/L (ref 45–117)
ALT SERPL-CCNC: 22 U/L (ref 13–56)
ANION GAP SERPL CALC-SCNC: 5 MMOL/L (ref 3–18)
AST SERPL-CCNC: 17 U/L (ref 10–38)
BASOPHILS # BLD: 0 K/UL (ref 0–0.1)
BASOPHILS NFR BLD: 0 % (ref 0–2)
BILIRUB SERPL-MCNC: 0.5 MG/DL (ref 0.2–1)
BUN SERPL-MCNC: 16 MG/DL (ref 7–18)
BUN/CREAT SERPL: 20 (ref 12–20)
CALCIUM SERPL-MCNC: 8.9 MG/DL (ref 8.5–10.1)
CHLORIDE SERPL-SCNC: 105 MMOL/L (ref 100–111)
CHOLEST SERPL-MCNC: 191 MG/DL
CO2 SERPL-SCNC: 26 MMOL/L (ref 21–32)
CREAT SERPL-MCNC: 0.8 MG/DL (ref 0.6–1.3)
DIFFERENTIAL METHOD BLD: ABNORMAL
EOSINOPHIL # BLD: 0.1 K/UL (ref 0–0.4)
EOSINOPHIL NFR BLD: 1 % (ref 0–5)
ERYTHROCYTE [DISTWIDTH] IN BLOOD BY AUTOMATED COUNT: 11.9 % (ref 11.6–14.5)
EST. AVERAGE GLUCOSE BLD GHB EST-MCNC: 169 MG/DL
GLOBULIN SER CALC-MCNC: 2.5 G/DL (ref 2–4)
GLUCOSE SERPL-MCNC: 156 MG/DL (ref 74–99)
HBA1C MFR BLD: 7.5 % (ref 4.2–5.6)
HCT VFR BLD AUTO: 31.4 % (ref 35–45)
HDLC SERPL-MCNC: 85 MG/DL (ref 40–60)
HDLC SERPL: 2.2 (ref 0–5)
HGB BLD-MCNC: 10.8 G/DL (ref 12–16)
IMM GRANULOCYTES # BLD AUTO: 0 K/UL (ref 0–0.04)
IMM GRANULOCYTES NFR BLD AUTO: 0 % (ref 0–0.5)
LDLC SERPL CALC-MCNC: 92.6 MG/DL (ref 0–100)
LIPID PANEL: ABNORMAL
LYMPHOCYTES # BLD: 0.9 K/UL (ref 0.9–3.6)
LYMPHOCYTES NFR BLD: 19 % (ref 21–52)
MAGNESIUM SERPL-MCNC: 2.1 MG/DL (ref 1.6–2.6)
MCH RBC QN AUTO: 33 PG (ref 24–34)
MCHC RBC AUTO-ENTMCNC: 34.4 G/DL (ref 31–37)
MCV RBC AUTO: 96 FL (ref 78–100)
MONOCYTES # BLD: 0.3 K/UL (ref 0.05–1.2)
MONOCYTES NFR BLD: 7 % (ref 3–10)
NEUTS SEG # BLD: 3.6 K/UL (ref 1.8–8)
NEUTS SEG NFR BLD: 73 % (ref 40–73)
NRBC # BLD: 0 K/UL (ref 0–0.01)
NRBC BLD-RTO: 0 PER 100 WBC
PLATELET # BLD AUTO: 222 K/UL (ref 135–420)
PMV BLD AUTO: 11.5 FL (ref 9.2–11.8)
POTASSIUM SERPL-SCNC: 4.5 MMOL/L (ref 3.5–5.5)
PROT SERPL-MCNC: 6 G/DL (ref 6.4–8.2)
RBC # BLD AUTO: 3.27 M/UL (ref 4.2–5.3)
SODIUM SERPL-SCNC: 136 MMOL/L (ref 136–145)
T4 FREE SERPL-MCNC: 1.2 NG/DL (ref 0.7–1.5)
TRIGL SERPL-MCNC: 67 MG/DL
TSH SERPL DL<=0.05 MIU/L-ACNC: 3.56 UIU/ML (ref 0.36–3.74)
VLDLC SERPL CALC-MCNC: 13.4 MG/DL
WBC # BLD AUTO: 5 K/UL (ref 4.6–13.2)

## 2023-03-21 PROCEDURE — 83735 ASSAY OF MAGNESIUM: CPT

## 2023-03-21 PROCEDURE — 36415 COLL VENOUS BLD VENIPUNCTURE: CPT

## 2023-03-21 PROCEDURE — 84443 ASSAY THYROID STIM HORMONE: CPT

## 2023-03-21 PROCEDURE — 83036 HEMOGLOBIN GLYCOSYLATED A1C: CPT

## 2023-03-21 PROCEDURE — 82306 VITAMIN D 25 HYDROXY: CPT

## 2023-03-21 PROCEDURE — 84439 ASSAY OF FREE THYROXINE: CPT

## 2023-03-21 PROCEDURE — 80061 LIPID PANEL: CPT

## 2023-03-21 PROCEDURE — 80053 COMPREHEN METABOLIC PANEL: CPT

## 2023-03-21 PROCEDURE — 85025 COMPLETE CBC W/AUTO DIFF WBC: CPT

## 2023-03-28 ENCOUNTER — HOSPITAL ENCOUNTER (OUTPATIENT)
Facility: HOSPITAL | Age: 68
Setting detail: SPECIMEN
Discharge: HOME | End: 2023-03-31
Payer: MEDICARE

## 2023-03-28 ENCOUNTER — OFFICE VISIT (OUTPATIENT)
Age: 68
End: 2023-03-28
Payer: MEDICARE

## 2023-03-28 VITALS
WEIGHT: 166.8 LBS | SYSTOLIC BLOOD PRESSURE: 142 MMHG | OXYGEN SATURATION: 99 % | HEART RATE: 70 BPM | TEMPERATURE: 97.8 F | BODY MASS INDEX: 27.79 KG/M2 | HEIGHT: 65 IN | DIASTOLIC BLOOD PRESSURE: 60 MMHG | RESPIRATION RATE: 16 BRPM

## 2023-03-28 DIAGNOSIS — E78.00 PURE HYPERCHOLESTEROLEMIA: ICD-10-CM

## 2023-03-28 DIAGNOSIS — E10.22 TYPE 1 DIABETES MELLITUS WITH STAGE 3A CHRONIC KIDNEY DISEASE (HCC): ICD-10-CM

## 2023-03-28 DIAGNOSIS — E10.42 TYPE 1 DIABETES MELLITUS WITH DIABETIC POLYNEUROPATHY (HCC): ICD-10-CM

## 2023-03-28 DIAGNOSIS — R79.9 ABNORMAL FINDING OF BLOOD CHEMISTRY, UNSPECIFIED: ICD-10-CM

## 2023-03-28 DIAGNOSIS — E03.4 HYPOTHYROIDISM DUE TO ACQUIRED ATROPHY OF THYROID: ICD-10-CM

## 2023-03-28 DIAGNOSIS — M85.89 OSTEOPENIA OF MULTIPLE SITES: ICD-10-CM

## 2023-03-28 DIAGNOSIS — N18.31 TYPE 1 DIABETES MELLITUS WITH STAGE 3A CHRONIC KIDNEY DISEASE (HCC): ICD-10-CM

## 2023-03-28 DIAGNOSIS — D64.9 ANEMIA, UNSPECIFIED TYPE: ICD-10-CM

## 2023-03-28 DIAGNOSIS — E10.3553 TYPE 1 DIABETES MELLITUS WITH STABLE PROLIFERATIVE DIABETIC RETINOPATHY, BILATERAL (HCC): ICD-10-CM

## 2023-03-28 DIAGNOSIS — N18.31 CHRONIC KIDNEY DISEASE, STAGE 3A (HCC): ICD-10-CM

## 2023-03-28 DIAGNOSIS — I10 ESSENTIAL HYPERTENSION: Primary | ICD-10-CM

## 2023-03-28 DIAGNOSIS — K21.9 GASTROESOPHAGEAL REFLUX DISEASE, UNSPECIFIED WHETHER ESOPHAGITIS PRESENT: ICD-10-CM

## 2023-03-28 DIAGNOSIS — N18.31 STAGE 3A CHRONIC KIDNEY DISEASE (HCC): ICD-10-CM

## 2023-03-28 LAB
CREAT UR-MCNC: 21 MG/DL (ref 30–125)
MICROALBUMIN UR-MCNC: 3.35 MG/DL (ref 0–3)
MICROALBUMIN/CREAT UR-RTO: 160 MG/G (ref 0–30)

## 2023-03-28 PROCEDURE — 1123F ACP DISCUSS/DSCN MKR DOCD: CPT | Performed by: INTERNAL MEDICINE

## 2023-03-28 PROCEDURE — 3017F COLORECTAL CA SCREEN DOC REV: CPT | Performed by: INTERNAL MEDICINE

## 2023-03-28 PROCEDURE — 3078F DIAST BP <80 MM HG: CPT | Performed by: INTERNAL MEDICINE

## 2023-03-28 PROCEDURE — 99211 OFF/OP EST MAY X REQ PHY/QHP: CPT | Performed by: INTERNAL MEDICINE

## 2023-03-28 PROCEDURE — 99214 OFFICE O/P EST MOD 30 MIN: CPT | Performed by: INTERNAL MEDICINE

## 2023-03-28 PROCEDURE — G8427 DOCREV CUR MEDS BY ELIG CLIN: HCPCS | Performed by: INTERNAL MEDICINE

## 2023-03-28 PROCEDURE — 1090F PRES/ABSN URINE INCON ASSESS: CPT | Performed by: INTERNAL MEDICINE

## 2023-03-28 PROCEDURE — 1036F TOBACCO NON-USER: CPT | Performed by: INTERNAL MEDICINE

## 2023-03-28 PROCEDURE — G8484 FLU IMMUNIZE NO ADMIN: HCPCS | Performed by: INTERNAL MEDICINE

## 2023-03-28 PROCEDURE — 2022F DILAT RTA XM EVC RTNOPTHY: CPT | Performed by: INTERNAL MEDICINE

## 2023-03-28 PROCEDURE — 3074F SYST BP LT 130 MM HG: CPT | Performed by: INTERNAL MEDICINE

## 2023-03-28 PROCEDURE — G8399 PT W/DXA RESULTS DOCUMENT: HCPCS | Performed by: INTERNAL MEDICINE

## 2023-03-28 PROCEDURE — G8417 CALC BMI ABV UP PARAM F/U: HCPCS | Performed by: INTERNAL MEDICINE

## 2023-03-28 PROCEDURE — 82043 UR ALBUMIN QUANTITATIVE: CPT

## 2023-03-28 PROCEDURE — 3051F HG A1C>EQUAL 7.0%<8.0%: CPT | Performed by: INTERNAL MEDICINE

## 2023-03-28 RX ORDER — HYDROCHLOROTHIAZIDE 12.5 MG/1
12.5 CAPSULE, GELATIN COATED ORAL EVERY MORNING
Qty: 90 CAPSULE | Refills: 1 | Status: SHIPPED | OUTPATIENT
Start: 2023-03-28

## 2023-03-28 ASSESSMENT — PATIENT HEALTH QUESTIONNAIRE - PHQ9
SUM OF ALL RESPONSES TO PHQ9 QUESTIONS 1 & 2: 0
2. FEELING DOWN, DEPRESSED OR HOPELESS: 0
SUM OF ALL RESPONSES TO PHQ QUESTIONS 1-9: 0
1. LITTLE INTEREST OR PLEASURE IN DOING THINGS: 0
SUM OF ALL RESPONSES TO PHQ QUESTIONS 1-9: 0

## 2023-03-28 NOTE — PROGRESS NOTES
Araceli Mode presents today for   Chief Complaint   Patient presents with    Hypertension     3 month follow up          1. \"Have you been to the ER, urgent care clinic since your last visit? Hospitalized since your last visit? \" no    2. \"Have you seen or consulted any other health care providers outside of the [address] [place] [address] since your last visit? \" no     3. For patients aged 39-70: Has the patient had a colonoscopy / FIT/ Cologuard? Yes - no Care Gap present      If the patient is female:    4. For patients aged 41-77: Has the patient had a mammogram within the past 2 years? Yes - no Care Gap present      5. For patients aged 21-65: Has the patient had a pap smear?  NA - based on age or sex
which she is receiving treatment. She states that her driving has been restricted to only during the day, and she is aware that she must turn her head completely to each side when navigating behind the wheel. In 2/2018, she began having difficulty with right elbow pain and was evaluated by Dr. Shivam Esteves who diagnosed her with right lateral epicondylitis. She received a cortisone injection with improvement. In 10/2018, while hiking on vacation hiking in [place], she slipped on some wooden steps and landed on her left shoulder. She was transported to [facility] in [place], [place], and found to have a closed nondisplaced fracture of the left proximal humerus. She was placed in a sling and told to follow-up with orthopedics. She was being followed by Dr. Elza Smith of [address] [place] [address], and completed physical therapy with improvement. She is no longer requiring pain medication. On 1/5/2022, she completed plastic surgery on her face with Dr. Greg Frank, which included a surgical lift and laser treatment to address her wrinkles. She reports that surgery went well without complication. She also completed Botox injections to address the wrinkles on her forehead which were not corrected during surgery.     Past Medical History:   Diagnosis Date    Chronic renal disease, stage III ([address]     Diabetes mellitus type 1 (HCC)     Diabetic peripheral neuropathy (HCC)     Diabetic retinopathy ([address]     Essential hypertension with goal blood pressure less than 140/90     Hearing decreased     Hyperlipidemia     Hypothyroidism      Past Surgical History:   Procedure Laterality Date    GYN      3 D and C's    GYN      partial hysterectomy    HEENT      tonsillectomy    HEENT      multiple surgeries for retinopathy    HEENT      cataract removal bilateral    HYSTERECTOMY (CERVIX STATUS UNKNOWN)      ORTHOPEDIC SURGERY      carpral tunnel sugery bilateral     Current Outpatient Medications

## 2023-03-28 NOTE — PATIENT INSTRUCTIONS
should you know? Limit saturated fat, such as the fat from meat and dairy products. This is a healthy choice because people who have diabetes are at higher risk of heart disease. So choose lean cuts of meat and nonfat or low-fat dairy products. Use olive or canola oil instead of butter or shortening when cooking. Don't skip meals. Your blood sugar may drop too low if you skip meals and take insulin or certain medicines for diabetes. Check with your doctor before you drink alcohol. Alcohol can cause your blood sugar to drop too low. Alcohol can also cause a bad reaction if you take certain diabetes medicines. Follow-up care is a key part of your treatment and safety. Be sure to make and go to all appointments, and call your doctor if you are having problems. It's also a good idea to know your test results and keep a list of the medicines you take. Where can you learn more? Go to http://www.breen.com/  Enter I147 in the search box to learn more about \"Learning About Diabetes Food Guidelines. \"  Current as of: December 20, 2019               Content Version: 12.6  © 9990-0603 [employer]. Care instructions adapted under license by [employer] (which disclaims liability or warranty for this information). If you have questions about a medical condition or this instruction, always ask your healthcare professional. [address] any warranty or liability for your use of this information. Learning About Low-Sodium Foods  What foods are low in sodium? The foods you eat contain nutrients, such as vitamins and minerals. Sodium is a nutrient. Your body needs the right amount to stay healthy and work as it should. You can use the list below to help you make choices about which foods to eat.   Fruits  Fresh, frozen, canned, or dried fruit  Vegetables  Fresh or frozen vegetables, with no added salt  Canned vegetables, low-sodium or with no added

## 2023-04-19 ENCOUNTER — TELEPHONE (OUTPATIENT)
Age: 68
End: 2023-04-19

## 2023-06-20 RX ORDER — OMEPRAZOLE 40 MG/1
CAPSULE, DELAYED RELEASE ORAL
Qty: 90 CAPSULE | Refills: 3 | Status: SHIPPED | OUTPATIENT
Start: 2023-06-20

## 2023-06-20 NOTE — TELEPHONE ENCOUNTER
Previous refill per chart    omeprazole (PRILOSEC) 40 mg capsule 90 Capsule 3 7/27/2022     Sig: TAKE 1 CAPSULE DAILY    Sent to pharmacy as: omeprazole 40 mg capsule,delayed release (PRILOSEC)    E-Prescribing Status: Receipt confirmed by pharmacy (7/27/2022 12:40 PM EDT)

## 2023-06-22 ENCOUNTER — HOSPITAL ENCOUNTER (OUTPATIENT)
Facility: HOSPITAL | Age: 68
Setting detail: SPECIMEN
Discharge: HOME | End: 2023-06-22
Payer: MEDICARE

## 2023-06-22 DIAGNOSIS — D64.9 ANEMIA, UNSPECIFIED TYPE: ICD-10-CM

## 2023-06-22 DIAGNOSIS — E10.3553 TYPE 1 DIABETES MELLITUS WITH STABLE PROLIFERATIVE DIABETIC RETINOPATHY, BILATERAL (HCC): ICD-10-CM

## 2023-06-22 DIAGNOSIS — E10.42 TYPE 1 DIABETES MELLITUS WITH DIABETIC POLYNEUROPATHY (HCC): ICD-10-CM

## 2023-06-22 DIAGNOSIS — E10.22 TYPE 1 DIABETES MELLITUS WITH STAGE 3A CHRONIC KIDNEY DISEASE (HCC): ICD-10-CM

## 2023-06-22 DIAGNOSIS — N18.31 CHRONIC KIDNEY DISEASE, STAGE 3A (HCC): ICD-10-CM

## 2023-06-22 DIAGNOSIS — N18.31 TYPE 1 DIABETES MELLITUS WITH STAGE 3A CHRONIC KIDNEY DISEASE (HCC): ICD-10-CM

## 2023-06-22 DIAGNOSIS — E03.4 HYPOTHYROIDISM DUE TO ACQUIRED ATROPHY OF THYROID: ICD-10-CM

## 2023-06-22 DIAGNOSIS — E78.00 PURE HYPERCHOLESTEROLEMIA: ICD-10-CM

## 2023-06-22 DIAGNOSIS — I10 ESSENTIAL HYPERTENSION: ICD-10-CM

## 2023-06-22 LAB
25(OH)D3 SERPL-MCNC: 55.7 NG/ML (ref 30–100)
ALBUMIN SERPL-MCNC: 3.4 G/DL (ref 3.4–5)
ALBUMIN/GLOB SERPL: 1.3 (ref 0.8–1.7)
ALP SERPL-CCNC: 55 U/L (ref 45–117)
ALT SERPL-CCNC: 20 U/L (ref 13–56)
ANION GAP SERPL CALC-SCNC: 4 MMOL/L (ref 3–18)
APPEARANCE UR: CLEAR
AST SERPL-CCNC: 16 U/L (ref 10–38)
BACTERIA URNS QL MICRO: ABNORMAL /HPF
BASOPHILS # BLD: 0 K/UL (ref 0–0.1)
BASOPHILS NFR BLD: 0 % (ref 0–2)
BILIRUB SERPL-MCNC: 0.3 MG/DL (ref 0.2–1)
BILIRUB UR QL: NEGATIVE
BUN SERPL-MCNC: 23 MG/DL (ref 7–18)
BUN/CREAT SERPL: 24 (ref 12–20)
CALCIUM SERPL-MCNC: 8.9 MG/DL (ref 8.5–10.1)
CHLORIDE SERPL-SCNC: 105 MMOL/L (ref 100–111)
CHOLEST SERPL-MCNC: 187 MG/DL
CO2 SERPL-SCNC: 27 MMOL/L (ref 21–32)
COLOR UR: YELLOW
CREAT SERPL-MCNC: 0.95 MG/DL (ref 0.6–1.3)
CREAT UR-MCNC: 40 MG/DL (ref 30–125)
DIFFERENTIAL METHOD BLD: ABNORMAL
EOSINOPHIL # BLD: 0.1 K/UL (ref 0–0.4)
EOSINOPHIL NFR BLD: 2 % (ref 0–5)
EPITH CASTS URNS QL MICRO: ABNORMAL /LPF (ref 0–5)
ERYTHROCYTE [DISTWIDTH] IN BLOOD BY AUTOMATED COUNT: 11.9 % (ref 11.6–14.5)
GLOBULIN SER CALC-MCNC: 2.6 G/DL (ref 2–4)
GLUCOSE SERPL-MCNC: 193 MG/DL (ref 74–99)
GLUCOSE UR STRIP.AUTO-MCNC: NEGATIVE MG/DL
HBA1C MFR BLD: 7.2 % (ref 4.2–5.6)
HCT VFR BLD AUTO: 29.5 % (ref 35–45)
HDLC SERPL-MCNC: 75 MG/DL (ref 40–60)
HDLC SERPL: 2.5 (ref 0–5)
HGB BLD-MCNC: 10 G/DL (ref 12–16)
HGB UR QL STRIP: NEGATIVE
IMM GRANULOCYTES # BLD AUTO: 0 K/UL (ref 0–0.04)
IMM GRANULOCYTES NFR BLD AUTO: 1 % (ref 0–0.5)
KETONES UR QL STRIP.AUTO: NEGATIVE MG/DL
LDLC SERPL CALC-MCNC: 95.2 MG/DL (ref 0–100)
LEUKOCYTE ESTERASE UR QL STRIP.AUTO: ABNORMAL
LIPID PANEL: ABNORMAL
LYMPHOCYTES # BLD: 1 K/UL (ref 0.9–3.6)
LYMPHOCYTES NFR BLD: 17 % (ref 21–52)
MAGNESIUM SERPL-MCNC: 2 MG/DL (ref 1.6–2.6)
MCH RBC QN AUTO: 33.9 PG (ref 24–34)
MCHC RBC AUTO-ENTMCNC: 33.9 G/DL (ref 31–37)
MCV RBC AUTO: 100 FL (ref 78–100)
MICROALBUMIN UR-MCNC: 3.42 MG/DL (ref 0–3)
MICROALBUMIN/CREAT UR-RTO: 86 MG/G (ref 0–30)
MONOCYTES # BLD: 0.4 K/UL (ref 0.05–1.2)
MONOCYTES NFR BLD: 6 % (ref 3–10)
NEUTS SEG # BLD: 4.4 K/UL (ref 1.8–8)
NEUTS SEG NFR BLD: 75 % (ref 40–73)
NITRITE UR QL STRIP.AUTO: NEGATIVE
NRBC # BLD: 0 K/UL (ref 0–0.01)
NRBC BLD-RTO: 0 PER 100 WBC
PH UR STRIP: 6 (ref 5–8)
PLATELET # BLD AUTO: 256 K/UL (ref 135–420)
PMV BLD AUTO: 11.1 FL (ref 9.2–11.8)
POTASSIUM SERPL-SCNC: 4.8 MMOL/L (ref 3.5–5.5)
PROT SERPL-MCNC: 6 G/DL (ref 6.4–8.2)
PROT UR STRIP-MCNC: NEGATIVE MG/DL
RBC # BLD AUTO: 2.95 M/UL (ref 4.2–5.3)
RBC #/AREA URNS HPF: ABNORMAL /HPF (ref 0–5)
SODIUM SERPL-SCNC: 136 MMOL/L (ref 136–145)
SP GR UR REFRACTOMETRY: 1.01 (ref 1–1.03)
TRIGL SERPL-MCNC: 84 MG/DL
TSH SERPL DL<=0.05 MIU/L-ACNC: 2.73 UIU/ML (ref 0.36–3.74)
UROBILINOGEN UR QL STRIP.AUTO: 0.2 EU/DL (ref 0.2–1)
VLDLC SERPL CALC-MCNC: 16.8 MG/DL
WBC # BLD AUTO: 6 K/UL (ref 4.6–13.2)
WBC URNS QL MICRO: ABNORMAL /HPF (ref 0–4)

## 2023-06-22 PROCEDURE — 80053 COMPREHEN METABOLIC PANEL: CPT

## 2023-06-22 PROCEDURE — 83036 HEMOGLOBIN GLYCOSYLATED A1C: CPT

## 2023-06-22 PROCEDURE — 36415 COLL VENOUS BLD VENIPUNCTURE: CPT

## 2023-06-22 PROCEDURE — 82043 UR ALBUMIN QUANTITATIVE: CPT

## 2023-06-22 PROCEDURE — 84443 ASSAY THYROID STIM HORMONE: CPT

## 2023-06-22 PROCEDURE — 82570 ASSAY OF URINE CREATININE: CPT

## 2023-06-22 PROCEDURE — 82306 VITAMIN D 25 HYDROXY: CPT

## 2023-06-22 PROCEDURE — 80061 LIPID PANEL: CPT

## 2023-06-22 PROCEDURE — 83735 ASSAY OF MAGNESIUM: CPT

## 2023-06-22 PROCEDURE — 81001 URINALYSIS AUTO W/SCOPE: CPT

## 2023-06-22 PROCEDURE — 85025 COMPLETE CBC W/AUTO DIFF WBC: CPT

## 2023-06-29 ENCOUNTER — OFFICE VISIT (OUTPATIENT)
Age: 68
End: 2023-06-29
Payer: MEDICARE

## 2023-06-29 VITALS
HEIGHT: 65 IN | BODY MASS INDEX: 27.99 KG/M2 | HEART RATE: 59 BPM | DIASTOLIC BLOOD PRESSURE: 54 MMHG | WEIGHT: 168 LBS | SYSTOLIC BLOOD PRESSURE: 124 MMHG | TEMPERATURE: 97 F | OXYGEN SATURATION: 100 % | RESPIRATION RATE: 16 BRPM

## 2023-06-29 DIAGNOSIS — N18.31 HYPERTENSIVE KIDNEY DISEASE WITH STAGE 3A CHRONIC KIDNEY DISEASE (HCC): ICD-10-CM

## 2023-06-29 DIAGNOSIS — E78.00 PURE HYPERCHOLESTEROLEMIA: ICD-10-CM

## 2023-06-29 DIAGNOSIS — R26.89 IMBALANCE: ICD-10-CM

## 2023-06-29 DIAGNOSIS — E10.3553 STABLE PROLIFERATIVE DIABETIC RETINOPATHY OF BOTH EYES ASSOCIATED WITH TYPE 1 DIABETES MELLITUS (HCC): ICD-10-CM

## 2023-06-29 DIAGNOSIS — E10.22 TYPE 1 DIABETES MELLITUS WITH STAGE 3A CHRONIC KIDNEY DISEASE (HCC): ICD-10-CM

## 2023-06-29 DIAGNOSIS — N18.31 TYPE 1 DIABETES MELLITUS WITH STAGE 3A CHRONIC KIDNEY DISEASE (HCC): ICD-10-CM

## 2023-06-29 DIAGNOSIS — D64.9 ANEMIA, UNSPECIFIED TYPE: ICD-10-CM

## 2023-06-29 DIAGNOSIS — E10.42 TYPE 1 DIABETES MELLITUS WITH DIABETIC POLYNEUROPATHY (HCC): ICD-10-CM

## 2023-06-29 DIAGNOSIS — R29.898 BILATERAL LEG WEAKNESS: ICD-10-CM

## 2023-06-29 DIAGNOSIS — E03.4 HYPOTHYROIDISM DUE TO ACQUIRED ATROPHY OF THYROID: ICD-10-CM

## 2023-06-29 DIAGNOSIS — I12.9 HYPERTENSIVE KIDNEY DISEASE WITH STAGE 3A CHRONIC KIDNEY DISEASE (HCC): ICD-10-CM

## 2023-06-29 DIAGNOSIS — I10 ESSENTIAL HYPERTENSION: Primary | ICD-10-CM

## 2023-06-29 DIAGNOSIS — N18.31 CHRONIC KIDNEY DISEASE, STAGE 3A (HCC): ICD-10-CM

## 2023-06-29 DIAGNOSIS — K21.9 GASTROESOPHAGEAL REFLUX DISEASE, UNSPECIFIED WHETHER ESOPHAGITIS PRESENT: ICD-10-CM

## 2023-06-29 PROCEDURE — 2022F DILAT RTA XM EVC RTNOPTHY: CPT | Performed by: INTERNAL MEDICINE

## 2023-06-29 PROCEDURE — G8417 CALC BMI ABV UP PARAM F/U: HCPCS | Performed by: INTERNAL MEDICINE

## 2023-06-29 PROCEDURE — 1036F TOBACCO NON-USER: CPT | Performed by: INTERNAL MEDICINE

## 2023-06-29 PROCEDURE — G8427 DOCREV CUR MEDS BY ELIG CLIN: HCPCS | Performed by: INTERNAL MEDICINE

## 2023-06-29 PROCEDURE — 1090F PRES/ABSN URINE INCON ASSESS: CPT | Performed by: INTERNAL MEDICINE

## 2023-06-29 PROCEDURE — 3078F DIAST BP <80 MM HG: CPT | Performed by: INTERNAL MEDICINE

## 2023-06-29 PROCEDURE — G8399 PT W/DXA RESULTS DOCUMENT: HCPCS | Performed by: INTERNAL MEDICINE

## 2023-06-29 PROCEDURE — 3017F COLORECTAL CA SCREEN DOC REV: CPT | Performed by: INTERNAL MEDICINE

## 2023-06-29 PROCEDURE — 3074F SYST BP LT 130 MM HG: CPT | Performed by: INTERNAL MEDICINE

## 2023-06-29 PROCEDURE — 99214 OFFICE O/P EST MOD 30 MIN: CPT | Performed by: INTERNAL MEDICINE

## 2023-06-29 PROCEDURE — 3051F HG A1C>EQUAL 7.0%<8.0%: CPT | Performed by: INTERNAL MEDICINE

## 2023-06-29 PROCEDURE — 1123F ACP DISCUSS/DSCN MKR DOCD: CPT | Performed by: INTERNAL MEDICINE

## 2023-06-29 PROCEDURE — 99211 OFF/OP EST MAY X REQ PHY/QHP: CPT | Performed by: INTERNAL MEDICINE

## 2023-06-29 RX ORDER — LISINOPRIL 40 MG/1
40 TABLET ORAL DAILY
COMMUNITY
Start: 2023-04-01

## 2023-06-29 RX ORDER — HYDROCHLOROTHIAZIDE 12.5 MG/1
12.5 CAPSULE, GELATIN COATED ORAL EVERY MORNING
Qty: 90 CAPSULE | Refills: 3 | Status: SHIPPED | OUTPATIENT
Start: 2023-06-29

## 2023-06-29 SDOH — ECONOMIC STABILITY: HOUSING INSECURITY
IN THE LAST 12 MONTHS, WAS THERE A TIME WHEN YOU DID NOT HAVE A STEADY PLACE TO SLEEP OR SLEPT IN A SHELTER (INCLUDING NOW)?: NO

## 2023-06-29 SDOH — ECONOMIC STABILITY: INCOME INSECURITY: HOW HARD IS IT FOR YOU TO PAY FOR THE VERY BASICS LIKE FOOD, HOUSING, MEDICAL CARE, AND HEATING?: NOT HARD AT ALL

## 2023-06-29 SDOH — ECONOMIC STABILITY: FOOD INSECURITY: WITHIN THE PAST 12 MONTHS, YOU WORRIED THAT YOUR FOOD WOULD RUN OUT BEFORE YOU GOT MONEY TO BUY MORE.: NEVER TRUE

## 2023-06-29 SDOH — ECONOMIC STABILITY: FOOD INSECURITY: WITHIN THE PAST 12 MONTHS, THE FOOD YOU BOUGHT JUST DIDN'T LAST AND YOU DIDN'T HAVE MONEY TO GET MORE.: NEVER TRUE

## 2023-06-29 ASSESSMENT — PATIENT HEALTH QUESTIONNAIRE - PHQ9
SUM OF ALL RESPONSES TO PHQ QUESTIONS 1-9: 0
SUM OF ALL RESPONSES TO PHQ9 QUESTIONS 1 & 2: 0
SUM OF ALL RESPONSES TO PHQ QUESTIONS 1-9: 0
2. FEELING DOWN, DEPRESSED OR HOPELESS: 0
1. LITTLE INTEREST OR PLEASURE IN DOING THINGS: 0

## 2023-07-02 ENCOUNTER — TELEPHONE (OUTPATIENT)
Age: 68
End: 2023-07-02

## 2023-07-02 PROBLEM — R26.89 IMBALANCE: Status: ACTIVE | Noted: 2023-07-02

## 2023-07-02 PROBLEM — R29.898 BILATERAL LEG WEAKNESS: Status: ACTIVE | Noted: 2023-07-02

## 2023-07-11 RX ORDER — LEVOTHYROXINE SODIUM 88 UG/1
TABLET ORAL
Qty: 90 TABLET | Refills: 3 | Status: SHIPPED | OUTPATIENT
Start: 2023-07-11

## 2023-07-11 NOTE — TELEPHONE ENCOUNTER
Please refill if appropriate or refuse medication if not appropriate.     PCP: Olivia Duran MD     Last appt: 6/29/23    Last refill: 11/22/22      Future Appointments   Date Time Provider [address] [place] [address]   10/4/2023 11:30 AM Olivia Duran MD [facility]         Requested Prescriptions     Pending Prescriptions Disp Refills    levothyroxine (SYNTHROID) 88 MCG tablet [Pharmacy Med Name: L-THYROXINE TABS 88MCG] 90 tablet 3     Sig: TAKE 1 TABLET DAILY BEFORE BREAKFAST

## 2023-07-24 RX ORDER — EZETIMIBE 10 MG/1
TABLET ORAL
Qty: 90 TABLET | Refills: 3 | Status: SHIPPED | OUTPATIENT
Start: 2023-07-24

## 2023-07-24 NOTE — TELEPHONE ENCOUNTER
PCP: Louise Gillespie MD    Previous refill per chart   Disp Refills Start End    ezetimibe (ZETIA) 10 MG tablet   7/29/2022     Sig: TAKE 1 TABLET DAILY    Class: Historical Med      Last appt:    Future Appointments   Date Time Provider [address]   8/2/2023 12:20 PM Paty Kraus PT [facility]   10/4/2023 11:30 AM Louise Gillespie MD [facility]

## 2023-08-02 ENCOUNTER — HOSPITAL ENCOUNTER (OUTPATIENT)
Facility: HOSPITAL | Age: 68
Setting detail: RECURRING SERIES
Discharge: HOME | End: 2023-08-05
Payer: MEDICARE

## 2023-08-02 PROCEDURE — 97162 PT EVAL MOD COMPLEX 30 MIN: CPT

## 2023-08-02 PROCEDURE — 97535 SELF CARE MNGMENT TRAINING: CPT

## 2023-08-02 NOTE — THERAPY EVALUATION
PHYSICAL / OCCUPATIONAL THERAPY - DAILY TREATMENT NOTE (updated )  For Eval visit    Patient Name: Jose Juan Rust    Date: 2023    : 1955  Insurance: Payor: MEDICARE / Plan: MEDICARE PART A AND B / Product Type: *No Product type* /      Patient  verified yes     Visit #   Current / Total 1 8-12   Time   In / Out 1230 115   Pain   In / Out 0 0   Subjective Functional Status/Changes: See POC     TREATMENT AREA =  Other symptoms and signs involving the musculoskeletal system [R29.898]  Other abnormalities of gait and mobility [R26.89]    OBJECTIVE         30 min   Eval - untimed                      Therapeutic Procedures: Tx Min Billable or 1:1 Min (if diff from Tx Min) Procedure, Rationale, Specifics   15 15 [id number] Self Care/Home Management (timed):  improve patient knowledge and understanding of home safety, activity modification, diagnosis/prognosis, and physical therapy expectations, procedures and progression  to improve patient's ability to progress to PLOF and address remaining functional goals.   (see flow sheet as applicable)     Details if applicable:            Details if applicable:            Details if applicable:            Details if applicable:       [facility] Totals Reminder: bill using total billable min of TIMED therapeutic procedures (example: do not include dry needle or estim unattended, both untimed codes, in totals to left)  8-22 min = 1 unit; 23-37 min = 2 units; 38-52 min = 3 units; 53-67 min = 4 units; 68-82 min = 5 units   Total Total     [x]  Patient Education billed concurrently with other procedures   [x] Review HEP    [] Progressed/Changed HEP, detail:    [] Other detail:       Objective Information/Functional Measures/Assessment    See POC    Patient will continue to benefit from skilled PT / OT services to modify and progress therapeutic interventions, analyze and address functional mobility deficits, analyze and address ROM deficits, analyze and address strength

## 2023-08-02 NOTE — THERAPY EVALUATION
[address] [facility]  [address] [facility], [address], [place], [address] Ph: [phone] Fx: [phone]  Plan of Care / Statement of Necessity for Physical Therapy Services     Patient Name: Maida Gaitan : 1955   Medical   Diagnosis: Other symptoms and signs involving the musculoskeletal system [R29.898]  Other abnormalities of gait and mobility [R26.89] Treatment Diagnosis: M62.81  GENERAL MUSCLE WEAKNESS    Onset Date: Several years, worsening May 2023     Referral Source: Louise Gillespie MD [facility]): 2023   Prior Hospitalization: See medical history Provider #: [id number]   Prior Level of Function: Independent all ADL's   Comorbidities: DM, Type I, Thyroid, HTN, peripheral vascular disease, Visual impairment     Assessment / price information:  :   Maida Gaitan is a 76 y.o.  yo female with Dx of Other symptoms and signs involving the musculoskeletal system [R29.898]  Other abnormalities of gait and mobility [R26.89]. Patient reports she's had worsening balance over the past few years. Reports \"heaviness\" in her legs and difficulty w/ walking on sand and uneven terrain. She has a significant loss of vision in the R eye and bilateral peripheral vision loss. Most recent fall was 3 months ago when she tripped over her granddaughter in a store parking lot. Reports some episodes of dizziness. Objective:  DOI:    Changes prior to date of Onset:  Vertigo  [x] Imbalance   [x]   Dizziness  [x]   Spontaneous []  Positional  []  Non-Specific Head Mvmt  []  Worse with: Lying down  Sitting up in bed Rolling in bed L/R Turning head Side to side    Standing quickly Bending Forward Looking up  How long did episode last? Sec: Min: Hours: Day(s): Weeks: Additional symptoms?    Nausea   []      Vomiting []    Loss of Balance [x]      Oscillopsia []     Headache []          Diplopia []   Visual Changes [] Dysarthria [] Sensory disturbances [x]Limb

## 2023-08-09 ENCOUNTER — HOSPITAL ENCOUNTER (OUTPATIENT)
Facility: HOSPITAL | Age: 68
Setting detail: RECURRING SERIES
Discharge: HOME | End: 2023-08-12
Payer: MEDICARE

## 2023-08-09 PROCEDURE — 97110 THERAPEUTIC EXERCISES: CPT

## 2023-08-09 PROCEDURE — 97112 NEUROMUSCULAR REEDUCATION: CPT

## 2023-08-09 NOTE — PROGRESS NOTES
PHYSICAL / OCCUPATIONAL THERAPY - DAILY TREATMENT NOTE (updated )    Patient Name: Joselo Champagne    Date: 2023    : 1955  Insurance: Payor: MEDICARE / Plan: MEDICARE PART A AND B / Product Type: *No Product type* /      Patient  verified Yes     Visit #   Current / Total 2 8-12   Time   In / Out 5:01 pm 5:36    Pain   In / Out 0 0   Subjective Functional Status/Changes: Pt reports very mild dizziness today     TREATMENT AREA =  Other symptoms and signs involving the musculoskeletal system [R29.898]  Other abnormalities of gait and mobility [R26.89]    OBJECTIVE         Therapeutic Procedures: Tx Min Billable or 1:1 Min (if diff from Tx Min) Procedure, Rationale, Specifics   27  [id number] Neuromuscular Re-Education (timed):  improve balance, coordination, kinesthetic sense, posture, core stability and proprioception to improve patient's ability to develop conscious control of individual muscles and awareness of position of extremities in order to progress to PLOF and address remaining functional goals. (see flow sheet as applicable)     Details if applicable:       8  [id number] Therapeutic Exercise (timed):  increase ROM, strength, coordination, balance, and proprioception to improve patient's ability to progress to PLOF and address remaining functional goals.  (see flow sheet as applicable)     Details if applicable:            Details if applicable:            Details if applicable:            Details if applicable:     28  [facility] Totals Reminder: bill using total billable min of TIMED therapeutic procedures (example: do not include dry needle or estim unattended, both untimed codes, in totals to left)  8-22 min = 1 unit; 23-37 min = 2 units; 38-52 min = 3 units; 53-67 min = 4 units; 68-82 min = 5 units   Total Total     [x]  Patient Education billed concurrently with other procedures   [x] Review HEP    [] Progressed/Changed HEP, detail:    [] Other detail:       Objective Information/Functional

## 2023-08-16 ENCOUNTER — HOSPITAL ENCOUNTER (OUTPATIENT)
Facility: HOSPITAL | Age: 68
Setting detail: RECURRING SERIES
Discharge: HOME | End: 2023-08-19
Payer: MEDICARE

## 2023-08-16 PROCEDURE — 97535 SELF CARE MNGMENT TRAINING: CPT

## 2023-08-16 PROCEDURE — 97112 NEUROMUSCULAR REEDUCATION: CPT

## 2023-08-16 PROCEDURE — 97530 THERAPEUTIC ACTIVITIES: CPT

## 2023-08-16 NOTE — PROGRESS NOTES
PHYSICAL / OCCUPATIONAL THERAPY - DAILY TREATMENT NOTE (updated )    Patient Name: Xu Mays    Date: 2023    : 1955  Insurance: Payor: MEDICARE / Plan: MEDICARE PART A AND B / Product Type: *No Product type* /      Patient  verified Yes     Visit #   Current / Total 3 8-12   Time   In / Out 4:47 pm 5:27    Pain   In / Out 0 0   Subjective Functional Status/Changes: Pt reports she woke up today feeling dizzy and nauseous. Pt reports currently mild to moderate dizziness. Performing HEP daily     TREATMENT AREA =  Other symptoms and signs involving the musculoskeletal system [R29.898]  Other abnormalities of gait and mobility [R26.89]    OBJECTIVE         Therapeutic Procedures: Tx Min Billable or 1:1 Min (if diff from Tx Min) Procedure, Rationale, Specifics   32  [id number] Neuromuscular Re-Education (timed):  improve balance, coordination, kinesthetic sense, posture, core stability and proprioception to improve patient's ability to develop conscious control of individual muscles and awareness of position of extremities in order to progress to PLOF and address remaining functional goals. (see flow sheet as applicable)     Details if applicable:       8  [id number] Self Care/Home Management (timed):  improve patient knowledge and understanding of home safety, activity modification, and review and updated written HEP, review PT goals and taper  to improve patient's ability to progress to PLOF and address remaining functional goals.   (see flow sheet as applicable)     Details if applicable:            Details if applicable:            Details if applicable:            Details if applicable:     36   [facility] Totals Reminder: bill using total billable min of TIMED therapeutic procedures (example: do not include dry needle or estim unattended, both untimed codes, in totals to left)  8-22 min = 1 unit; 23-37 min = 2 units; 38-52 min = 3 units; 53-67 min = 4 units; 68-82 min = 5 units   Total Total     [x]

## 2023-08-18 ENCOUNTER — HOSPITAL ENCOUNTER (OUTPATIENT)
Facility: HOSPITAL | Age: 68
Setting detail: RECURRING SERIES
Discharge: HOME | End: 2023-08-21
Payer: MEDICARE

## 2023-08-18 PROCEDURE — 97112 NEUROMUSCULAR REEDUCATION: CPT

## 2023-08-18 PROCEDURE — 97530 THERAPEUTIC ACTIVITIES: CPT

## 2023-08-18 NOTE — PROGRESS NOTES
PHYSICAL / OCCUPATIONAL THERAPY - DAILY TREATMENT NOTE (updated )    Patient Name: Arslan Reynoso    Date: 2023    : 1955  Insurance: Payor: MEDICARE / Plan: MEDICARE PART A AND B / Product Type: *No Product type* /      Patient  verified Yes     Visit #   Current / Total 4 8-12   Time   In / Out 1103 1141   Pain   In / Out 0 0   Subjective Functional Status/Changes: Patient reports improvements in overall confidence w/ walking and ADL's     TREATMENT AREA =  Muscle weakness (generalized) [M62.81]    OBJECTIVE         Therapeutic Procedures: Tx Min Billable or 1:1 Min (if diff from Tx Min) Procedure, Rationale, Specifics   25 25 [id number] Neuromuscular Re-Education (timed):  improve balance, coordination, kinesthetic sense, posture, core stability and proprioception to improve patient's ability to develop conscious control of individual muscles and awareness of position of extremities in order to progress to PLOF and address remaining functional goals. (see flow sheet as applicable)     Details if applicable:        01565 Therapeutic Activity (timed):  use of dynamic activities replicating functional movements to increase ROM, strength, coordination, balance, and proprioception in order to improve patient's ability to progress to PLOF and address remaining functional goals.   (see flow sheet as applicable)       Details if applicable:            Details if applicable:            Details if applicable:            Details if applicable:     42 39 [facility] Totals Reminder: bill using total billable min of TIMED therapeutic procedures (example: do not include dry needle or estim unattended, both untimed codes, in totals to left)  8-22 min = 1 unit; 23-37 min = 2 units; 38-52 min = 3 units; 53-67 min = 4 units; 68-82 min = 5 units   Total Total     [x]  Patient Education billed concurrently with other procedures   [x] Review HEP    [] Progressed/Changed HEP, detail:    [] Other detail:

## 2023-08-23 ENCOUNTER — HOSPITAL ENCOUNTER (OUTPATIENT)
Facility: HOSPITAL | Age: 68
Setting detail: RECURRING SERIES
Discharge: HOME | End: 2023-08-26
Payer: MEDICARE

## 2023-08-23 PROCEDURE — 97530 THERAPEUTIC ACTIVITIES: CPT

## 2023-08-23 PROCEDURE — 97112 NEUROMUSCULAR REEDUCATION: CPT

## 2023-08-23 NOTE — PROGRESS NOTES
12-31-23      PLAN  Yes  Continue plan of care  [x]  Upgrade activities as tolerated  []  Discharge due to :  []  Other:    Richard Leigh PTA    8/23/2023    3:42 PM    Future Appointments   Date Time Provider 4600  [facility]   8/25/2023 11:00 AM Richard Leigh PTA [facility]   8/30/2023  3:40 PM Richard Leigh PTA [facility]   9/1/2023 11:00 AM Richard Leigh [facility]   9/6/2023  3:40 PM Richard Leigh [facility]   9/8/2023 11:00 AM Pascual Soliman [facility]   9/13/2023  3:40 PM Richard Leigh [facility]   9/15/2023 11:00 AM Pascual Soliman [facility]   10/4/2023 11:30 AM Lola Beal MD [facility] BS AMB

## 2023-08-25 ENCOUNTER — APPOINTMENT (OUTPATIENT)
Facility: HOSPITAL | Age: 68
End: 2023-08-25
Payer: MEDICARE

## 2023-08-30 ENCOUNTER — HOSPITAL ENCOUNTER (OUTPATIENT)
Facility: HOSPITAL | Age: 68
Setting detail: RECURRING SERIES
Discharge: HOME | End: 2023-09-02
Payer: MEDICARE

## 2023-08-30 PROCEDURE — 97112 NEUROMUSCULAR REEDUCATION: CPT

## 2023-08-30 PROCEDURE — 97530 THERAPEUTIC ACTIVITIES: CPT

## 2023-08-30 NOTE — PROGRESS NOTES
PHYSICAL / OCCUPATIONAL THERAPY - DAILY TREATMENT NOTE (updated )    Patient Name: Eric Son    Date: 2023    : 1955  Insurance: Payor: MEDICARE / Plan: MEDICARE PART A AND B / Product Type: *No Product type* /      Patient  verified Yes     Visit #   Current / Total 6 12   Time   In / Out 3:44 pm 4:27    Pain   In / Out 0 0   Subjective Functional Status/Changes: Pt reports she feels like her exercises are getting better. TREATMENT AREA =  Muscle weakness (generalized) [M62.81]    OBJECTIVE         Therapeutic Procedures: Tx Min Billable or 1:1 Min (if diff from Tx Min) Procedure, Rationale, Specifics   31  [id number] Neuromuscular Re-Education (timed):  improve balance, coordination, kinesthetic sense, posture, core stability and proprioception to improve patient's ability to develop conscious control of individual muscles and awareness of position of extremities in order to progress to PLOF and address remaining functional goals. (see flow sheet as applicable)     Details if applicable:       12  21541 Therapeutic Activity (timed):  use of dynamic activities replicating functional movements to increase ROM, strength, coordination, balance, and proprioception in order to improve patient's ability to progress to PLOF and address remaining functional goals.   (see flow sheet as applicable)     Details if applicable:            Details if applicable:            Details if applicable:            Details if applicable:     37  [facility] Totals Reminder: bill using total billable min of TIMED therapeutic procedures (example: do not include dry needle or estim unattended, both untimed codes, in totals to left)  8-22 min = 1 unit; 23-37 min = 2 units; 38-52 min = 3 units; 53-67 min = 4 units; 68-82 min = 5 units   Total Total     [x]  Patient Education billed concurrently with other procedures   [x] Review HEP    [] Progressed/Changed HEP, detail:    [] Other detail:       Objective

## 2023-09-01 ENCOUNTER — HOSPITAL ENCOUNTER (OUTPATIENT)
Facility: HOSPITAL | Age: 68
Setting detail: RECURRING SERIES
Discharge: HOME | End: 2023-09-04
Payer: MEDICARE

## 2023-09-01 PROCEDURE — 97110 THERAPEUTIC EXERCISES: CPT

## 2023-09-01 PROCEDURE — 97535 SELF CARE MNGMENT TRAINING: CPT

## 2023-09-01 PROCEDURE — 97112 NEUROMUSCULAR REEDUCATION: CPT

## 2023-09-01 NOTE — PROGRESS NOTES
PHYSICAL / OCCUPATIONAL THERAPY - DAILY TREATMENT NOTE (updated )    Patient Name: Farooq Adrian    Date: 2023    : 1955  Insurance: Payor: MEDICARE / Plan: MEDICARE PART A AND B / Product Type: *No Product type* /      Patient  verified Yes     Visit #   Current / Total 7 12   Time   In / Out 11:08 am 11:49    Pain   In / Out 0 0   Subjective Functional Status/Changes: Pt reports I'm a little weird today. I don't sleep real well. Pt reports ~ 40% overall improvement in imbalance. I feel a lot more confidence. Pt reports performing HEP TID except yesterday she attended a       TREATMENT AREA =  Muscle weakness (generalized) [M62.81]    OBJECTIVE         Therapeutic Procedures: Tx Min Billable or 1:1 Min (if diff from Tx Min) Procedure, Rationale, Specifics   12  [id number] Neuromuscular Re-Education (timed):  improve balance, coordination, kinesthetic sense, posture, core stability and proprioception to improve patient's ability to develop conscious control of individual muscles and awareness of position of extremities in order to progress to PLOF and address remaining functional goals. (see flow sheet as applicable)     Details if applicable:       16  16624 Self Care/Home Management (timed):  improve patient knowledge and understanding of home safety, activity modification, physical therapy expectations, procedures and progression, and STGs/LTGs  to improve patient's ability to progress to PLOF and address remaining functional goals. (see flow sheet as applicable)     Details if applicable:     13  86706 Therapeutic Exercise (timed):  increase ROM, strength, coordination, balance, and proprioception to improve patient's ability to progress to PLOF and address remaining functional goals.  (see flow sheet as applicable)     Details if applicable:            Details if applicable:     39  [facility] Totals Reminder: bill using total billable min of TIMED therapeutic procedures (example: do not

## 2023-09-01 NOTE — THERAPY RECERTIFICATION
[address] [facility] PHYSICAL THERAPY  [address] [facility], [address], [place], [address] Ph: [phone] Fx: [phone]  PHYSICAL THERAPY PROGRESS NOTE  Patient Name: Dawson Soriano : 1955   Treatment/Medical Diagnosis: Muscle weakness (generalized) [M62.81]   Referral Source: Marge Bhakta MD     Date of Initial Visit: 2023 Attended Visits: 7 Missed Visits: 0     SUMMARY OF TREATMENT  Physical Therapy treatment has consisted of Therapeutic exercise for LE strengthening, Neuromuscular re education for static and dynamic balance, VSE/VVI , Saccades, fall prevention education, and HEP. CURRENT STATUS  Ms Adelaide Luna has progressed well in Physical therapy. Pt is compliant in initial HEP, demonstrating improving use of hip and ankle balance reactions in both static and dynamic balance exercises. SLS: R/L: 7 seconds/9 seconds  DHI: improved from 52 @ initial evaluation to 36     Patient will report at least 25% reduction of symptoms with ADLs. Status at last Eval: na  Current Status: Pt reports ~ 40% overall improvement in imbalance. Goal Met?  yes    2. Patient will be independent and complaint with HEP TID to reduce imbalance and dizziness with ADLs  Status at last Eval: na  Current Status: Pt independent with initial HEP. Goal Met?  yes    3. Patient will improve CANDE step 5 from sway to Normal and Step 6 from Fall to Sway indicating improved proprioceptive control decreasing fall risk w/ ADL's and ambulation  Status at last Eval: see above  Current Status: CANDE step 5 improved to Normal and Step 6 improved to Sway. Goal Met?  yes    Payor: MEDICARE / Plan: MEDICARE PART A AND B / Product Type: *No Product type* /   Long Term Goals:  1. Patient will report at least 50% reduction of symptoms with ADLs.   2. Patient will be independent with self progression of HEP and demonstrate willingness to continue HEP after D/C to maximize/maintain gains in functional

## 2023-09-06 ENCOUNTER — HOSPITAL ENCOUNTER (OUTPATIENT)
Facility: HOSPITAL | Age: 68
Setting detail: RECURRING SERIES
Discharge: HOME | End: 2023-09-09
Payer: MEDICARE

## 2023-09-06 PROCEDURE — 97110 THERAPEUTIC EXERCISES: CPT

## 2023-09-06 PROCEDURE — 97112 NEUROMUSCULAR REEDUCATION: CPT

## 2023-09-06 PROCEDURE — 97530 THERAPEUTIC ACTIVITIES: CPT

## 2023-09-06 NOTE — PROGRESS NOTES
untimed codes, in totals to left)  8-22 min = 1 unit; 23-37 min = 2 units; 38-52 min = 3 units; 53-67 min = 4 units; 68-82 min = 5 units   Total Total     [x]  Patient Education billed concurrently with other procedures   [x] Review HEP    [] Progressed/Changed HEP, detail:    [] Other detail:       Objective Information/Functional Measures/Assessment    Pt demonstrating improving use of hip and ankle balance strategy in static and dynamic gait. Moderate difficulty with LOB and CGA recovery for modified tandem gait  Improving alfredo and form in gait with HT. Patient will continue to benefit from skilled PT / OT services to modify and progress therapeutic interventions, analyze and address functional mobility deficits, analyze and address imbalance/dizziness, and instruct in home and community integration to address functional deficits and attain remaining goals. Progress toward goals / Updated goals:  []  See Progress Note/Recertification  Continue toward all updated LTGs   Update HEP next visit.     PLAN  - Continue Plan of [facility]    9/6/2023    4:26 PM    Future Appointments   Date Time Provider [address]   9/8/2023 11:00 AM Geni Diaz, PT [facility]   9/13/2023  3:40 PM Michael Mustafa [facility]   9/15/2023 11:00 AM Geni Diaz, PT [facility]   9/19/2023 12:20 PM Michael Mustafa PTA [facility]   9/22/2023 11:40 AM Michael Mustafa [facility]   9/26/2023  1:40 PM Michael Mustafa PTA [facility]   9/29/2023 11:40 AM Geni Diaz, PT [facility]   10/4/2023 11:30 AM Gin Saldana MD [facility] BS AMB

## 2023-09-08 ENCOUNTER — TELEPHONE (OUTPATIENT)
Facility: HOSPITAL | Age: 68
End: 2023-09-08

## 2023-09-08 ENCOUNTER — APPOINTMENT (OUTPATIENT)
Facility: HOSPITAL | Age: 68
End: 2023-09-08
Payer: MEDICARE

## 2023-09-13 ENCOUNTER — HOSPITAL ENCOUNTER (OUTPATIENT)
Facility: HOSPITAL | Age: 68
Setting detail: RECURRING SERIES
Discharge: HOME | End: 2023-09-16
Payer: MEDICARE

## 2023-09-13 PROCEDURE — 97535 SELF CARE MNGMENT TRAINING: CPT

## 2023-09-13 PROCEDURE — 97112 NEUROMUSCULAR REEDUCATION: CPT

## 2023-09-13 PROCEDURE — 97530 THERAPEUTIC ACTIVITIES: CPT

## 2023-09-13 NOTE — PROGRESS NOTES
of TIMED therapeutic procedures (example: do not include dry needle or estim unattended, both untimed codes, in totals to left)  8-22 min = 1 unit; 23-37 min = 2 units; 38-52 min = 3 units; 53-67 min = 4 units; 68-82 min = 5 units   Total Total     [x]  Patient Education billed concurrently with other procedures   [x] Review HEP    [] Progressed/Changed HEP, detail:    [] Other detail:       Objective Information/Functional Measures/Assessment    I:10  FGA:26/30  Reviewed discharge instructions and vestibular taper. Patient will continue to benefit from skilled PT / OT services to modify and progress therapeutic interventions, analyze and address functional mobility deficits, analyze and address imbalance/dizziness, and instruct in home and community integration to address functional deficits and attain remaining goals.     Progress toward goals / Updated goals:  []  See Progress Note/Recertification    See discharge     PLAN  - Discharge due to : progressing toward goals/met goals    Ganesh Bardales PTA    9/13/2023    3:57 PM    Future Appointments   Date Time Provider [address] [place] [address]   9/15/2023 11:00 AM Ganesh Bardales PTA [facility]   9/19/2023 12:20 PM Ganesh Bardales PTA [facility]   9/22/2023 11:40 AM Ganesh Bardales PTA [facility]   9/26/2023  1:40 PM Ganesh Bardales PTA [facility]   9/29/2023 11:40 AM Selene Weems PT [facility]   10/4/2023 11:30 AM Migdalia Manuel MD [facility] BS AMB

## 2023-09-15 ENCOUNTER — APPOINTMENT (OUTPATIENT)
Facility: HOSPITAL | Age: 68
End: 2023-09-15
Payer: MEDICARE

## 2023-09-19 ENCOUNTER — APPOINTMENT (OUTPATIENT)
Facility: HOSPITAL | Age: 68
End: 2023-09-19
Payer: MEDICARE

## 2023-09-19 LAB
BASOPHILS # BLD AUTO: 0 X10E3/UL (ref 0–0.2)
BASOPHILS NFR BLD AUTO: 0 %
EOSINOPHIL # BLD AUTO: 0.1 X10E3/UL (ref 0–0.4)
EOSINOPHIL NFR BLD AUTO: 2 %
ERYTHROCYTE [DISTWIDTH] IN BLOOD BY AUTOMATED COUNT: 11.6 % (ref 11.7–15.4)
HBA1C MFR BLD: 8.1 % (ref 4.8–5.6)
HCT VFR BLD AUTO: 33.9 % (ref 34–46.6)
HGB BLD-MCNC: 11.5 G/DL (ref 11.1–15.9)
IMM GRANULOCYTES # BLD AUTO: 0 X10E3/UL (ref 0–0.1)
IMM GRANULOCYTES NFR BLD AUTO: 0 %
LYMPHOCYTES # BLD AUTO: 1.6 X10E3/UL (ref 0.7–3.1)
LYMPHOCYTES NFR BLD AUTO: 25 %
MCH RBC QN AUTO: 33.8 PG (ref 26.6–33)
MCHC RBC AUTO-ENTMCNC: 33.9 G/DL (ref 31.5–35.7)
MCV RBC AUTO: 100 FL (ref 79–97)
MONOCYTES # BLD AUTO: 0.5 X10E3/UL (ref 0.1–0.9)
MONOCYTES NFR BLD AUTO: 8 %
NEUTROPHILS # BLD AUTO: 4.1 X10E3/UL (ref 1.4–7)
NEUTROPHILS NFR BLD AUTO: 65 %
PLATELET # BLD AUTO: 259 X10E3/UL (ref 150–450)
RBC # BLD AUTO: 3.4 X10E6/UL (ref 3.77–5.28)
SPECIMEN STATUS REPORT: NORMAL
WBC # BLD AUTO: 6.3 X10E3/UL (ref 3.4–10.8)

## 2023-09-20 LAB
25(OH)D3+25(OH)D2 SERPL-MCNC: 56.5 NG/ML (ref 30–100)
ALBUMIN SERPL-MCNC: 4.4 G/DL (ref 3.9–4.9)
ALBUMIN/CREAT UR: 198 MG/G CREAT (ref 0–29)
ALBUMIN/GLOB SERPL: 2.2 {RATIO} (ref 1.2–2.2)
ALP SERPL-CCNC: 60 IU/L (ref 44–121)
ALT SERPL-CCNC: 12 IU/L (ref 0–32)
AST SERPL-CCNC: 16 IU/L (ref 0–40)
BILIRUB SERPL-MCNC: 0.3 MG/DL (ref 0–1.2)
BUN SERPL-MCNC: 20 MG/DL (ref 8–27)
BUN/CREAT SERPL: 20 (ref 12–28)
CALCIUM SERPL-MCNC: 9.7 MG/DL (ref 8.7–10.3)
CHLORIDE SERPL-SCNC: 100 MMOL/L (ref 96–106)
CHOLEST SERPL-MCNC: 206 MG/DL (ref 100–199)
CO2 SERPL-SCNC: 24 MMOL/L (ref 20–29)
CREAT SERPL-MCNC: 0.98 MG/DL (ref 0.57–1)
CREAT UR-MCNC: 34.8 MG/DL
EGFRCR SERPLBLD CKD-EPI 2021: 63 ML/MIN/1.73
FERRITIN SERPL-MCNC: 243 NG/ML (ref 15–150)
GLOBULIN SER CALC-MCNC: 2 G/DL (ref 1.5–4.5)
GLUCOSE SERPL-MCNC: 116 MG/DL (ref 70–99)
HDLC SERPL-MCNC: 80 MG/DL
IMP & REVIEW OF LAB RESULTS: NORMAL
LDLC SERPL CALC-MCNC: 113 MG/DL (ref 0–99)
LDLC/HDLC SERPL: 1.4 RATIO (ref 0–3.2)
MAGNESIUM SERPL-MCNC: 1.9 MG/DL (ref 1.6–2.3)
MICROALBUMIN UR-MCNC: 68.9 UG/ML
POTASSIUM SERPL-SCNC: 4.4 MMOL/L (ref 3.5–5.2)
PROT SERPL-MCNC: 6.4 G/DL (ref 6–8.5)
SODIUM SERPL-SCNC: 141 MMOL/L (ref 134–144)
TRIGL SERPL-MCNC: 74 MG/DL (ref 0–149)
TSH SERPL DL<=0.005 MIU/L-ACNC: 4.78 UIU/ML (ref 0.45–4.5)
VLDLC SERPL CALC-MCNC: 13 MG/DL (ref 5–40)

## 2023-09-22 ENCOUNTER — APPOINTMENT (OUTPATIENT)
Facility: HOSPITAL | Age: 68
End: 2023-09-22
Payer: MEDICARE

## 2023-09-26 ENCOUNTER — APPOINTMENT (OUTPATIENT)
Facility: HOSPITAL | Age: 68
End: 2023-09-26
Payer: MEDICARE

## 2023-09-29 ENCOUNTER — APPOINTMENT (OUTPATIENT)
Facility: HOSPITAL | Age: 68
End: 2023-09-29
Payer: MEDICARE

## 2023-10-04 ENCOUNTER — OFFICE VISIT (OUTPATIENT)
Age: 68
End: 2023-10-04
Payer: MEDICARE

## 2023-10-04 VITALS
OXYGEN SATURATION: 100 % | WEIGHT: 169 LBS | HEIGHT: 65 IN | RESPIRATION RATE: 16 BRPM | TEMPERATURE: 98 F | DIASTOLIC BLOOD PRESSURE: 60 MMHG | SYSTOLIC BLOOD PRESSURE: 134 MMHG | BODY MASS INDEX: 28.16 KG/M2 | HEART RATE: 66 BPM

## 2023-10-04 DIAGNOSIS — D64.9 ANEMIA, UNSPECIFIED TYPE: ICD-10-CM

## 2023-10-04 DIAGNOSIS — I10 ESSENTIAL HYPERTENSION: ICD-10-CM

## 2023-10-04 DIAGNOSIS — D75.89 MACROCYTOSIS: ICD-10-CM

## 2023-10-04 DIAGNOSIS — M85.89 OSTEOPENIA OF MULTIPLE SITES: ICD-10-CM

## 2023-10-04 DIAGNOSIS — N18.31 STAGE 3A CHRONIC KIDNEY DISEASE (HCC): ICD-10-CM

## 2023-10-04 DIAGNOSIS — E03.4 HYPOTHYROIDISM DUE TO ACQUIRED ATROPHY OF THYROID: ICD-10-CM

## 2023-10-04 DIAGNOSIS — K21.9 GASTROESOPHAGEAL REFLUX DISEASE, UNSPECIFIED WHETHER ESOPHAGITIS PRESENT: ICD-10-CM

## 2023-10-04 DIAGNOSIS — Z87.891 FORMER SMOKER: ICD-10-CM

## 2023-10-04 DIAGNOSIS — E10.3553 STABLE PROLIFERATIVE DIABETIC RETINOPATHY OF BOTH EYES ASSOCIATED WITH TYPE 1 DIABETES MELLITUS (HCC): ICD-10-CM

## 2023-10-04 DIAGNOSIS — N18.31 TYPE 1 DIABETES MELLITUS WITH STAGE 3A CHRONIC KIDNEY DISEASE (HCC): Primary | ICD-10-CM

## 2023-10-04 DIAGNOSIS — E10.22 TYPE 1 DIABETES MELLITUS WITH STAGE 3A CHRONIC KIDNEY DISEASE (HCC): Primary | ICD-10-CM

## 2023-10-04 DIAGNOSIS — Z87.891 PERSONAL HISTORY OF TOBACCO USE: ICD-10-CM

## 2023-10-04 DIAGNOSIS — E10.42 TYPE 1 DIABETES MELLITUS WITH DIABETIC POLYNEUROPATHY (HCC): ICD-10-CM

## 2023-10-04 DIAGNOSIS — E78.00 PURE HYPERCHOLESTEROLEMIA: ICD-10-CM

## 2023-10-04 DIAGNOSIS — N18.31 HYPERTENSIVE KIDNEY DISEASE WITH STAGE 3A CHRONIC KIDNEY DISEASE (HCC): ICD-10-CM

## 2023-10-04 DIAGNOSIS — I12.9 HYPERTENSIVE KIDNEY DISEASE WITH STAGE 3A CHRONIC KIDNEY DISEASE (HCC): ICD-10-CM

## 2023-10-04 DIAGNOSIS — Z12.31 SCREENING MAMMOGRAM FOR BREAST CANCER: ICD-10-CM

## 2023-10-04 PROCEDURE — G0296 VISIT TO DETERM LDCT ELIG: HCPCS | Performed by: INTERNAL MEDICINE

## 2023-10-04 NOTE — PROGRESS NOTES
Martin Marks presents today for   Chief Complaint   Patient presents with    3 Month Follow-Up       1. \"Have you been to the ER, urgent care clinic since your last visit? Hospitalized since your last visit? \" no    2. \"Have you seen or consulted any other health care providers outside of the [address] [place] [address] since your last visit? \" no     3. For patients aged 43-73: Has the patient had a colonoscopy / FIT/ Cologuard? Yes - no Care Gap present      If the patient is female:    4. For patients aged 43-66: Has the patient had a mammogram within the past 2 years? Yes - no Care Gap present      5. For patients aged 21-65: Has the patient had a pap smear?  NA - based on age or sex
murmur. Lungs: clear to auscultation, no wheezes, rhonchi, or rales. Heart: regular rate and rhythm. No murmur, rubs, gallops  Abdomen: soft, nontender, nondistended, normal bowel sounds, no hepatosplenomegaly or masses. Extremities: without edema.       Diabetic foot exam:   Left Foot:   Visual Exam: normal   Pulse DP: 2+ (normal)   Filament test: reduced sensation   Vibratory Sensation: diminished  Right Foot:   Visual Exam: normal   Pulse DP: 2+ (normal)   Filament test: reduced sensation   Vibratory Sensation: diminished        Review of Data:  Labs:  Orders Only on 09/19/2023   Component Date Value Ref Range Status    WBC 09/19/2023 6.3  3.4 - 10.8 x10E3/uL Final    RBC 09/19/2023 3.40 (L)  3.77 - 5.28 x10E6/uL Final    Hemoglobin 09/19/2023 11.5  11.1 - 15.9 g/dL Final    Hematocrit 09/19/2023 33.9 (L)  34.0 - 46.6 % Final    MCV 09/19/2023 100 (H)  79 - 97 fL Final    MCH 09/19/2023 33.8 (H)  26.6 - 33.0 pg Final    MCHC 09/19/2023 33.9  31.5 - 35.7 g/dL Final    RDW 09/19/2023 11.6 (L)  11.7 - 15.4 % Final    Platelets 78/68/6921 259  150 - 450 x10E3/uL Final    Neutrophils % 09/19/2023 65  Not Estab. % Final    Lymphocytes % 09/19/2023 25  Not Estab. % Final    Monocytes % 09/19/2023 8  Not Estab. % Final    Eosinophils % 09/19/2023 2  Not Estab. % Final    Basophils % 09/19/2023 0  Not Estab. % Final    Neutrophils Absolute 09/19/2023 4.1  1.4 - 7.0 x10E3/uL Final    Lymphocytes Absolute 09/19/2023 1.6  0.7 - 3.1 x10E3/uL Final    Monocytes Absolute 09/19/2023 0.5  0.1 - 0.9 x10E3/uL Final    Eosinophils Absolute 09/19/2023 0.1  0.0 - 0.4 x10E3/uL Final    Basophils Absolute 09/19/2023 0.0  0.0 - 0.2 x10E3/uL Final    Immature Granulocytes 09/19/2023 0  Not Estab. % Final    Immature Grans (Abs) 09/19/2023 0.0  0.0 - 0.1 x10E3/uL Final    Specimen Status Report 09/19/2023 COMMENT   Final    Hemoglobin A1C 09/19/2023 8.1 (H)  4.8 - 5.6 % Final    Glucose 09/19/2023 116 (H)  70 - 99 mg/dL Final    BUN

## 2023-10-08 ENCOUNTER — TELEPHONE (OUTPATIENT)
Age: 68
End: 2023-10-08

## 2023-10-08 PROBLEM — R29.898 BILATERAL LEG WEAKNESS: Status: RESOLVED | Noted: 2023-07-02 | Resolved: 2023-10-08

## 2023-10-08 PROBLEM — N18.30 STAGE 3 CHRONIC KIDNEY DISEASE (HCC): Status: ACTIVE | Noted: 2023-10-08

## 2023-10-10 NOTE — TELEPHONE ENCOUNTER
We don't need CD of XRAY because probably just the image on it, and our doctors don't read radiologic images    Don't need patient to sign release since we are pcp, they should be able to fax for continuity of care Doxycycline Pregnancy And Lactation Text: This medication is Pregnancy Category D and not consider safe during pregnancy. It is also excreted in breast milk but is considered safe for shorter treatment courses.

## 2023-12-27 RX ORDER — AMLODIPINE BESYLATE 5 MG/1
5 TABLET ORAL DAILY
Qty: 90 TABLET | Refills: 3 | Status: SHIPPED | OUTPATIENT
Start: 2023-12-27

## 2023-12-27 RX ORDER — LISINOPRIL 40 MG/1
40 TABLET ORAL DAILY
Qty: 90 TABLET | Refills: 3 | Status: SHIPPED | OUTPATIENT
Start: 2023-12-27

## 2023-12-28 LAB
25(OH)D3+25(OH)D2 SERPL-MCNC: 43.6 NG/ML (ref 30–100)
ALBUMIN SERPL-MCNC: 4.1 G/DL (ref 3.9–4.9)
ALBUMIN/CREAT UR: 181 MG/G CREAT (ref 0–29)
ALBUMIN/GLOB SERPL: 2.2 {RATIO} (ref 1.2–2.2)
ALP SERPL-CCNC: 58 IU/L (ref 44–121)
ALT SERPL-CCNC: 13 IU/L (ref 0–32)
AST SERPL-CCNC: 20 IU/L (ref 0–40)
BASOPHILS # BLD AUTO: 0 X10E3/UL (ref 0–0.2)
BASOPHILS NFR BLD AUTO: 1 %
BILIRUB SERPL-MCNC: 0.4 MG/DL (ref 0–1.2)
BUN SERPL-MCNC: 16 MG/DL (ref 8–27)
BUN/CREAT SERPL: 20 (ref 12–28)
CALCIUM SERPL-MCNC: 9.9 MG/DL (ref 8.7–10.3)
CHLORIDE SERPL-SCNC: 100 MMOL/L (ref 96–106)
CHOLEST SERPL-MCNC: 201 MG/DL (ref 100–199)
CO2 SERPL-SCNC: 25 MMOL/L (ref 20–29)
CREAT SERPL-MCNC: 0.8 MG/DL (ref 0.57–1)
CREAT UR-MCNC: 27.1 MG/DL
EGFRCR SERPLBLD CKD-EPI 2021: 80 ML/MIN/1.73
EOSINOPHIL # BLD AUTO: 0.1 X10E3/UL (ref 0–0.4)
EOSINOPHIL NFR BLD AUTO: 2 %
ERYTHROCYTE [DISTWIDTH] IN BLOOD BY AUTOMATED COUNT: 11.6 % (ref 11.7–15.4)
GLOBULIN SER CALC-MCNC: 1.9 G/DL (ref 1.5–4.5)
GLUCOSE SERPL-MCNC: 126 MG/DL (ref 70–99)
HBA1C MFR BLD: 7.9 % (ref 4.8–5.6)
HCT VFR BLD AUTO: 33.9 % (ref 34–46.6)
HDLC SERPL-MCNC: 71 MG/DL
HGB BLD-MCNC: 11 G/DL (ref 11.1–15.9)
IMM GRANULOCYTES # BLD AUTO: 0 X10E3/UL (ref 0–0.1)
IMM GRANULOCYTES NFR BLD AUTO: 0 %
IMP & REVIEW OF LAB RESULTS: NORMAL
LDLC SERPL CALC-MCNC: 114 MG/DL (ref 0–99)
LYMPHOCYTES # BLD AUTO: 1.2 X10E3/UL (ref 0.7–3.1)
LYMPHOCYTES NFR BLD AUTO: 20 %
Lab: NORMAL
MCH RBC QN AUTO: 32.3 PG (ref 26.6–33)
MCHC RBC AUTO-ENTMCNC: 32.4 G/DL (ref 31.5–35.7)
MCV RBC AUTO: 99 FL (ref 79–97)
MICROALBUMIN UR-MCNC: 49.1 UG/ML
MONOCYTES # BLD AUTO: 0.4 X10E3/UL (ref 0.1–0.9)
MONOCYTES NFR BLD AUTO: 7 %
NEUTROPHILS # BLD AUTO: 4.1 X10E3/UL (ref 1.4–7)
NEUTROPHILS NFR BLD AUTO: 70 %
PLATELET # BLD AUTO: 268 X10E3/UL (ref 150–450)
POTASSIUM SERPL-SCNC: 5.3 MMOL/L (ref 3.5–5.2)
PROT SERPL-MCNC: 6 G/DL (ref 6–8.5)
RBC # BLD AUTO: 3.41 X10E6/UL (ref 3.77–5.28)
SODIUM SERPL-SCNC: 137 MMOL/L (ref 134–144)
SPECIMEN STATUS REPORT: NORMAL
TRIGL SERPL-MCNC: 88 MG/DL (ref 0–149)
TSH SERPL DL<=0.005 MIU/L-ACNC: 0.46 UIU/ML (ref 0.45–4.5)
VLDLC SERPL CALC-MCNC: 16 MG/DL (ref 5–40)
WBC # BLD AUTO: 5.9 X10E3/UL (ref 3.4–10.8)

## 2024-01-04 ENCOUNTER — OFFICE VISIT (OUTPATIENT)
Age: 69
End: 2024-01-04

## 2024-01-04 VITALS
TEMPERATURE: 98.9 F | RESPIRATION RATE: 16 BRPM | HEIGHT: 65 IN | OXYGEN SATURATION: 100 % | SYSTOLIC BLOOD PRESSURE: 132 MMHG | HEART RATE: 59 BPM | DIASTOLIC BLOOD PRESSURE: 68 MMHG | BODY MASS INDEX: 28.32 KG/M2 | WEIGHT: 170 LBS

## 2024-01-04 DIAGNOSIS — E03.4 HYPOTHYROIDISM DUE TO ACQUIRED ATROPHY OF THYROID: ICD-10-CM

## 2024-01-04 DIAGNOSIS — I12.9 HYPERTENSIVE KIDNEY DISEASE WITH STAGE 3A CHRONIC KIDNEY DISEASE (HCC): ICD-10-CM

## 2024-01-04 DIAGNOSIS — D64.9 ANEMIA, UNSPECIFIED TYPE: ICD-10-CM

## 2024-01-04 DIAGNOSIS — Z00.00 MEDICARE ANNUAL WELLNESS VISIT, SUBSEQUENT: ICD-10-CM

## 2024-01-04 DIAGNOSIS — E78.00 PURE HYPERCHOLESTEROLEMIA: ICD-10-CM

## 2024-01-04 DIAGNOSIS — E10.42 TYPE 1 DIABETES MELLITUS WITH DIABETIC POLYNEUROPATHY (HCC): ICD-10-CM

## 2024-01-04 DIAGNOSIS — N18.31 TYPE 1 DIABETES MELLITUS WITH STAGE 3A CHRONIC KIDNEY DISEASE (HCC): ICD-10-CM

## 2024-01-04 DIAGNOSIS — N18.31 STAGE 3A CHRONIC KIDNEY DISEASE (HCC): ICD-10-CM

## 2024-01-04 DIAGNOSIS — Z71.89 ACP (ADVANCE CARE PLANNING): ICD-10-CM

## 2024-01-04 DIAGNOSIS — E10.3553 STABLE PROLIFERATIVE DIABETIC RETINOPATHY OF BOTH EYES ASSOCIATED WITH TYPE 1 DIABETES MELLITUS (HCC): ICD-10-CM

## 2024-01-04 DIAGNOSIS — E10.22 TYPE 1 DIABETES MELLITUS WITH STAGE 3A CHRONIC KIDNEY DISEASE (HCC): ICD-10-CM

## 2024-01-04 DIAGNOSIS — I10 ESSENTIAL HYPERTENSION: Primary | ICD-10-CM

## 2024-01-04 DIAGNOSIS — N18.31 HYPERTENSIVE KIDNEY DISEASE WITH STAGE 3A CHRONIC KIDNEY DISEASE (HCC): ICD-10-CM

## 2024-01-04 DIAGNOSIS — K21.9 GASTROESOPHAGEAL REFLUX DISEASE, UNSPECIFIED WHETHER ESOPHAGITIS PRESENT: ICD-10-CM

## 2024-01-04 RX ORDER — LEVOTHYROXINE SODIUM 88 UG/1
88 TABLET ORAL
Qty: 1 TABLET | Refills: 0
Start: 2024-01-04

## 2024-01-04 RX ORDER — LEVOTHYROXINE SODIUM 112 UG/1
112 TABLET ORAL
Qty: 30 TABLET | Refills: 1 | Status: SHIPPED | OUTPATIENT
Start: 2024-01-04

## 2024-01-04 ASSESSMENT — PATIENT HEALTH QUESTIONNAIRE - PHQ9
SUM OF ALL RESPONSES TO PHQ QUESTIONS 1-9: 0
SUM OF ALL RESPONSES TO PHQ QUESTIONS 1-9: 0
1. LITTLE INTEREST OR PLEASURE IN DOING THINGS: 0
SUM OF ALL RESPONSES TO PHQ9 QUESTIONS 1 & 2: 0
SUM OF ALL RESPONSES TO PHQ QUESTIONS 1-9: 0
SUM OF ALL RESPONSES TO PHQ QUESTIONS 1-9: 0
2. FEELING DOWN, DEPRESSED OR HOPELESS: 0

## 2024-01-04 ASSESSMENT — LIFESTYLE VARIABLES
HOW MANY STANDARD DRINKS CONTAINING ALCOHOL DO YOU HAVE ON A TYPICAL DAY: 1 OR 2
HOW OFTEN DO YOU HAVE A DRINK CONTAINING ALCOHOL: MONTHLY OR LESS

## 2024-01-04 NOTE — ACP (ADVANCE CARE PLANNING)
Advance Care Planning     Advance Care Planning (ACP) Physician/NP/PA Conversation    Date of Conversation: 1/4/2024  Conducted with: Patient with Decision Making Capacity    Healthcare Decision Maker:      Primary Decision Maker: Jus King - Spouse - [phone]    Secondary Decision Maker: Vicki King - Child - [phone]    Click here to complete Healthcare Decision Makers including selection of the Healthcare Decision Maker Relationship (ie \"Primary\")  Today we confirmed healthcare decision-makers as her  and daughter.    Care Preferences:    Hospitalization:  \"If your health worsens and it becomes clear that your chance of recovery is unlikely, what would be your preference regarding hospitalization?\"  The patient would prefer hospitalization.    Ventilation:  \"If you were unable to breath on your own and your chance of recovery was unlikely, what would be your preference about the use of a ventilator (breathing machine) if it was available to you?\"  The patient would desire the use of a ventilator.    Resuscitation:  \"In the event your heart stopped as a result of an underlying serious health condition, would you want attempts made to restart your heart, or would you prefer a natural death?\"  Yes, attempt to resuscitate.    treatment goals, benefit/burden of treatment options, ventilation preferences, hospitalization preferences, resuscitation preferences, and end of life care preferences (vegetative state/imminent death)    Conversation Outcomes / Follow-Up Plan:  ACP in process - information provided, considering goals and options  Reviewed DNR/DNI and patient elects Full Code (Attempt Resuscitation)    Length of Voluntary ACP Conversation in minutes:  16 minutes    Noelle Del Angel MD

## 2024-01-04 NOTE — PATIENT INSTRUCTIONS
your doctor recommends aspirin, take the amount directed each day. Make sure you take aspirin and not another kind of pain reliever, such as acetaminophen (Tylenol).   When should you call for help?   Call 911 if you have symptoms of a heart attack. These may include:    Chest pain or pressure, or a strange feeling in the chest.     Sweating.     Shortness of breath.     Pain, pressure, or a strange feeling in the back, neck, jaw, or upper belly or in one or both shoulders or arms.     Lightheadedness or sudden weakness.     A fast or irregular heartbeat.   After you call 911, the  may tell you to chew 1 adult-strength or 2 to 4 low-dose aspirin. Wait for an ambulance. Do not try to drive yourself.  Watch closely for changes in your health, and be sure to contact your doctor if you have any problems.  Where can you learn more?  Go to https://www.[employer]/patientEd and enter F075 to learn more about \"A Healthy Heart: Care Instructions.\"  Current as of: June 25, 2023               Content Version: 13.9  © 1670-6602 [employer].   Care instructions adapted under license by [employer]. If you have questions about a medical condition or this instruction, always ask your healthcare professional. [employer] disclaims any warranty or liability for your use of this information.      Personalized Preventive Plan for Barbra King - 1/4/2024  Medicare offers a range of preventive health benefits. Some of the tests and screenings are paid in full while other may be subject to a deductible, co-insurance, and/or copay.    Some of these benefits include a comprehensive review of your medical history including lifestyle, illnesses that may run in your family, and various assessments and screenings as appropriate.    After reviewing your medical record and screening and assessments performed today your provider may have ordered immunizations, labs, imaging, and/or referrals for you.  A

## 2024-01-07 ENCOUNTER — TELEPHONE (OUTPATIENT)
Age: 69
End: 2024-01-07

## 2024-01-15 ENCOUNTER — HOSPITAL ENCOUNTER (OUTPATIENT)
Facility: HOSPITAL | Age: 69
Discharge: HOME | End: 2024-01-18
Attending: INTERNAL MEDICINE
Payer: MEDICARE

## 2024-01-15 ENCOUNTER — CLINICAL DOCUMENTATION (OUTPATIENT)
Age: 69
End: 2024-01-15

## 2024-01-15 VITALS — BODY MASS INDEX: 28.32 KG/M2 | HEIGHT: 65 IN | WEIGHT: 170 LBS

## 2024-01-15 DIAGNOSIS — Z12.31 SCREENING MAMMOGRAM FOR BREAST CANCER: ICD-10-CM

## 2024-01-15 DIAGNOSIS — M85.89 OSTEOPENIA OF MULTIPLE SITES: ICD-10-CM

## 2024-01-15 DIAGNOSIS — Z87.891 PERSONAL HISTORY OF TOBACCO USE: ICD-10-CM

## 2024-01-15 PROCEDURE — 71271 CT THORAX LUNG CANCER SCR C-: CPT

## 2024-01-15 PROCEDURE — 77063 BREAST TOMOSYNTHESIS BI: CPT

## 2024-01-15 PROCEDURE — 77080 DXA BONE DENSITY AXIAL: CPT

## 2024-01-15 NOTE — PROGRESS NOTES
Subjective:   Patient ID:  Malik Rosenberg is a 72 y.o. male who presents for evaluation of Diabetes; Hypertension; and Chest Pain      HPIPatient is seen in our [facility].    He has chest pains at rest - picking on the left side of his chest. Otherwise he is active and doing well. He has not had a migraine recently.    As her him he was taken off the ARB secondary to  Hyperkalemia. He was started on Amlodipine. HCTZ - he is not taking. Initially his K dropped from 5.6 to 5.0 but  Yesterday off of ARB it was 5.6 again.         Other Medical Problems  HTN  Diabetic Type II  Hyperlipidemia with increased TG    Cardiac Testing:    ROS      Allergies and current medications updated and reviewed:  Review of patient's allergies indicates:   Allergen Reactions    Codeine     Fenofibrate Nausea Only    Lisinopril      Cough    Shellfish containing products Swelling     Current Outpatient Medications   Medication Sig Dispense Refill    albuterol 90 mcg/actuation inhaler Inhale 2 puffs into the lungs every 6 (six) hours as needed for Wheezing. 1 each 11    amLODIPine (NORVASC) 5 MG tablet Take 1 tablet (5 mg total) by mouth once daily. 90 tablet 2    cyanocobalamin (VITAMIN B-12) 1000 MCG tablet Take 100 mcg by mouth once daily.      ergocalciferol (ERGOCALCIFEROL) 50,000 unit Cap Take 1 capsule (50,000 Units total) by mouth every 7 days. 12 capsule 3    hydroCHLOROthiazide (HYDRODIURIL) 25 MG tablet Take 1 tablet (25 mg total) by mouth daily as needed (swelling). 30 tablet 11    LUZMARIA 0.5-0.4 mg CM24 TAKE 1 CAPSULE BY MOUTH EVERY DAY 30 capsule 0    metFORMIN (GLUCOPHAGE) 500 MG tablet Take 1 tablet (500 mg total) by mouth 2 (two) times daily with meals. 180 tablet 3    NITROSTAT 0.4 mg SL tablet Place 1 tablet (0.4 mg total) under the tongue every 5 (five) minutes as needed. 30 tablet 11    pantoprazole (PROTONIX) 20 MG tablet TAKE 1 TABLET BY MOUTH DAILY 90 tablet 3     Reviewed bone density study from 1/15/2024 showing T-scores:  femoral neck left -1.4/right -1.5 and lumbar -0.2. Calculated FRAX score estimates her 10 year risk of a major osteoporetic fracture at 9.5 % and hip fracture at 1.2 %, which are not an indication for biphosphonate treatment.  There has been only mild worsening of the bilateral femoral neck T-scores since prior study in 10/2020.    Will let the patient know that her bone density study continues to show evidence of osteopenia (low bone density).  Will recommend that she attempt to achieve 1200 mg of calcium daily through dietary intake and supplements if needed, and continue Vitamin D supplementation. Also, will encourage her to exercise regularly, particularly weight bearing activities, which may help to prevent progression.      "epinephrine (EPIPEN) 0.3 mg/0.3 mL (1:1,000) AtIn Inject 0.3 mLs (0.3 mg total) into the muscle once as needed. 1 Device 12    fenofibrate 160 MG Tab Take 1 tablet (160 mg total) by mouth once daily. 90 tablet 3     No current facility-administered medications for this visit.        Objective:   Right Arm BP - Sittin/72 (19)  Left Arm BP - Sittin/69 (19)  /69 (BP Location: Left arm, Patient Position: Sitting, BP Method: Large (Automatic))   Pulse 84   Ht 5' 6" (1.676 m)   Wt 74.8 kg (164 lb 14.5 oz)   SpO2 96%   BMI 26.62 kg/m²     Body surface area is 1.87 meters squared.  Physical Exam   Constitutional: He is oriented to person, place, and time. He appears well-developed and well-nourished.   HENT:   Mouth/Throat: Mucous membranes are normal.   Neck: No hepatojugular reflux and no JVD present.   Cardiovascular: Normal rate and regular rhythm.   Pulses:       Radial pulses are 2+ on the right side, and 2+ on the left side.        Posterior tibial pulses are 2+ on the right side, and 2+ on the left side.   Pulmonary/Chest: Breath sounds normal.   No Scoliosis or Kyphosis   Abdominal: Soft. Normal appearance and bowel sounds are normal. There is no hepatomegaly. There is no tenderness.   Musculoskeletal:        Right lower leg: He exhibits no edema.        Left lower leg: He exhibits no edema.   No limitations for exercise   Neurological: He is alert and oriented to person, place, and time.   Skin: Skin is warm and dry. No cyanosis. Nails show no clubbing.   Psychiatric: He has a normal mood and affect. His speech is normal and behavior is normal. Judgment and thought content normal. Cognition and memory are normal.       Chemistry        Component Value Date/Time     2019 0845    K 5.6 (H) 2019 0845     2019 0845    CO2 28 2019 0845    BUN 14 2019 0845    CREATININE 1.3 2019 0845     (H) 2019 0845      "   Component Value Date/Time    CALCIUM 9.9 03/27/2019 0845    ALKPHOS 123 03/27/2019 0845    AST 26 03/27/2019 0845    ALT 22 03/27/2019 0845    BILITOT 0.5 03/27/2019 0845    ESTGFRAFRICA >60.0 03/27/2019 0845    EGFRNONAA 54.5 (A) 03/27/2019 0845            Recent Labs   Lab 07/31/18  0907 03/27/19  0845   WHITE BLOOD CELL COUNT 7.86  --    HEMOGLOBIN 15.1  --    HEMATOCRIT 45.1  --    MCV 87  --    PLATELETS 168  --    BNP  --  <10   CHOLESTEROL 126 159   HDL 29 L 30 L   LDL CHOLESTEROL 33.6 L Invalid, Trig>400.0   TRIGLYCERIDES 317 H 669 H   HDL/CHOLESTEROL RATIO 23.0 18.9 L                Test(s) Reviewed  I have reviewed the following in detail:  [] Stress test   [] Angiography   [x] Echocardiogram   [x] Labs   [] Other:         Assessment:   Precordial pain  He has risk factors.- he needs a stress echo but his achilles tendon is bad and this secondary to Cipro     The 10-year ASCVD risk score (Hao DC Jr., et al., 2013) is: 43.8%    Values used to calculate the score:      Age: 72 years      Sex: Male      Is Non- : No      Diabetic: Yes      Tobacco smoker: No      Systolic Blood Pressure: 127 mmHg      Is BP treated: Yes      HDL Cholesterol: 30 mg/dL      Total Cholesterol: 159 mg/dL      Migraines  No recent HA    Essential hypertension  Repeat BMP today. Start HTCZ again. We hope to get him back to an ARB as he is DM    CKD (chronic kidney disease) stage 3, GFR 30-59 ml/min  Most likely secondary to DM. He is seeing Endocrine today. Last urinalysis in 7/2018 which did not show any protein    Mixed dyslipidemia  TG are 669 today. He is off of medications and HIAC 7.5.      Plan:   Follow up in about 8 weeks (around 5/23/2019) for exercise stress echo, BMP.  1. Restart HCTZ  2. Restart Fenobrate  3. Endocrine consult  4. BMP today to check Potassium    Orders Placed This Encounter   Procedures    Basic metabolic panel    Basic metabolic panel    Ambulatory consult to  Endocrinology    Echocardiogram stress test with CFD (Cupid Only)     Medications Ordered This Encounter   Medications    fenofibrate 160 MG Tab

## 2024-04-02 LAB
ALBUMIN SERPL-MCNC: 3.9 G/DL (ref 3.9–4.9)
ALBUMIN/CREAT UR: 82 MG/G CREAT (ref 0–29)
ALBUMIN/GLOB SERPL: 1.9 {RATIO} (ref 1.2–2.2)
ALP SERPL-CCNC: 65 IU/L (ref 44–121)
ALT SERPL-CCNC: 12 IU/L (ref 0–32)
AST SERPL-CCNC: 16 IU/L (ref 0–40)
BASOPHILS # BLD AUTO: 0 X10E3/UL (ref 0–0.2)
BASOPHILS NFR BLD AUTO: 0 %
BILIRUB SERPL-MCNC: 0.3 MG/DL (ref 0–1.2)
BUN SERPL-MCNC: 19 MG/DL (ref 8–27)
BUN/CREAT SERPL: 19 (ref 12–28)
CALCIUM SERPL-MCNC: 9.3 MG/DL (ref 8.7–10.3)
CHLORIDE SERPL-SCNC: 98 MMOL/L (ref 96–106)
CHOLEST SERPL-MCNC: 219 MG/DL (ref 100–199)
CO2 SERPL-SCNC: 24 MMOL/L (ref 20–29)
CREAT SERPL-MCNC: 0.99 MG/DL (ref 0.57–1)
CREAT UR-MCNC: 96.4 MG/DL
EGFRCR SERPLBLD CKD-EPI 2021: 62 ML/MIN/1.73
EOSINOPHIL # BLD AUTO: 0.1 X10E3/UL (ref 0–0.4)
EOSINOPHIL NFR BLD AUTO: 1 %
ERYTHROCYTE [DISTWIDTH] IN BLOOD BY AUTOMATED COUNT: 11.4 % (ref 11.7–15.4)
GLOBULIN SER CALC-MCNC: 2.1 G/DL (ref 1.5–4.5)
GLUCOSE SERPL-MCNC: 114 MG/DL (ref 70–99)
HBA1C MFR BLD: 8 % (ref 4.8–5.6)
HCT VFR BLD AUTO: 33.4 % (ref 34–46.6)
HDLC SERPL-MCNC: 81 MG/DL
HGB BLD-MCNC: 11.2 G/DL (ref 11.1–15.9)
IMM GRANULOCYTES # BLD AUTO: 0 X10E3/UL (ref 0–0.1)
IMM GRANULOCYTES NFR BLD AUTO: 0 %
LDLC SERPL CALC-MCNC: 123 MG/DL (ref 0–99)
LYMPHOCYTES # BLD AUTO: 1.6 X10E3/UL (ref 0.7–3.1)
LYMPHOCYTES NFR BLD AUTO: 22 %
MAGNESIUM SERPL-MCNC: 1.9 MG/DL (ref 1.6–2.3)
MCH RBC QN AUTO: 33.2 PG (ref 26.6–33)
MCHC RBC AUTO-ENTMCNC: 33.5 G/DL (ref 31.5–35.7)
MCV RBC AUTO: 99 FL (ref 79–97)
MICROALBUMIN UR-MCNC: 79.1 UG/ML
MONOCYTES # BLD AUTO: 0.6 X10E3/UL (ref 0.1–0.9)
MONOCYTES NFR BLD AUTO: 8 %
NEUTROPHILS # BLD AUTO: 4.7 X10E3/UL (ref 1.4–7)
NEUTROPHILS NFR BLD AUTO: 69 %
PLATELET # BLD AUTO: 271 X10E3/UL (ref 150–450)
POTASSIUM SERPL-SCNC: 4.6 MMOL/L (ref 3.5–5.2)
PROT SERPL-MCNC: 6 G/DL (ref 6–8.5)
RBC # BLD AUTO: 3.37 X10E6/UL (ref 3.77–5.28)
SODIUM SERPL-SCNC: 135 MMOL/L (ref 134–144)
SPECIMEN STATUS REPORT: NORMAL
T4 FREE SERPL-MCNC: 1.71 NG/DL (ref 0.82–1.77)
TRIGL SERPL-MCNC: 89 MG/DL (ref 0–149)
TSH SERPL DL<=0.005 MIU/L-ACNC: 0.97 UIU/ML (ref 0.45–4.5)
VLDLC SERPL CALC-MCNC: 15 MG/DL (ref 5–40)
WBC # BLD AUTO: 7 X10E3/UL (ref 3.4–10.8)

## 2024-04-09 ENCOUNTER — OFFICE VISIT (OUTPATIENT)
Age: 69
End: 2024-04-09

## 2024-04-09 ENCOUNTER — HOSPITAL ENCOUNTER (OUTPATIENT)
Facility: HOSPITAL | Age: 69
Discharge: HOME | End: 2024-04-12
Payer: MEDICARE

## 2024-04-09 VITALS
SYSTOLIC BLOOD PRESSURE: 132 MMHG | OXYGEN SATURATION: 99 % | WEIGHT: 168 LBS | BODY MASS INDEX: 27.99 KG/M2 | RESPIRATION RATE: 16 BRPM | TEMPERATURE: 98.2 F | HEART RATE: 62 BPM | DIASTOLIC BLOOD PRESSURE: 64 MMHG | HEIGHT: 65 IN

## 2024-04-09 DIAGNOSIS — E55.9 VITAMIN D DEFICIENCY: ICD-10-CM

## 2024-04-09 DIAGNOSIS — E10.42 TYPE 1 DIABETES MELLITUS WITH DIABETIC POLYNEUROPATHY (HCC): Primary | ICD-10-CM

## 2024-04-09 DIAGNOSIS — E10.3553 STABLE PROLIFERATIVE DIABETIC RETINOPATHY OF BOTH EYES ASSOCIATED WITH TYPE 1 DIABETES MELLITUS (HCC): ICD-10-CM

## 2024-04-09 DIAGNOSIS — M25.561 ACUTE PAIN OF RIGHT KNEE: ICD-10-CM

## 2024-04-09 DIAGNOSIS — E10.22 TYPE 1 DIABETES MELLITUS WITH STAGE 3A CHRONIC KIDNEY DISEASE (HCC): ICD-10-CM

## 2024-04-09 DIAGNOSIS — N18.31 STAGE 3A CHRONIC KIDNEY DISEASE (HCC): ICD-10-CM

## 2024-04-09 DIAGNOSIS — M79.604 LEG PAIN, LATERAL, RIGHT: ICD-10-CM

## 2024-04-09 DIAGNOSIS — I12.9 HYPERTENSIVE KIDNEY DISEASE WITH STAGE 3A CHRONIC KIDNEY DISEASE (HCC): ICD-10-CM

## 2024-04-09 DIAGNOSIS — K21.9 GASTROESOPHAGEAL REFLUX DISEASE, UNSPECIFIED WHETHER ESOPHAGITIS PRESENT: ICD-10-CM

## 2024-04-09 DIAGNOSIS — D64.9 ANEMIA, UNSPECIFIED TYPE: ICD-10-CM

## 2024-04-09 DIAGNOSIS — E03.4 HYPOTHYROIDISM DUE TO ACQUIRED ATROPHY OF THYROID: ICD-10-CM

## 2024-04-09 DIAGNOSIS — I25.10 CORONARY ARTERY CALCIFICATION SEEN ON CT SCAN: ICD-10-CM

## 2024-04-09 DIAGNOSIS — E78.00 PURE HYPERCHOLESTEROLEMIA: ICD-10-CM

## 2024-04-09 DIAGNOSIS — I10 ESSENTIAL HYPERTENSION: ICD-10-CM

## 2024-04-09 DIAGNOSIS — N18.31 HYPERTENSIVE KIDNEY DISEASE WITH STAGE 3A CHRONIC KIDNEY DISEASE (HCC): ICD-10-CM

## 2024-04-09 DIAGNOSIS — N18.31 TYPE 1 DIABETES MELLITUS WITH STAGE 3A CHRONIC KIDNEY DISEASE (HCC): ICD-10-CM

## 2024-04-09 PROCEDURE — 73560 X-RAY EXAM OF KNEE 1 OR 2: CPT

## 2024-04-09 PROCEDURE — 72100 X-RAY EXAM L-S SPINE 2/3 VWS: CPT

## 2024-04-09 PROCEDURE — 73502 X-RAY EXAM HIP UNI 2-3 VIEWS: CPT

## 2024-04-09 NOTE — PATIENT INSTRUCTIONS
your risk for heart disease, heart attack, and stroke. HDL is the \"good\" cholesterol. It helps clear bad cholesterol from the body. High HDL is linked with a lower risk of heart disease, heart attack, and stroke.  Your cholesterol levels help your doctor find out your risk for having a heart attack or stroke. You and your doctor can talk about whether you need to lower your risk and what treatment is best for you.  A heart-healthy lifestyle along with medicines can help lower your cholesterol and your risk. The way you choose to lower your risk will depend on how high your risk is for heart attack and stroke. It will also depend on how you feel about taking medicines.  Follow-up care is a key part of your treatment and safety. Be sure to make and go to all appointments, and call your doctor if you are having problems. It's also a good idea to know your test results and keep a list of the medicines you take.  How can you care for yourself at home?  Eat a variety of foods every day. Good choices include fruits, vegetables, whole grains (like oatmeal), dried beans and peas, nuts and seeds, soy products (like tofu), and fat-free or low-fat dairy products.  Replace butter, margarine, and hydrogenated or partially hydrogenated oils with olive and canola oils. (Canola oil margarine without trans fat is fine.)  Replace red meat with fish, poultry, and soy protein (like tofu).  Limit processed and packaged foods like chips, crackers, and cookies.  Bake, broil, or steam foods. Don't goff them.  Be physically active. Get at least 30 minutes of exercise on most days of the week. Walking is a good choice. You also may want to do other activities, such as running, swimming, cycling, or playing tennis or team sports.  Stay at a healthy weight or lose weight by making the changes in eating and physical activity listed above. Losing just a small amount of weight, even 5 to 10 pounds, can reduce your risk for having a heart attack or

## 2024-04-09 NOTE — PROGRESS NOTES
Barbra King presents today for   Chief Complaint   Patient presents with    3 Month Follow-Up       \"Have you been to the ER, urgent care clinic since your last visit?  Hospitalized since your last visit?\"    NO    “Have you seen or consulted any other health care providers outside of [facility] since your last visit?”    NO            
loss. Will continue to monitor.   12. Health maintenance. Completed Moderna COVID 19 vaccine series and received two Moderna booster doses and the updated bivalent Pfizer booster dose. Received 2/2 doses of Shingrix vaccine and requested that she provide date of second dose so that chart may be updated.  Completed influenza vaccine, RSV vaccine, and updated COVID-vaccine for this year.  Advised of recommendation for repeat COVID-vaccine given age. Discussed due for Tdap vaccine and will proceed.  Other immunizations up-to-date.  Vitamin D level has been on current maintenance dose supplement.  Continue screening skin exams at [facility] dermatology. Medicare wellness visit up-to-date.     Patient understands recommendations and agrees with plan..  Follow-up in 3 months.    This visit required high complexity medically necessary decision making and management plans.     Time spent in preparing for the visit, including review of history, tests done prior to arrival, additional time reviewing clinical data, imaging, outside records and test results:  5  minutes.  Time spent in counseling with patient and/or family members regarding care plan: 30 minutes.  Time spent on same-day documentation, ordering tests, treatments, and referring patient for further care: 10 minutes.   Time spent on visit does not include time for documentation not completed on day of visit.

## 2024-04-11 ENCOUNTER — TELEPHONE (OUTPATIENT)
Age: 69
End: 2024-04-11

## 2024-04-11 NOTE — TELEPHONE ENCOUNTER
Called and discussed x-rays performed following visit on 4/9/2024.  Advised that     Right knee x-ray:  Mild edema adjacent to the infrapatellar ligament, correlate clinically for  relevant symptoms/localized pain. If no localized pain, may be part of a more  diffuse process as there also may be some mild edema elsewhere in the  subcutaneous fat.    Lumbar spine x-ray  Moderate mid to lower lumbar spine facet joint arthropathy. Grade 1  anterolisthesis of L5.      Right hip x-ray  Mild degenerative change.    Patient reports that she experienced a fall due to twisting of her right knee today [facility].  Right knee x-ray showed no acute findings and she was prescribed naproxen and advised to follow-up with orthopedics.  Given that she lives in [place], recommended [facility] and she will call for an appointment.  She will let us know if she needs a referral.

## 2024-04-13 ENCOUNTER — TELEPHONE (OUTPATIENT)
Age: 69
End: 2024-04-13

## 2024-04-22 ENCOUNTER — PATIENT MESSAGE (OUTPATIENT)
Age: 69
End: 2024-04-22

## 2024-04-22 NOTE — TELEPHONE ENCOUNTER
From: Barbra King  To: Dr. Noelle Del Angel  Sent: 4/22/2024 8:38 AM EDT  Subject: Quinapril/Accuprill tabs    I was refilling my medication box today and noticed I have run out of Quinapril 5mg and Accuprill 10 mgs. When i went to [facility] they were no longer listed or available. Am I supposed to be using these meds? Not sure what is going on. Thanks in advance for getting back to me.

## 2024-05-08 RX ORDER — OMEPRAZOLE 40 MG/1
CAPSULE, DELAYED RELEASE ORAL
Qty: 90 CAPSULE | Refills: 3 | Status: SHIPPED | OUTPATIENT
Start: 2024-05-08

## 2024-06-10 ENCOUNTER — TELEPHONE (OUTPATIENT)
Facility: CLINIC | Age: 69
End: 2024-06-10

## 2024-06-10 NOTE — TELEPHONE ENCOUNTER
Vesat called in states Pt has surgery on 6/12/24 for right knee Meniscus repair.    She is going to fax over lab work. She states Pts glucose is 203.    Surgeon preforming is Dr. Redd.     She states if there are any questions please call [phone]

## 2024-06-17 RX ORDER — HYDROCHLOROTHIAZIDE 12.5 MG/1
12.5 CAPSULE, GELATIN COATED ORAL EVERY MORNING
Qty: 90 CAPSULE | Refills: 3 | Status: SHIPPED | OUTPATIENT
Start: 2024-06-17

## 2024-06-17 RX ORDER — OMEPRAZOLE 40 MG/1
40 CAPSULE, DELAYED RELEASE ORAL DAILY
Qty: 90 CAPSULE | Refills: 3 | Status: SHIPPED | OUTPATIENT
Start: 2024-06-17

## 2024-06-17 NOTE — TELEPHONE ENCOUNTER
Last OV: 04/09/24  Next OV: 07/10/24  Last RX: 06/29/23- HCTZ                 05/08/24- omeprazole      Please refuse omeprazole. Recently refilled 05/08/24.

## 2024-07-05 RX ORDER — LEVOTHYROXINE SODIUM 88 UG/1
88 TABLET ORAL
Qty: 75 TABLET | Refills: 3 | Status: SHIPPED | OUTPATIENT
Start: 2024-07-05

## 2024-07-18 RX ORDER — EZETIMIBE 10 MG/1
TABLET ORAL
Qty: 90 TABLET | Refills: 3 | Status: SHIPPED | OUTPATIENT
Start: 2024-07-18

## 2024-08-01 ENCOUNTER — HOSPITAL ENCOUNTER (OUTPATIENT)
Facility: HOSPITAL | Age: 69
Setting detail: SPECIMEN
Discharge: HOME | End: 2024-08-01
Payer: MEDICARE

## 2024-08-01 DIAGNOSIS — N18.31 STAGE 3A CHRONIC KIDNEY DISEASE (HCC): ICD-10-CM

## 2024-08-01 DIAGNOSIS — D64.9 ANEMIA, UNSPECIFIED TYPE: ICD-10-CM

## 2024-08-01 DIAGNOSIS — I12.9 HYPERTENSIVE KIDNEY DISEASE WITH STAGE 3A CHRONIC KIDNEY DISEASE (HCC): ICD-10-CM

## 2024-08-01 DIAGNOSIS — N18.31 TYPE 1 DIABETES MELLITUS WITH STAGE 3A CHRONIC KIDNEY DISEASE (HCC): ICD-10-CM

## 2024-08-01 DIAGNOSIS — E78.00 PURE HYPERCHOLESTEROLEMIA: ICD-10-CM

## 2024-08-01 DIAGNOSIS — E03.4 HYPOTHYROIDISM DUE TO ACQUIRED ATROPHY OF THYROID: ICD-10-CM

## 2024-08-01 DIAGNOSIS — N18.31 HYPERTENSIVE KIDNEY DISEASE WITH STAGE 3A CHRONIC KIDNEY DISEASE (HCC): ICD-10-CM

## 2024-08-01 DIAGNOSIS — E10.3553 STABLE PROLIFERATIVE DIABETIC RETINOPATHY OF BOTH EYES ASSOCIATED WITH TYPE 1 DIABETES MELLITUS (HCC): ICD-10-CM

## 2024-08-01 DIAGNOSIS — E10.42 TYPE 1 DIABETES MELLITUS WITH DIABETIC POLYNEUROPATHY (HCC): ICD-10-CM

## 2024-08-01 DIAGNOSIS — E10.22 TYPE 1 DIABETES MELLITUS WITH STAGE 3A CHRONIC KIDNEY DISEASE (HCC): ICD-10-CM

## 2024-08-01 DIAGNOSIS — I10 ESSENTIAL HYPERTENSION: ICD-10-CM

## 2024-08-01 LAB
25(OH)D3 SERPL-MCNC: 52 NG/ML (ref 30–100)
ALBUMIN SERPL-MCNC: 3.5 G/DL (ref 3.4–5)
ALBUMIN/GLOB SERPL: 1.3 (ref 0.8–1.7)
ALP SERPL-CCNC: 62 U/L (ref 45–117)
ALT SERPL-CCNC: 15 U/L (ref 13–56)
ANION GAP SERPL CALC-SCNC: 8 MMOL/L (ref 3–18)
AST SERPL-CCNC: 14 U/L (ref 10–38)
BASOPHILS # BLD: 0 K/UL (ref 0–0.1)
BASOPHILS NFR BLD: 0 % (ref 0–2)
BILIRUB SERPL-MCNC: 0.5 MG/DL (ref 0.2–1)
BUN SERPL-MCNC: 16 MG/DL (ref 7–18)
BUN/CREAT SERPL: 16 (ref 12–20)
CALCIUM SERPL-MCNC: 9.2 MG/DL (ref 8.5–10.1)
CHLORIDE SERPL-SCNC: 102 MMOL/L (ref 100–111)
CHOLEST SERPL-MCNC: 132 MG/DL
CO2 SERPL-SCNC: 25 MMOL/L (ref 21–32)
CREAT SERPL-MCNC: 1 MG/DL (ref 0.6–1.3)
CREAT UR-MCNC: 55 MG/DL (ref 30–125)
DIFFERENTIAL METHOD BLD: ABNORMAL
EOSINOPHIL # BLD: 0.2 K/UL (ref 0–0.4)
EOSINOPHIL NFR BLD: 3 % (ref 0–5)
ERYTHROCYTE [DISTWIDTH] IN BLOOD BY AUTOMATED COUNT: 11.5 % (ref 11.6–14.5)
EST. AVERAGE GLUCOSE BLD GHB EST-MCNC: 163 MG/DL
GLOBULIN SER CALC-MCNC: 2.8 G/DL (ref 2–4)
GLUCOSE SERPL-MCNC: 200 MG/DL (ref 74–99)
HBA1C MFR BLD: 7.3 % (ref 4.2–5.6)
HCT VFR BLD AUTO: 33 % (ref 35–45)
HDLC SERPL-MCNC: 75 MG/DL (ref 40–60)
HDLC SERPL: 1.8 (ref 0–5)
HGB BLD-MCNC: 10.8 G/DL (ref 12–16)
IMM GRANULOCYTES # BLD AUTO: 0 K/UL (ref 0–0.04)
IMM GRANULOCYTES NFR BLD AUTO: 0 % (ref 0–0.5)
LDLC SERPL CALC-MCNC: 40.6 MG/DL (ref 0–100)
LIPID PANEL: ABNORMAL
LYMPHOCYTES # BLD: 0.8 K/UL (ref 0.9–3.6)
LYMPHOCYTES NFR BLD: 12 % (ref 21–52)
MCH RBC QN AUTO: 33.6 PG (ref 24–34)
MCHC RBC AUTO-ENTMCNC: 32.7 G/DL (ref 31–37)
MCV RBC AUTO: 102.8 FL (ref 78–100)
MICROALBUMIN UR-MCNC: 7.95 MG/DL (ref 0–3)
MICROALBUMIN/CREAT UR-RTO: 145 MG/G (ref 0–30)
MONOCYTES # BLD: 0.4 K/UL (ref 0.05–1.2)
MONOCYTES NFR BLD: 6 % (ref 3–10)
NEUTS SEG # BLD: 5.5 K/UL (ref 1.8–8)
NEUTS SEG NFR BLD: 79 % (ref 40–73)
NRBC # BLD: 0 K/UL (ref 0–0.01)
NRBC BLD-RTO: 0 PER 100 WBC
PLATELET # BLD AUTO: 273 K/UL (ref 135–420)
PMV BLD AUTO: 11.9 FL (ref 9.2–11.8)
POTASSIUM SERPL-SCNC: 4.5 MMOL/L (ref 3.5–5.5)
PROT SERPL-MCNC: 6.3 G/DL (ref 6.4–8.2)
RBC # BLD AUTO: 3.21 M/UL (ref 4.2–5.3)
SODIUM SERPL-SCNC: 135 MMOL/L (ref 136–145)
TRIGL SERPL-MCNC: 82 MG/DL
TSH SERPL DL<=0.05 MIU/L-ACNC: 0.23 UIU/ML (ref 0.36–3.74)
VIT B12 SERPL-MCNC: 1315 PG/ML (ref 211–911)
VLDLC SERPL CALC-MCNC: 16.4 MG/DL
WBC # BLD AUTO: 7 K/UL (ref 4.6–13.2)

## 2024-08-01 PROCEDURE — 80053 COMPREHEN METABOLIC PANEL: CPT

## 2024-08-01 PROCEDURE — 84443 ASSAY THYROID STIM HORMONE: CPT

## 2024-08-01 PROCEDURE — 82607 VITAMIN B-12: CPT

## 2024-08-01 PROCEDURE — 82043 UR ALBUMIN QUANTITATIVE: CPT

## 2024-08-01 PROCEDURE — 80061 LIPID PANEL: CPT

## 2024-08-01 PROCEDURE — 83036 HEMOGLOBIN GLYCOSYLATED A1C: CPT

## 2024-08-01 PROCEDURE — 82570 ASSAY OF URINE CREATININE: CPT

## 2024-08-01 PROCEDURE — 36415 COLL VENOUS BLD VENIPUNCTURE: CPT

## 2024-08-01 PROCEDURE — 85025 COMPLETE CBC W/AUTO DIFF WBC: CPT

## 2024-08-01 PROCEDURE — 82306 VITAMIN D 25 HYDROXY: CPT

## 2024-08-17 DIAGNOSIS — E10.3553 STABLE PROLIFERATIVE DIABETIC RETINOPATHY OF BOTH EYES ASSOCIATED WITH TYPE 1 DIABETES MELLITUS (HCC): ICD-10-CM

## 2024-08-17 DIAGNOSIS — I25.10 CORONARY ARTERY CALCIFICATION SEEN ON CT SCAN: ICD-10-CM

## 2024-08-17 DIAGNOSIS — E78.00 PURE HYPERCHOLESTEROLEMIA: ICD-10-CM

## 2024-08-17 DIAGNOSIS — N18.31 TYPE 1 DIABETES MELLITUS WITH STAGE 3A CHRONIC KIDNEY DISEASE (HCC): ICD-10-CM

## 2024-08-17 DIAGNOSIS — E10.42 TYPE 1 DIABETES MELLITUS WITH DIABETIC POLYNEUROPATHY (HCC): ICD-10-CM

## 2024-08-17 DIAGNOSIS — E10.22 TYPE 1 DIABETES MELLITUS WITH STAGE 3A CHRONIC KIDNEY DISEASE (HCC): ICD-10-CM

## 2024-08-19 NOTE — TELEPHONE ENCOUNTER
I do not believe Repatha can be delivered by mail order since it needs to be kept refrigerated.  I have sent in the prescription, but please make sure that the patient checks with [facility].

## 2024-08-19 NOTE — TELEPHONE ENCOUNTER
PCP: Noelle Del Angel MD    LAST REFILL PER CHART:  Medication:Evolocumab (REPATHA) SOSY syringe   Ordered On:04/09/2024  Instructions:Inject 1 mL into the skin every 14 days   Dispense:6   Refills:3      Future Appointments   Date Time Provider Department Center   9/25/2024  1:00 PM Noelle Del Angel MD [facility] DEP

## 2024-09-24 SDOH — ECONOMIC STABILITY: FOOD INSECURITY: WITHIN THE PAST 12 MONTHS, YOU WORRIED THAT YOUR FOOD WOULD RUN OUT BEFORE YOU GOT MONEY TO BUY MORE.: NEVER TRUE

## 2024-09-24 SDOH — ECONOMIC STABILITY: FOOD INSECURITY: WITHIN THE PAST 12 MONTHS, THE FOOD YOU BOUGHT JUST DIDN'T LAST AND YOU DIDN'T HAVE MONEY TO GET MORE.: NEVER TRUE

## 2024-09-24 SDOH — ECONOMIC STABILITY: TRANSPORTATION INSECURITY
IN THE PAST 12 MONTHS, HAS LACK OF TRANSPORTATION KEPT YOU FROM MEETINGS, WORK, OR FROM GETTING THINGS NEEDED FOR DAILY LIVING?: NO

## 2024-09-24 SDOH — ECONOMIC STABILITY: INCOME INSECURITY: HOW HARD IS IT FOR YOU TO PAY FOR THE VERY BASICS LIKE FOOD, HOUSING, MEDICAL CARE, AND HEATING?: NOT HARD AT ALL

## 2024-09-25 ENCOUNTER — OFFICE VISIT (OUTPATIENT)
Facility: CLINIC | Age: 69
End: 2024-09-25

## 2024-09-25 VITALS
DIASTOLIC BLOOD PRESSURE: 66 MMHG | RESPIRATION RATE: 14 BRPM | BODY MASS INDEX: 26.49 KG/M2 | WEIGHT: 159 LBS | TEMPERATURE: 99.1 F | HEIGHT: 65 IN | SYSTOLIC BLOOD PRESSURE: 128 MMHG | HEART RATE: 70 BPM | OXYGEN SATURATION: 99 %

## 2024-09-25 DIAGNOSIS — E10.42 TYPE 1 DIABETES MELLITUS WITH DIABETIC POLYNEUROPATHY (HCC): ICD-10-CM

## 2024-09-25 DIAGNOSIS — I12.9 HYPERTENSIVE KIDNEY DISEASE WITH STAGE 3A CHRONIC KIDNEY DISEASE (HCC): ICD-10-CM

## 2024-09-25 DIAGNOSIS — Z23 NEED FOR PROPHYLACTIC VACCINATION AGAINST STREPTOCOCCUS PNEUMONIAE (PNEUMOCOCCUS): ICD-10-CM

## 2024-09-25 DIAGNOSIS — E78.00 PURE HYPERCHOLESTEROLEMIA: ICD-10-CM

## 2024-09-25 DIAGNOSIS — I25.10 CORONARY ARTERY CALCIFICATION SEEN ON CT SCAN: ICD-10-CM

## 2024-09-25 DIAGNOSIS — N18.31 TYPE 1 DIABETES MELLITUS WITH STAGE 3A CHRONIC KIDNEY DISEASE (HCC): ICD-10-CM

## 2024-09-25 DIAGNOSIS — E10.3553 STABLE PROLIFERATIVE DIABETIC RETINOPATHY OF BOTH EYES ASSOCIATED WITH TYPE 1 DIABETES MELLITUS (HCC): ICD-10-CM

## 2024-09-25 DIAGNOSIS — E66.3 OVERWEIGHT (BMI 25.0-29.9): ICD-10-CM

## 2024-09-25 DIAGNOSIS — Z12.11 COLON CANCER SCREENING: ICD-10-CM

## 2024-09-25 DIAGNOSIS — N18.31 HYPERTENSIVE KIDNEY DISEASE WITH STAGE 3A CHRONIC KIDNEY DISEASE (HCC): ICD-10-CM

## 2024-09-25 DIAGNOSIS — E10.22 TYPE 1 DIABETES MELLITUS WITH STAGE 3A CHRONIC KIDNEY DISEASE (HCC): ICD-10-CM

## 2024-09-25 DIAGNOSIS — I10 ESSENTIAL HYPERTENSION: Primary | ICD-10-CM

## 2024-09-25 DIAGNOSIS — E03.4 HYPOTHYROIDISM DUE TO ACQUIRED ATROPHY OF THYROID: ICD-10-CM

## 2024-09-25 DIAGNOSIS — N18.31 STAGE 3A CHRONIC KIDNEY DISEASE (HCC): ICD-10-CM

## 2024-09-25 DIAGNOSIS — D64.9 ANEMIA, UNSPECIFIED TYPE: ICD-10-CM

## 2024-09-25 DIAGNOSIS — Z23 ENCOUNTER FOR IMMUNIZATION: ICD-10-CM

## 2024-09-25 RX ORDER — LEVOTHYROXINE SODIUM 88 UG/1
88 TABLET ORAL DAILY
Qty: 100 TABLET | Refills: 3 | Status: SHIPPED | OUTPATIENT
Start: 2024-09-25

## 2024-09-25 RX ORDER — LEVOTHYROXINE SODIUM 88 UG/1
88 TABLET ORAL DAILY
Qty: 75 TABLET | Refills: 3 | Status: SHIPPED | OUTPATIENT
Start: 2024-09-25 | End: 2024-09-25 | Stop reason: SDUPTHER

## 2024-10-29 RX ORDER — LISINOPRIL 40 MG/1
40 TABLET ORAL DAILY
Qty: 90 TABLET | Refills: 3 | Status: SHIPPED | OUTPATIENT
Start: 2024-10-29

## 2024-10-29 RX ORDER — HYDROCHLOROTHIAZIDE 12.5 MG/1
12.5 CAPSULE ORAL EVERY MORNING
Qty: 90 CAPSULE | Refills: 3 | Status: SHIPPED | OUTPATIENT
Start: 2024-10-29

## 2024-10-29 NOTE — TELEPHONE ENCOUNTER
PCP: Noelle Del Angel MD    LAST OFFICE VISIT: 09/25/2024    LAST REFILL PER CHART:  Medication:lisinopril (PRINIVIL;ZESTRIL) 40 MG tablet   Ordered On:12/27/2023  Instructions:TAKE 1 TABLET DAILY   Dispense:90 tablets  Refills:3    LAST REFILL PER CHART:  Medication:hydroCHLOROthiazide 12.5 MG capsule   Ordered On:06/17/2024  Instructions:Take 1 capsule by mouth every morning   Dispense:90 capsules  Refills:3      Future Appointments   Date Time Provider Department Center   1/15/2025 10:15 AM IOC LAB VISIT [facility] DEP   1/22/2025  1:00 PM Noelle Del Angel MD [facility] DEP

## 2024-12-23 RX ORDER — AMLODIPINE BESYLATE 5 MG/1
5 TABLET ORAL DAILY
Qty: 90 TABLET | Refills: 3 | Status: SHIPPED | OUTPATIENT
Start: 2024-12-23

## 2024-12-23 NOTE — TELEPHONE ENCOUNTER
PCP: Noelle Del Angel MD    LAST OFFICE VISIT: 09/25/2024    LAST REFILL PER CHART:  Medication:amLODIPine (NORVASC) 5 MG tablet   Ordered On:12/27/2023  Instructions:TAKE 1 TABLET DAILY   Dispense:90 tablets  Refills:3      Future Appointments   Date Time Provider Department Center   1/15/2025 10:15 AM IOC LAB VISIT [facility] DEP   1/22/2025  1:00 PM Noelle Del Angel MD [facility] DEP

## 2025-01-14 ENCOUNTER — HOSPITAL ENCOUNTER (OUTPATIENT)
Facility: HOSPITAL | Age: 70
Setting detail: SPECIMEN
Discharge: HOME | End: 2025-01-17
Payer: MEDICARE

## 2025-01-14 DIAGNOSIS — N18.31 STAGE 3A CHRONIC KIDNEY DISEASE (HCC): ICD-10-CM

## 2025-01-14 DIAGNOSIS — N18.31 TYPE 1 DIABETES MELLITUS WITH STAGE 3A CHRONIC KIDNEY DISEASE (HCC): ICD-10-CM

## 2025-01-14 DIAGNOSIS — E10.42 TYPE 1 DIABETES MELLITUS WITH DIABETIC POLYNEUROPATHY (HCC): ICD-10-CM

## 2025-01-14 DIAGNOSIS — E10.3553 STABLE PROLIFERATIVE DIABETIC RETINOPATHY OF BOTH EYES ASSOCIATED WITH TYPE 1 DIABETES MELLITUS (HCC): ICD-10-CM

## 2025-01-14 DIAGNOSIS — D64.9 ANEMIA, UNSPECIFIED TYPE: ICD-10-CM

## 2025-01-14 DIAGNOSIS — I10 ESSENTIAL HYPERTENSION: ICD-10-CM

## 2025-01-14 DIAGNOSIS — E78.00 PURE HYPERCHOLESTEROLEMIA: ICD-10-CM

## 2025-01-14 DIAGNOSIS — E10.22 TYPE 1 DIABETES MELLITUS WITH STAGE 3A CHRONIC KIDNEY DISEASE (HCC): ICD-10-CM

## 2025-01-14 DIAGNOSIS — E03.4 HYPOTHYROIDISM DUE TO ACQUIRED ATROPHY OF THYROID: ICD-10-CM

## 2025-01-14 LAB
25(OH)D3 SERPL-MCNC: 45.2 NG/ML (ref 30–100)
ALBUMIN SERPL-MCNC: 3.7 G/DL (ref 3.4–5)
ALBUMIN/GLOB SERPL: 1.4 (ref 0.8–1.7)
ALP SERPL-CCNC: 66 U/L (ref 45–117)
ALT SERPL-CCNC: 15 U/L (ref 13–56)
ANION GAP SERPL CALC-SCNC: 6 MMOL/L (ref 3–18)
APPEARANCE UR: CLEAR
AST SERPL-CCNC: 12 U/L (ref 10–38)
BACTERIA URNS QL MICRO: ABNORMAL /HPF
BASOPHILS # BLD: 0.01 K/UL (ref 0–0.1)
BASOPHILS NFR BLD: 0.2 % (ref 0–2)
BILIRUB SERPL-MCNC: 0.6 MG/DL (ref 0.2–1)
BILIRUB UR QL: NEGATIVE
BUN SERPL-MCNC: 25 MG/DL (ref 7–18)
BUN/CREAT SERPL: 24 (ref 12–20)
CALCIUM SERPL-MCNC: 9.4 MG/DL (ref 8.5–10.1)
CHLORIDE SERPL-SCNC: 102 MMOL/L (ref 100–111)
CHOLEST SERPL-MCNC: 159 MG/DL
CO2 SERPL-SCNC: 26 MMOL/L (ref 21–32)
COLOR UR: YELLOW
CREAT SERPL-MCNC: 1.03 MG/DL (ref 0.6–1.3)
CREAT UR-MCNC: 47 MG/DL (ref 30–125)
DIFFERENTIAL METHOD BLD: ABNORMAL
EOSINOPHIL # BLD: 0.06 K/UL (ref 0–0.4)
EOSINOPHIL NFR BLD: 1.1 % (ref 0–5)
EPITH CASTS URNS QL MICRO: ABNORMAL /LPF (ref 0–5)
ERYTHROCYTE [DISTWIDTH] IN BLOOD BY AUTOMATED COUNT: 11.8 % (ref 11.6–14.5)
EST. AVERAGE GLUCOSE BLD GHB EST-MCNC: 163 MG/DL
FERRITIN SERPL-MCNC: 169 NG/ML (ref 8–388)
GLOBULIN SER CALC-MCNC: 2.7 G/DL (ref 2–4)
GLUCOSE SERPL-MCNC: 187 MG/DL (ref 74–99)
GLUCOSE UR STRIP.AUTO-MCNC: NEGATIVE MG/DL
HBA1C MFR BLD: 7.3 % (ref 4.2–5.6)
HCT VFR BLD AUTO: 33.6 % (ref 35–45)
HDLC SERPL-MCNC: 90 MG/DL (ref 40–60)
HDLC SERPL: 1.8 (ref 0–5)
HGB BLD-MCNC: 10.9 G/DL (ref 12–16)
HGB UR QL STRIP: NEGATIVE
IMM GRANULOCYTES # BLD AUTO: 0.03 K/UL (ref 0–0.04)
IMM GRANULOCYTES NFR BLD AUTO: 0.5 % (ref 0–0.5)
KETONES UR QL STRIP.AUTO: NEGATIVE MG/DL
LDLC SERPL CALC-MCNC: 48 MG/DL (ref 0–100)
LEUKOCYTE ESTERASE UR QL STRIP.AUTO: ABNORMAL
LIPID PANEL: ABNORMAL
LYMPHOCYTES # BLD: 1.19 K/UL (ref 0.9–3.6)
LYMPHOCYTES NFR BLD: 21.5 % (ref 21–52)
MAGNESIUM SERPL-MCNC: 2 MG/DL (ref 1.6–2.6)
MCH RBC QN AUTO: 32.6 PG (ref 24–34)
MCHC RBC AUTO-ENTMCNC: 32.4 G/DL (ref 31–37)
MCV RBC AUTO: 100.6 FL (ref 78–100)
MICROALBUMIN UR-MCNC: 8.36 MG/DL (ref 0–3)
MICROALBUMIN/CREAT UR-RTO: 178 MG/G (ref 0–30)
MONOCYTES # BLD: 0.32 K/UL (ref 0.05–1.2)
MONOCYTES NFR BLD: 5.8 % (ref 3–10)
NEUTS SEG # BLD: 3.93 K/UL (ref 1.8–8)
NEUTS SEG NFR BLD: 70.9 % (ref 40–73)
NITRITE UR QL STRIP.AUTO: NEGATIVE
NRBC # BLD: 0 K/UL (ref 0–0.01)
NRBC BLD-RTO: 0 PER 100 WBC
PH UR STRIP: 6 (ref 5–8)
PLATELET # BLD AUTO: 259 K/UL (ref 135–420)
PMV BLD AUTO: 11.4 FL (ref 9.2–11.8)
POTASSIUM SERPL-SCNC: 4.6 MMOL/L (ref 3.5–5.5)
PROT SERPL-MCNC: 6.4 G/DL (ref 6.4–8.2)
PROT UR STRIP-MCNC: NEGATIVE MG/DL
RBC # BLD AUTO: 3.34 M/UL (ref 4.2–5.3)
RBC #/AREA URNS HPF: ABNORMAL /HPF (ref 0–5)
SODIUM SERPL-SCNC: 134 MMOL/L (ref 136–145)
SP GR UR REFRACTOMETRY: 1.01 (ref 1–1.03)
T4 FREE SERPL-MCNC: 1.3 NG/DL (ref 0.7–1.5)
TRIGL SERPL-MCNC: 105 MG/DL
TSH SERPL DL<=0.05 MIU/L-ACNC: 0.94 UIU/ML (ref 0.36–3.74)
UROBILINOGEN UR QL STRIP.AUTO: 0.2 EU/DL (ref 0.2–1)
VLDLC SERPL CALC-MCNC: 21 MG/DL
WBC # BLD AUTO: 5.5 K/UL (ref 4.6–13.2)
WBC URNS QL MICRO: ABNORMAL /HPF (ref 0–5)

## 2025-01-14 PROCEDURE — 80053 COMPREHEN METABOLIC PANEL: CPT

## 2025-01-14 PROCEDURE — 83735 ASSAY OF MAGNESIUM: CPT

## 2025-01-14 PROCEDURE — 81001 URINALYSIS AUTO W/SCOPE: CPT

## 2025-01-14 PROCEDURE — 82306 VITAMIN D 25 HYDROXY: CPT

## 2025-01-14 PROCEDURE — 85025 COMPLETE CBC W/AUTO DIFF WBC: CPT

## 2025-01-14 PROCEDURE — 84443 ASSAY THYROID STIM HORMONE: CPT

## 2025-01-14 PROCEDURE — 84439 ASSAY OF FREE THYROXINE: CPT

## 2025-01-14 PROCEDURE — 83036 HEMOGLOBIN GLYCOSYLATED A1C: CPT

## 2025-01-14 PROCEDURE — 36415 COLL VENOUS BLD VENIPUNCTURE: CPT

## 2025-01-14 PROCEDURE — 82728 ASSAY OF FERRITIN: CPT

## 2025-01-14 PROCEDURE — 82570 ASSAY OF URINE CREATININE: CPT

## 2025-01-14 PROCEDURE — 82043 UR ALBUMIN QUANTITATIVE: CPT

## 2025-01-14 PROCEDURE — 80061 LIPID PANEL: CPT

## 2025-01-19 SDOH — ECONOMIC STABILITY: FOOD INSECURITY: WITHIN THE PAST 12 MONTHS, YOU WORRIED THAT YOUR FOOD WOULD RUN OUT BEFORE YOU GOT MONEY TO BUY MORE.: NEVER TRUE

## 2025-01-19 SDOH — HEALTH STABILITY: PHYSICAL HEALTH: ON AVERAGE, HOW MANY DAYS PER WEEK DO YOU ENGAGE IN MODERATE TO STRENUOUS EXERCISE (LIKE A BRISK WALK)?: 0 DAYS

## 2025-01-19 SDOH — ECONOMIC STABILITY: FOOD INSECURITY: WITHIN THE PAST 12 MONTHS, THE FOOD YOU BOUGHT JUST DIDN'T LAST AND YOU DIDN'T HAVE MONEY TO GET MORE.: NEVER TRUE

## 2025-01-19 SDOH — ECONOMIC STABILITY: INCOME INSECURITY: IN THE LAST 12 MONTHS, WAS THERE A TIME WHEN YOU WERE NOT ABLE TO PAY THE MORTGAGE OR RENT ON TIME?: NO

## 2025-01-19 SDOH — HEALTH STABILITY: PHYSICAL HEALTH: ON AVERAGE, HOW MANY MINUTES DO YOU ENGAGE IN EXERCISE AT THIS LEVEL?: 30 MIN

## 2025-01-19 SDOH — ECONOMIC STABILITY: TRANSPORTATION INSECURITY
IN THE PAST 12 MONTHS, HAS THE LACK OF TRANSPORTATION KEPT YOU FROM MEDICAL APPOINTMENTS OR FROM GETTING MEDICATIONS?: NO

## 2025-01-19 ASSESSMENT — PATIENT HEALTH QUESTIONNAIRE - PHQ9
SUM OF ALL RESPONSES TO PHQ QUESTIONS 1-9: 0
1. LITTLE INTEREST OR PLEASURE IN DOING THINGS: NOT AT ALL
2. FEELING DOWN, DEPRESSED OR HOPELESS: NOT AT ALL
SUM OF ALL RESPONSES TO PHQ QUESTIONS 1-9: 0

## 2025-01-19 ASSESSMENT — LIFESTYLE VARIABLES
HOW OFTEN DO YOU HAVE A DRINK CONTAINING ALCOHOL: MONTHLY OR LESS
HOW MANY STANDARD DRINKS CONTAINING ALCOHOL DO YOU HAVE ON A TYPICAL DAY: 1 OR 2

## 2025-02-26 ENCOUNTER — TRANSCRIBE ORDERS (OUTPATIENT)
Facility: HOSPITAL | Age: 70
End: 2025-02-26

## 2025-02-26 DIAGNOSIS — Z12.31 OTHER SCREENING MAMMOGRAM: Primary | ICD-10-CM

## 2025-03-01 ENCOUNTER — PATIENT MESSAGE (OUTPATIENT)
Facility: CLINIC | Age: 70
End: 2025-03-01

## 2025-03-01 DIAGNOSIS — E10.42 TYPE 1 DIABETES MELLITUS WITH DIABETIC POLYNEUROPATHY (HCC): ICD-10-CM

## 2025-03-01 DIAGNOSIS — N18.31 TYPE 1 DIABETES MELLITUS WITH STAGE 3A CHRONIC KIDNEY DISEASE (HCC): ICD-10-CM

## 2025-03-01 DIAGNOSIS — E78.00 PURE HYPERCHOLESTEROLEMIA: ICD-10-CM

## 2025-03-01 DIAGNOSIS — D64.9 ANEMIA, UNSPECIFIED TYPE: ICD-10-CM

## 2025-03-01 DIAGNOSIS — E10.22 TYPE 1 DIABETES MELLITUS WITH STAGE 3A CHRONIC KIDNEY DISEASE (HCC): ICD-10-CM

## 2025-03-01 DIAGNOSIS — I10 ESSENTIAL HYPERTENSION: Primary | ICD-10-CM

## 2025-03-01 DIAGNOSIS — E03.4 HYPOTHYROIDISM DUE TO ACQUIRED ATROPHY OF THYROID: ICD-10-CM

## 2025-03-13 ENCOUNTER — HOSPITAL ENCOUNTER (OUTPATIENT)
Facility: HOSPITAL | Age: 70
Setting detail: SPECIMEN
Discharge: HOME | End: 2025-03-16
Payer: MEDICARE

## 2025-03-13 DIAGNOSIS — E03.4 HYPOTHYROIDISM DUE TO ACQUIRED ATROPHY OF THYROID: ICD-10-CM

## 2025-03-13 DIAGNOSIS — I10 ESSENTIAL HYPERTENSION: ICD-10-CM

## 2025-03-13 DIAGNOSIS — D64.9 ANEMIA, UNSPECIFIED TYPE: ICD-10-CM

## 2025-03-13 DIAGNOSIS — E10.42 TYPE 1 DIABETES MELLITUS WITH DIABETIC POLYNEUROPATHY (HCC): ICD-10-CM

## 2025-03-13 DIAGNOSIS — N18.31 TYPE 1 DIABETES MELLITUS WITH STAGE 3A CHRONIC KIDNEY DISEASE (HCC): ICD-10-CM

## 2025-03-13 DIAGNOSIS — E10.22 TYPE 1 DIABETES MELLITUS WITH STAGE 3A CHRONIC KIDNEY DISEASE (HCC): ICD-10-CM

## 2025-03-13 DIAGNOSIS — E78.00 PURE HYPERCHOLESTEROLEMIA: ICD-10-CM

## 2025-03-13 LAB
ALBUMIN SERPL-MCNC: 3.7 G/DL (ref 3.4–5)
ALBUMIN/GLOB SERPL: 1.3 (ref 0.8–1.7)
ALP SERPL-CCNC: 67 U/L (ref 45–117)
ALT SERPL-CCNC: 16 U/L (ref 13–56)
ANION GAP SERPL CALC-SCNC: 5 MMOL/L (ref 3–18)
AST SERPL-CCNC: 14 U/L (ref 10–38)
BASOPHILS # BLD: 0 K/UL (ref 0–0.1)
BASOPHILS NFR BLD: 0 % (ref 0–2)
BILIRUB SERPL-MCNC: 0.5 MG/DL (ref 0.2–1)
BUN SERPL-MCNC: 20 MG/DL (ref 7–18)
BUN/CREAT SERPL: 20 (ref 12–20)
CALCIUM SERPL-MCNC: 9.2 MG/DL (ref 8.5–10.1)
CHLORIDE SERPL-SCNC: 100 MMOL/L (ref 100–111)
CHOLEST SERPL-MCNC: 168 MG/DL
CO2 SERPL-SCNC: 25 MMOL/L (ref 21–32)
CREAT SERPL-MCNC: 0.98 MG/DL (ref 0.6–1.3)
CREAT UR-MCNC: 21 MG/DL (ref 30–125)
DIFFERENTIAL METHOD BLD: ABNORMAL
EOSINOPHIL # BLD: 0.12 K/UL (ref 0–0.4)
EOSINOPHIL NFR BLD: 2 % (ref 0–5)
ERYTHROCYTE [DISTWIDTH] IN BLOOD BY AUTOMATED COUNT: 11.5 % (ref 11.6–14.5)
GLOBULIN SER CALC-MCNC: 2.9 G/DL (ref 2–4)
GLUCOSE SERPL-MCNC: 153 MG/DL (ref 74–99)
HCT VFR BLD AUTO: 34.2 % (ref 35–45)
HDLC SERPL-MCNC: 95 MG/DL (ref 40–60)
HDLC SERPL: 1.8 (ref 0–5)
HGB BLD-MCNC: 11.2 G/DL (ref 12–16)
IMM GRANULOCYTES # BLD AUTO: 0 K/UL (ref 0–0.04)
IMM GRANULOCYTES NFR BLD AUTO: 0 % (ref 0–0.5)
LDLC SERPL CALC-MCNC: 54.4 MG/DL (ref 0–100)
LIPID PANEL: ABNORMAL
LYMPHOCYTES # BLD: 1.36 K/UL (ref 0.9–3.6)
LYMPHOCYTES NFR BLD: 23 % (ref 21–52)
MCH RBC QN AUTO: 32.9 PG (ref 24–34)
MCHC RBC AUTO-ENTMCNC: 32.7 G/DL (ref 31–37)
MCV RBC AUTO: 100.6 FL (ref 78–100)
MICROALBUMIN UR-MCNC: 10.3 MG/DL (ref 0–3)
MICROALBUMIN/CREAT UR-RTO: 490 MG/G (ref 0–30)
MONOCYTES # BLD: 0.35 K/UL (ref 0.05–1.2)
MONOCYTES NFR BLD: 6 % (ref 3–10)
NEUTS SEG # BLD: 4.07 K/UL (ref 1.8–8)
NEUTS SEG NFR BLD: 69 % (ref 40–73)
NRBC # BLD: 0 K/UL (ref 0–0.01)
NRBC BLD-RTO: 0 PER 100 WBC
PLATELET # BLD AUTO: 264 K/UL (ref 135–420)
PLATELET COMMENT: ABNORMAL
PMV BLD AUTO: 11.5 FL (ref 9.2–11.8)
POTASSIUM SERPL-SCNC: 4.9 MMOL/L (ref 3.5–5.5)
PROT SERPL-MCNC: 6.6 G/DL (ref 6.4–8.2)
RBC # BLD AUTO: 3.4 M/UL (ref 4.2–5.3)
RBC MORPH BLD: ABNORMAL
SODIUM SERPL-SCNC: 130 MMOL/L (ref 136–145)
TRIGL SERPL-MCNC: 93 MG/DL
TSH SERPL DL<=0.05 MIU/L-ACNC: 0.78 UIU/ML (ref 0.36–3.74)
VIT B12 SERPL-MCNC: 1221 PG/ML (ref 211–911)
VLDLC SERPL CALC-MCNC: 18.6 MG/DL
WBC # BLD AUTO: 5.9 K/UL (ref 4.6–13.2)

## 2025-03-13 PROCEDURE — 84443 ASSAY THYROID STIM HORMONE: CPT

## 2025-03-13 PROCEDURE — 85025 COMPLETE CBC W/AUTO DIFF WBC: CPT

## 2025-03-13 PROCEDURE — 82570 ASSAY OF URINE CREATININE: CPT

## 2025-03-13 PROCEDURE — 80053 COMPREHEN METABOLIC PANEL: CPT

## 2025-03-13 PROCEDURE — 83521 IG LIGHT CHAINS FREE EACH: CPT

## 2025-03-13 PROCEDURE — 84166 PROTEIN E-PHORESIS/URINE/CSF: CPT

## 2025-03-13 PROCEDURE — 84156 ASSAY OF PROTEIN URINE: CPT

## 2025-03-13 PROCEDURE — 80061 LIPID PANEL: CPT

## 2025-03-13 PROCEDURE — 82784 ASSAY IGA/IGD/IGG/IGM EACH: CPT

## 2025-03-13 PROCEDURE — 86335 IMMUNFIX E-PHORSIS/URINE/CSF: CPT

## 2025-03-13 PROCEDURE — 82607 VITAMIN B-12: CPT

## 2025-03-13 PROCEDURE — 86334 IMMUNOFIX E-PHORESIS SERUM: CPT

## 2025-03-13 PROCEDURE — 82043 UR ALBUMIN QUANTITATIVE: CPT

## 2025-03-13 PROCEDURE — 84165 PROTEIN E-PHORESIS SERUM: CPT

## 2025-03-13 PROCEDURE — 36415 COLL VENOUS BLD VENIPUNCTURE: CPT

## 2025-03-14 LAB — PERIPHERAL SMEAR, MD REVIEW: NORMAL

## 2025-03-17 LAB
ALBUMIN SERPL ELPH-MCNC: 3.7 G/DL (ref 2.9–4.4)
ALBUMIN/GLOB SERPL: 1.4 (ref 0.7–1.7)
ALPHA1 GLOB SERPL ELPH-MCNC: 0.3 G/DL (ref 0–0.4)
ALPHA2 GLOB SERPL ELPH-MCNC: 0.8 G/DL (ref 0.4–1)
B-GLOBULIN SERPL ELPH-MCNC: 0.9 G/DL (ref 0.7–1.3)
GAMMA GLOB SERPL ELPH-MCNC: 0.8 G/DL (ref 0.4–1.8)
GLOBULIN SER-MCNC: 2.7 G/DL (ref 2.2–3.9)
IGA SERPL-MCNC: 143 MG/DL (ref 87–352)
IGG SERPL-MCNC: 749 MG/DL (ref 586–1602)
IGM SERPL-MCNC: 66 MG/DL (ref 26–217)
INTERPRETATION SERPL IEP-IMP: ABNORMAL
KAPPA LC FREE SER-MCNC: 22.1 MG/L (ref 3.3–19.4)
KAPPA LC FREE/LAMBDA FREE SER: 1.18 (ref 0.26–1.65)
LAMBDA LC FREE SERPL-MCNC: 18.7 MG/L (ref 5.7–26.3)
M PROTEIN SERPL ELPH-MCNC: ABNORMAL G/DL
PROT SERPL-MCNC: 6.4 G/DL (ref 6–8.5)

## 2025-03-17 SDOH — HEALTH STABILITY: PHYSICAL HEALTH: ON AVERAGE, HOW MANY MINUTES DO YOU ENGAGE IN EXERCISE AT THIS LEVEL?: 30 MIN

## 2025-03-17 SDOH — HEALTH STABILITY: PHYSICAL HEALTH: ON AVERAGE, HOW MANY DAYS PER WEEK DO YOU ENGAGE IN MODERATE TO STRENUOUS EXERCISE (LIKE A BRISK WALK)?: 0 DAYS

## 2025-03-17 ASSESSMENT — PATIENT HEALTH QUESTIONNAIRE - PHQ9
2. FEELING DOWN, DEPRESSED OR HOPELESS: SEVERAL DAYS
1. LITTLE INTEREST OR PLEASURE IN DOING THINGS: SEVERAL DAYS
SUM OF ALL RESPONSES TO PHQ QUESTIONS 1-9: 2

## 2025-03-17 ASSESSMENT — LIFESTYLE VARIABLES
HOW OFTEN DO YOU HAVE A DRINK CONTAINING ALCOHOL: 2
HOW OFTEN DO YOU HAVE A DRINK CONTAINING ALCOHOL: MONTHLY OR LESS
HOW MANY STANDARD DRINKS CONTAINING ALCOHOL DO YOU HAVE ON A TYPICAL DAY: 1 OR 2
HOW MANY STANDARD DRINKS CONTAINING ALCOHOL DO YOU HAVE ON A TYPICAL DAY: 1
HOW OFTEN DO YOU HAVE SIX OR MORE DRINKS ON ONE OCCASION: 1

## 2025-03-18 LAB
ALBUMIN 24H MFR UR ELPH: 76.5 %
ALPHA1 GLOB 24H MFR UR ELPH: 3.3 %
ALPHA2 GLOB 24H MFR UR ELPH: 6 %
B-GLOBULIN MFR UR ELPH: 8.6 %
GAMMA GLOB 24H MFR UR ELPH: 5.5 %
INTERPRETATION UR IFE-IMP: NORMAL
Lab: NORMAL
M PROTEIN 24H MFR UR ELPH: NORMAL %
PROT UR-MCNC: 21.8 MG/DL

## 2025-03-20 ENCOUNTER — OFFICE VISIT (OUTPATIENT)
Facility: CLINIC | Age: 70
End: 2025-03-20

## 2025-03-20 VITALS
TEMPERATURE: 98.8 F | WEIGHT: 166 LBS | DIASTOLIC BLOOD PRESSURE: 60 MMHG | OXYGEN SATURATION: 98 % | HEIGHT: 65 IN | SYSTOLIC BLOOD PRESSURE: 138 MMHG | HEART RATE: 67 BPM | BODY MASS INDEX: 27.66 KG/M2 | RESPIRATION RATE: 14 BRPM

## 2025-03-20 DIAGNOSIS — E78.00 PURE HYPERCHOLESTEROLEMIA: ICD-10-CM

## 2025-03-20 DIAGNOSIS — E10.3553 STABLE PROLIFERATIVE DIABETIC RETINOPATHY OF BOTH EYES ASSOCIATED WITH TYPE 1 DIABETES MELLITUS: ICD-10-CM

## 2025-03-20 DIAGNOSIS — Z71.89 ACP (ADVANCE CARE PLANNING): ICD-10-CM

## 2025-03-20 DIAGNOSIS — D53.9 MACROCYTIC ANEMIA: ICD-10-CM

## 2025-03-20 DIAGNOSIS — N18.31 HYPERTENSIVE KIDNEY DISEASE WITH STAGE 3A CHRONIC KIDNEY DISEASE (HCC): ICD-10-CM

## 2025-03-20 DIAGNOSIS — I10 ESSENTIAL HYPERTENSION: Primary | ICD-10-CM

## 2025-03-20 DIAGNOSIS — K21.9 GASTROESOPHAGEAL REFLUX DISEASE, UNSPECIFIED WHETHER ESOPHAGITIS PRESENT: ICD-10-CM

## 2025-03-20 DIAGNOSIS — E10.42 TYPE 1 DIABETES MELLITUS WITH DIABETIC POLYNEUROPATHY (HCC): ICD-10-CM

## 2025-03-20 DIAGNOSIS — E10.22 TYPE 1 DIABETES MELLITUS WITH STAGE 3A CHRONIC KIDNEY DISEASE (HCC): ICD-10-CM

## 2025-03-20 DIAGNOSIS — Z00.00 MEDICARE ANNUAL WELLNESS VISIT, SUBSEQUENT: ICD-10-CM

## 2025-03-20 DIAGNOSIS — F32.9 REACTIVE DEPRESSION: ICD-10-CM

## 2025-03-20 DIAGNOSIS — E66.3 OVERWEIGHT (BMI 25.0-29.9): ICD-10-CM

## 2025-03-20 DIAGNOSIS — Z87.891 PERSONAL HISTORY OF TOBACCO USE: ICD-10-CM

## 2025-03-20 DIAGNOSIS — E03.4 HYPOTHYROIDISM DUE TO ACQUIRED ATROPHY OF THYROID: ICD-10-CM

## 2025-03-20 DIAGNOSIS — N18.31 TYPE 1 DIABETES MELLITUS WITH STAGE 3A CHRONIC KIDNEY DISEASE (HCC): ICD-10-CM

## 2025-03-20 DIAGNOSIS — I12.9 HYPERTENSIVE KIDNEY DISEASE WITH STAGE 3A CHRONIC KIDNEY DISEASE (HCC): ICD-10-CM

## 2025-03-20 RX ORDER — ESCITALOPRAM OXALATE 5 MG/1
5 TABLET ORAL DAILY
Qty: 90 TABLET | Refills: 3 | Status: SHIPPED | OUTPATIENT
Start: 2025-03-20

## 2025-03-20 SDOH — ECONOMIC STABILITY: FOOD INSECURITY: WITHIN THE PAST 12 MONTHS, YOU WORRIED THAT YOUR FOOD WOULD RUN OUT BEFORE YOU GOT MONEY TO BUY MORE.: NEVER TRUE

## 2025-03-20 SDOH — ECONOMIC STABILITY: FOOD INSECURITY: WITHIN THE PAST 12 MONTHS, THE FOOD YOU BOUGHT JUST DIDN'T LAST AND YOU DIDN'T HAVE MONEY TO GET MORE.: NEVER TRUE

## 2025-03-20 NOTE — PATIENT INSTRUCTIONS
vaccination in the context of an injury, a tetanus vaccine (Tdap or Td) is recommended every 10 years.    A shingles vaccine is recommended once in a lifetime after age 60. The Shingles vaccine is also not covered by Medicare part B.    Note, however, that both the Shingles vaccine and Tdap/Td are generally covered by secondary carriers. Please check your coverage and out of pocket expenses. Consider contacting your local health department because it may stock these vaccines for a reasonable charge.    We currently have documentation of the following immunization history for you:  Immunization History   Administered Date(s) Administered   • COVID-19, MODERNA BLUE border, Primary or Immunocompromised, (age 12y+), IM, 100 mcg/0.5mL 02/12/2021, 03/12/2021, 10/24/2021, 03/31/2022   • COVID-19, MODERNA, 2024/25, (age 12y+), IM, 50mcg/0.5mL 09/22/2023, 10/03/2024   • COVID-19, PFIZER Bivalent, DO NOT Dilute, (age 12y+), IM, 30 mcg/0.3 mL 10/05/2022   • Influenza Virus Vaccine 11/09/2010, 11/04/2011, 10/17/2012   • Influenza, FLUAD, (age 65 y+), IM, Quadv, 0.5mL 11/05/2020, 09/29/2021   • Influenza, FLUAD, (age 65 y+), IM, Trivalent PF, 0.5mL 09/25/2024   • Influenza, FLUARIX, FLULAVAL, FLUZONE (age 6 mo+) and AFLURIA, (age 3 y+), Quadv PF, 0.5mL 12/15/2015, 09/14/2016, 10/19/2017, 11/07/2018, 12/10/2019   • Influenza, FLUZONE High Dose (age 65 y+), IM, Quadv, 0.7mL 09/22/2023   • Influenza, FLUZONE High Dose, (age 65 y+), IM, Trivalent PF, 0.5mL 11/30/2022   • Influenza, Trivalent PF 12/22/2014   • Pneumococcal, PCV-13, PREVNAR 13, (age 6w+), IM, 0.5mL 04/18/2020   • Pneumococcal, PCV20, PREVNAR 20, (age 6w+), IM, 0.5mL 09/25/2024   • Pneumococcal, PPSV23, PNEUMOVAX 23, (age 2y+), SC/IM, 0.5mL 04/13/2017, 05/31/2019   • RSV, AREXVY, (age 60y+), PF, IM, 0.5mL 09/22/2023   • TDaP, ADACEL (age 10y-64y), BOOSTRIX (age 10y+), IM, 0.5mL 06/25/2013, 10/03/2024   • Zoster Live (Zostavax) 05/31/2016   • Zoster Recombinant

## 2025-03-21 NOTE — ACP (ADVANCE CARE PLANNING)
Advance Care Planning     Advance Care Planning (ACP) Physician/NP/PA Conversation    Date of Conversation: 3/20/2025  Conducted with: Patient with Decision Making Capacity    Healthcare Decision Maker:      Primary Decision Maker: Jus King - Spouse - [phone]    Secondary Decision Maker: Vicki King - Child - [phone]    Click here to complete Healthcare Decision Makers including selection of the Healthcare Decision Maker Relationship (ie \"Primary\")  Today we confirmed healthcare decision makers as her  and daughter    Care Preferences:    Hospitalization:  \"If your health worsens and it becomes clear that your chance of recovery is unlikely, what would be your preference regarding hospitalization?\"  The patient would prefer hospitalization.    Ventilation:  \"If you were unable to breath on your own and your chance of recovery was unlikely, what would be your preference about the use of a ventilator (breathing machine) if it was available to you?\"  The patient would desire the use of a ventilator.    Resuscitation:  \"In the event your heart stopped as a result of an underlying serious health condition, would you want attempts made to restart your heart, or would you prefer a natural death?\"  Yes, attempt to resuscitate.    treatment goals, benefit/burden of treatment options, ventilation preferences, hospitalization preferences, resuscitation preferences, and end of life care preferences (vegetative state/imminent death)    Conversation Outcomes / Follow-Up Plan:  ACP in process - information provided, considering goals and options. Patient states that they would wish resuscitation efforts only as long as meaningful recovery is considered possible and not deemed futile.    Reviewed DNR/DNI and patient elects Full Code (Attempt Resuscitation)    Length of Voluntary ACP Conversation in minutes:  16 minutes    Noelle Del Angel MD

## 2025-03-21 NOTE — PROGRESS NOTES
Barbra King presents today for   Chief Complaint   Patient presents with    Medicare AWV       \"Have you been to the ER, urgent care clinic since your last visit?  Hospitalized since your last visit?\"    NO    “Have you seen or consulted any other health care providers outside of [facility] since your last visit?”    NO    “Have you had a colorectal cancer screening such as a colonoscopy/FIT/Cologuard?    Scheduled for April 14, 2025    Date of last Colonoscopy: 9/30/2019  No cologuard on file  No FIT/FOBT on file   No flexible sigmoidoscopy on file             
1 tablet by mouth daily Yes Automatic Reconciliation, Ar   insulin glargine (LANTUS SOLOSTAR) 100 UNIT/ML injection pen Inject 20 Units into the skin daily  Patient taking differently: Inject 17 Units into the skin daily Yes Automatic Reconciliation, Ar   insulin lispro, 1 Unit Dial, (HUMALOG/ADMELOG) 100 UNIT/ML SOPN Inject into the skin Yes Automatic Reconciliation, Ar       CareTeam (Including outside providers/suppliers regularly involved in providing care):   Patient Care Team:  Noelle Del Angel MD as PCP - General  Noelle Del Angel MD as PCP - Empaneled Provider  Esther Green MD as Physician     Recommendations for Preventive Services Due: see orders and patient instructions/AVS.  Recommended screening schedule for the next 5-10 years is provided to the patient in written form: see Patient Instructions/AVS.     Reviewed and updated this visit:  Tobacco  Allergies  Meds  Sexual Hx      Sexual History: Patient declined to answer.           
arthropathy. Developed acute worsening of right knee pain and presented to [facility] ED on 4/11/2024 and x-ray without acute changes.  She had been evaluated by Dr. Redd of [facility] on 6/7/2024 and diagnosed with acute medial meniscus tear.  Completed arthroscopic surgery on 6/12/2024 and postoperative course complicated by hospitalization for DKA and urinary retention.  Now completed physical therapy with overall improvement and resumed walking for exercise.  Completed follow-up with Dr. Redd on 10/28/2024 and advised to follow-up in 6 months.  12. Overweight.  Weight increased 7 pounds since last visit. Currently following a low carbohydrate intermittent fasting diet with increased proteins, salads, and fruit. Reports today that she is no longer drinking soft drinks and only drinking coffee and water.  Also walking for exercise daily.  Emphasized importance of continued lifestyle modifications, including heart healthy diet, regular exercise, and weight loss. Will continue to monitor.   13 . Health maintenance. Completed Moderna COVID 19 vaccine series and received two Moderna booster doses and the updated bivalent Pfizer booster dose. Received Shingrix vaccine series and Tdap vaccine.  Completed RSV vaccine and and Prevnar 20.  Advised to obtain updated COVID-vaccine. Other immunizations up-to-date.  Vitamin D level remains stable on current maintenance dose supplement.  Scheduled for colonoscopy on 4/14/2025. Continue screening skin exams at [facility] dermatology.  Continue regular eye exams with Dr. Higgins.  Will request report.    In addition, an annual Medicare wellness visit was done today.     Patient understands recommendations and agrees with plan..  Follow-up in 3 months.      This visit required high complexity medically necessary decision making and management plans.     Time spent in preparing for the visit, including review of history, tests done prior to arrival, additional

## 2025-03-23 ENCOUNTER — TELEPHONE (OUTPATIENT)
Facility: CLINIC | Age: 70
End: 2025-03-23

## 2025-03-23 PROBLEM — F32.9 REACTIVE DEPRESSION: Status: ACTIVE | Noted: 2025-03-23

## 2025-03-23 PROBLEM — E66.3 OVERWEIGHT (BMI 25.0-29.9): Status: ACTIVE | Noted: 2025-03-23

## 2025-03-23 PROBLEM — D53.9 MACROCYTIC ANEMIA: Status: ACTIVE | Noted: 2017-04-16

## 2025-03-27 ENCOUNTER — HOSPITAL ENCOUNTER (OUTPATIENT)
Facility: HOSPITAL | Age: 70
Discharge: HOME | End: 2025-03-30
Attending: INTERNAL MEDICINE
Payer: MEDICARE

## 2025-03-27 VITALS — HEIGHT: 65 IN | BODY MASS INDEX: 27.66 KG/M2 | WEIGHT: 166.01 LBS

## 2025-03-27 DIAGNOSIS — Z12.31 OTHER SCREENING MAMMOGRAM: ICD-10-CM

## 2025-03-27 DIAGNOSIS — Z87.891 PERSONAL HISTORY OF TOBACCO USE: ICD-10-CM

## 2025-03-27 PROCEDURE — 77063 BREAST TOMOSYNTHESIS BI: CPT

## 2025-03-27 PROCEDURE — 71271 CT THORAX LUNG CANCER SCR C-: CPT

## 2025-04-06 ENCOUNTER — RESULTS FOLLOW-UP (OUTPATIENT)
Facility: CLINIC | Age: 70
End: 2025-04-06

## 2025-06-19 ENCOUNTER — HOSPITAL ENCOUNTER (OUTPATIENT)
Facility: HOSPITAL | Age: 70
Setting detail: SPECIMEN
Discharge: HOME | End: 2025-06-22
Payer: MEDICARE

## 2025-06-19 DIAGNOSIS — I12.9 HYPERTENSIVE KIDNEY DISEASE WITH STAGE 3A CHRONIC KIDNEY DISEASE (HCC): ICD-10-CM

## 2025-06-19 DIAGNOSIS — E03.4 HYPOTHYROIDISM DUE TO ACQUIRED ATROPHY OF THYROID: ICD-10-CM

## 2025-06-19 DIAGNOSIS — E10.42 TYPE 1 DIABETES MELLITUS WITH DIABETIC POLYNEUROPATHY (HCC): ICD-10-CM

## 2025-06-19 DIAGNOSIS — N18.31 TYPE 1 DIABETES MELLITUS WITH STAGE 3A CHRONIC KIDNEY DISEASE (HCC): ICD-10-CM

## 2025-06-19 DIAGNOSIS — N18.31 HYPERTENSIVE KIDNEY DISEASE WITH STAGE 3A CHRONIC KIDNEY DISEASE (HCC): ICD-10-CM

## 2025-06-19 DIAGNOSIS — E10.3553 STABLE PROLIFERATIVE DIABETIC RETINOPATHY OF BOTH EYES ASSOCIATED WITH TYPE 1 DIABETES MELLITUS (HCC): ICD-10-CM

## 2025-06-19 DIAGNOSIS — E10.22 TYPE 1 DIABETES MELLITUS WITH STAGE 3A CHRONIC KIDNEY DISEASE (HCC): ICD-10-CM

## 2025-06-19 DIAGNOSIS — D53.9 MACROCYTIC ANEMIA: ICD-10-CM

## 2025-06-19 DIAGNOSIS — E78.00 PURE HYPERCHOLESTEROLEMIA: ICD-10-CM

## 2025-06-19 LAB
ALBUMIN SERPL-MCNC: 3.7 G/DL (ref 3.4–5)
ALBUMIN/GLOB SERPL: 1.7 (ref 0.8–1.7)
ALP SERPL-CCNC: 58 U/L (ref 45–117)
ALT SERPL-CCNC: 12 U/L (ref 10–35)
ANION GAP SERPL CALC-SCNC: 12 MMOL/L (ref 3–18)
AST SERPL-CCNC: 18 U/L (ref 10–38)
BASOPHILS # BLD: 0.01 K/UL (ref 0–0.1)
BASOPHILS NFR BLD: 0.2 % (ref 0–2)
BILIRUB SERPL-MCNC: 0.4 MG/DL (ref 0.2–1)
BUN SERPL-MCNC: 33 MG/DL (ref 6–23)
BUN/CREAT SERPL: 28 (ref 12–20)
CALCIUM SERPL-MCNC: 9.7 MG/DL (ref 8.5–10.1)
CHLORIDE SERPL-SCNC: 97 MMOL/L (ref 98–107)
CHOLEST SERPL-MCNC: 163 MG/DL
CO2 SERPL-SCNC: 22 MMOL/L (ref 21–32)
CREAT SERPL-MCNC: 1.18 MG/DL (ref 0.6–1.3)
CREAT UR-MCNC: 57.7 MG/DL (ref 30–125)
DIFFERENTIAL METHOD BLD: ABNORMAL
EOSINOPHIL # BLD: 0.03 K/UL (ref 0–0.4)
EOSINOPHIL NFR BLD: 0.5 % (ref 0–5)
ERYTHROCYTE [DISTWIDTH] IN BLOOD BY AUTOMATED COUNT: 11.6 % (ref 11.6–14.5)
EST. AVERAGE GLUCOSE BLD GHB EST-MCNC: 173 MG/DL
GLOBULIN SER CALC-MCNC: 2.2 G/DL (ref 2–4)
GLUCOSE SERPL-MCNC: 165 MG/DL (ref 74–108)
HBA1C MFR BLD: 7.7 % (ref 4.2–5.6)
HCT VFR BLD AUTO: 33.8 % (ref 35–45)
HDLC SERPL-MCNC: 77 MG/DL (ref 40–60)
HDLC SERPL: 2.1 (ref 0–5)
HGB BLD-MCNC: 11.2 G/DL (ref 12–16)
IMM GRANULOCYTES # BLD AUTO: 0.02 K/UL (ref 0–0.04)
IMM GRANULOCYTES NFR BLD AUTO: 0.3 % (ref 0–0.5)
LDLC SERPL CALC-MCNC: 70 MG/DL (ref 0–100)
LYMPHOCYTES # BLD: 1.26 K/UL (ref 0.9–3.6)
LYMPHOCYTES NFR BLD: 19.3 % (ref 21–52)
MAGNESIUM SERPL-MCNC: 1.9 MG/DL (ref 1.6–2.6)
MCH RBC QN AUTO: 32.7 PG (ref 24–34)
MCHC RBC AUTO-ENTMCNC: 33.1 G/DL (ref 31–37)
MCV RBC AUTO: 98.5 FL (ref 78–100)
MICROALBUMIN UR-MCNC: <1.2 MG/DL (ref 0–3)
MICROALBUMIN/CREAT UR-RTO: NORMAL MG/G (ref 0–30)
MONOCYTES # BLD: 0.35 K/UL (ref 0.05–1.2)
MONOCYTES NFR BLD: 5.4 % (ref 3–10)
NEUTS SEG # BLD: 4.85 K/UL (ref 1.8–8)
NEUTS SEG NFR BLD: 74.3 % (ref 40–73)
NRBC # BLD: 0 K/UL (ref 0–0.01)
NRBC BLD-RTO: 0 PER 100 WBC
PLATELET # BLD AUTO: 293 K/UL (ref 135–420)
PMV BLD AUTO: 11.4 FL (ref 9.2–11.8)
POTASSIUM SERPL-SCNC: 5.1 MMOL/L (ref 3.5–5.5)
PROT SERPL-MCNC: 5.9 G/DL (ref 6.4–8.2)
RBC # BLD AUTO: 3.43 M/UL (ref 4.2–5.3)
SODIUM SERPL-SCNC: 132 MMOL/L (ref 136–145)
TRIGL SERPL-MCNC: 82 MG/DL (ref 0–150)
TSH, 3RD GENERATION: 0.35 UIU/ML (ref 0.27–4.2)
VLDLC SERPL CALC-MCNC: 16 MG/DL
WBC # BLD AUTO: 6.5 K/UL (ref 4.6–13.2)

## 2025-06-19 PROCEDURE — 84443 ASSAY THYROID STIM HORMONE: CPT

## 2025-06-19 PROCEDURE — 85025 COMPLETE CBC W/AUTO DIFF WBC: CPT

## 2025-06-19 PROCEDURE — 82043 UR ALBUMIN QUANTITATIVE: CPT

## 2025-06-19 PROCEDURE — 80053 COMPREHEN METABOLIC PANEL: CPT

## 2025-06-19 PROCEDURE — 82570 ASSAY OF URINE CREATININE: CPT

## 2025-06-19 PROCEDURE — 83735 ASSAY OF MAGNESIUM: CPT

## 2025-06-19 PROCEDURE — 80061 LIPID PANEL: CPT

## 2025-06-19 PROCEDURE — 36415 COLL VENOUS BLD VENIPUNCTURE: CPT

## 2025-06-19 PROCEDURE — 83036 HEMOGLOBIN GLYCOSYLATED A1C: CPT

## 2025-06-25 ENCOUNTER — OFFICE VISIT (OUTPATIENT)
Facility: CLINIC | Age: 70
End: 2025-06-25

## 2025-06-25 VITALS
TEMPERATURE: 98.7 F | WEIGHT: 164 LBS | OXYGEN SATURATION: 100 % | DIASTOLIC BLOOD PRESSURE: 50 MMHG | HEART RATE: 77 BPM | SYSTOLIC BLOOD PRESSURE: 132 MMHG | RESPIRATION RATE: 16 BRPM | HEIGHT: 65 IN | BODY MASS INDEX: 27.32 KG/M2

## 2025-06-25 DIAGNOSIS — E10.3553 STABLE PROLIFERATIVE DIABETIC RETINOPATHY OF BOTH EYES ASSOCIATED WITH TYPE 1 DIABETES MELLITUS (HCC): ICD-10-CM

## 2025-06-25 DIAGNOSIS — K21.9 GASTROESOPHAGEAL REFLUX DISEASE, UNSPECIFIED WHETHER ESOPHAGITIS PRESENT: ICD-10-CM

## 2025-06-25 DIAGNOSIS — E03.4 HYPOTHYROIDISM DUE TO ACQUIRED ATROPHY OF THYROID: ICD-10-CM

## 2025-06-25 DIAGNOSIS — E10.22 TYPE 1 DIABETES MELLITUS WITH STAGE 3A CHRONIC KIDNEY DISEASE (HCC): ICD-10-CM

## 2025-06-25 DIAGNOSIS — I10 ESSENTIAL HYPERTENSION: Primary | ICD-10-CM

## 2025-06-25 DIAGNOSIS — N18.31 HYPERTENSIVE KIDNEY DISEASE WITH STAGE 3A CHRONIC KIDNEY DISEASE (HCC): ICD-10-CM

## 2025-06-25 DIAGNOSIS — N18.31 TYPE 1 DIABETES MELLITUS WITH STAGE 3A CHRONIC KIDNEY DISEASE (HCC): ICD-10-CM

## 2025-06-25 DIAGNOSIS — E78.00 PURE HYPERCHOLESTEROLEMIA: ICD-10-CM

## 2025-06-25 DIAGNOSIS — D53.9 MACROCYTIC ANEMIA: ICD-10-CM

## 2025-06-25 DIAGNOSIS — E10.42 TYPE 1 DIABETES MELLITUS WITH DIABETIC POLYNEUROPATHY (HCC): ICD-10-CM

## 2025-06-25 DIAGNOSIS — I12.9 HYPERTENSIVE KIDNEY DISEASE WITH STAGE 3A CHRONIC KIDNEY DISEASE (HCC): ICD-10-CM

## 2025-06-25 DIAGNOSIS — E66.3 OVERWEIGHT (BMI 25.0-29.9): ICD-10-CM

## 2025-06-25 NOTE — PATIENT INSTRUCTIONS
do other activities, such as running, swimming, cycling, or playing tennis or team sports.  Stay at a healthy weight or lose weight by making the changes in eating and physical activity listed above. Losing just a small amount of weight, even 5 to 10 pounds, can reduce your risk for having a heart attack or stroke.  Do not smoke.  When should you call for help?  Watch closely for changes in your health, and be sure to contact your doctor if:    You need help making lifestyle changes.     You have questions about your medicine.   Where can you learn more?  Go to https://www.[employer].net/Splystonnections  Enter I865 in the search box to learn more about \"High Cholesterol: Care Instructions.\"  Current as of: December 16, 2019               Content Version: 12.6  © 6904-4135 [employer].   Care instructions adapted under license by [employer] (which disclaims liability or warranty for this information). If you have questions about a medical condition or this instruction, always ask your healthcare professional. [employer] disclaims any warranty or liability for your use of this information.

## 2025-06-25 NOTE — PROGRESS NOTES
Barbra King presents today for   Chief Complaint   Patient presents with    3 Month Follow-Up       \"Have you been to the ER, urgent care clinic since your last visit?  Hospitalized since your last visit?\"    NO    “Have you seen or consulted any other health care providers outside of [facility] since your last visit?”    NO            
(HCC)     Essential hypertension with goal blood pressure less than 140/90     Hearing decreased     Hyperlipidemia     Hypothyroidism      Past Surgical History:   Procedure Laterality Date    GYN      3 D and C's    GYN      partial hysterectomy    HEENT      tonsillectomy    HEENT      multiple surgeries for retinopathy    HEENT      cataract removal bilateral    HYSTERECTOMY (CERVIX STATUS UNKNOWN)      ORTHOPEDIC SURGERY      carpral tunnel sugery bilateral    OVARY REMOVAL       Current Outpatient Medications   Medication Sig    amLODIPine (NORVASC) 5 MG tablet TAKE 1 TABLET DAILY    lisinopril (PRINIVIL;ZESTRIL) 40 MG tablet Take 1 tablet by mouth daily    hydroCHLOROthiazide 12.5 MG capsule Take 1 capsule by mouth every morning    levothyroxine (SYNTHROID) 88 MCG tablet Take 1 tablet by mouth Daily    Evolocumab (REPATHA) SOSY syringe Inject 1 mL into the skin every 14 days    ezetimibe (ZETIA) 10 MG tablet TAKE 1 TABLET DAILY    omeprazole (PRILOSEC) 40 MG delayed release capsule Take 1 capsule by mouth daily    aspirin 81 MG EC tablet Take by mouth daily    brimonidine (ALPHAGAN P) 0.1 % SOLN ceived the following from [facility]: Outside name: ALPHAGAN P 0.1 % ophthalmic solution    Cholecalciferol 50 MCG (2000) CAPS Take 1 capsule by mouth daily    cyanocobalamin 500 MCG tablet Take 1 tablet by mouth daily    insulin glargine (LANTUS SOLOSTAR) 100 UNIT/ML injection pen Inject 20 Units into the skin daily    insulin lispro, 1 Unit Dial, (HUMALOG/ADMELOG) 100 UNIT/ML SOPN Inject into the skin     No current facility-administered medications for this visit.     Allergies and Intolerances:   Allergies   Allergen Reactions    Penicillin G Benzathine Anaphylaxis    Penicillin G Hives    Penicillins Hives    Sulfa Antibiotics Nausea Only, Dizziness or Vertigo and Nausea And Vomiting     Other reaction(s): Vertigo       Family History: Grandmother  from colon cancer. Mother had CAD and

## 2025-06-29 PROBLEM — F32.9 REACTIVE DEPRESSION: Status: RESOLVED | Noted: 2025-03-23 | Resolved: 2025-06-29

## 2025-07-14 RX ORDER — EZETIMIBE 10 MG/1
10 TABLET ORAL DAILY
Qty: 90 TABLET | Refills: 3 | Status: SHIPPED | OUTPATIENT
Start: 2025-07-14

## 2025-08-01 DIAGNOSIS — E78.00 PURE HYPERCHOLESTEROLEMIA: ICD-10-CM

## 2025-08-01 DIAGNOSIS — E10.3553 STABLE PROLIFERATIVE DIABETIC RETINOPATHY OF BOTH EYES ASSOCIATED WITH TYPE 1 DIABETES MELLITUS (HCC): ICD-10-CM

## 2025-08-01 DIAGNOSIS — E10.42 TYPE 1 DIABETES MELLITUS WITH DIABETIC POLYNEUROPATHY (HCC): ICD-10-CM

## 2025-08-01 DIAGNOSIS — N18.31 TYPE 1 DIABETES MELLITUS WITH STAGE 3A CHRONIC KIDNEY DISEASE (HCC): ICD-10-CM

## 2025-08-01 DIAGNOSIS — I25.10 CORONARY ARTERY CALCIFICATION SEEN ON CT SCAN: ICD-10-CM

## 2025-08-01 DIAGNOSIS — E10.22 TYPE 1 DIABETES MELLITUS WITH STAGE 3A CHRONIC KIDNEY DISEASE (HCC): ICD-10-CM
